# Patient Record
Sex: FEMALE | Race: WHITE | NOT HISPANIC OR LATINO | Employment: OTHER | URBAN - METROPOLITAN AREA
[De-identification: names, ages, dates, MRNs, and addresses within clinical notes are randomized per-mention and may not be internally consistent; named-entity substitution may affect disease eponyms.]

---

## 2017-01-10 ENCOUNTER — TELEPHONE (OUTPATIENT)
Dept: FAMILY MEDICINE CLINIC | Facility: CLINIC | Age: 67
End: 2017-01-10

## 2017-01-11 RX ORDER — ESCITALOPRAM OXALATE 10 MG/1
10 TABLET ORAL DAILY
Qty: 90 TABLET | Refills: 1 | Status: SHIPPED | OUTPATIENT
Start: 2017-01-11 | End: 2017-07-21 | Stop reason: SDUPTHER

## 2017-03-13 RX ORDER — MELOXICAM 15 MG/1
TABLET ORAL
Qty: 90 TABLET | Refills: 0 | Status: SHIPPED | OUTPATIENT
Start: 2017-03-13 | End: 2017-06-26 | Stop reason: SDUPTHER

## 2017-03-20 RX ORDER — BUPROPION HYDROCHLORIDE 150 MG/1
TABLET ORAL
Qty: 90 TABLET | Refills: 0 | Status: SHIPPED | OUTPATIENT
Start: 2017-03-20 | End: 2017-06-30 | Stop reason: SDUPTHER

## 2017-03-20 RX ORDER — FENOFIBRATE 145 MG/1
TABLET, COATED ORAL
Qty: 90 TABLET | Refills: 0 | Status: SHIPPED | OUTPATIENT
Start: 2017-03-20 | End: 2017-06-30 | Stop reason: SDUPTHER

## 2017-03-27 RX ORDER — AMILORIDE HYDROCHLORIDE AND HYDROCHLOROTHIAZIDE 5; 50 MG/1; MG/1
TABLET ORAL
Qty: 90 TABLET | Refills: 0 | Status: SHIPPED | OUTPATIENT
Start: 2017-03-27 | End: 2017-07-13 | Stop reason: SDUPTHER

## 2017-03-27 RX ORDER — LOSARTAN POTASSIUM 100 MG/1
TABLET ORAL
Qty: 90 TABLET | Refills: 0 | Status: SHIPPED | OUTPATIENT
Start: 2017-03-27 | End: 2017-07-13 | Stop reason: SDUPTHER

## 2017-05-15 RX ORDER — ATENOLOL 50 MG/1
TABLET ORAL
Qty: 90 TABLET | Refills: 0 | Status: SHIPPED | OUTPATIENT
Start: 2017-05-15 | End: 2017-07-21 | Stop reason: SDUPTHER

## 2017-06-26 RX ORDER — MELOXICAM 15 MG/1
TABLET ORAL
Qty: 90 TABLET | Refills: 0 | Status: SHIPPED | OUTPATIENT
Start: 2017-06-26 | End: 2017-10-17 | Stop reason: SDUPTHER

## 2017-06-30 RX ORDER — FENOFIBRATE 145 MG/1
TABLET, COATED ORAL
Qty: 90 TABLET | Refills: 0 | Status: SHIPPED | OUTPATIENT
Start: 2017-06-30 | End: 2017-07-21 | Stop reason: SDUPTHER

## 2017-06-30 RX ORDER — BUPROPION HYDROCHLORIDE 150 MG/1
TABLET ORAL
Qty: 90 TABLET | Refills: 0 | Status: SHIPPED | OUTPATIENT
Start: 2017-06-30 | End: 2017-07-21 | Stop reason: SDUPTHER

## 2017-07-05 ENCOUNTER — APPOINTMENT (OUTPATIENT)
Dept: WOMENS IMAGING | Facility: HOSPITAL | Age: 67
End: 2017-07-05

## 2017-07-05 DIAGNOSIS — R92.8 ABNORMAL MAMMOGRAM OF LEFT BREAST: Primary | ICD-10-CM

## 2017-07-05 PROCEDURE — MDREVIEWSP: Performed by: RADIOLOGY

## 2017-07-05 PROCEDURE — 77063 BREAST TOMOSYNTHESIS BI: CPT | Performed by: RADIOLOGY

## 2017-07-05 PROCEDURE — 76641 ULTRASOUND BREAST COMPLETE: CPT | Performed by: RADIOLOGY

## 2017-07-05 PROCEDURE — G0206 DX MAMMO INCL CAD UNI: HCPCS | Performed by: RADIOLOGY

## 2017-07-05 PROCEDURE — G0279 TOMOSYNTHESIS, MAMMO: HCPCS | Performed by: RADIOLOGY

## 2017-07-05 PROCEDURE — 19083 BX BREAST 1ST LESION US IMAG: CPT | Performed by: RADIOLOGY

## 2017-07-05 PROCEDURE — G0202 SCR MAMMO BI INCL CAD: HCPCS | Performed by: RADIOLOGY

## 2017-07-14 RX ORDER — LOSARTAN POTASSIUM 100 MG/1
TABLET ORAL
Qty: 90 TABLET | Refills: 0 | Status: SHIPPED | OUTPATIENT
Start: 2017-07-14 | End: 2017-07-21 | Stop reason: SDUPTHER

## 2017-07-14 RX ORDER — AMILORIDE HYDROCHLORIDE AND HYDROCHLOROTHIAZIDE 5; 50 MG/1; MG/1
TABLET ORAL
Qty: 90 TABLET | Refills: 0 | Status: SHIPPED | OUTPATIENT
Start: 2017-07-14 | End: 2017-07-21 | Stop reason: SDUPTHER

## 2017-07-21 ENCOUNTER — PREP FOR SURGERY (OUTPATIENT)
Dept: OTHER | Facility: HOSPITAL | Age: 67
End: 2017-07-21

## 2017-07-21 ENCOUNTER — OFFICE VISIT (OUTPATIENT)
Dept: FAMILY MEDICINE CLINIC | Facility: CLINIC | Age: 67
End: 2017-07-21

## 2017-07-21 ENCOUNTER — OFFICE VISIT (OUTPATIENT)
Dept: MAMMOGRAPHY | Facility: CLINIC | Age: 67
End: 2017-07-21

## 2017-07-21 VITALS
WEIGHT: 237 LBS | HEIGHT: 67 IN | SYSTOLIC BLOOD PRESSURE: 112 MMHG | OXYGEN SATURATION: 96 % | DIASTOLIC BLOOD PRESSURE: 68 MMHG | HEART RATE: 63 BPM | TEMPERATURE: 97.1 F | BODY MASS INDEX: 37.2 KG/M2

## 2017-07-21 VITALS
SYSTOLIC BLOOD PRESSURE: 120 MMHG | DIASTOLIC BLOOD PRESSURE: 80 MMHG | WEIGHT: 237 LBS | HEART RATE: 88 BPM | HEIGHT: 67 IN | BODY MASS INDEX: 37.2 KG/M2 | OXYGEN SATURATION: 95 %

## 2017-07-21 DIAGNOSIS — C50.912 RECURRENT BREAST CANCER, LEFT (HCC): Primary | ICD-10-CM

## 2017-07-21 DIAGNOSIS — I10 ESSENTIAL HYPERTENSION: ICD-10-CM

## 2017-07-21 DIAGNOSIS — K21.9 GASTROESOPHAGEAL REFLUX DISEASE WITHOUT ESOPHAGITIS: ICD-10-CM

## 2017-07-21 DIAGNOSIS — C50.412 BREAST CANCER OF UPPER-OUTER QUADRANT OF LEFT FEMALE BREAST (HCC): Primary | ICD-10-CM

## 2017-07-21 DIAGNOSIS — E78.2 MIXED HYPERLIPIDEMIA: Primary | ICD-10-CM

## 2017-07-21 PROCEDURE — 99205 OFFICE O/P NEW HI 60 MIN: CPT | Performed by: SURGERY

## 2017-07-21 PROCEDURE — 99213 OFFICE O/P EST LOW 20 MIN: CPT | Performed by: FAMILY MEDICINE

## 2017-07-21 RX ORDER — ESCITALOPRAM OXALATE 10 MG/1
10 TABLET ORAL DAILY
Qty: 90 TABLET | Refills: 1 | Status: SHIPPED | OUTPATIENT
Start: 2017-07-21 | End: 2018-03-22 | Stop reason: SDUPTHER

## 2017-07-21 RX ORDER — CHLORAL HYDRATE 500 MG
1200 CAPSULE ORAL
COMMUNITY

## 2017-07-21 RX ORDER — BUPROPION HYDROCHLORIDE 150 MG/1
150 TABLET ORAL EVERY MORNING
Qty: 90 TABLET | Refills: 1 | Status: SHIPPED | OUTPATIENT
Start: 2017-07-21 | End: 2018-02-26 | Stop reason: HOSPADM

## 2017-07-21 RX ORDER — AMOXICILLIN 500 MG/1
CAPSULE ORAL
COMMUNITY
Start: 2017-07-20 | End: 2017-07-21

## 2017-07-21 RX ORDER — AMILORIDE HYDROCHLORIDE AND HYDROCHLOROTHIAZIDE 5; 50 MG/1; MG/1
1 TABLET ORAL DAILY
Qty: 90 TABLET | Refills: 1 | Status: SHIPPED | OUTPATIENT
Start: 2017-07-21 | End: 2018-06-04 | Stop reason: SDUPTHER

## 2017-07-21 RX ORDER — LOSARTAN POTASSIUM 100 MG/1
100 TABLET ORAL DAILY
Qty: 90 TABLET | Refills: 1 | Status: SHIPPED | OUTPATIENT
Start: 2017-07-21 | End: 2018-08-08 | Stop reason: SDUPTHER

## 2017-07-21 RX ORDER — ACETAMINOPHEN 325 MG/1
650 TABLET ORAL ONCE
Status: CANCELLED | OUTPATIENT
Start: 2017-08-21 | End: 2017-07-21

## 2017-07-21 RX ORDER — ATENOLOL 50 MG/1
50 TABLET ORAL DAILY
Qty: 90 TABLET | Refills: 1 | Status: SHIPPED | OUTPATIENT
Start: 2017-07-21 | End: 2018-02-20 | Stop reason: SDUPTHER

## 2017-07-21 RX ORDER — LIDOCAINE AND PRILOCAINE 25; 25 MG/G; MG/G
CREAM TOPICAL ONCE
Status: CANCELLED | OUTPATIENT
Start: 2017-08-21 | End: 2017-07-21

## 2017-07-21 RX ORDER — CELECOXIB 200 MG/1
200 CAPSULE ORAL ONCE
Status: CANCELLED | OUTPATIENT
Start: 2017-08-21 | End: 2017-07-21

## 2017-07-21 RX ORDER — CEFAZOLIN SODIUM 2 G/100ML
2 INJECTION, SOLUTION INTRAVENOUS ONCE
Status: CANCELLED | OUTPATIENT
Start: 2017-08-21 | End: 2017-07-21

## 2017-07-21 RX ORDER — DIAZEPAM 5 MG/1
10 TABLET ORAL ONCE
Status: CANCELLED | OUTPATIENT
Start: 2017-08-21 | End: 2017-07-21

## 2017-07-21 RX ORDER — AMOXICILLIN 500 MG/1
1000 CAPSULE ORAL 2 TIMES DAILY
COMMUNITY
End: 2017-07-21

## 2017-07-21 RX ORDER — FENOFIBRATE 160 MG/1
160 TABLET ORAL DAILY
Qty: 90 TABLET | Refills: 1 | Status: SHIPPED | OUTPATIENT
Start: 2017-07-21 | End: 2018-04-19 | Stop reason: SDUPTHER

## 2017-07-21 NOTE — PROGRESS NOTES
Subjective   Alesia Resendez is a 67 y.o. female presenting with   Chief Complaint   Patient presents with   • Hypertension     check up    • Hyperlipidemia     check up    • Med Refill     all mds  send to [facility]   • Question     about fenofibrate         HPI Comments: This is a very pleasant 67-year-old who is here for follow-up for high blood pressure and high cholesterol.  She tells me that the 145 mg fenofibrate went from 8 end all her co-pay to over $100.  I told her when I checked on [employer] that the 160 mg strength should be much more reasonable.  I will send out that strength.    She also is about to get bilateral mastectomy for breast cancer from Dr. Eisenberg.  This should be done in about one month.  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  The biopsy was done by Dr. Akiko Watts and she has been referred to Dr. Calle for Mohs dissection.    She has had right knee replacement surgery and currently appears to be doing very well other than the above-stated problems.  Her mood seems very good and she seems to be accepting this well.    Hypertension     Hyperlipidemia          The following portions of the patient's history were reviewed and updated as appropriate: current medications, past family history, past medical history, past social history, past surgical history and problem list.    Review of Systems   All other systems reviewed and are negative.      Objective   Physical Exam   Constitutional: She is oriented to person, place, and time. She appears well-developed and well-nourished. No distress.   HENT:   Head: Normocephalic and atraumatic.       Eyes: EOM are normal. Pupils are equal, round, and reactive to light.   Neck: Normal range of motion. Neck supple. No thyromegaly present.   Cardiovascular: Normal rate, regular rhythm and intact distal pulses.    Murmur (1/6 systolic ejection murmur) heard.  Pulmonary/Chest: Effort  normal and breath sounds normal. No respiratory distress. She has no wheezes.   Musculoskeletal: Normal range of motion. She exhibits deformity (right knee surgical scar). She exhibits no edema or tenderness.   Lymphadenopathy:     She has no cervical adenopathy.   Neurological: She is alert and oriented to person, place, and time.   Skin: Skin is warm and dry. She is not diaphoretic.   Psychiatric: She has a normal mood and affect. Her behavior is normal.   Nursing note and vitals reviewed.      Assessment/Plan   Alesia was seen today for hypertension, hyperlipidemia, med refill and question.    Diagnoses and all orders for this visit:    Mixed hyperlipidemia  -     Comprehensive Metabolic Panel    Essential hypertension  -     Comprehensive Metabolic Panel  -     TSH    Gastroesophageal reflux disease without esophagitis  -     Comprehensive Metabolic Panel    Other orders  -     fenofibrate 160 MG tablet; Take 1 tablet by mouth Daily.                   I would like him to return for another visit in 6 month(s)

## 2017-07-21 NOTE — PATIENT INSTRUCTIONS
This is a very nice 67-year-old who is here for follow-up.  I will order routine blood work and notify her when I get the results.  I would like her to call if there is any problem.

## 2017-07-21 NOTE — PROGRESS NOTES
Chief Complaint: Alesia Resendez is a 67 y.o.. female here today for Breast Cancer (Left Breast)        History of Present Illness:  Patient presents with newly diagnosed breast cancer.  She is a nice 67-year-old white female who underwent breast conserving surgery for a left breast cancer in 2006.  She apparently had a sentinel lymph node biopsy with that procedure.  She took tamoxifen for 5 years and receive radiation treatment.  She has been obtaining yearly mammograms and her most recent mammograms discovered 2 questionable areas in the 12 o'clock position of the left breast.  Biopsy of one of these revealed an intermediate grade invasive ductal cancer that was ER positive at 98%, HI positive at 60%, and HER-2 negative.  The Ki-67 was 13%.  Her family history is negative for breast or ovarian cancer.      Review of Systems:  Review of Systems - Oncology     Past Medical and Surgical History:  Breast Biopsy History:  Patient has had the following breast biopsies:Left Breast 2006- DCIS, Left breast 2017- Ductal carcinoma  Breast Cancer HIstory:  Patient has the following past medical history of breast cancer treatment:Left Breast DCIS treated with Tamoxifen, radiation and Left Breast Lumpectomy  Breast Operations, and year:  Left Breast Lumpectomy    History   Smoking Status   • Never Smoker   Smokeless Tobacco   • Not on file     Obstetric History:  Patient is postmenopausal, entered menopause naturally at age: 50   Number of pregnancies:3  Number of live births: 3  Number of abortions or miscarriages: 0  Age of delivery of first child: 26  Patient breast fed, for the following lenth of time:9 months  Length of time taking birth control pills:4 years  Patient took hormone replacement during the following dates:Took premarin for 12 years. Stopped taking in 2006.    Past Surgical History:   Procedure Laterality Date   • APPENDECTOMY     • GALLBLADDER SURGERY     • HYSTERECTOMY     • TOTAL KNEE ARTHROPLASTY  Bilateral        Past Medical History:   Diagnosis Date   • Arthritis    • Hypertension    • Urinary incontinence        Prior Hospitalizations, other than for surgery or childbirth, and year:  Pneumonia 1991    Social History:  Patient is .  Patient has 2 daughters. and Patient has 1 sons.    Family History:  Family History   Problem Relation Age of Onset   • Heart attack Mother    • Esophageal cancer Mother    • Alcohol abuse Father    • Diabetes Father      type 2   • Heart attack Father    • Heart failure Father    • Hyperlipidemia Father    • Hypertension Father    • Prostate cancer Father    • Birth defects Brother    • Heart attack Brother        Vital Signs:  Vitals:    07/21/17 0948   BP: 112/68   Pulse: 63   Temp: 97.1 °F (36.2 °C)   SpO2: 96%       Medications:    Current Outpatient Prescriptions:     Current Outpatient Prescriptions:   •  aMILoride-hydrochlorothiazide (MODURETIC) 5-50 MG per tablet, TAKE ONE TABLET BY MOUTH DAILY, Disp: 90 tablet, Rfl: 0  •  amoxicillin (AMOXIL) 500 MG capsule, , Disp: , Rfl:   •  atenolol (TENORMIN) 50 MG tablet, TAKE ONE TABLET BY MOUTH DAILY, Disp: 90 tablet, Rfl: 0  •  buPROPion XL (WELLBUTRIN XL) 150 MG 24 hr tablet, TAKE ONE TABLET BY MOUTH DAILY, Disp: 90 tablet, Rfl: 0  •  escitalopram (LEXAPRO) 10 MG tablet, Take 1 tablet by mouth Daily., Disp: 90 tablet, Rfl: 1  •  fenofibrate (TRICOR) 145 MG tablet, TAKE ONE TABLET BY MOUTH DAILY, Disp: 90 tablet, Rfl: 0  •  losartan (COZAAR) 100 MG tablet, TAKE ONE TABLET BY MOUTH DAILY, Disp: 90 tablet, Rfl: 0  •  meloxicam (MOBIC) 15 MG tablet, TAKE ONE TABLET BY MOUTH DAILY AS NEEDED, Disp: 90 tablet, Rfl: 0  •  Mirabegron (MYRBETRIQ PO), Take 50 mg by mouth., Disp: , Rfl:   •  Multiple Vitamins-Minerals (CENTRUM SILVER PO), Take  by mouth., Disp: , Rfl:   •  Omega-3 Fatty Acids (FISH OIL) 1000 MG capsule capsule, Take 1,200 mg by mouth Daily With Breakfast., Disp: , Rfl:   •  omeprazole (priLOSEC) 20 MG capsule,  TAKE ONE CAPSULE BY MOUTH DAILY, Disp: 90 capsule, Rfl: 0    Physical Examination:  General Appearance:   Patient is in no distress.  She is well kept and has an obese build.   Psychiatric:  Patient with appropriate mood and affect. Alert and oriented to self, time, and place.    Breast, RIGHT:  small sized, symmetric with the contralateral side.  Breast skin is without erythema, edema, rashes.  There are no visible abnormalities upon inspection during the arm-raising maneuver or with hands on hips in the sitting position. There is no nipple retraction, discharge or nipple/areolar skin changes.There are no masses palpable in the sitting or supine positions.    Breast, LEFT:  small sized, symmetric with the contralateral side.  Breast skin is without erythema, edema, rashes.  There are no visible abnormalities upon inspection during the arm-raising maneuver or with hands on hips in the sitting position. There is no nipple retraction, discharge or nipple/areolar skin changes.There is a lumpectomy scar in the upper outer quadrant with a bit of architectural distortion.  There is a small biopsy scar nearby and some bruising but I do not feel an obvious mass.    Lymphatic:  There is no axillary, cervical, infraclavicular, or supraclavicular adenopathy bilaterally.  Eyes:  Pupils are round and reactive to light.  Cardiovascular:  Heart rate and rhythm are regular.  Respiratory:  Lungs are clear bilaterally with no crackles or wheezes in any lung field.  Gastrointestinal:  Abdomen is soft, nondistended, and nontender.  There is no evidence of hepatosplenomegaly.  There are no scars from previous surgery.    Musculoskeletal:  Good strength in all 4 extremities.   There is good range of motion in both shoulders.  There are scars on both knees from her knee replacement  Skin:  She does have a small area along the nasolabial fold on the left side of her face which is a squamous cell cancer.    Assessment:  1. Breast cancer of  upper-outer quadrant of left female breast          Plan:  She was accompanied today by her daughter who is a nurse.  The office visit lasted 1 hour with 50 minutes spent in face-to-face consultation.  We began the conversation discussing her pathology report.  We talked about the origin of most of breast cancers from either the ducts or the lobules.  The difference between invasive and in situ disease was explained and the visual was drawn for her.  When invasion has occurred, the lymph nodes need to be evaluated.  When there is in situ disease the lymph nodes should be considered if the area of concern is widespread or it is high-grade.  We also discussed the significance of hormone receptors.  They often can give us some idea how the breast cancer will behave and potentially lead us to offer neoadjuvant chemotherapy.    Next we discussed the surgical options which include breast conserving therapy versus a mastectomy.  With breast conserving therapy we are talking about a lumpectomy with margins, lymph node evaluation, and radiation treatment.  The radiation treatment is generally given to the entire breast for 6 weeks.  The side effects consist of local skin change and potential injury to nearby structures such as the lung or the heart.  A mastectomy would involve removing the breast tissues with preservation of the pectoral muscles and evaluation of the lymph nodes if indicated.  The potential for reconstruction by either an implant or autologous tissue was discussed.  This could be performed on a delayed basis or immediately depending on a multitude of factors.  The survival rates for these 2 procedures are equivalent but there are incidences where one may be favored over the other.  There are times when a lumpectomy is not possible due to the large tumor to breast ratio or the location of the tumor.  Previous radiation to the chest wall or collagen disorders such as scleroderma would make radiation treatment  and possible and therefore exclude breast conserving therapy as an option.    We discussed her situation and unfortunately she does not have many choices.  Because of her previous breast conserving surgery treatment she is not a candidate for repeat lumpectomy.  She will need a mastectomy and is not interested in reconstruction.  She does desire to have bilateral mastectomies and I think that is reasonable in her situation.  We talked about the possibility of genetic testing because this is a second breast primary in her.  We certainly can consider that but the operation she is desiring would be the recommended procedure if she was carrying a genetic mutation.      CPT coding:    Next Appointment:  No Follow-up on file.

## 2017-07-22 LAB
ALBUMIN SERPL-MCNC: 4.7 G/DL (ref 3.5–5.2)
ALBUMIN/GLOB SERPL: 1.7 G/DL
ALP SERPL-CCNC: 54 U/L (ref 39–117)
ALT SERPL-CCNC: 42 U/L (ref 1–33)
AST SERPL-CCNC: 42 U/L (ref 1–32)
BILIRUB SERPL-MCNC: 0.4 MG/DL (ref 0.1–1.2)
BUN SERPL-MCNC: 24 MG/DL (ref 8–23)
BUN/CREAT SERPL: 24 (ref 7–25)
CALCIUM SERPL-MCNC: 10.9 MG/DL (ref 8.6–10.5)
CHLORIDE SERPL-SCNC: 103 MMOL/L (ref 98–107)
CO2 SERPL-SCNC: 26.6 MMOL/L (ref 22–29)
CREAT SERPL-MCNC: 1 MG/DL (ref 0.57–1)
GLOBULIN SER CALC-MCNC: 2.8 GM/DL
GLUCOSE SERPL-MCNC: 99 MG/DL (ref 65–99)
POTASSIUM SERPL-SCNC: 4.4 MMOL/L (ref 3.5–5.2)
PROT SERPL-MCNC: 7.5 G/DL (ref 6–8.5)
SODIUM SERPL-SCNC: 145 MMOL/L (ref 136–145)
TSH SERPL DL<=0.005 MIU/L-ACNC: 1.03 MIU/ML (ref 0.27–4.2)

## 2017-07-25 PROBLEM — C50.919 RECURRENT BREAST CANCER: Status: ACTIVE | Noted: 2017-07-25

## 2017-07-26 ENCOUNTER — HOSPITAL ENCOUNTER (OUTPATIENT)
Dept: CT IMAGING | Facility: HOSPITAL | Age: 67
Discharge: HOME | End: 2017-07-26
Admitting: FAMILY MEDICINE

## 2017-07-26 ENCOUNTER — OFFICE VISIT (OUTPATIENT)
Dept: FAMILY MEDICINE CLINIC | Facility: CLINIC | Age: 67
End: 2017-07-26

## 2017-07-26 VITALS
HEIGHT: 67 IN | TEMPERATURE: 98.5 F | DIASTOLIC BLOOD PRESSURE: 76 MMHG | WEIGHT: 237 LBS | HEART RATE: 61 BPM | OXYGEN SATURATION: 97 % | BODY MASS INDEX: 37.2 KG/M2 | SYSTOLIC BLOOD PRESSURE: 122 MMHG

## 2017-07-26 DIAGNOSIS — R10.11 RIGHT UPPER QUADRANT ABDOMINAL PAIN: ICD-10-CM

## 2017-07-26 DIAGNOSIS — I10 ESSENTIAL HYPERTENSION: ICD-10-CM

## 2017-07-26 DIAGNOSIS — R10.11 RUQ ABDOMINAL PAIN: Primary | ICD-10-CM

## 2017-07-26 DIAGNOSIS — K21.9 GASTROESOPHAGEAL REFLUX DISEASE WITHOUT ESOPHAGITIS: ICD-10-CM

## 2017-07-26 DIAGNOSIS — E78.2 MIXED HYPERLIPIDEMIA: Primary | ICD-10-CM

## 2017-07-26 PROCEDURE — 0 IOPAMIDOL 61 % SOLUTION: Performed by: FAMILY MEDICINE

## 2017-07-26 PROCEDURE — 74177 CT ABD & PELVIS W/CONTRAST: CPT

## 2017-07-26 PROCEDURE — 82565 ASSAY OF CREATININE: CPT

## 2017-07-26 PROCEDURE — 0 DIATRIZOATE MEGLUMINE & SODIUM PER 1 ML: Performed by: FAMILY MEDICINE

## 2017-07-26 PROCEDURE — 99213 OFFICE O/P EST LOW 20 MIN: CPT | Performed by: FAMILY MEDICINE

## 2017-07-26 RX ADMIN — IOPAMIDOL 85 ML: 612 INJECTION, SOLUTION INTRAVENOUS at 14:37

## 2017-07-26 RX ADMIN — DIATRIZOATE MEGLUMINE AND DIATRIZOATE SODIUM 30 ML: 660; 100 LIQUID ORAL; RECTAL at 13:30

## 2017-07-26 NOTE — PATIENT INSTRUCTIONS
This is a very nice 67-year-old who has acute right upper quadrant abdominal pain.  I will request a CT of the abdomen and call her but if she gets significantly worse she should go on to the emergency department.

## 2017-07-27 LAB
ALBUMIN SERPL-MCNC: 4.7 G/DL (ref 3.5–5.2)
ALBUMIN/GLOB SERPL: 1.9 G/DL
ALP SERPL-CCNC: 60 U/L (ref 39–117)
ALT SERPL-CCNC: 30 U/L (ref 1–33)
AMYLASE SERPL-CCNC: 54 U/L (ref 28–100)
AST SERPL-CCNC: 22 U/L (ref 1–32)
BASOPHILS # BLD AUTO: 0.05 10*3/MM3 (ref 0–0.2)
BASOPHILS NFR BLD AUTO: 0.6 % (ref 0–1.5)
BILIRUB SERPL-MCNC: 0.3 MG/DL (ref 0.1–1.2)
BUN SERPL-MCNC: 21 MG/DL (ref 8–23)
BUN/CREAT SERPL: 20.6 (ref 7–25)
CALCIUM SERPL-MCNC: 10.1 MG/DL (ref 8.6–10.5)
CHLORIDE SERPL-SCNC: 103 MMOL/L (ref 98–107)
CO2 SERPL-SCNC: 25 MMOL/L (ref 22–29)
CREAT BLDA-MCNC: 1.1 MG/DL (ref 0.6–1.3)
CREAT SERPL-MCNC: 1.02 MG/DL (ref 0.57–1)
EOSINOPHIL # BLD AUTO: 0.26 10*3/MM3 (ref 0–0.7)
EOSINOPHIL NFR BLD AUTO: 3.3 % (ref 0.3–6.2)
ERYTHROCYTE [DISTWIDTH] IN BLOOD BY AUTOMATED COUNT: 13.2 % (ref 11.7–13)
GLOBULIN SER CALC-MCNC: 2.5 GM/DL
GLUCOSE SERPL-MCNC: 102 MG/DL (ref 65–99)
HCT VFR BLD AUTO: 44.6 % (ref 35.6–45.5)
HGB BLD-MCNC: 14.2 G/DL (ref 11.9–15.5)
IMM GRANULOCYTES # BLD: 0 10*3/MM3 (ref 0–0.03)
IMM GRANULOCYTES NFR BLD: 0 % (ref 0–0.5)
LYMPHOCYTES # BLD AUTO: 4.08 10*3/MM3 (ref 0.9–4.8)
LYMPHOCYTES NFR BLD AUTO: 52.4 % (ref 19.6–45.3)
MCH RBC QN AUTO: 30.1 PG (ref 26.9–32)
MCHC RBC AUTO-ENTMCNC: 31.8 G/DL (ref 32.4–36.3)
MCV RBC AUTO: 94.5 FL (ref 80.5–98.2)
MONOCYTES # BLD AUTO: 0.58 10*3/MM3 (ref 0.2–1.2)
MONOCYTES NFR BLD AUTO: 7.5 % (ref 5–12)
NEUTROPHILS # BLD AUTO: 2.81 10*3/MM3 (ref 1.9–8.1)
NEUTROPHILS NFR BLD AUTO: 36.2 % (ref 42.7–76)
PLATELET # BLD AUTO: 304 10*3/MM3 (ref 140–500)
POTASSIUM SERPL-SCNC: 4.5 MMOL/L (ref 3.5–5.2)
PROT SERPL-MCNC: 7.2 G/DL (ref 6–8.5)
RBC # BLD AUTO: 4.72 10*6/MM3 (ref 3.9–5.2)
SODIUM SERPL-SCNC: 143 MMOL/L (ref 136–145)
WBC # BLD AUTO: 7.78 10*3/MM3 (ref 4.5–10.7)

## 2017-08-09 ENCOUNTER — TELEPHONE (OUTPATIENT)
Dept: OTHER | Facility: HOSPITAL | Age: 67
End: 2017-08-09

## 2017-08-09 NOTE — TELEPHONE ENCOUNTER
I called and spoke with Alesia today. She would like to meet for a navigational meeting August 14th after her PAT appointment at 11:00. [facility].

## 2017-08-14 ENCOUNTER — APPOINTMENT (OUTPATIENT)
Dept: PREADMISSION TESTING | Facility: HOSPITAL | Age: 67
End: 2017-08-14

## 2017-08-14 ENCOUNTER — DOCUMENTATION (OUTPATIENT)
Dept: OTHER | Facility: HOSPITAL | Age: 67
End: 2017-08-14

## 2017-08-14 ENCOUNTER — HOSPITAL ENCOUNTER (OUTPATIENT)
Dept: GENERAL RADIOLOGY | Facility: HOSPITAL | Age: 67
Discharge: HOME | End: 2017-08-14
Admitting: SURGERY

## 2017-08-14 VITALS
HEART RATE: 60 BPM | TEMPERATURE: 98.4 F | OXYGEN SATURATION: 95 % | DIASTOLIC BLOOD PRESSURE: 78 MMHG | BODY MASS INDEX: 36.59 KG/M2 | WEIGHT: 241.44 LBS | RESPIRATION RATE: 16 BRPM | HEIGHT: 68 IN | SYSTOLIC BLOOD PRESSURE: 125 MMHG

## 2017-08-14 DIAGNOSIS — C50.912 RECURRENT BREAST CANCER, LEFT (HCC): ICD-10-CM

## 2017-08-14 LAB
ALBUMIN SERPL-MCNC: 4.2 G/DL (ref 3.5–5.2)
ALBUMIN/GLOB SERPL: 1.5 G/DL
ALP SERPL-CCNC: 55 U/L (ref 39–117)
ALT SERPL W P-5'-P-CCNC: 31 U/L (ref 1–33)
ANION GAP SERPL CALCULATED.3IONS-SCNC: 12.3 MMOL/L
AST SERPL-CCNC: 32 U/L (ref 1–32)
BILIRUB SERPL-MCNC: 0.3 MG/DL (ref 0.1–1.2)
BUN BLD-MCNC: 26 MG/DL (ref 8–23)
BUN/CREAT SERPL: 28.6 (ref 7–25)
CALCIUM SPEC-SCNC: 10.3 MG/DL (ref 8.6–10.5)
CHLORIDE SERPL-SCNC: 105 MMOL/L (ref 98–107)
CO2 SERPL-SCNC: 25.7 MMOL/L (ref 22–29)
CREAT BLD-MCNC: 0.91 MG/DL (ref 0.57–1)
DEPRECATED RDW RBC AUTO: 44.4 FL (ref 37–54)
ERYTHROCYTE [DISTWIDTH] IN BLOOD BY AUTOMATED COUNT: 13.2 % (ref 11.7–13)
GFR SERPL CREATININE-BSD FRML MDRD: 62 ML/MIN/1.73
GLOBULIN UR ELPH-MCNC: 2.8 GM/DL
GLUCOSE BLD-MCNC: 110 MG/DL (ref 65–99)
HCT VFR BLD AUTO: 41.8 % (ref 35.6–45.5)
HGB BLD-MCNC: 13.3 G/DL (ref 11.9–15.5)
MCH RBC QN AUTO: 29.4 PG (ref 26.9–32)
MCHC RBC AUTO-ENTMCNC: 31.8 G/DL (ref 32.4–36.3)
MCV RBC AUTO: 92.5 FL (ref 80.5–98.2)
PLATELET # BLD AUTO: 250 10*3/MM3 (ref 140–500)
PMV BLD AUTO: 9.5 FL (ref 6–12)
POTASSIUM BLD-SCNC: 3.7 MMOL/L (ref 3.5–5.2)
PROT SERPL-MCNC: 7 G/DL (ref 6–8.5)
RBC # BLD AUTO: 4.52 10*6/MM3 (ref 3.9–5.2)
SODIUM BLD-SCNC: 143 MMOL/L (ref 136–145)
WBC NRBC COR # BLD: 6.4 10*3/MM3 (ref 4.5–10.7)

## 2017-08-14 PROCEDURE — 36415 COLL VENOUS BLD VENIPUNCTURE: CPT

## 2017-08-14 PROCEDURE — 85027 COMPLETE CBC AUTOMATED: CPT | Performed by: SURGERY

## 2017-08-14 PROCEDURE — 80053 COMPREHEN METABOLIC PANEL: CPT | Performed by: SURGERY

## 2017-08-14 PROCEDURE — 93010 ELECTROCARDIOGRAM REPORT: CPT | Performed by: INTERNAL MEDICINE

## 2017-08-14 PROCEDURE — 93005 ELECTROCARDIOGRAM TRACING: CPT

## 2017-08-14 PROCEDURE — 71020 HC CHEST PA AND LATERAL: CPT

## 2017-08-14 NOTE — DISCHARGE INSTRUCTIONS
Take the following medications the morning of surgery with a small sip of water:    LOSARTAN   OMEPRAZOLE    General Instructions:  • Do not eat solid food after midnight the night before surgery.  • You may drink clear liquids day of surgery but must stop at least one hour before your hospital arrival time. (0800)  • It is beneficial for you to have a clear drink that contains carbohydrates the day of surgery.  We suggest a 20 ounce bottle of Gatorade or Powerade for non-diabetic patients     Clear liquids are liquids you can see through.  Nothing red in color.     Plain water                               Sports drinks  Sodas                                   Gelatin (Jell-O)  Fruit juices without pulp such as white grape juice and apple juice  Popsicles that contain no fruit or yogurt  Tea or coffee (no cream or milk added)  Gatorade / Powerade  G2 / Powerade Zero    • Do not smoke, use chewing tobacco or drink alcohol the day of surgery.   • Bring any papers given to you in the doctor’s office.  • Wear clean comfortable clothes and socks.  • Do not wear contact lenses or make-up.  Bring a case for your glasses.   • Leave all other valuables and jewelry at home.  • The Pre-Admission Testing nurse will instruct you to bring medications if unable to obtain an accurate list in Pre-Admission Testing.    • REPORT TO OUTPATIENT SURGERY ON 8- AT 0900        Preventing a Surgical Site Infection:  • For 2 to 3 days before surgery, avoid shaving with a razor because the razor can irritate skin and make it easier to develop an infection.  • The night prior to surgery sleep in a clean bed with clean clothing.  Do not allow pets to sleep with you.  • Shower on the morning of surgery using a fresh bar of anti-bacterial soap (such as Dial) and clean washcloth.  Dry with a clean towel and dress in clean clothing.  • Ask your surgeon if you will be receiving antibiotics prior to surgery.  • Make sure you, your family, and  all healthcare providers clean their hands with soap and water or an alcohol based hand  before caring for you or your wound.    Day of surgery:  Upon arrival, a Pre-op nurse and Anesthesiologist will review your health history, obtain vital signs, and answer questions you may have.  The only belongings needed at this time will be your home medications and if applicable your C-PAP/BI-PAP machine.  If you are staying overnight your family can leave the rest of your belongings in the car and bring them to your room later.  A Pre-op nurse will start an IV and you may receive medication in preparation for surgery, including something to help you relax.  Your family will be able to see you in the Pre-op area.  While you are in surgery your family should notify the waiting room  if they leave the waiting room area and provide a contact phone number.    Please be aware that surgery does come with discomfort.  We want to make every effort to control your discomfort so please discuss any uncontrolled symptoms with your nurse.   Your doctor will most likely have prescribed pain medications.      If you are staying overnight following surgery, you will be transported to your hospital room following the recovery period.  [facility] has all private rooms.    If you have any questions please call Pre-Admission Testing at [phone].    Deductibles and co-payments are collected on the day of service. Please be prepared to pay the required co-pay, deductible or deposit on the day of service as defined by your plan.

## 2017-08-14 NOTE — PROGRESS NOTES
I met with Alesia today after her PAT appointment to review what to expect from her upcoming surgery and in the post-operative period. We reviewed drain care principals and she was able to view a J/P drain, a Softee Post Surgical Camisole, and knitted prosthesis. She lives alone, but her sister will be staying with her for a week. Her sister happens to be a nurse.  We discussed that Dr. Eisneberg will refer her to a medical oncologist. She states she was seen outside the system before, but would like to see someone at [facility] now. She also stated that she took Tamoxifen back in 2006 and suffered terrible hot flashes. She will call for any questions that arise.

## 2017-08-21 ENCOUNTER — HOSPITAL ENCOUNTER (OUTPATIENT)
Facility: HOSPITAL | Age: 67
Discharge: HOME | End: 2017-08-22
Attending: SURGERY | Admitting: SURGERY

## 2017-08-21 ENCOUNTER — ANESTHESIA (OUTPATIENT)
Dept: PERIOP | Facility: HOSPITAL | Age: 67
End: 2017-08-21

## 2017-08-21 ENCOUNTER — HOSPITAL ENCOUNTER (OUTPATIENT)
Dept: NUCLEAR MEDICINE | Facility: HOSPITAL | Age: 67
Discharge: HOME | End: 2017-08-21
Attending: SURGERY

## 2017-08-21 ENCOUNTER — ANESTHESIA EVENT (OUTPATIENT)
Dept: PERIOP | Facility: HOSPITAL | Age: 67
End: 2017-08-21

## 2017-08-21 DIAGNOSIS — C50.912 RECURRENT BREAST CANCER, LEFT (HCC): ICD-10-CM

## 2017-08-21 PROCEDURE — 25010000002 SUCCINYLCHOLINE PER 20 MG: Performed by: NURSE ANESTHETIST, CERTIFIED REGISTERED

## 2017-08-21 PROCEDURE — A9541 TC99M SULFUR COLLOID: HCPCS | Performed by: SURGERY

## 2017-08-21 PROCEDURE — G0378 HOSPITAL OBSERVATION PER HR: HCPCS

## 2017-08-21 PROCEDURE — 38525 BIOPSY/REMOVAL LYMPH NODES: CPT | Performed by: SURGERY

## 2017-08-21 PROCEDURE — 25010000002 FENTANYL CITRATE (PF) 100 MCG/2ML SOLUTION: Performed by: NURSE ANESTHETIST, CERTIFIED REGISTERED

## 2017-08-21 PROCEDURE — 88332 PATH CONSLTJ SURG EA ADD BLK: CPT | Performed by: SURGERY

## 2017-08-21 PROCEDURE — 25010000002 PROPOFOL 10 MG/ML EMULSION: Performed by: NURSE ANESTHETIST, CERTIFIED REGISTERED

## 2017-08-21 PROCEDURE — 0 TECHNETIUM FILTERED SULFUR COLLOID: Performed by: SURGERY

## 2017-08-21 PROCEDURE — 25010000002 ONDANSETRON PER 1 MG: Performed by: NURSE ANESTHETIST, CERTIFIED REGISTERED

## 2017-08-21 PROCEDURE — 38792 RA TRACER ID OF SENTINL NODE: CPT | Performed by: SURGERY

## 2017-08-21 PROCEDURE — 88307 TISSUE EXAM BY PATHOLOGIST: CPT | Performed by: SURGERY

## 2017-08-21 PROCEDURE — 38792 RA TRACER ID OF SENTINL NODE: CPT

## 2017-08-21 PROCEDURE — 25010000002 MIDAZOLAM PER 1 MG: Performed by: ANESTHESIOLOGY

## 2017-08-21 PROCEDURE — 25010000002 FENTANYL CITRATE (PF) 100 MCG/2ML SOLUTION: Performed by: ANESTHESIOLOGY

## 2017-08-21 PROCEDURE — 25010000002 DEXAMETHASONE PER 1 MG: Performed by: NURSE ANESTHETIST, CERTIFIED REGISTERED

## 2017-08-21 PROCEDURE — 88331 PATH CONSLTJ SURG 1 BLK 1SPC: CPT | Performed by: SURGERY

## 2017-08-21 PROCEDURE — 25010000003 CEFAZOLIN IN D5W 1 GM/50ML SOLUTION: Performed by: SURGERY

## 2017-08-21 PROCEDURE — 19303 MAST SIMPLE COMPLETE: CPT | Performed by: SURGERY

## 2017-08-21 PROCEDURE — 25010000003 CEFAZOLIN IN DEXTROSE 2-4 GM/100ML-% SOLUTION: Performed by: SURGERY

## 2017-08-21 RX ORDER — CELECOXIB 200 MG/1
200 CAPSULE ORAL ONCE
Status: COMPLETED | OUTPATIENT
Start: 2017-08-21 | End: 2017-08-21

## 2017-08-21 RX ORDER — ACETAMINOPHEN 650 MG/1
650 SUPPOSITORY RECTAL ONCE AS NEEDED
Status: DISCONTINUED | OUTPATIENT
Start: 2017-08-21 | End: 2017-08-21 | Stop reason: HOSPADM

## 2017-08-21 RX ORDER — ACETAMINOPHEN 325 MG/1
650 TABLET ORAL EVERY 6 HOURS PRN
Status: DISCONTINUED | OUTPATIENT
Start: 2017-08-21 | End: 2017-08-22 | Stop reason: HOSPADM

## 2017-08-21 RX ORDER — DEXAMETHASONE SODIUM PHOSPHATE 10 MG/ML
INJECTION INTRAMUSCULAR; INTRAVENOUS AS NEEDED
Status: DISCONTINUED | OUTPATIENT
Start: 2017-08-21 | End: 2017-08-21 | Stop reason: SURG

## 2017-08-21 RX ORDER — NALOXONE HCL 0.4 MG/ML
0.4 VIAL (ML) INJECTION
Status: DISCONTINUED | OUTPATIENT
Start: 2017-08-21 | End: 2017-08-22 | Stop reason: HOSPADM

## 2017-08-21 RX ORDER — OXYCODONE HYDROCHLORIDE AND ACETAMINOPHEN 5; 325 MG/1; MG/1
1 TABLET ORAL ONCE AS NEEDED
Status: DISCONTINUED | OUTPATIENT
Start: 2017-08-21 | End: 2017-08-21 | Stop reason: HOSPADM

## 2017-08-21 RX ORDER — LIDOCAINE HYDROCHLORIDE 10 MG/ML
0.5 INJECTION, SOLUTION EPIDURAL; INFILTRATION; INTRACAUDAL; PERINEURAL ONCE AS NEEDED
Status: COMPLETED | OUTPATIENT
Start: 2017-08-21 | End: 2017-08-21

## 2017-08-21 RX ORDER — BUPROPION HYDROCHLORIDE 150 MG/1
150 TABLET ORAL EVERY MORNING
Status: DISCONTINUED | OUTPATIENT
Start: 2017-08-22 | End: 2017-08-22 | Stop reason: HOSPADM

## 2017-08-21 RX ORDER — ISOSULFAN BLUE 50 MG/5ML
INJECTION, SOLUTION SUBCUTANEOUS
Status: DISPENSED
Start: 2017-08-21 | End: 2017-08-21

## 2017-08-21 RX ORDER — ACETAMINOPHEN 325 MG/1
650 TABLET ORAL ONCE AS NEEDED
Status: DISCONTINUED | OUTPATIENT
Start: 2017-08-21 | End: 2017-08-21 | Stop reason: HOSPADM

## 2017-08-21 RX ORDER — PROPOFOL 10 MG/ML
VIAL (ML) INTRAVENOUS AS NEEDED
Status: DISCONTINUED | OUTPATIENT
Start: 2017-08-21 | End: 2017-08-21 | Stop reason: SURG

## 2017-08-21 RX ORDER — LIDOCAINE AND PRILOCAINE 25; 25 MG/G; MG/G
CREAM TOPICAL ONCE
Status: COMPLETED | OUTPATIENT
Start: 2017-08-21 | End: 2017-08-21

## 2017-08-21 RX ORDER — PROMETHAZINE HYDROCHLORIDE 25 MG/ML
6.25 INJECTION, SOLUTION INTRAMUSCULAR; INTRAVENOUS ONCE AS NEEDED
Status: DISCONTINUED | OUTPATIENT
Start: 2017-08-21 | End: 2017-08-21 | Stop reason: HOSPADM

## 2017-08-21 RX ORDER — AMILORIDE HYDROCHLORIDE 5 MG/1
5 TABLET ORAL DAILY
Status: DISCONTINUED | OUTPATIENT
Start: 2017-08-21 | End: 2017-08-22 | Stop reason: HOSPADM

## 2017-08-21 RX ORDER — DIPHENHYDRAMINE HYDROCHLORIDE 50 MG/ML
12.5 INJECTION INTRAMUSCULAR; INTRAVENOUS
Status: DISCONTINUED | OUTPATIENT
Start: 2017-08-21 | End: 2017-08-21 | Stop reason: HOSPADM

## 2017-08-21 RX ORDER — NALOXONE HCL 0.4 MG/ML
0.4 VIAL (ML) INJECTION AS NEEDED
Status: DISCONTINUED | OUTPATIENT
Start: 2017-08-21 | End: 2017-08-21 | Stop reason: HOSPADM

## 2017-08-21 RX ORDER — ACETAMINOPHEN 325 MG/1
650 TABLET ORAL ONCE
Status: DISCONTINUED | OUTPATIENT
Start: 2017-08-21 | End: 2017-08-21 | Stop reason: HOSPADM

## 2017-08-21 RX ORDER — NALOXONE HCL 0.4 MG/ML
0.1 VIAL (ML) INJECTION
Status: DISCONTINUED | OUTPATIENT
Start: 2017-08-21 | End: 2017-08-22 | Stop reason: HOSPADM

## 2017-08-21 RX ORDER — ESCITALOPRAM OXALATE 10 MG/1
10 TABLET ORAL DAILY
Status: DISCONTINUED | OUTPATIENT
Start: 2017-08-21 | End: 2017-08-22 | Stop reason: HOSPADM

## 2017-08-21 RX ORDER — HYDROMORPHONE HYDROCHLORIDE 1 MG/ML
0.5 INJECTION, SOLUTION INTRAMUSCULAR; INTRAVENOUS; SUBCUTANEOUS
Status: DISCONTINUED | OUTPATIENT
Start: 2017-08-21 | End: 2017-08-21 | Stop reason: HOSPADM

## 2017-08-21 RX ORDER — PROMETHAZINE HYDROCHLORIDE 25 MG/1
25 SUPPOSITORY RECTAL ONCE AS NEEDED
Status: DISCONTINUED | OUTPATIENT
Start: 2017-08-21 | End: 2017-08-21 | Stop reason: HOSPADM

## 2017-08-21 RX ORDER — ATENOLOL 50 MG/1
50 TABLET ORAL NIGHTLY
Status: DISCONTINUED | OUTPATIENT
Start: 2017-08-21 | End: 2017-08-22 | Stop reason: HOSPADM

## 2017-08-21 RX ORDER — FENTANYL CITRATE 50 UG/ML
INJECTION, SOLUTION INTRAMUSCULAR; INTRAVENOUS AS NEEDED
Status: DISCONTINUED | OUTPATIENT
Start: 2017-08-21 | End: 2017-08-21 | Stop reason: SURG

## 2017-08-21 RX ORDER — SODIUM CHLORIDE 0.9 % (FLUSH) 0.9 %
1-10 SYRINGE (ML) INJECTION AS NEEDED
Status: DISCONTINUED | OUTPATIENT
Start: 2017-08-21 | End: 2017-08-21 | Stop reason: HOSPADM

## 2017-08-21 RX ORDER — ONDANSETRON 4 MG/1
4 TABLET, FILM COATED ORAL EVERY 6 HOURS PRN
Status: DISCONTINUED | OUTPATIENT
Start: 2017-08-21 | End: 2017-08-22 | Stop reason: HOSPADM

## 2017-08-21 RX ORDER — CEFAZOLIN SODIUM 2 G/100ML
2 INJECTION, SOLUTION INTRAVENOUS ONCE
Status: COMPLETED | OUTPATIENT
Start: 2017-08-21 | End: 2017-08-21

## 2017-08-21 RX ORDER — HYDRALAZINE HYDROCHLORIDE 20 MG/ML
5 INJECTION INTRAMUSCULAR; INTRAVENOUS
Status: DISCONTINUED | OUTPATIENT
Start: 2017-08-21 | End: 2017-08-21 | Stop reason: HOSPADM

## 2017-08-21 RX ORDER — LOSARTAN POTASSIUM 50 MG/1
100 TABLET ORAL DAILY
Status: DISCONTINUED | OUTPATIENT
Start: 2017-08-21 | End: 2017-08-22 | Stop reason: HOSPADM

## 2017-08-21 RX ORDER — LIDOCAINE HYDROCHLORIDE 20 MG/ML
INJECTION, SOLUTION INFILTRATION; PERINEURAL AS NEEDED
Status: DISCONTINUED | OUTPATIENT
Start: 2017-08-21 | End: 2017-08-21 | Stop reason: SURG

## 2017-08-21 RX ORDER — MAGNESIUM HYDROXIDE 1200 MG/15ML
LIQUID ORAL AS NEEDED
Status: DISCONTINUED | OUTPATIENT
Start: 2017-08-21 | End: 2017-08-21 | Stop reason: HOSPADM

## 2017-08-21 RX ORDER — HYDROCHLOROTHIAZIDE 50 MG/1
50 TABLET ORAL DAILY
Status: DISCONTINUED | OUTPATIENT
Start: 2017-08-21 | End: 2017-08-22 | Stop reason: HOSPADM

## 2017-08-21 RX ORDER — MIDAZOLAM HYDROCHLORIDE 1 MG/ML
1 INJECTION INTRAMUSCULAR; INTRAVENOUS
Status: DISCONTINUED | OUTPATIENT
Start: 2017-08-21 | End: 2017-08-21 | Stop reason: HOSPADM

## 2017-08-21 RX ORDER — FAMOTIDINE 10 MG/ML
20 INJECTION, SOLUTION INTRAVENOUS ONCE
Status: COMPLETED | OUTPATIENT
Start: 2017-08-21 | End: 2017-08-21

## 2017-08-21 RX ORDER — ONDANSETRON 2 MG/ML
INJECTION INTRAMUSCULAR; INTRAVENOUS AS NEEDED
Status: DISCONTINUED | OUTPATIENT
Start: 2017-08-21 | End: 2017-08-21 | Stop reason: SURG

## 2017-08-21 RX ORDER — HYDROMORPHONE HYDROCHLORIDE 1 MG/ML
0.5 INJECTION, SOLUTION INTRAMUSCULAR; INTRAVENOUS; SUBCUTANEOUS
Status: DISCONTINUED | OUTPATIENT
Start: 2017-08-21 | End: 2017-08-22 | Stop reason: HOSPADM

## 2017-08-21 RX ORDER — AMILORIDE HYDROCHLORIDE AND HYDROCHLOROTHIAZIDE 5; 50 MG/1; MG/1
1 TABLET ORAL DAILY
Status: DISCONTINUED | OUTPATIENT
Start: 2017-08-21 | End: 2017-08-21 | Stop reason: CLARIF

## 2017-08-21 RX ORDER — EPHEDRINE SULFATE 50 MG/ML
INJECTION, SOLUTION INTRAVENOUS AS NEEDED
Status: DISCONTINUED | OUTPATIENT
Start: 2017-08-21 | End: 2017-08-21 | Stop reason: SURG

## 2017-08-21 RX ORDER — SCOLOPAMINE TRANSDERMAL SYSTEM 1 MG/1
1 PATCH, EXTENDED RELEASE TRANSDERMAL ONCE
Status: COMPLETED | OUTPATIENT
Start: 2017-08-21 | End: 2017-08-22

## 2017-08-21 RX ORDER — DIAZEPAM 5 MG/1
10 TABLET ORAL ONCE
Status: COMPLETED | OUTPATIENT
Start: 2017-08-21 | End: 2017-08-21

## 2017-08-21 RX ORDER — MIDAZOLAM HYDROCHLORIDE 1 MG/ML
2 INJECTION INTRAMUSCULAR; INTRAVENOUS
Status: DISCONTINUED | OUTPATIENT
Start: 2017-08-21 | End: 2017-08-21 | Stop reason: HOSPADM

## 2017-08-21 RX ORDER — OXYCODONE HYDROCHLORIDE AND ACETAMINOPHEN 5; 325 MG/1; MG/1
1 TABLET ORAL EVERY 4 HOURS PRN
Status: DISCONTINUED | OUTPATIENT
Start: 2017-08-21 | End: 2017-08-22 | Stop reason: HOSPADM

## 2017-08-21 RX ORDER — FENTANYL CITRATE 50 UG/ML
50 INJECTION, SOLUTION INTRAMUSCULAR; INTRAVENOUS
Status: DISCONTINUED | OUTPATIENT
Start: 2017-08-21 | End: 2017-08-21 | Stop reason: HOSPADM

## 2017-08-21 RX ORDER — ONDANSETRON 4 MG/1
4 TABLET, ORALLY DISINTEGRATING ORAL EVERY 6 HOURS PRN
Status: DISCONTINUED | OUTPATIENT
Start: 2017-08-21 | End: 2017-08-22 | Stop reason: HOSPADM

## 2017-08-21 RX ORDER — PROMETHAZINE HYDROCHLORIDE 25 MG/1
25 TABLET ORAL ONCE AS NEEDED
Status: DISCONTINUED | OUTPATIENT
Start: 2017-08-21 | End: 2017-08-21 | Stop reason: HOSPADM

## 2017-08-21 RX ORDER — ACETAMINOPHEN 325 MG/1
650 TABLET ORAL ONCE
Status: COMPLETED | OUTPATIENT
Start: 2017-08-21 | End: 2017-08-21

## 2017-08-21 RX ORDER — SODIUM CHLORIDE, SODIUM LACTATE, POTASSIUM CHLORIDE, CALCIUM CHLORIDE 600; 310; 30; 20 MG/100ML; MG/100ML; MG/100ML; MG/100ML
9 INJECTION, SOLUTION INTRAVENOUS CONTINUOUS
Status: DISCONTINUED | OUTPATIENT
Start: 2017-08-21 | End: 2017-08-22 | Stop reason: HOSPADM

## 2017-08-21 RX ORDER — NALBUPHINE HCL 10 MG/ML
10 AMPUL (ML) INJECTION EVERY 4 HOURS PRN
Status: DISCONTINUED | OUTPATIENT
Start: 2017-08-21 | End: 2017-08-21 | Stop reason: HOSPADM

## 2017-08-21 RX ORDER — NALBUPHINE HCL 10 MG/ML
2 AMPUL (ML) INJECTION EVERY 4 HOURS PRN
Status: DISCONTINUED | OUTPATIENT
Start: 2017-08-21 | End: 2017-08-21 | Stop reason: HOSPADM

## 2017-08-21 RX ORDER — HYDROCODONE BITARTRATE AND ACETAMINOPHEN 7.5; 325 MG/1; MG/1
1 TABLET ORAL ONCE AS NEEDED
Status: DISCONTINUED | OUTPATIENT
Start: 2017-08-21 | End: 2017-08-21 | Stop reason: HOSPADM

## 2017-08-21 RX ORDER — ROCURONIUM BROMIDE 10 MG/ML
INJECTION, SOLUTION INTRAVENOUS AS NEEDED
Status: DISCONTINUED | OUTPATIENT
Start: 2017-08-21 | End: 2017-08-21 | Stop reason: SURG

## 2017-08-21 RX ORDER — ATENOLOL 50 MG/1
50 TABLET ORAL DAILY
Status: DISCONTINUED | OUTPATIENT
Start: 2017-08-21 | End: 2017-08-21

## 2017-08-21 RX ORDER — ONDANSETRON 2 MG/ML
4 INJECTION INTRAMUSCULAR; INTRAVENOUS EVERY 6 HOURS PRN
Status: DISCONTINUED | OUTPATIENT
Start: 2017-08-21 | End: 2017-08-22 | Stop reason: HOSPADM

## 2017-08-21 RX ORDER — ISOSULFAN BLUE 50 MG/5ML
INJECTION, SOLUTION SUBCUTANEOUS AS NEEDED
Status: DISCONTINUED | OUTPATIENT
Start: 2017-08-21 | End: 2017-08-21 | Stop reason: HOSPADM

## 2017-08-21 RX ORDER — SUCCINYLCHOLINE CHLORIDE 20 MG/ML
INJECTION INTRAMUSCULAR; INTRAVENOUS AS NEEDED
Status: DISCONTINUED | OUTPATIENT
Start: 2017-08-21 | End: 2017-08-21 | Stop reason: SURG

## 2017-08-21 RX ORDER — DEXTROSE, SODIUM CHLORIDE, AND POTASSIUM CHLORIDE 5; .45; .15 G/100ML; G/100ML; G/100ML
50 INJECTION INTRAVENOUS CONTINUOUS
Status: DISCONTINUED | OUTPATIENT
Start: 2017-08-21 | End: 2017-08-22 | Stop reason: HOSPADM

## 2017-08-21 RX ADMIN — FENTANYL CITRATE 50 MCG: 50 INJECTION INTRAMUSCULAR; INTRAVENOUS at 14:51

## 2017-08-21 RX ADMIN — SCOPOLAMINE 1 PATCH: 1 PATCH, EXTENDED RELEASE TRANSDERMAL at 10:57

## 2017-08-21 RX ADMIN — SODIUM CHLORIDE, POTASSIUM CHLORIDE, SODIUM LACTATE AND CALCIUM CHLORIDE 9 ML/HR: 600; 310; 30; 20 INJECTION, SOLUTION INTRAVENOUS at 09:54

## 2017-08-21 RX ADMIN — OXYCODONE HYDROCHLORIDE AND ACETAMINOPHEN 1 TABLET: 5; 325 TABLET ORAL at 21:07

## 2017-08-21 RX ADMIN — FENTANYL CITRATE 100 MCG: 50 INJECTION INTRAMUSCULAR; INTRAVENOUS at 11:40

## 2017-08-21 RX ADMIN — LIDOCAINE AND PRILOCAINE: 25; 25 CREAM TOPICAL at 09:43

## 2017-08-21 RX ADMIN — PROPOFOL 150 MG: 10 INJECTION, EMULSION INTRAVENOUS at 11:40

## 2017-08-21 RX ADMIN — CELECOXIB 200 MG: 200 CAPSULE ORAL at 09:41

## 2017-08-21 RX ADMIN — ONDANSETRON 4 MG: 2 INJECTION INTRAMUSCULAR; INTRAVENOUS at 14:31

## 2017-08-21 RX ADMIN — CEFAZOLIN SODIUM 2 G: 2 INJECTION, SOLUTION INTRAVENOUS at 11:36

## 2017-08-21 RX ADMIN — ROCURONIUM BROMIDE 5 MG: 10 INJECTION INTRAVENOUS at 11:40

## 2017-08-21 RX ADMIN — LIDOCAINE HYDROCHLORIDE 100 MG: 20 INJECTION, SOLUTION INFILTRATION; PERINEURAL at 11:40

## 2017-08-21 RX ADMIN — SODIUM CHLORIDE, POTASSIUM CHLORIDE, SODIUM LACTATE AND CALCIUM CHLORIDE: 600; 310; 30; 20 INJECTION, SOLUTION INTRAVENOUS at 14:34

## 2017-08-21 RX ADMIN — EPHEDRINE SULFATE 10 MG: 50 INJECTION INTRAMUSCULAR; INTRAVENOUS; SUBCUTANEOUS at 11:52

## 2017-08-21 RX ADMIN — FENTANYL CITRATE 50 MCG: 50 INJECTION INTRAMUSCULAR; INTRAVENOUS at 15:29

## 2017-08-21 RX ADMIN — MIDAZOLAM 1 MG: 1 INJECTION INTRAMUSCULAR; INTRAVENOUS at 10:56

## 2017-08-21 RX ADMIN — OXYCODONE HYDROCHLORIDE AND ACETAMINOPHEN 1 TABLET: 5; 325 TABLET ORAL at 17:06

## 2017-08-21 RX ADMIN — EPHEDRINE SULFATE 10 MG: 50 INJECTION INTRAMUSCULAR; INTRAVENOUS; SUBCUTANEOUS at 12:54

## 2017-08-21 RX ADMIN — MIDAZOLAM 1 MG: 1 INJECTION INTRAMUSCULAR; INTRAVENOUS at 11:04

## 2017-08-21 RX ADMIN — EPHEDRINE SULFATE 10 MG: 50 INJECTION INTRAMUSCULAR; INTRAVENOUS; SUBCUTANEOUS at 12:49

## 2017-08-21 RX ADMIN — TECHNETIUM TC 99M SULFUR COLLOID 1 DOSE: KIT at 10:35

## 2017-08-21 RX ADMIN — DEXAMETHASONE SODIUM PHOSPHATE 4 MG: 10 INJECTION INTRAMUSCULAR; INTRAVENOUS at 12:00

## 2017-08-21 RX ADMIN — SUCCINYLCHOLINE CHLORIDE 100 MG: 20 INJECTION, SOLUTION INTRAMUSCULAR; INTRAVENOUS; PARENTERAL at 11:40

## 2017-08-21 RX ADMIN — POTASSIUM CHLORIDE, DEXTROSE MONOHYDRATE AND SODIUM CHLORIDE 50 ML/HR: 150; 5; 450 INJECTION, SOLUTION INTRAVENOUS at 18:21

## 2017-08-21 RX ADMIN — FENTANYL CITRATE 50 MCG: 50 INJECTION INTRAMUSCULAR; INTRAVENOUS at 13:05

## 2017-08-21 RX ADMIN — FAMOTIDINE 20 MG: 10 INJECTION, SOLUTION INTRAVENOUS at 10:56

## 2017-08-21 RX ADMIN — DIAZEPAM 10 MG: 5 TABLET ORAL at 09:54

## 2017-08-21 RX ADMIN — ACETAMINOPHEN 650 MG: 325 TABLET ORAL at 09:41

## 2017-08-21 RX ADMIN — LIDOCAINE HYDROCHLORIDE 0.2 ML: 10 INJECTION, SOLUTION EPIDURAL; INFILTRATION; INTRACAUDAL; PERINEURAL at 09:55

## 2017-08-21 RX ADMIN — SODIUM CHLORIDE, POTASSIUM CHLORIDE, SODIUM LACTATE AND CALCIUM CHLORIDE: 600; 310; 30; 20 INJECTION, SOLUTION INTRAVENOUS at 12:45

## 2017-08-21 RX ADMIN — EPHEDRINE SULFATE 5 MG: 50 INJECTION INTRAMUSCULAR; INTRAVENOUS; SUBCUTANEOUS at 12:00

## 2017-08-21 RX ADMIN — FENTANYL CITRATE 50 MCG: 50 INJECTION INTRAMUSCULAR; INTRAVENOUS at 14:30

## 2017-08-21 RX ADMIN — CEFAZOLIN SODIUM 1 G: 1 INJECTION, SOLUTION INTRAVENOUS at 19:51

## 2017-08-21 NOTE — PLAN OF CARE
Problem: Patient Care Overview (Adult)  Goal: Plan of Care Review  Outcome: Ongoing (interventions implemented as appropriate)    08/21/17 1514   Coping/Psychosocial Response Interventions   Plan Of Care Reviewed With patient   Patient Care Overview   Progress improving

## 2017-08-21 NOTE — PLAN OF CARE
Problem: Perioperative Period (Adult)  Goal: Signs and Symptoms of Listed Potential Problems Will be Absent or Manageable (Perioperative Period)  Outcome: Ongoing (interventions implemented as appropriate)    08/21/17 0962   Perioperative Period   Problems Assessed (Perioperative Period) all

## 2017-08-21 NOTE — ANESTHESIA POSTPROCEDURE EVALUATION
Patient: Alesia Resendez    Procedure Summary     Date Anesthesia Start Anesthesia Stop Room / Location    08/21/17 1136 [id number]  [facility]  /  [facility]       Procedure Diagnosis Surgeon Provider    BILATEREAL BREAST MASTECTOMY WITH SENTINEL NODE BIOPSY ON THE LEFT The sentinel lymph node biopsy will only be performed on the left side (Bilateral Breast) Recurrent breast cancer, left  (Recurrent breast cancer, left [C50.912]) MD Sophy Orr MD          Anesthesia Type: general  Last vitals  BP   129/73 (08/21/17 1516)    Temp   36.9 °C (98.5 °F) (08/21/17 1500)    Pulse   66 (08/21/17 1516)   Resp   16 (08/21/17 1516)    SpO2   94 % (08/21/17 1516)      Post Anesthesia Care and Evaluation    Patient location during evaluation: bedside  Patient participation: complete - patient participated  Level of consciousness: awake  Pain management: adequate  Airway patency: patent  Anesthetic complications: No anesthetic complications    Cardiovascular status: acceptable  Respiratory status: acceptable  Hydration status: acceptable    Comments: /73  Pulse 66  Temp 36.9 °C (98.5 °F) (Temporal Artery )   Resp 16  SpO2 94%

## 2017-08-21 NOTE — ANESTHESIA PROCEDURE NOTES
Airway  Urgency: elective    Airway not difficult    General Information and Staff    Patient location during procedure: OR  Anesthesiologist: MARNI WELLER  CRNA: ZEYAD DOLAN    Indications and Patient Condition  Indications for airway management: airway protection    Preoxygenated: yes  MILS not maintained throughout  Mask difficulty assessment: 1 - vent by mask    Final Airway Details  Final airway type: endotracheal airway      Successful airway: ETT  Cuffed: yes   Successful intubation technique: direct laryngoscopy  Facilitating devices/methods: intubating stylet  Endotracheal tube insertion site: oral  Blade: [place]  Blade size: #2  ETT size: 7.0 mm  Cormack-Lehane Classification: grade I - full view of glottis  Placement verified by: chest auscultation and capnometry   Cuff volume (mL): 8  Measured from: lips  Number of attempts at approach: 1    Additional Comments  Ett placed easily, appears atraumatic, dentition intact

## 2017-08-21 NOTE — ANESTHESIA PREPROCEDURE EVALUATION
Anesthesia Evaluation     history of anesthetic complications: PONV  NPO Solid Status: > 8 hours  NPO Liquid Status: > 8 hours     Airway   Mallampati: II  TM distance: >3 FB  Neck ROM: full  no difficulty expected  Dental - normal exam     Pulmonary - negative pulmonary ROS and normal exam   Cardiovascular   Exercise tolerance: good (4-7 METS)    ECG reviewed  Rhythm: regular    (+) hypertension, hyperlipidemia  (-) murmur      Neuro/Psych- negative ROS  GI/Hepatic/Renal/Endo    (+)  GERD,     Musculoskeletal     Abdominal  - normal exam  (+) obese,    Substance History      OB/GYN          Other   (+) arthritis                                     Anesthesia Plan    ASA 3     general   (  D/W R&B of GA including but not limited to: heart, lung, liver, kidney, neurologic problems, positioning injuries, dental damage, corneal abrasion and TMJ.  .)  intravenous induction   Anesthetic plan and risks discussed with patient.

## 2017-08-21 NOTE — PLAN OF CARE
Problem: Perioperative Period (Adult)  Goal: Signs and Symptoms of Listed Potential Problems Will be Absent or Manageable (Perioperative Period)  Outcome: Ongoing (interventions implemented as appropriate)    08/21/17 1515   Perioperative Period   Problems Assessed (Perioperative Period) all   Problems Present (Perioperative Period) none

## 2017-08-21 NOTE — PLAN OF CARE
Problem: Mastectomy (Adult)  Goal: Signs and Symptoms of Listed Potential Problems Will be Absent or Manageable (Mastectomy)  Outcome: Ongoing (interventions implemented as appropriate)    08/21/17 1515   Mastectomy   Problems Assessed (Mastectomy) all   Problems Present (Mastectomy) none

## 2017-08-21 NOTE — PLAN OF CARE
Problem: Patient Care Overview (Adult)  Goal: Plan of Care Review  Outcome: Ongoing (interventions implemented as appropriate)    08/21/17 0929   Coping/Psychosocial Response Interventions   Plan Of Care Reviewed With patient       Goal: Discharge Needs Assessment  Outcome: Ongoing (interventions implemented as appropriate)    08/21/17 0929   Discharge Needs Assessment   Concerns To Be Addressed denies needs/concerns at this time

## 2017-08-21 NOTE — OP NOTE
Name: Alesia Resendez  Age: 67 y.o.  Sex: female  :  1950  MRN: [id number]    Mastectomy with SN Procedure Note    Indications: This patient presents for surgical treatment of Breast Cancer involving the left breast.  She has previously had cancer in this breast treated with breast conserving therapy.  Her only option is a mastectomy and she has opted for bilateral mastectomies with a left sentinel lymph node biopsy.    Pre-operative Diagnosis: breast cancer, left    Post-operative Diagnosis: same    Procedure:  bilateral  mastectomy, left [place] Node biopsy   Surgeon: Diallo Eisenberg MD, FACS    Assistants: Patricia WHITTEN    Anesthesia: General anesthesia      Procedure Details   The patient was seen again in the Holding Room. The risks, benefits, indications, potential complications, treatment options, and expected outcomes were discussed with the patient. The possibilities of reaction to medication, pulmonary aspiration, bleeding, recurrent infection, the need for additional procedures, failure to diagnose a condition, and creating a complication requiring transfusion or further operation were discussed with the patient. The patient and/or family concurred with the proposed plan, giving informed consent. The site of surgery was properly noted/marked.    The patient was also taken to the nuclear med department where a radioisotope injection was performed in the periareolar area of the affected breast  in the usual fashion.       The patient was taken to the Operating Room, identified as Alesia Resendez  and the procedure verified as bilateral total mastectomy    A Time Out was held and the above information confirmed.          The patient was placed supine and general anesthetic was administered.     3 cc of blue dye were injected into the breast near the edge of the left areola.  The  arm, breast, and chest were prepped and draped in standard fashion.   An oblique elliptical incision was made  encompassing the nipple of the .left breast.  . Skin flaps were created meticulously to preserve the subdermal blood supply.  Flaps were developed to the clavicle, rectus sheath, sternum, and anterior edge of the latissimus dorsi m.    [place] node evaluation was performed. 3 sentinel node(s) was/were found.  The first sentinel lymph node had counts of 84 and was not blue, the second lymph node had counts of 75 was also not blue.  The final lymph node had counts of 257 and it had some blue within it. . No other nodes were found with counts over 25 and there were no other blue nodes.  Frozen section was performed and all 3 sentinel lymph     Dissection was carried down to the pectoralis fascia, which was included with the specimen, and an axillary dissection  was not performed.,       The specimen was submitted to pathology. The wound was irrigated. One CONG drain was placed.  The skin incision was closed in layers with interrupted 3-0 Vicryl sutures and a 4-0 Vicryl subcuticular suture.. The drain was secured with 2-0 silk sutures.     Attention was then directed to the right breast.  An elliptical incision was fashioned so that the nipple areolar complex would be removed.  Flaps were created as previously described and carried down to the chest wall on all sides.  The breast was reflected off of the chest wall including the pectoralis fascia.  When we came to the lateral aspect of the breast was incised along the lateral edge of the pectoralis muscle and eventually identified the intercostal brachial nerve.  We dissected the tissue inferior to this while preserving it.  The remaining attachments were divided and the breast removed.  An orienting suture was placed and it was sent to pathology for permanent sections.  A 19 [place] track Olvera drain was placed and secured in place with a 2-0 nylon suture.  Steri-Strips and dressings and an Ace wrap were applied.    Instrument, sponge, and needle counts were correct at  closure and at the conclusion of the case.     Findings: There were no unexpected findings.     Estimated Blood Loss: less than 50 mL           Drains: 19 [place] S[place]-Olvera drain ×2      Specimens:   ID Type Source Tests Collected by Time Destination   A : SENTINEL NODE #1 Tissue Axilla, Left TISSUE EXAM Diallo Eisenberg MD 8/21/2017 1246    B : SENTINEL NODE #2 Tissue Axilla, Left TISSUE EXAM Diallo Eisenberg MD 8/21/2017 1251    C : SENTINEL NODE #3 Tissue Axilla, Left TISSUE EXAM Diallo Eisenberg MD 8/21/2017 1254    D : LEFT BREAST SUTURE @ 12:00 Tissue Breast, Left TISSUE EXAM Diallo Eisenberg MD 8/21/2017 1302    E : RIGHT BREAST--STITCH AT 12:00 Tissue Breast, Right TISSUE EXAM Diallo Eisenberg MD 8/21/2017 1403        Complications: None; patient tolerated the procedure well.           Disposition: PACU - hemodynamically stable.           Condition: stable

## 2017-08-21 NOTE — H&P
History of Present Illness:  Patient presents with newly diagnosed breast cancer.  She is a nice 67-year-old white female who underwent breast conserving surgery for a left breast cancer in 2006.  She apparently had a sentinel lymph node biopsy with that procedure.  She took tamoxifen for 5 years and receive radiation treatment.  She has been obtaining yearly mammograms and her most recent mammograms discovered 2 questionable areas in the 12 o'clock position of the left breast.  Biopsy of one of these revealed an intermediate grade invasive ductal cancer that was ER positive at 98%, OR positive at 60%, and HER-2 negative.  The Ki-67 was 13%.  Her family history is negative for breast or ovarian cancer.        Review of Systems:  Review of Systems - Oncology      Past Medical and Surgical History:  Breast Biopsy History:  Patient has had the following breast biopsies:Left Breast 2006- DCIS, Left breast 2017- Ductal carcinoma  Breast Cancer HIstory:  Patient has the following past medical history of breast cancer treatment:Left Breast DCIS treated with Tamoxifen, radiation and Left Breast Lumpectomy  Breast Operations, and year:  Left Breast Lumpectomy         History   Smoking Status   • Never Smoker   Smokeless Tobacco   • Not on file      Obstetric History:  Patient is postmenopausal, entered menopause naturally at age: 50   Number of pregnancies:3  Number of live births: 3  Number of abortions or miscarriages: 0  Age of delivery of first child: 26  Patient breast fed, for the following lenth of time:9 months  Length of time taking birth control pills:4 years  Patient took hormone replacement during the following dates:Took premarin for 12 years. Stopped taking in 2006.      Surgical History          Past Surgical History:   Procedure Laterality Date   • APPENDECTOMY       • GALLBLADDER SURGERY       • HYSTERECTOMY       • TOTAL KNEE ARTHROPLASTY Bilateral               Medical History         Past Medical  History:   Diagnosis Date   • Arthritis     • Hypertension     • Urinary incontinence              Prior Hospitalizations, other than for surgery or childbirth, and year:  Pneumonia 1991     Social History:  Patient is .  Patient has 2 daughters. and Patient has 1 sons.     Family History:         Family History   Problem Relation Age of Onset   • Heart attack Mother     • Esophageal cancer Mother     • Alcohol abuse Father     • Diabetes Father         type 2   • Heart attack Father     • Heart failure Father     • Hyperlipidemia Father     • Hypertension Father     • Prostate cancer Father     • Birth defects Brother     • Heart attack Brother           Vital Signs: Blood pressure 117/61, pulse 58, temperature 98.1, O2 saturation 94%      Vitals:        BP:    Pulse:    Temp:    SpO2:          Medications:     Current Outpatient Prescriptions:      Current Outpatient Prescriptions:   •  aMILoride-hydrochlorothiazide (MODURETIC) 5-50 MG per tablet, TAKE ONE TABLET BY MOUTH DAILY, Disp: 90 tablet, Rfl: 0  •  amoxicillin (AMOXIL) 500 MG capsule, , Disp: , Rfl:   •  atenolol (TENORMIN) 50 MG tablet, TAKE ONE TABLET BY MOUTH DAILY, Disp: 90 tablet, Rfl: 0  •  buPROPion XL (WELLBUTRIN XL) 150 MG 24 hr tablet, TAKE ONE TABLET BY MOUTH DAILY, Disp: 90 tablet, Rfl: 0  •  escitalopram (LEXAPRO) 10 MG tablet, Take 1 tablet by mouth Daily., Disp: 90 tablet, Rfl: 1  •  fenofibrate (TRICOR) 145 MG tablet, TAKE ONE TABLET BY MOUTH DAILY, Disp: 90 tablet, Rfl: 0  •  losartan (COZAAR) 100 MG tablet, TAKE ONE TABLET BY MOUTH DAILY, Disp: 90 tablet, Rfl: 0  •  meloxicam (MOBIC) 15 MG tablet, TAKE ONE TABLET BY MOUTH DAILY AS NEEDED, Disp: 90 tablet, Rfl: 0  •  Mirabegron (MYRBETRIQ PO), Take 50 mg by mouth., Disp: , Rfl:   •  Multiple Vitamins-Minerals (CENTRUM SILVER PO), Take  by mouth., Disp: , Rfl:   •  Omega-3 Fatty Acids (FISH OIL) 1000 MG capsule capsule, Take 1,200 mg by mouth Daily With Breakfast., Disp: , Rfl:   •   omeprazole (priLOSEC) 20 MG capsule, TAKE ONE CAPSULE BY MOUTH DAILY, Disp: 90 capsule, Rfl: 0     Physical Examination:  General Appearance:   Patient is in no distress.  She is well kept and has an obese build.   Psychiatric:  Patient with appropriate mood and affect. Alert and oriented to self, time, and place.     Breast, RIGHT:  small sized, symmetric with the contralateral side.  Breast skin is without erythema, edema, rashes.  There are no visible abnormalities upon inspection during the arm-raising maneuver or with hands on hips in the sitting position. There is no nipple retraction, discharge or nipple/areolar skin changes.There are no masses palpable in the sitting or supine positions.     Breast, LEFT:  small sized, symmetric with the contralateral side.  Breast skin is without erythema, edema, rashes.  There are no visible abnormalities upon inspection during the arm-raising maneuver or with hands on hips in the sitting position. There is no nipple retraction, discharge or nipple/areolar skin changes.There is a lumpectomy scar in the upper outer quadrant with a bit of architectural distortion.  There is a small biopsy scar nearby and some bruising but I do not feel an obvious mass.     Lymphatic:  There is no axillary, cervical, infraclavicular, or supraclavicular adenopathy bilaterally.  Eyes:  Pupils are round and reactive to light.  Cardiovascular:  Heart rate and rhythm are regular.  Respiratory:  Lungs are clear bilaterally with no crackles or wheezes in any lung field.  Gastrointestinal:  Abdomen is soft, nondistended, and nontender.  There is no evidence of hepatosplenomegaly.  There are no scars from previous surgery.     Musculoskeletal:  Good strength in all 4 extremities.   There is good range of motion in both shoulders.  There are scars on both knees from her knee replacement  Skin:  She does have a small area along the nasolabial fold on the left side of her face which is a squamous cell  cancer.     Assessment:  1. Breast cancer of upper-outer quadrant of left female breast      Plan-the patient has decided to undergo bilateral mastectomies with a left sentinel lymph node biopsy.  She does understand that if the sentinel lymph node biopsy does not work, she will require an axillary dissection.  All of her questions have been answered and she wishes to proceed.

## 2017-08-21 NOTE — PLAN OF CARE
Problem: Patient Care Overview (Adult)  Goal: Plan of Care Review  Outcome: Ongoing (interventions implemented as appropriate)    08/21/17 4148   Coping/Psychosocial Response Interventions   Plan Of Care Reviewed With patient   Patient Care Overview   Progress no change   Outcome Evaluation   Outcome Summary/Follow up Plan just arrived from [facility] PACU, vss, dressings to chest intact, CONG x2 with bloody drainage, ivf infusing, medicated for pain as ordered, scds on       Goal: Adult Individualization and Mutuality  Outcome: Ongoing (interventions implemented as appropriate)  Goal: Discharge Needs Assessment  Outcome: Ongoing (interventions implemented as appropriate)    Problem: Perioperative Period (Adult)  Goal: Signs and Symptoms of Listed Potential Problems Will be Absent or Manageable (Perioperative Period)  Outcome: Ongoing (interventions implemented as appropriate)    Problem: Mastectomy (Adult)  Goal: Signs and Symptoms of Listed Potential Problems Will be Absent or Manageable (Mastectomy)  Outcome: Ongoing (interventions implemented as appropriate)

## 2017-08-22 VITALS
RESPIRATION RATE: 16 BRPM | TEMPERATURE: 97.6 F | HEART RATE: 60 BPM | OXYGEN SATURATION: 95 % | DIASTOLIC BLOOD PRESSURE: 71 MMHG | SYSTOLIC BLOOD PRESSURE: 114 MMHG

## 2017-08-22 PROCEDURE — 25010000003 CEFAZOLIN IN D5W 1 GM/50ML SOLUTION: Performed by: SURGERY

## 2017-08-22 PROCEDURE — G0378 HOSPITAL OBSERVATION PER HR: HCPCS

## 2017-08-22 PROCEDURE — 99024 POSTOP FOLLOW-UP VISIT: CPT | Performed by: SURGERY

## 2017-08-22 RX ORDER — HYDROCODONE BITARTRATE AND ACETAMINOPHEN 5; 325 MG/1; MG/1
1-2 TABLET ORAL EVERY 4 HOURS PRN
Qty: 25 TABLET | Refills: 0 | Status: SHIPPED | OUTPATIENT
Start: 2017-08-22 | End: 2017-09-08

## 2017-08-22 RX ADMIN — BUPROPION HYDROCHLORIDE 150 MG: 150 TABLET, EXTENDED RELEASE ORAL at 06:46

## 2017-08-22 RX ADMIN — CEFAZOLIN SODIUM 1 G: 1 INJECTION, SOLUTION INTRAVENOUS at 03:40

## 2017-08-22 RX ADMIN — OXYCODONE HYDROCHLORIDE AND ACETAMINOPHEN 1 TABLET: 5; 325 TABLET ORAL at 02:32

## 2017-08-22 RX ADMIN — OXYCODONE HYDROCHLORIDE AND ACETAMINOPHEN 1 TABLET: 5; 325 TABLET ORAL at 08:48

## 2017-08-22 RX ADMIN — OXYCODONE HYDROCHLORIDE AND ACETAMINOPHEN 1 TABLET: 5; 325 TABLET ORAL at 12:46

## 2017-08-22 NOTE — PLAN OF CARE
Problem: Patient Care Overview (Adult)  Goal: Plan of Care Review  Outcome: Ongoing (interventions implemented as appropriate)    08/22/17 0541   Coping/Psychosocial Response Interventions   Plan Of Care Reviewed With patient   Patient Care Overview   Progress improving   Outcome Evaluation   Outcome Summary/Follow up Plan VSS, TOLERATING REGULAR DIET, ENCOURAGE TO INCREASE FLUID INTAKE AND TO USE INCENTIVE SPIROMETER, WILL D/C ARGUELLES IN AM, ENCOURAGE TO AMBULATE MORE IN THE HALLWAY.       Goal: Adult Individualization and Mutuality  Outcome: Ongoing (interventions implemented as appropriate)  Goal: Discharge Needs Assessment  Outcome: Ongoing (interventions implemented as appropriate)    Problem: Perioperative Period (Adult)  Goal: Signs and Symptoms of Listed Potential Problems Will be Absent or Manageable (Perioperative Period)  Outcome: Ongoing (interventions implemented as appropriate)    Problem: Mastectomy (Adult)  Goal: Signs and Symptoms of Listed Potential Problems Will be Absent or Manageable (Mastectomy)  Outcome: Ongoing (interventions implemented as appropriate)

## 2017-08-22 NOTE — PROGRESS NOTES
Postoperative rounding note breast surgery    Postoperative day: 1     Overnight events:  She has done very well overnight.  Her pain was well-controlled with oral pain medication.  She has ambulated and her catheter has been removed.  There is also been no nausea or vomiting    Vitals:    08/22/17 0700   BP: 93/61   Pulse: 59   Resp: 16   Temp: 97.5 °F (36.4 °C)   SpO2: 93%       Examination:     The patient is alert and oriented.  Her Ace bandage is in place and her dressings/incisions are clean dry and intact.  There are no undrained fluid collections underneath her mastectomy flaps.  Her drains are serosanguineous.    Assessment:  Postoperative day 1 doing well.     We'll plan to saline lock her IV fluids.  We will have her complete her drain teaching.  We will make sure that she is adequately ambulating and tolerates her breakfast and/or lunch.  She may go home later today.        She already has a follow-up visit with me scheduled but I have asked her to call us in 6 days with her drainage amounts..  I asked her to call the office if she needs a reminder on the date and time.

## 2017-08-24 ENCOUNTER — TELEPHONE (OUTPATIENT)
Dept: MAMMOGRAPHY | Facility: CLINIC | Age: 67
End: 2017-08-24

## 2017-08-24 DIAGNOSIS — C50.412 BREAST CANCER OF UPPER-OUTER QUADRANT OF LEFT FEMALE BREAST (HCC): Primary | ICD-10-CM

## 2017-08-24 NOTE — TELEPHONE ENCOUNTER
I told her the path report shows negative sentinel lymph nodes and an 8 mm cancer with clear margins.  The other breast did not show any abnormalities.  I will place an order for an oncology visit as well.

## 2017-08-28 ENCOUNTER — TELEPHONE (OUTPATIENT)
Dept: MAMMOGRAPHY | Facility: CLINIC | Age: 67
End: 2017-08-28

## 2017-08-28 NOTE — TELEPHONE ENCOUNTER
Pt called with her drain output.      CONG#1 CONG#2  8/25 70ml 20  8/26 40 19  8/27 39 20       Pt will call back on Wed or Thursday with updated amounts. Was educated on needing 30 ml or less in each drain for 3 consecutive days. Pt with LUCIEN/HARLAN Banegas RN.

## 2017-08-30 ENCOUNTER — TELEPHONE (OUTPATIENT)
Dept: OTHER | Facility: HOSPITAL | Age: 67
End: 2017-08-30

## 2017-08-30 NOTE — TELEPHONE ENCOUNTER
I spoke with Alesia today. She states she is doing well after surgery. She is hoping to get her drains out the end of this week. She states she is managing well with drain care with assistance from some friends. She says that she does have some pain to the incision site, but not bad. She is aware that she has an appointment with medical oncology on September 8th and will give me a call after that appointment to let me know what the plan is going forward.

## 2017-08-31 ENCOUNTER — TELEPHONE (OUTPATIENT)
Dept: MAMMOGRAPHY | Facility: CLINIC | Age: 67
End: 2017-08-31

## 2017-09-01 ENCOUNTER — OFFICE VISIT (OUTPATIENT)
Dept: MAMMOGRAPHY | Facility: CLINIC | Age: 67
End: 2017-09-01

## 2017-09-01 VITALS
DIASTOLIC BLOOD PRESSURE: 75 MMHG | OXYGEN SATURATION: 96 % | HEART RATE: 77 BPM | TEMPERATURE: 97.5 F | SYSTOLIC BLOOD PRESSURE: 126 MMHG

## 2017-09-01 DIAGNOSIS — C50.912 RECURRENT BREAST CANCER, LEFT (HCC): Primary | ICD-10-CM

## 2017-09-01 PROCEDURE — 99024 POSTOP FOLLOW-UP VISIT: CPT | Performed by: SURGERY

## 2017-09-01 RX ORDER — VALACYCLOVIR HYDROCHLORIDE 500 MG/1
TABLET, FILM COATED ORAL
COMMUNITY
Start: 2017-07-31 | End: 2017-09-08

## 2017-09-01 NOTE — PROGRESS NOTES
Chief Complaint: Alesia Resendez is a  67 y.o. female, initially referred by No ref. provider found , who is here today for a postoperative visit.    History of Present Illness:  In the interim,Alesia Resendez has had the following procedure and resultant pathology report: She is undergone bilateral mastectomies with left sentinel lymph node biopsy.  All 3 sentinel nodes were negative along with 2 additional nodes in the breast specimen.  The tumor measured 8 mm and there were nice clear margins.  The drains are now putting out below 30 cc of fluid for 24 hours.    She has noted no redness, warmth,drainage, swelling at the incision site. Denies fever or chills.      Current Outpatient Prescriptions:   •  aMILoride-hydrochlorothiazide (MODURETIC) 5-50 MG per tablet, Take 1 tablet by mouth Daily., Disp: 90 tablet, Rfl: 1  •  atenolol (TENORMIN) 50 MG tablet, Take 1 tablet by mouth Daily., Disp: 90 tablet, Rfl: 1  •  buPROPion XL (WELLBUTRIN XL) 150 MG 24 hr tablet, Take 1 tablet by mouth Every Morning., Disp: 90 tablet, Rfl: 1  •  escitalopram (LEXAPRO) 10 MG tablet, Take 1 tablet by mouth Daily., Disp: 90 tablet, Rfl: 1  •  fenofibrate 160 MG tablet, Take 1 tablet by mouth Daily., Disp: 90 tablet, Rfl: 1  •  HYDROcodone-acetaminophen (NORCO) 5-325 MG per tablet, Take 1-2 tablets by mouth Every 4 (Four) Hours As Needed (Pain)., Disp: 25 tablet, Rfl: 0  •  losartan (COZAAR) 100 MG tablet, Take 1 tablet by mouth Daily., Disp: 90 tablet, Rfl: 1  •  meloxicam (MOBIC) 15 MG tablet, TAKE ONE TABLET BY MOUTH DAILY AS NEEDED, Disp: 90 tablet, Rfl: 0  •  Mirabegron ER (MYRBETRIQ) 25 MG tablet sustained-release 24 hour 24 hr tablet, Take 25 mg by mouth., Disp: , Rfl:   •  Multiple Vitamins-Minerals (CENTRUM SILVER PO), Take  by mouth., Disp: , Rfl:   •  Omega-3 Fatty Acids (FISH OIL) 1000 MG capsule capsule, Take 1,200 mg by mouth Daily With Breakfast., Disp: , Rfl:   •  omeprazole (priLOSEC) 20 MG capsule, TAKE ONE CAPSULE BY  MOUTH DAILY, Disp: 90 capsule, Rfl: 0  •  valACYclovir (VALTREX) 500 MG tablet, , Disp: , Rfl:   Physical examination  The patient is status post mastectomies on both sides.  The incisions look good with the Steri-Strips in place.  There are also bilateral drains in place.  I do not see any evidence of undrained fluid collections or flap necrosis.  Assessment:  Recurrent left breast cancer status post bilateral mastectomies without reconstruction.  She does have an appointment to see the medical oncologist next week.  I was able to remove the drains without difficulty today and I will see her back in the office in 1 week to assess for fluid collection    Plan:  Office visit 1 week

## 2017-09-08 ENCOUNTER — LAB (OUTPATIENT)
Dept: OTHER | Facility: HOSPITAL | Age: 67
End: 2017-09-08

## 2017-09-08 ENCOUNTER — OFFICE VISIT (OUTPATIENT)
Dept: MAMMOGRAPHY | Facility: CLINIC | Age: 67
End: 2017-09-08

## 2017-09-08 ENCOUNTER — CONSULT (OUTPATIENT)
Dept: ONCOLOGY | Facility: CLINIC | Age: 67
End: 2017-09-08

## 2017-09-08 VITALS
SYSTOLIC BLOOD PRESSURE: 118 MMHG | RESPIRATION RATE: 16 BRPM | DIASTOLIC BLOOD PRESSURE: 73 MMHG | WEIGHT: 242.4 LBS | HEIGHT: 67 IN | HEART RATE: 64 BPM | BODY MASS INDEX: 38.04 KG/M2 | TEMPERATURE: 98.6 F | OXYGEN SATURATION: 95 %

## 2017-09-08 VITALS
TEMPERATURE: 98.1 F | HEART RATE: 58 BPM | DIASTOLIC BLOOD PRESSURE: 78 MMHG | OXYGEN SATURATION: 97 % | SYSTOLIC BLOOD PRESSURE: 128 MMHG

## 2017-09-08 DIAGNOSIS — C50.912 RECURRENT BREAST CANCER, LEFT (HCC): Primary | ICD-10-CM

## 2017-09-08 DIAGNOSIS — C50.412 BREAST CANCER OF UPPER-OUTER QUADRANT OF LEFT FEMALE BREAST (HCC): Primary | ICD-10-CM

## 2017-09-08 DIAGNOSIS — Z90.722 ACQUIRED ABSENCE OF BOTH OVARIES: ICD-10-CM

## 2017-09-08 LAB
BASOPHILS # BLD AUTO: 0.07 10*3/MM3 (ref 0–0.2)
BASOPHILS NFR BLD AUTO: 1.1 % (ref 0–1.5)
DEPRECATED RDW RBC AUTO: 39.5 FL (ref 37–54)
EOSINOPHIL # BLD AUTO: 0.35 10*3/MM3 (ref 0–0.7)
EOSINOPHIL NFR BLD AUTO: 5.6 % (ref 0.3–6.2)
ERYTHROCYTE [DISTWIDTH] IN BLOOD BY AUTOMATED COUNT: 12.3 % (ref 11.7–13)
HCT VFR BLD AUTO: 40.3 % (ref 35.6–45.5)
HGB BLD-MCNC: 13.8 G/DL (ref 11.9–15.5)
IMM GRANULOCYTES # BLD: 0.01 10*3/MM3 (ref 0–0.03)
IMM GRANULOCYTES NFR BLD: 0.2 % (ref 0–0.5)
LYMPHOCYTES # BLD AUTO: 2.97 10*3/MM3 (ref 0.9–4.8)
LYMPHOCYTES NFR BLD AUTO: 47.4 % (ref 19.6–45.3)
MCH RBC QN AUTO: 30 PG (ref 26.9–32)
MCHC RBC AUTO-ENTMCNC: 34.2 G/DL (ref 32.4–36.3)
MCV RBC AUTO: 87.6 FL (ref 80.5–98.2)
MONOCYTES # BLD AUTO: 0.4 10*3/MM3 (ref 0.2–1.2)
MONOCYTES NFR BLD AUTO: 6.4 % (ref 5–12)
NEUTROPHILS # BLD AUTO: 2.46 10*3/MM3 (ref 1.9–8.1)
NEUTROPHILS NFR BLD AUTO: 39.3 % (ref 42.7–76)
NRBC BLD MANUAL-RTO: 0 /100 WBC (ref 0–0)
PLATELET # BLD AUTO: 310 10*3/MM3 (ref 140–500)
PMV BLD AUTO: 9.2 FL (ref 6–12)
RBC # BLD AUTO: 4.6 10*6/MM3 (ref 3.9–5.2)
WBC NRBC COR # BLD: 6.26 10*3/MM3 (ref 4.5–10.7)

## 2017-09-08 PROCEDURE — 85025 COMPLETE CBC W/AUTO DIFF WBC: CPT | Performed by: INTERNAL MEDICINE

## 2017-09-08 PROCEDURE — 99024 POSTOP FOLLOW-UP VISIT: CPT | Performed by: SURGERY

## 2017-09-08 PROCEDURE — 99205 OFFICE O/P NEW HI 60 MIN: CPT | Performed by: INTERNAL MEDICINE

## 2017-09-08 PROCEDURE — 36415 COLL VENOUS BLD VENIPUNCTURE: CPT

## 2017-09-08 NOTE — PROGRESS NOTES
Chief Complaint: Alesia Resendez is a  67 y.o. female, initially referred by No ref. provider found , who is here today for a postoperative visit.    History of Present Illness:  In the interim,Alesia Resendez has had the drain is removed from each side.  She has been exercising her arms and feels that she has full range of motion back area the patient has not detected any obvious fluid beneath the flaps.  She has seen the medical oncologists who have ordered an Oncotype DX test.    She has noted no redness, warmth,drainage, swelling at the incision site. Denies fever or chills.      Current Outpatient Prescriptions:   •  aMILoride-hydrochlorothiazide (MODURETIC) 5-50 MG per tablet, Take 1 tablet by mouth Daily., Disp: 90 tablet, Rfl: 1  •  atenolol (TENORMIN) 50 MG tablet, Take 1 tablet by mouth Daily., Disp: 90 tablet, Rfl: 1  •  buPROPion XL (WELLBUTRIN XL) 150 MG 24 hr tablet, Take 1 tablet by mouth Every Morning., Disp: 90 tablet, Rfl: 1  •  escitalopram (LEXAPRO) 10 MG tablet, Take 1 tablet by mouth Daily., Disp: 90 tablet, Rfl: 1  •  fenofibrate 160 MG tablet, Take 1 tablet by mouth Daily., Disp: 90 tablet, Rfl: 1  •  losartan (COZAAR) 100 MG tablet, Take 1 tablet by mouth Daily., Disp: 90 tablet, Rfl: 1  •  meloxicam (MOBIC) 15 MG tablet, TAKE ONE TABLET BY MOUTH DAILY AS NEEDED, Disp: 90 tablet, Rfl: 0  •  Mirabegron ER (MYRBETRIQ) 25 MG tablet sustained-release 24 hour 24 hr tablet, Take 25 mg by mouth., Disp: , Rfl:   •  Multiple Vitamins-Minerals (CENTRUM SILVER PO), Take  by mouth., Disp: , Rfl:   •  Omega-3 Fatty Acids (FISH OIL) 1000 MG capsule capsule, Take 1,200 mg by mouth Daily With Breakfast., Disp: , Rfl:   •  omeprazole (priLOSEC) 20 MG capsule, TAKE ONE CAPSULE BY MOUTH DAILY, Disp: 90 capsule, Rfl: 0  Physical examination-the chest wall on both sides is healing well from her surgery.  I did remove the Steri-Strips today and see no areas of skin necrosis.  There did appear to be some fluid in  the right lateral chest wall and I was able to aspirate 30 cc of serosanguineous fluid.  Assessment:  Recurrent left breast cancer of the upper outer quadrant status post bilateral mastectomy with left sentinel lymph node biopsy.  She is doing well and has regained full arm motion.  I would like to see her back in the office in 1 week to assess for a seroma formation.    Plan:  Return visit in 1 week.  I will give her a prescription for breast prosthesis after that visit.

## 2017-09-08 NOTE — PROGRESS NOTES
Subjective .     REASON FOR CONSULTATION:     Provide an opinion on any further workup or treatment recurrent left breast cancer status post bilateral mastectomy on 8/21/2017.  The tumor is stage IA (pT1 cN0 M0), ER/NC positive, HER-2 negative.  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  Patient is here for further evaluation.                             REQUESTING PHYSICIAN: Diallo Eisenberg MD     RECORDS OBTAINED:  Records of the patients history including those obtained from the referring provider were reviewed and summarized in detail.    HISTORY OF PRESENT ILLNESS:  The patient is a 67 y.o. year old female who is here for initial evaluation with the above-mentioned history.    Ms. Resendez reports that she has healed wound, no infection.  She actually will see Dr. Eisenberg this afternoon for removing the stitches.  She has no plan for reconstruction surgery.  She reports she had her 1st episode of breast cancer back in 2006, at that time she was seen at the [facility].  She had a lumpectomy with a 6 mm DCIS and had radiation therapy followed by 5 years of tamoxifen.     The patient had an abnormal mammogram study in early 07/2017.  She subsequently had ultrasound-guided left breast biopsy on 07/05/2017.  This study reported invasive ductal carcinoma with the largest dimension 11 mm; it was a grade 2 tumor.  Further biomarker study at the [facility] reported      It was reported strongly positive for ER 98% and strongly for NC 61%, HER-2 was negative, IHC 0 and Ki-67 at 13.7%.      The patient was referred to see Dr. Eisenberg who performed bilateral mastectomy on 08/21/2017.  Pathology evaluation from the mastectomy sample reported invasive ductal carcinoma with the largest dimension 8 mm, grade 2 Grass[place] score 6/10 with a single focus of invasive carcinoma.  There is  also DCIS component.  All margins were clear.  All 3 sentinel lymph nodes were negative for malignancy.  There were also 2 lymph nodes in the mastectomy sample, which were also negative.          Past Medical History:   Diagnosis Date   • Anxiety    • Arthritis    • Breast cancer     LEFT   • Breast cancer 2017    LEFT   • Gastric reflux    • High cholesterol    • History of radiation therapy 2006    LT BREAST   • History of skin cancer     SQUAMOUS CELL REMOVED FROM LT UPPER LIP AND RT THUMB   • Hypertension    • PONV (postoperative nausea and vomiting)    • Urinary incontinence      Past Surgical History:   Procedure Laterality Date   • APPENDECTOMY     • BREAST LUMPECTOMY W/ NEEDLE LOCALIZATION Left    • COLONOSCOPY     • GALLBLADDER SURGERY     • HYSTERECTOMY Secondary to cervical carcinoma in situ      • MASTECTOMY W/ SENTINEL NODE BIOPSY Bilateral 2017    Procedure: BILATEREAL BREAST MASTECTOMY WITH SENTINEL NODE BIOPSY ON THE LEFT The sentinel lymph node biopsy will only be performed on the left side;  Surgeon: Diallo Eisenberg MD;  [place] [facility] [place] [facility] [place]  Service:    • TOTAL KNEE ARTHROPLASTY Bilateral 2000     OB/GYN history: .  Menarche age 12.  Was on Premarin prior to her diagnosis of first breast cancer in .     HEMATOLOGIC/ONCOLOGIC HISTORY:  (History from previous dates can be found in the separate document.) See history of present illness    MEDICATIONS    Current Outpatient Prescriptions:   •  aMILoride-hydrochlorothiazide (MODURETIC) 5-50 MG per tablet, Take 1 tablet by mouth Daily., Disp: 90 tablet, Rfl: 1  •  atenolol (TENORMIN) 50 MG tablet, Take 1 tablet by mouth Daily., Disp: 90 tablet, Rfl: 1  •  buPROPion XL (WELLBUTRIN XL) 150 MG 24 hr tablet, Take 1 tablet by mouth Every Morning., Disp: 90 tablet, Rfl: 1  •  escitalopram (LEXAPRO) 10 MG tablet, Take 1 tablet by mouth Daily., Disp: 90 tablet, Rfl: 1  •  fenofibrate 160 MG tablet, Take 1  tablet by mouth Daily., Disp: 90 tablet, Rfl: 1  •  losartan (COZAAR) 100 MG tablet, Take 1 tablet by mouth Daily., Disp: 90 tablet, Rfl: 1  •  meloxicam (MOBIC) 15 MG tablet, TAKE ONE TABLET BY MOUTH DAILY AS NEEDED, Disp: 90 tablet, Rfl: 0  •  Mirabegron ER (MYRBETRIQ) 25 MG tablet sustained-release 24 hour 24 hr tablet, Take 25 mg by mouth., Disp: , Rfl:   •  Multiple Vitamins-Minerals (CENTRUM SILVER PO), Take  by mouth., Disp: , Rfl:   •  Omega-3 Fatty Acids (FISH OIL) 1000 MG capsule capsule, Take 1,200 mg by mouth Daily With Breakfast., Disp: , Rfl:   •  omeprazole (priLOSEC) 20 MG capsule, TAKE ONE CAPSULE BY MOUTH DAILY, Disp: 90 capsule, Rfl: 0    ALLERGIES:   No Known Allergies    SOCIAL HISTORY:       Social History     Social History   • Marital status:      Spouse name: N/A   • Number of children: 3   • Years of education: College education      Occupational History   • From [employer]  Retired     Social History Main Topics   • Smoking status: Never Smoker   • Smokeless tobacco: Never Used   • Alcohol use 2 drinks per month    • Drug use: No   • Sexual activity: Defer      Comment:          FAMILY HISTORY:  Family History   Problem Relation Age of Onset   • Heart attack and hypertension  Mother    • Esophageal cancer Mother, diagnosed at age 74, and the past week at age 76 due to cancer.      •      • Alcohol abuse Father    • Diabetes Father      type 2   • Heart attack Father    • Heart failure Father    • Hyperlipidemia Father    • Hypertension Father    • Prostate cancerDiagnosed at age 85.   Father,  at age 87 due to multiple medical problems    • Clotting disorder Father    • Birth defects Brother    • Heart attack Brother    • Clotting disorder Daughter    • breast cancer  Paternal great aunt all         REVIEW OF SYSTEMS:  GENERAL: No change in appetite or weight;   No fevers, chills, sweats.    SKIN: Healing wound on chest.   No rashes.  HEME/LYMPH: No  "easy bruising, bleeding.   No swollen nodes.   EYES: No vision changes or diplopia.   ENT: No tinnitus, hearing loss, gum bleeding, epistaxis, hoarseness or dysphagia.   RESPIRATORY: No cough, shortness of breath, hemoptysis or wheezing.   CVS: No chest pain, palpitations, orthopnea, dyspnea on exertion or PND.   GI: No melena or hematochezia.   No abdominal pain.  No nausea, vomiting, constipation, diarrhea  : No lower tract obstructive symptoms, dysuria or hematuria.   MUSCULOSKELETAL: No bone pain.  No joint stiffness.   NEUROLOGICAL: No global weakness, loss of consciousness or seizures.   PSYCHIATRIC: No increased nervousness, mood changes or depression.     Objective    Vitals:    09/08/17 0755   BP: 118/73   Pulse: 64   Resp: 16   Temp: 98.6 °F (37 °C)   TempSrc: Oral   SpO2: 95%   Weight: 242 lb 6.4 oz (110 kg)   Height: 66.93\" (170 cm)  Comment: new ht newpt   PainSc:   5   PainLoc: Comment: surgery site     Current Status 9/8/2017   ECOG score 0      PHYSICAL EXAM:    GENERAL:  Well-developed, Moderate obese female in no acute distress.   SKIN:  Warm, dry without rashes, purpura or petechiae.  Bilateral chest wound is healing, no evidence of infection.  EYES:   EOMs intact.  Conjunctivae normal.  EARS:  Hearing intact.  NOSE:  No nasal discharge.  MOUTH:  Tongue is well-papillated; no stomatitis or ulcers.  Lips normal.  THROAT:  Oropharynx without lesions or exudates.  NECK:  Supple with good range of motion; no thyromegaly or masses, no JVD.  LYMPHATICS:  No cervical, supraclavicular, axillary or inguinal adenopathy.  CHEST:  Lungs clear to auscultation. Good airflow.  CARDIAC:  Regular rate and rhythm without murmurs, rubs or gallops. Normal S1,S2.  ABDOMEN:  Soft, nontender with no hepatosplenomegaly or masses.  EXTREMITIES:  No clubbing, cyanosis or edema.  NEUROLOGICAL:  Cranial Nerves II-XII grossly intact.  No focal neurological deficits.  PSYCHIATRIC:  Normal affect and mood.    RECENT " LABS:  Lab Results   Component Value Date    WBC 6.26 09/08/2017    HGB 13.8 09/08/2017    HCT 40.3 09/08/2017    MCV 87.6 09/08/2017     09/08/2017     Lab Results   Component Value Date    NEUTROABS 2.46 09/08/2017     Lab Results   Component Value Date    GLUCOSE 110 (H) 08/14/2017    BUN 26 (H) 08/14/2017    CREATININE 0.91 08/14/2017    EGFRIFNONA 62 08/14/2017    EGFRIFAFRI 66 07/26/2017    BCR 28.6 (H) 08/14/2017    K 3.7 08/14/2017    CO2 25.7 08/14/2017    CALCIUM 10.3 08/14/2017    PROTENTOTREF 7.2 07/26/2017    ALBUMIN 4.20 08/14/2017    LABIL2 1.5 08/14/2017    AST 32 08/14/2017    ALT 31 08/14/2017     Sodium   Date Value Ref Range Status   08/14/2017 143 136 - 145 mmol/L Final     Potassium   Date Value Ref Range Status   08/14/2017 3.7 3.5 - 5.2 mmol/L Final     Total Bilirubin   Date Value Ref Range Status   08/14/2017 0.3 0.1 - 1.2 mg/dL Final     Alkaline Phosphatase   Date Value Ref Range Status   08/14/2017 55 39 - 117 U/L Final   ]      PATH RESULT: 8/21/2017  Final Diagnosis      1: SENTINEL LYMPH NODE #1, LEFT AXILLA, EXCISIONAL SPECIMEN (ONE NODE):                         NO TUMOR IDENTIFIED.     2: SENTINEL LYMPH NODE #2, LEFT AXILLA, EXCISIONAL SPECIMEN (ONE NODE):                         NO TUMOR IDENTIFIED.     3: SENTINEL LYMPH NODE #3, LEFT AXILLA, EXCISIONAL SPECIMEN (ONE NODE):                         NO TUMOR IDENTIFIED.     CARLOS/jse      4: LEFT MASTECTOMY:                         INVASIVE DUCT CARCINOMA, GRADE 2 (8 MM)                         MARGINS FREE OF INVASIVE CARCINOMA AND DUCT CARCINOMA IN-SITU.                         TWO BENIGN AXILLARY LYMPH NODES.               SCAR  5: RIGHT MASTECTOMY:                         FIBROCYSTIC CHANGE INCLUDING DUCT HYPERPLASIA WITHOUT ATYPIA, COLUMNAR CELL CHANGE,                                                FOCAL           APOCRINE METAPLASIA/                         FIBROSIS OF NIPPLE WITH ASSOCIATED SQUAMOUS METAPLASIA OF DUCT  EPITHELIUM.       SYNOPTIC REPORT:  The following synoptic report utilizes the January 2016 [facility] protocol for invasive carcinoma of breast.    Procedure:  Total mastectomy.  Lymph node sampling:  Bergoo lymph nodes and partial dissection.  Specimen laterality:  Left.  Tumor size:  Size of the largest invasive carcinoma.         Greatest dimension of largest focus of invasion: 8 mm.         COMMENT           :  This dimension is based on the presence of invasive carcinoma on two consecutively obtained tissue          blocks with estimated thickness of 4 mm for each block.  Histologic type:  Invasive mammary carcinoma, ductal, not otherwise specified.  Histologic grade: ([place] Histologic Score):          Glandular/tubular differentiation: Score 3.          Nuclear pleomorphism:  Score 2.          Mitotic rate: Score 1.          Overall grade:  Grade 2.  Tumor focality:  Single focus of invasive carcinoma.  Duct carcinoma in-situ: Duct carcinoma in situ is present. Intermediate grade with focal comedo necrosis.   Margins           Invasive carcinoma:  Margins uninvolved by invasive carcinoma.           Distance from closest margin: 2.5 mm from superior superficial margin.             Duct carcinoma in-situ:  Margins uninvolved by duct carcinoma in-situ.           Distance from closest margin:  Duct carcinoma in-situ is 2.0 mm from superior superficial margin.  Lymph nodes:            Total number of lymph nodes examined (sentinel and nonsentinel): 5.           Number of sentinel lymph nodes examined: 3.  Pathologic staging:             Primary tumor:  pT1b           COMMENT:  This staging is based on findings in this specimen only.           Regional lymph nodes:  PN0.           COMMENT:  This finding is based on routine hematoxylin and eosin stain slides only.  Cytokertatin staining is                    available on request.  Additional studies:  Hormone receptor and HER2-Linda studies have been performed on  prior biopsy material.           Assessment/Plan      Recurrent breast cancer, left, Posterior bilateral mastectomy. Stage 1A, Strongly ER/CO positive and negative for HER-2.      The patient had a therapeutic left breast mastectomy and a prophylactic right breast mastectomy on 08/21/2017 by Dr. Eisenberg.  Pathology evaluation reported invasive ductal carcinoma, largest dimension 8 mm without lymph node involvement. However, her previous biopsy on 07/05/2017 reported largest dimension at 11 mm, so this patient would have T1c disease, but she is still stage IA.  Strongly ER/CO positive and negative for HER-2.  I discussed with the patient today, most likely, she will not need chemotherapy, however, I do recommend to have genetic testing with Oncotype DX, just to be sure, since this is either recurrent left breast cancer or the 2nd primary left breast cancer.      I also discussed with the patient endocrine therapy using aromatase inhibitors.  I discussed with her the potential side effects associated with aromatase inhibitors including decreased bone density, diffuse arthralgia, and hot flashes and sweating.  She has had no bone density for 4 years now so I will arrange her to have a DEXA scan in the next few days.      I will bring the patient back in 3 weeks from now for reevaluation, since the Oncotype DX study will take 2 weeks.      I answered her questions to her satisfaction.         FRIEDA JACKSON M.D., Ph.D.    9/8/2017          CC: MD Gabriella Orr disclaimer:  Much of this encounter note is an electronic transcription/translation of spoken language to printed text. The electronic translation of spoken language may permit erroneous, or at times, nonsensical words or phrases to be inadvertently transcribed; Although I have reviewed the note for such errors, some may still exist.

## 2017-09-14 ENCOUNTER — HOSPITAL ENCOUNTER (OUTPATIENT)
Dept: BONE DENSITY | Facility: HOSPITAL | Age: 67
Discharge: HOME | End: 2017-09-14
Attending: INTERNAL MEDICINE | Admitting: INTERNAL MEDICINE

## 2017-09-14 DIAGNOSIS — Z90.722 ACQUIRED ABSENCE OF BOTH OVARIES: ICD-10-CM

## 2017-09-14 DIAGNOSIS — C50.912 RECURRENT BREAST CANCER, LEFT (HCC): ICD-10-CM

## 2017-09-14 PROCEDURE — 77080 DXA BONE DENSITY AXIAL: CPT

## 2017-09-15 ENCOUNTER — OFFICE VISIT (OUTPATIENT)
Dept: MAMMOGRAPHY | Facility: CLINIC | Age: 67
End: 2017-09-15

## 2017-09-15 VITALS
TEMPERATURE: 97.6 F | HEART RATE: 63 BPM | SYSTOLIC BLOOD PRESSURE: 112 MMHG | OXYGEN SATURATION: 95 % | DIASTOLIC BLOOD PRESSURE: 60 MMHG

## 2017-09-15 DIAGNOSIS — C50.912 RECURRENT BREAST CANCER, LEFT (HCC): Primary | ICD-10-CM

## 2017-09-15 PROCEDURE — 99024 POSTOP FOLLOW-UP VISIT: CPT | Performed by: SURGERY

## 2017-09-15 NOTE — PROGRESS NOTES
Chief Complaint: Alesia Resendez is a  67 y.o. female, initially referred by No ref. provider found , who is here today for a postoperative visit.    History of Present Illness:  In the interim,Alesia Resendez has had a small seroma drained from the right hand side.  She thinks her may be a little puffiness there.  She has also been doing the wall walking exercises and making good progress.    She has noted no redness, warmth,drainage at the incision site. Denies fever or chills.      Current Outpatient Prescriptions:   •  aMILoride-hydrochlorothiazide (MODURETIC) 5-50 MG per tablet, Take 1 tablet by mouth Daily., Disp: 90 tablet, Rfl: 1  •  atenolol (TENORMIN) 50 MG tablet, Take 1 tablet by mouth Daily., Disp: 90 tablet, Rfl: 1  •  buPROPion XL (WELLBUTRIN XL) 150 MG 24 hr tablet, Take 1 tablet by mouth Every Morning., Disp: 90 tablet, Rfl: 1  •  escitalopram (LEXAPRO) 10 MG tablet, Take 1 tablet by mouth Daily., Disp: 90 tablet, Rfl: 1  •  fenofibrate 160 MG tablet, Take 1 tablet by mouth Daily., Disp: 90 tablet, Rfl: 1  •  losartan (COZAAR) 100 MG tablet, Take 1 tablet by mouth Daily., Disp: 90 tablet, Rfl: 1  •  meloxicam (MOBIC) 15 MG tablet, TAKE ONE TABLET BY MOUTH DAILY AS NEEDED, Disp: 90 tablet, Rfl: 0  •  Mirabegron ER (MYRBETRIQ) 25 MG tablet sustained-release 24 hour 24 hr tablet, Take 25 mg by mouth., Disp: , Rfl:   •  Multiple Vitamins-Minerals (CENTRUM SILVER PO), Take  by mouth., Disp: , Rfl:   •  Omega-3 Fatty Acids (FISH OIL) 1000 MG capsule capsule, Take 1,200 mg by mouth Daily With Breakfast., Disp: , Rfl:   •  omeprazole (priLOSEC) 20 MG capsule, TAKE ONE CAPSULE BY MOUTH DAILY, Disp: 90 capsule, Rfl: 0  Physical examination  The right chest wall has a nicely healing incision.  There does appear to be some fluid present but certainly no signs of infection.  I aspirated this today and removed 52 cc of serosanguineous fluid  Left chest wall-there is a nicely healing incision in no signs of  fluid.  Assessment:  Recurrent left breast cancer status post bilateral mastectomy with left sentinel lymph node biopsy.    Plan:  She has full range of motion of her upper extremities.  I would like to see her back in 1 week to assess for any additional seroma fluid          EMR Dragon/transcription disclaimer:    Much of this encounter note is an electronic transcription/translocation of spoken language to printed text.  The electronic translation of spoken language may permit erroneous, or at times, nonsensical words or phrases to be inadvertently transcribed.  Although I have reviewed the note from such areas, some may still exist.

## 2017-09-19 LAB
CYTO UR: NORMAL
LAB AP CASE REPORT: NORMAL
Lab: NORMAL
Lab: NORMAL
PATH REPORT.ADDENDUM SPEC: NORMAL
PATH REPORT.FINAL DX SPEC: NORMAL
PATH REPORT.GROSS SPEC: NORMAL

## 2017-09-22 ENCOUNTER — OFFICE VISIT (OUTPATIENT)
Dept: MAMMOGRAPHY | Facility: CLINIC | Age: 67
End: 2017-09-22

## 2017-09-22 VITALS
TEMPERATURE: 98 F | HEART RATE: 65 BPM | OXYGEN SATURATION: 95 % | SYSTOLIC BLOOD PRESSURE: 118 MMHG | DIASTOLIC BLOOD PRESSURE: 65 MMHG

## 2017-09-22 DIAGNOSIS — C50.912 RECURRENT BREAST CANCER, LEFT (HCC): Primary | ICD-10-CM

## 2017-09-22 PROCEDURE — 99024 POSTOP FOLLOW-UP VISIT: CPT | Performed by: SURGERY

## 2017-09-22 NOTE — PROGRESS NOTES
Chief Complaint: Alesia Resendez is a  67 y.o. female, initially referred by No ref. provider found , who is here today for a postoperative visit.    History of Present Illness:  In the interim,Alesia Resendez has been doing the wall walking exercises and making very good progress.  She does feel that there may be some fluid along the right chest wall as it is feeling a little tight to her.    She has noted no redness, warmth or drainage at the incision site. Denies fever or chills.      Current Outpatient Prescriptions:   •  aMILoride-hydrochlorothiazide (MODURETIC) 5-50 MG per tablet, Take 1 tablet by mouth Daily., Disp: 90 tablet, Rfl: 1  •  atenolol (TENORMIN) 50 MG tablet, Take 1 tablet by mouth Daily., Disp: 90 tablet, Rfl: 1  •  buPROPion XL (WELLBUTRIN XL) 150 MG 24 hr tablet, Take 1 tablet by mouth Every Morning., Disp: 90 tablet, Rfl: 1  •  escitalopram (LEXAPRO) 10 MG tablet, Take 1 tablet by mouth Daily., Disp: 90 tablet, Rfl: 1  •  fenofibrate 160 MG tablet, Take 1 tablet by mouth Daily., Disp: 90 tablet, Rfl: 1  •  losartan (COZAAR) 100 MG tablet, Take 1 tablet by mouth Daily., Disp: 90 tablet, Rfl: 1  •  meloxicam (MOBIC) 15 MG tablet, TAKE ONE TABLET BY MOUTH DAILY AS NEEDED, Disp: 90 tablet, Rfl: 0  •  Mirabegron ER (MYRBETRIQ) 25 MG tablet sustained-release 24 hour 24 hr tablet, Take 25 mg by mouth., Disp: , Rfl:   •  Multiple Vitamins-Minerals (CENTRUM SILVER PO), Take  by mouth., Disp: , Rfl:   •  Omega-3 Fatty Acids (FISH OIL) 1000 MG capsule capsule, Take 1,200 mg by mouth Daily With Breakfast., Disp: , Rfl:   •  omeprazole (priLOSEC) 20 MG capsule, TAKE ONE CAPSULE BY MOUTH DAILY, Disp: 90 capsule, Rfl: 0  Physical examination  Chest wall-there did appear to be some fluid on the right side and today I aspirated 55 cc of serous fluid.  The left side looks fine  She does appear to have full range of motion of both upper extremities  Assessment:  Recurrent left breast cancer status post bilateral  mastectomies-she has developed a seroma on the right side.  We aspirated essentially the same amount we did last week.  I do feel like we have totally drained her today and she has been asked to reapply an Ace wrap around her chest to encourage the skin flaps to stick down.    Plan:  I will see her back in the office in 1 week.          EMR Dragon/transcription disclaimer:    Much of this encounter note is an electronic transcription/translocation of spoken language to printed text.  The electronic translation of spoken language may permit erroneous, or at times, nonsensical words or phrases to be inadvertently transcribed.  Although I have reviewed the note from such areas, some may still exist.

## 2017-09-29 ENCOUNTER — APPOINTMENT (OUTPATIENT)
Dept: OTHER | Facility: HOSPITAL | Age: 67
End: 2017-09-29

## 2017-09-29 ENCOUNTER — OFFICE VISIT (OUTPATIENT)
Dept: ONCOLOGY | Facility: CLINIC | Age: 67
End: 2017-09-29

## 2017-09-29 VITALS
RESPIRATION RATE: 16 BRPM | BODY MASS INDEX: 38.45 KG/M2 | SYSTOLIC BLOOD PRESSURE: 125 MMHG | HEART RATE: 62 BPM | WEIGHT: 245 LBS | HEIGHT: 67 IN | OXYGEN SATURATION: 95 % | TEMPERATURE: 98.6 F | DIASTOLIC BLOOD PRESSURE: 82 MMHG

## 2017-09-29 DIAGNOSIS — C50.912 RECURRENT BREAST CANCER, LEFT (HCC): Primary | ICD-10-CM

## 2017-09-29 PROCEDURE — 99214 OFFICE O/P EST MOD 30 MIN: CPT | Performed by: INTERNAL MEDICINE

## 2017-09-29 PROCEDURE — G0463 HOSPITAL OUTPT CLINIC VISIT: HCPCS | Performed by: INTERNAL MEDICINE

## 2017-09-29 NOTE — PROGRESS NOTES
Subjective .     REASON FOR FOLLOWUP:     1.  Recurrent left breast cancer status post bilateral mastectomy on 8/21/2017.  The tumor is stage IA (pT1 cN0 M0), ER/MO positive, HER-2 negative.  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   2.  Oncotype DX score 16, corresponding to a 10 year disease recurrence 10%.          3.  Bone density scan on 9/14/2017 reported normal results.       HISTORY OF PRESENT ILLNESS:  The patient is a 67 y.o. female who is here for re-evaluation, to discuss further treatment plan after we obtained Oncotype DX study and DEXA bone density.     Patient reports she has excellent performance status ECOG 0. She is physically active. Her wound has completely healed from the mastectomies. She has no specific complaints.    We obtained Oncotype DX study which comes back with a low risk score of 16, corresponding to 10 year disease recurrence risk 10%. She also had normal bone density scan.     Discussed with patient for endocrine therapy using aromatase inhibitor and its side effects. The patient reported that she had tremendous hot flashes, sweating when she was taking tamoxifen previously after she was diagnosed with DCIS. She also cited that she already had arthritis with joint pains involving her hands, she does not want to have risk to have worsening arthralgia from the aromatase inhibitors because she is physically active she does not want to have any limitation because of side effects from the aromatase inhibitor. I also presented data from the [facility] web site, and this looked at 5 year and 10 year survival benefit. Endocrine therapy using tamoxifen carries very small benefit, in 5 years it saves 1 out of 10 patients who would die from the disease and in 20 years it saves 2 out of 20 patients from dying of breast cancer. After consideration, the patient reports she does not want to have endocrine therapy  at all. She would rather take the risk.       Past Medical History:   Diagnosis Date   • Anxiety    • Arthritis    • Breast cancer 2006    LEFT   • Breast cancer 2017    LEFT   • Gastric reflux    • High cholesterol    • History of radiation therapy 2006    LT BREAST   • History of skin cancer     SQUAMOUS CELL REMOVED FROM LT UPPER LIP AND RT THUMB   • Hypertension    • PONV (postoperative nausea and vomiting)    • Urinary incontinence      Past Surgical History:   Procedure Laterality Date   • APPENDECTOMY     • BREAST LUMPECTOMY W/ NEEDLE LOCALIZATION Left    • COLONOSCOPY     • GALLBLADDER SURGERY     • HYSTERECTOMY Secondary to cervical carcinoma in situ      • MASTECTOMY W/ SENTINEL NODE BIOPSY Bilateral 2017    Procedure: BILATEREAL BREAST MASTECTOMY WITH SENTINEL NODE BIOPSY ON THE LEFT The sentinel lymph node biopsy will only be performed on the left side;  Surgeon: Diallo Eisenberg MD;  [place] [facility] [place] [facility] [place]:    • TOTAL KNEE ARTHROPLASTY Bilateral 2000     OB/GYN history: .  Menarche age 12.  Was on Premarin prior to her diagnosis of first breast cancer in .     HEMATOLOGIC/ONCOLOGIC HISTORY: The patient is a 67 y.o. year old female who is here for initial evaluation on 2017with the above-mentioned history.    Ms. Resendez reports that she has healed wound, no infection.  She actually will see Dr. Eisenberg this afternoon for removing the stitches.  She has no plan for reconstruction surgery.  She reports she had her 1st episode of breast cancer back in , at that time she was seen at the [facility].  She had a lumpectomy with a 6 mm DCIS and had radiation therapy followed by 5 years of tamoxifen.     The patient had an abnormal mammogram study in early 2017.  She subsequently had ultrasound-guided left breast biopsy on 2017.  This study reported invasive ductal carcinoma with the  largest dimension 11 mm; it was a grade 2 tumor.  Further biomarker study at the [facility] reported      It was reported strongly positive for ER 98% and strongly for OH 61%, HER-2 was negative, IHC 0 and Ki-67 at 13.7%.      The patient was referred to see Dr. Eisenberg who performed bilateral mastectomy on 08/21/2017.  Pathology evaluation from the mastectomy sample reported invasive ductal carcinoma with the largest dimension 8 mm, grade 2 Amorita score 6/10 with a single focus of invasive carcinoma.  There is also DCIS component.  All margins were clear.  All 3 sentinel lymph nodes were negative for malignancy.  There were also 2 lymph nodes in the mastectomy sample, which were also negative.      MEDICATIONS    Current Outpatient Prescriptions:   •  aMILoride-hydrochlorothiazide (MODURETIC) 5-50 MG per tablet, Take 1 tablet by mouth Daily., Disp: 90 tablet, Rfl: 1  •  atenolol (TENORMIN) 50 MG tablet, Take 1 tablet by mouth Daily., Disp: 90 tablet, Rfl: 1  •  buPROPion XL (WELLBUTRIN XL) 150 MG 24 hr tablet, Take 1 tablet by mouth Every Morning., Disp: 90 tablet, Rfl: 1  •  escitalopram (LEXAPRO) 10 MG tablet, Take 1 tablet by mouth Daily., Disp: 90 tablet, Rfl: 1  •  fenofibrate 160 MG tablet, Take 1 tablet by mouth Daily., Disp: 90 tablet, Rfl: 1  •  losartan (COZAAR) 100 MG tablet, Take 1 tablet by mouth Daily., Disp: 90 tablet, Rfl: 1  •  meloxicam (MOBIC) 15 MG tablet, TAKE ONE TABLET BY MOUTH DAILY AS NEEDED, Disp: 90 tablet, Rfl: 0  •  Mirabegron ER (MYRBETRIQ) 25 MG tablet sustained-release 24 hour 24 hr tablet, Take 25 mg by mouth., Disp: , Rfl:   •  Multiple Vitamins-Minerals (CENTRUM SILVER PO), Take  by mouth., Disp: , Rfl:   •  Omega-3 Fatty Acids (FISH OIL) 1000 MG capsule capsule, Take 1,200 mg by mouth Daily With Breakfast., Disp: , Rfl:   •  omeprazole (priLOSEC) 20 MG capsule, TAKE ONE CAPSULE BY MOUTH DAILY, Disp: 90 capsule, Rfl: 0    ALLERGIES:   No Known Allergies    SOCIAL  HISTORY:       Social History     Social History   • Marital status:      Spouse name: N/A   • Number of children: 3   • Years of education: College education      Occupational History   • From [employer]  Retired     Social History Main Topics   • Smoking status: Never Smoker   • Smokeless tobacco: Never Used   • Alcohol use 2 drinks per month    • Drug use: No   • Sexual activity: Defer      Comment:          FAMILY HISTORY:  Family History   Problem Relation Age of Onset   • Heart attack and hypertension  Mother    • Esophageal cancer Mother, diagnosed at age 74, and the past week at age 76 due to cancer.      •      • Alcohol abuse Father    • Diabetes Father      type 2   • Heart attack Father    • Heart failure Father    • Hyperlipidemia Father    • Hypertension Father    • Prostate cancerDiagnosed at age 85.   Father,  at age 87 due to multiple medical problems    • Clotting disorder Father    • Birth defects Brother    • Heart attack Brother    • Clotting disorder Daughter    • breast cancer  Paternal great aunt all         REVIEW OF SYSTEMS:  GENERAL: No change in appetite or weight;   No fevers, chills, sweats.    SKIN: Healing wound on chest. No rashes.  HEME/LYMPH: No easy bruising, bleeding.   No swollen nodes.   EYES: No vision changes or diplopia.   ENT: No tinnitus, hearing loss, gum bleeding, epistaxis, hoarseness or dysphagia.   RESPIRATORY: No cough, shortness of breath, hemoptysis or wheezing.   CVS: No chest pain, palpitations, orthopnea, dyspnea on exertion or PND.   GI: No melena or hematochezia.   No abdominal pain.  No nausea, vomiting, constipation, diarrhea  : No lower tract obstructive symptoms, dysuria or hematuria.   MUSCULOSKELETAL: Chronic arthritis and arthralgia involving her hands. NEUROLOGICAL: No global weakness, loss of consciousness or seizures.   PSYCHIATRIC: No increased nervousness, mood changes or depression.     Objective    Vitals:  "   09/29/17 0930   BP: 125/82   Pulse: 62   Resp: 16   Temp: 98.6 °F (37 °C)   TempSrc: Oral   SpO2: 95%   Weight: 245 lb (111 kg)   Height: 66.93\" (170 cm)   PainSc: 3  Comment: surgery discomfort   PainLoc: Arm  Comment: right arm     Current Status 9/29/2017   ECOG score 0      PHYSICAL EXAM:    GENERAL:  Well-developed, Moderate obese female in no acute distress.   SKIN:  Warm, dry without rashes, purpura or petechiae.   EYES:  Conjunctivae normal.  EARS:  Hearing intact.  NOSE:  No nasal discharge.  MOUTH:  Tongue is well-papillated; no stomatitis or ulcers.  Lips normal.  THROAT:  Oropharynx without lesions or exudates.  NECK:  Supple with good range of motion; no thyromegaly or masses.  LYMPHATICS:  No cervical, supraclavicular, axillary adenopathy.  CHEST:  Lungs clear to auscultation. Good airflow.  CARDIAC:  Regular rate and rhythm without murmurs, rubs or gallops. Normal S1,S2.  ABDOMEN:  Soft, nontender with no hepatosplenomegaly or masses.  EXTREMITIES:  No clubbing, cyanosis or edema.  NEUROLOGICAL:  Cranial Nerves II-XII grossly intact.  No focal neurological deficits.  PSYCHIATRIC:  Normal affect and mood.    RECENT LABS:  Lab Results   Component Value Date    WBC 6.26 09/08/2017    HGB 13.8 09/08/2017    HCT 40.3 09/08/2017    MCV 87.6 09/08/2017     09/08/2017     Lab Results   Component Value Date    NEUTROABS 2.46 09/08/2017     Lab Results   Component Value Date    GLUCOSE 110 (H) 08/14/2017    BUN 26 (H) 08/14/2017    CREATININE 0.91 08/14/2017    EGFRIFNONA 62 08/14/2017    EGFRIFAFRI 66 07/26/2017    BCR 28.6 (H) 08/14/2017    K 3.7 08/14/2017    CO2 25.7 08/14/2017    CALCIUM 10.3 08/14/2017    PROTENTOTREF 7.2 07/26/2017    ALBUMIN 4.20 08/14/2017    LABIL2 1.5 08/14/2017    AST 32 08/14/2017    ALT 31 08/14/2017     Sodium   Date Value Ref Range Status   08/14/2017 143 136 - 145 mmol/L Final     Potassium   Date Value Ref Range Status   08/14/2017 3.7 3.5 - 5.2 mmol/L Final "     Total Bilirubin   Date Value Ref Range Status   08/14/2017 0.3 0.1 - 1.2 mg/dL Final     Alkaline Phosphatase   Date Value Ref Range Status   08/14/2017 55 39 - 117 U/L Final   ]        Assessment/Plan      Recurrent left breast cancer, post bilateral mastectomy. Stage 1A, Strongly ER/WY positive and negative for HER-2.  Has low Oncotype DX score 16, corresponding to a 10 year disease recurrence risk 10%.  Certainly she is not a candidate for adjuvant chemotherapy.     Discussed with patient for the meaning of this study.  The disease recurrence not only limited due to local on the chest wall, but also most likely wouldn't be in the bones, liver lungs or even brain.  Patient voiced understanding.      I discussed with the patient today, see the current history, and she made her mind, not to pursue endocrine therapy.  She is willing to take the 10% risk for disease recurrence.    I will bring her back in 6 months for routine follow-up, we'll check CBC and CMP also.    More than 30 min, over half of that time were for counseling.  I answered her questions to her satisfaction.         FRIEDA JACKSON M.D., Ph.D.    9/29/2017        CC: MD Gabriella rOr disclaimer:  Much of this encounter note is an electronic transcription/translation of spoken language to printed text. The electronic translation of spoken language may permit erroneous, or at times, nonsensical words or phrases to be inadvertently transcribed; Although I have reviewed the note for such errors, some may still exist.

## 2017-10-02 ENCOUNTER — OFFICE VISIT (OUTPATIENT)
Dept: MAMMOGRAPHY | Facility: CLINIC | Age: 67
End: 2017-10-02

## 2017-10-02 VITALS
DIASTOLIC BLOOD PRESSURE: 80 MMHG | HEART RATE: 63 BPM | OXYGEN SATURATION: 96 % | TEMPERATURE: 97.9 F | SYSTOLIC BLOOD PRESSURE: 125 MMHG

## 2017-10-02 DIAGNOSIS — C50.912 RECURRENT MALIGNANT NEOPLASM OF LEFT BREAST (HCC): Primary | ICD-10-CM

## 2017-10-02 DIAGNOSIS — Z90.13 S/P BILATERAL MASTECTOMY: ICD-10-CM

## 2017-10-02 PROCEDURE — 99024 POSTOP FOLLOW-UP VISIT: CPT | Performed by: SURGERY

## 2017-10-02 RX ORDER — IMIQUIMOD 12.5 MG/.25G
CREAM TOPICAL
COMMUNITY
Start: 2017-09-26 | End: 2019-03-15

## 2017-10-02 NOTE — PROGRESS NOTES
Chief Complaint: Alesia Resendez is a  67 y.o. female, initially referred by No ref. provider found , who is here today for a postoperative visit.    History of Present Illness:  In the interim,Alesia Resendez has been wearing her Ace wrap to help decrease seroma formation.  She has also seen the medical oncologists.  Her Oncotype DX score was 16.  She does not need adjuvant chemotherapy but was offered hormone blocking therapy.  The patient after considering all of this declined to take that.    She has noted no redness, warmth,drainage, swelling at the incision site. Denies fever or chills.      Current Outpatient Prescriptions:   •  aMILoride-hydrochlorothiazide (MODURETIC) 5-50 MG per tablet, Take 1 tablet by mouth Daily., Disp: 90 tablet, Rfl: 1  •  atenolol (TENORMIN) 50 MG tablet, Take 1 tablet by mouth Daily., Disp: 90 tablet, Rfl: 1  •  buPROPion XL (WELLBUTRIN XL) 150 MG 24 hr tablet, Take 1 tablet by mouth Every Morning., Disp: 90 tablet, Rfl: 1  •  escitalopram (LEXAPRO) 10 MG tablet, Take 1 tablet by mouth Daily., Disp: 90 tablet, Rfl: 1  •  fenofibrate 160 MG tablet, Take 1 tablet by mouth Daily., Disp: 90 tablet, Rfl: 1  •  imiquimod (ALDARA) 5 % cream, , Disp: , Rfl:   •  losartan (COZAAR) 100 MG tablet, Take 1 tablet by mouth Daily., Disp: 90 tablet, Rfl: 1  •  meloxicam (MOBIC) 15 MG tablet, TAKE ONE TABLET BY MOUTH DAILY AS NEEDED, Disp: 90 tablet, Rfl: 0  •  Mirabegron ER (MYRBETRIQ) 25 MG tablet sustained-release 24 hour 24 hr tablet, Take 25 mg by mouth., Disp: , Rfl:   •  Multiple Vitamins-Minerals (CENTRUM SILVER PO), Take  by mouth., Disp: , Rfl:   •  Omega-3 Fatty Acids (FISH OIL) 1000 MG capsule capsule, Take 1,200 mg by mouth Daily With Breakfast., Disp: , Rfl:   •  omeprazole (priLOSEC) 20 MG capsule, TAKE ONE CAPSULE BY MOUTH DAILY, Disp: 90 capsule, Rfl: 0  Physical examination  The right chest wall has a nicely healed incision.  There was a tiny bit of fluid that I could detect and I  was able to aspirate 7 cc of serosanguineous fluid today.  The left chest wall has healed nicely with no signs of fluid.  The patient has full range of motion of both upper extremities    Assessment:  Recurrent left breast cancer status post bilateral mastectomy-we have been dealing with a postoperative seroma on the right side and today's aspiration should be all that she needs.    Plan:  She will continue to follow with the medical oncologists and therefore I will see her on an as-needed basis.  She has been instructed to call me if the medical oncologists stop seeing her so that I can be certain that she is having a physical examination every 6 months.  She was given a prescription for breast prosthesis.        EMR Dragon/transcription disclaimer:    Much of this encounter note is an electronic transcription/translocation of spoken language to printed text.  The electronic translation of spoken language may permit erroneous, or at times, nonsensical words or phrases to be inadvertently transcribed.  Although I have reviewed the note from such areas, some may still exist.

## 2017-10-17 RX ORDER — MELOXICAM 15 MG/1
TABLET ORAL
Qty: 90 TABLET | Refills: 0 | Status: SHIPPED | OUTPATIENT
Start: 2017-10-17 | End: 2018-02-08 | Stop reason: SDUPTHER

## 2017-12-14 ENCOUNTER — OFFICE VISIT (OUTPATIENT)
Dept: OTHER | Facility: HOSPITAL | Age: 67
End: 2017-12-14

## 2017-12-14 VITALS
DIASTOLIC BLOOD PRESSURE: 74 MMHG | BODY MASS INDEX: 39 KG/M2 | TEMPERATURE: 98.4 F | RESPIRATION RATE: 18 BRPM | SYSTOLIC BLOOD PRESSURE: 118 MMHG | WEIGHT: 248.5 LBS | OXYGEN SATURATION: 95 % | HEART RATE: 66 BPM

## 2017-12-14 DIAGNOSIS — C50.912 RECURRENT MALIGNANT NEOPLASM OF LEFT BREAST (HCC): Primary | ICD-10-CM

## 2017-12-14 PROCEDURE — G0463 HOSPITAL OUTPT CLINIC VISIT: HCPCS | Performed by: NURSE PRACTITIONER

## 2017-12-14 PROCEDURE — 99215 OFFICE O/P EST HI 40 MIN: CPT | Performed by: NURSE PRACTITIONER

## 2017-12-14 NOTE — PROGRESS NOTES
[facility] CANCER SURVIVORSHIP VISIT NOTE    Alesia Resendez is a pleasant 67 y.o.   female seen today at the request of Peyton Keyes MD in our Cancer Survivorship Clinic, being seen for recurrent infiltrative ductal carcinoma (Stage IA) of the Left Breast. Here today to review survivorship care plan.    TREATMENT HISTORY:   67 year old  female with a history of left DCIS in 2006 treated with lumpectomy, radiation and five years of Tamoxifen who in July of 2017 had abnormal mammogram. This was followed by an ultrasound guided left breast biopsy which showed a grade 2 infiltrating ductal carcinoma 11 mm in greatest dimension ER/N[place] Positive, HER2 negative. On August 21, 2017 the patient underwent a therapeutic left mastectomy and prophylactic right mastectomy and no reconstruction with Diallo Eisenberg MD. [place] lymph node biopsy showed 3 negative lymph nodes. Two additional lymph nodes from the left mastectomy sample were also negative. Pathology showed invasive ductal carcinoma with a DCIS component, grade 2, 8 mm in greatest dimension. Surgical margins were negative. She had Oncotype Dx testing result of 16 and so no adjuvant chemotherapy was recommended. She did discuss risks/benefits of adjuvant endocrine therapy with Dr Keyes and ultimately decided to forgo this.     Allergies as of 12/14/2017   • (No Known Allergies)       MEDICATIONS:  I have reviewed the medication list with the patient and I updated it in the electronic medical record. Medication dosages and frequencies were confirmed to be accurate with the patient.      Review of Systems   Constitutional: Positive for fatigue. Negative for activity change, appetite change, chills, fever and unexpected weight change.        Denies hot flashes     HENT: Negative for sore throat and trouble swallowing.    Respiratory: Negative for cough, chest tightness and shortness of breath.    Cardiovascular: Negative for chest pain  and leg swelling.   Gastrointestinal: Negative for abdominal pain and blood in stool.   Genitourinary: Negative for difficulty urinating and hematuria.   Musculoskeletal: Negative for arthralgias and myalgias.   Neurological: Negative for dizziness, weakness, light-headedness and headaches.   Psychiatric/Behavioral: Positive for decreased concentration. Negative for dysphoric mood and sleep disturbance. The patient is nervous/anxious.    All other systems reviewed and are negative.      DISTRESS QUESTIONNAIRE: 3/10 EFFECT TO LEVEL OF FUNCTIONING: none  Patient identified areas of distress: see flowsheet    FATIGUE: 3/10    /74  Pulse 66  Temp 98.4 °F (36.9 °C) (Oral)   Resp 18  Wt 113 kg (248 lb 8 oz)  SpO2 95% Comment: room air  BMI 39 kg/m2    Pain Score    12/14/17 1008   PainSc: 0-No pain         Physical Exam   Constitutional: She is oriented to person, place, and time. Vital signs are normal. She appears well-developed. She is cooperative.   HENT:   Head: Normocephalic and atraumatic.   Mouth/Throat: Oropharynx is clear and moist and mucous membranes are normal.   Cardiovascular: Normal rate, regular rhythm and intact distal pulses.    No lower extremity edema   Pulmonary/Chest: Effort normal. No respiratory distress.   Abdominal: Soft. Normal appearance.   Neurological: She is alert and oriented to person, place, and time.   Skin: Skin is warm, dry and intact. No cyanosis. Nails show no clubbing.   Psychiatric: She has a normal mood and affect. Her speech is normal and behavior is normal. Judgment and thought content normal.         Problem List Items Addressed This Visit        Malignant Neoplasms    Recurrent breast cancer - Primary    Relevant Orders    Ambulatory Referral to Multi-Disciplinary Clinic            SURVIVORSHIP DISCUSSION HELD TODAY:   Primary patient goal(s): wellness     Management of disease and treatment related effects: Alesia reports doing well after her surgery. She did have  some questions regarding getting her prosthesis prescription filled and Augustina Fermin RN was able to come to speak to the patient regarding this. She does report mild fatigue and some memory changes which she attributes to aging however we did discuss that these may be related to surgery as well and that she may well see improvements in both as she continues to recover. We discussed risk of lymphedema in women with sentinel node biopsy is low and has been reported to be between 5% and 17%, and also preventative measures and s/s of lymphedema developing or infection to be reported promptly. Information regarding breast care clinic and bioimpedance testing provided. She has elected to forgo endocrine therapy after her mastectomy and feels her decision was well informed and in collaboration with her treatment team.       Psychosocial and spiritual: Alesia reports anxiety and nervousness mostly related to general life stressors and does not associate this with her cancer diagnosis. She has been working part time as a  and picking up some extra days which she thinks she would like to take a step back from. She states that she had good support from her three adult children and sister immediately after her mastectomies but feels that after her recovery they are less inclined to discuss her diagnosis with her and this has been a let down for her. We did talk about the next Sharing Our Stories support series in February and registration information provided. We also discussed resources at [employer] for support, and also the searchable [place] [facility] Pathfinder database. She also states she would like to begin volunteering and we discussed the [employer] peer support network as well as possible avenues at the hospital and [employer]             Maintaining personal wellness: We discussed current BMI of 39 and recommended target of 20-25 for wellness, cardiovascular health and risk reduction for  cancer recurrence and prevention. We discussed availability of oncology registered dietician, Jessica Jones and referral offered. Patient agrees at this time. Jessica's contact info and handout describing recommended dietary modifications emphasizing whole foods, plant based diet provided and referral placed. She does participate in water aerobics 4 days a week which she feels was instrumental to her recovery. She is asking about Reiki therapy offered and I provided her with information for this as well as massage therapy.                    Orders Placed This Encounter   Procedures   • Ambulatory Referral to Multi-Disciplinary Clinic     Referral Priority:   Routine     Referral Type:   Consultation     Referral Reason:   Specialty Services Required     Number of Visits Requested:   1       After review of the Survivorship Treatment Summary & Care Plan, the patient verbalized understanding of reccommendations for follow-up. As outlined in the care plan, they were advised to continue with follow-up care in accordance with the NCCN surveillance guidelines while transitioning back to their primary care physician for continued general preventive and healthcare needs. We discussed the importance of healthy eating, exercise and weight management. We reviewed current guidelines for routine screening of other cancers.     A copy of the Survivorship Treatment Summary & Care Plan for Ms. Resendez (see below) was provided to her and forwarded to the providers identified on the care team.      30 minutes out of 40 minutes face-to-face spent counseling patient extensively on treatment summary, surveillance for recurrence, late and long-term effects of disease and treatment, intimacy and self-image, preventative screening for other cancers, achieving/maintatining healthy BMI and link to risk reduction, adherence to current therapies, management of anxiety/uncertainty and self care strategies.          Breast Cancer Survivorship Plan       General Information   Patient name Alesia Resendez   Date of birth 1950    Phone   Home Phone [phone]   Mobile [phone]      Email [email or web address]      Cancer Treatment Team     Patient Care Team:  Diallo Eisenberg MD as Referring Physician (Breast Surgery)  Diallo Eisenberg MD as Referring Physician (Breast Surgery)  Peyton Keyes MD PhD as Consulting Physician (Hematology and Oncology)    Provider Phone numbers  Care Team Provider: Virgil Moeller II, MD,  ([phone])  Care Team Provider: Akiko Drake MD,  ([phone])  Care Team Provider: Diallo Eisenberg MD,  ([phone])  Care Team Provider: Diallo Eisenberg MD,  ([phone])  Care Team Provider: Peyton Keyes MD PhD,  ([phone])     Post Treatment Care Team   Primary Care Physician Geremias Coronado MD  [address] [address]   [phone]         Background Information   Medical history Past Medical History:   Diagnosis   • Anxiety   • Arthritis   • Breast cancer    LEFT   • Breast cancer    LEFT   • Gastric reflux   • High cholesterol   • History of radiation therapy    LT BREAST   • History of skin cancer    SQUAMOUS CELL REMOVED FROM LT UPPER LIP AND RT THUMB   • Hypertension   • PONV (postoperative nausea and vomiting)   • Urinary incontinence      Surgical history Past Surgical History:   Procedure   • ANAL FISSURECTOMY   • APPENDECTOMY   • BREAST LUMPECTOMY W/ NEEDLE LOCALIZATION   • COLONOSCOPY   • GALLBLADDER SURGERY   • HYSTERECTOMY   • KNEE SURGERY    Knee cap replaced   • MASTECTOMY W/ SENTINEL NODE BIOPSY    Procedure: BILATEREAL BREAST MASTECTOMY WITH SENTINEL NODE BIOPSY ON THE LEFT The sentinel lymph node biopsy will only be performed on the left side;  Surgeon: Diallo Eisenberg MD;  Location: [facility];  Service:    • OOPHORECTOMY    BSO   • POSTERIOR REPAIR    with enterocele repair, midurethral sling, perineoplasty   • TONSILLECTOMY   • TOTAL KNEE  ARTHROPLASTY      Tobacco use History   Smoking Status   • Never Smoker   Smokeless Tobacco   • Never Used      Family oncology history Cancer-related family history includes Esophageal cancer (age of onset: 74) in her mother; Prostate cancer (age of onset: 85) in her father; Stomach cancer in her mother.      Oncology Information      Recurrent breast cancer    7/5/2017 Initial Diagnosis     Recurrent breast cancer    Left breast DCIS treated in 2006 with lumpectomy, radiation and Tamoxifen x 5 years         7/5/2017 Biopsy     Ultrasound guided left breast biopsy. Pathology showed grade 2 infiltrating ductal carcinoma.     Pathologic Stage: IA, T1c N0 M0  Estrogen receptor: Positive  Progesterone receptor: Positive  HER2 status: Negative  Oncotype DX Score: 16    Genetic testing: Not performed           8/21/2017 Surgery     Surgery       Procedure:  Therapeutic LEFT mastectomy; prophylactic RIGHT mastectomy; no reconstruction    [place] lymph node biopsy showed 3 negative lymph nodes  2 lymph nodes examined from mastectomy sample were negative  Pathology showed invasive ductal carcinoma with a DCIS component, grade 2, 8 mm in largest dimension  Surgical margins negative                   Complications during Therapy:   No concerns stated   Modification to Treatment Plan:  No Modifications      Lifetime Dose Tracking:   No doses have been documented on this patient for the following tracked chemicals: Doxorubicin, Epirubicin, Idarubicin, Daunorubicin, Mitoxantrone, Bleomycin, Mitomycin, Doxorubicin Liposomal         Disease Status at Completion of Primary Therapy: Complete Clinical Response / No Evidence of Disease      Persistent Treatment-Associated Adverse Effects at Completion of Therapy   It is important to recognize that not every person experiences the following adverse events after treatment.  You may not have any of these issues, a few or many adverse effects.  Experiences are highly variable.  Please  discuss any adverse effects of cancer treatment with your cancer care team.      After Surgical Therapy   Mastectomy     Mastectomy involves the removal of the entire breast. After surgery, there may be pain and soreness in the chest, underarm or shoulder which should get better over time. Nerves may be damaged along surgical scars on the chest and areas of lymph node removal which may result in numbness and other changes in sensation. Care and attention to wound healing after surgery is important. Healing can be affected by things like smoking, diabetes and other factors. Ask your doctor or nurse if you have issues with pain, numbness or tingling, or any changes in function or mobility.      How you think and feel about your body is important and coping with changes after mastectomy takes time.  It’s important to look at your scar and your chest. It’s important to touch your scar, too.  Your physician may give you instructions about massaging the scar to help with healing and to soften scar tissue. If you have a partner, let your partner look at your chest and feel the scar when you’re ready.  If you do not have breast reconstruction, you can wear a breast prosthesis to give the appearance of a natural breast in clothing. Prostheses can differ in material, weight and form and can be worn in a bra or attached to the chest. Your doctor can tell you when the time is right to be fitted for a prosthesis and you may need a special bra to wear with it. Some insurance companies cover the cost. Ask your healthcare providers for a list of resources and about help with insurance coverage. Some women choose to wear a breast prosthesis at times and to not wear one at other times or even not at all. Working through feelings about the cancer and changes as a result of surgery may take time and support. Talk with your doctor or nurse about any issues with body image and coping and ask for a list of resources for bras and  prostheses.     Survivors of breast cancer should speak with their health care provider regarding the possibility of a genetic or family syndrome. If there does appear to be a family history or possible genetic syndrome, genetic counseling and testing may be recommended.  [place] Node Biopsy   Removal of the sentinel lymph node is the removal of the first lymph node to which cancer cells are most likely to spread. After surgery, there may be pain and soreness in the area where the node was removed.  Nerves may be damaged which may result in numbness or other changes in sensation. While sentinel node biopsy (as opposed to a lymph node dissection where more lymph nodes are removed) decreases the risk of developing lymphedema, the risk is not completely gone.     Lymphedema   Removal of lymph nodes can slow the normal flow of lymph in the area which can lead to swelling in that limb, also called lymphedema.  Survivors who also received radiation therapy to the area where a lymph node was removed may be at increased risk of developing lymphedema. In some cases, the lymphedema can occur years after cancer therapy was completed. Lymphedema can cause pain or discomfort, disfigurement, change in function, and increased risk of infection in the affected area and closest limb. Signs of lymphedema can include a feeling of fullness or heaviness, changes in the skin (red, thick, stiff), aching, tightness, and difficulty moving or flexing nearby joints.  Other signs could be that your jewelry or clothes, like socks, pants or sleeves, may begin to feel tight on the affected limb. As signs of lymphedema could develop months or years after treatment, continue to monitor for signs and notify your doctor.      Several steps can be taken to help prevent and control lymphedema. Survivors should protect the potentially affected limb by avoiding cuts, scrapes, burns, insect bites, shots/vaccines, blood draws, and IV sticks in order to  decrease the risk of developing an infection in the limb. In addition, the survivor should protect the limb from the sun to avoid sunburn.  Lastly, survivors should avoid tight clothing and jewelry, and blood pressures in the affected limb that might further slow the normal flow of lymph.      Survivors of cancer, including those at risk for lymphedema, can and should exercise. Survivors should start slow and gradually increase intensity while monitoring your limb for changes in swelling or redness. If either occurs, stop exercising and notify your doctor for further direction.            After Chemotherapy   No chemotherapy received.      After Radiation Therapy   No radiation treatments received.      Hormone Therapy   Not receiving hormone therapy      Care of your Venous Access Device   No Venous Access Device currently in place      General After Cancer Treatment        It is not uncommon for cancer to impact other areas of your life such as relationships, work and mental health.  If you develop financial concerns, resources are sometimes available to assist in these areas.  Depression and anxiety can present either during or after cancer diagnosis and treatment.  It is important to discuss with your physician any of these concerns so these resources can be made available to you.      General Cancer Support & Resources    [facility]    Cancer Resource Center & Multidisciplinary Clinic:  [address] [place] [address]  [phone]    Survivorship Clinical Nurse Specialist  Shelli Prince APR - [phone]    Oncology Social Worker:  Sis Bo CSW - [phone]     Psychiatric Nurse Practitioner:  Rosalinda De La Cruz APR - [phone]    Financial Counselor and Contact Information:  [facility] - [phone]    Oncology Dietician Contact Information:  Jessica Jones RD - [phone]    Managing Anxiety or Depression:  Referral to support group,  e.g. American Cancer Society (www.cancer.org, 620.335.4219), Cancer Support Community (www.Living Independently Group.org, 317.457.7641)    Referral to a community provider for cognitive behavioral therapy or counseling.    Psychiatric care or counseling and/or initiation of pharmacotherapy are available at the [facility] . Please call [phone] or talk to a member of your treatment team about a referral.    For anonymous information, resource requests or support you can also call the 24-hour Crisis and Information Center at [phone]      Fear of Cancer Coming Back:  Patients need to know that these feelings are normal, and they won’t cause the cancer to come back.    • Referral for cognitive behavioral therapy or counseling    • Referral to support group, e.g. American Cancer Society (www.cancer.org, 187.241.7530), Cancer Support Community (www.Living Independently Group.org, 502.626.2214)      Fatigue:  • Regular physical activity (goal of 150 minutes of activity per week)  • Evaluation for hypothyroidism, anemia, depression      Financial Concerns:  The financial challenges that people with cancer and their families face are very real.     For hospital bills, you may want to talk with a hospital financial counselor. You may be able to work out a monthly payment plan or even get a reduced rate. You may also want to stay in touch with your insurance company to make sure costs are covered.    For information about resources that are available you can go to the NCI database, “Organizations That Offer Support Services,” at http://www.cancer.gov, search terms “financial assistance.”     Or call the NCI toll-free 7-328-2-CANCER (1-677.628.4745) to ask for help.      Managing Hot Flashes:  Ask your doctor about appropriate medical treatments. Medicines that may help manage hot flashes include Venlafaxine (Effexor) 37.5mg up to 75 mg daily OR Gabapentin (Neurontin) 300mg at bedtime up  "to 3 times/day.   OR referral to gynecologist      Managing Insomnia  · Practice good sleep hygiene (reduce/eliminate TV and electronic device screen time one hour before bed)  · Evaluation and management of other symptoms (hot flashes, anxiety, and pain)  · Regular physical activity (goal of 150 minutes of activity per week)  · Mindfulness based stress reduction    Concerns about Sexuality, Intimacy and Body Image    • Evaluation and treatment for anxiety, depression, as appropriate    • Referral to counseling and/or support group to address body image concerns,      such as breast asymmetry, prosthesis, e.g. the American Cancer Society’s     Look Good…Feel Better program (www.cancer.org, 757.158.8677)    • Recommendation for vaginal lubricant (Astroglide) or moisturizer (Replens, 1x     every 3 days)    • Screening for pain with intercourse    www.SexyAfterCancer.[employer]    Changes in Memory or \"Brain Fog\"  Patients should know that 25% of cancer patients have cognitive dysfunction after treatment and it usually gets better over time    Some things you can do to manage \"brain fog\" or memory problems include:  · Mental exercises (e.g., word games, crossword puzzles)   · Setting up and following routines  · Repeating information  · Keeping a memory journal  · Avoiding multitasking  · Exercising  · Maintaining a healthy diet.   · There is some evidence that Group Cognitive Training is also effective  · Rule out depression, sleep disturbance    About Lymphedema:  According to the National Cancer [place], anywhere from 5-17% of women who have sentinel lymph node biopsy develops lymphedema. Among women who have axillary lymph node dissection, the percentage is higher - from 20-53% - and risk increases with the number of nodes taken out.     • Referral to lymphedema physical therapy specialist for lymphatic massage  • Weight training  • Maintenance of healthy weight    Tips to reduce the risk of injury or infection to the " arm:  · Treat infections of the at-risk arm and hand right away.  · Wear gloves when doing house or garden work.  · Keep skin clean and well-moisturized.  · Use the arm not at risk when having blood drawn, getting injections or having blood pressure taken.  · Avoid sunburn and excess heat from saunas, hot baths, tanning and other sources.  · Don't cut nail cuticles.  · Use insect repellant when outdoors.  · Avoid injuries, including scratches and bruises, to the at-risk arm.  · Rest the at-risk arm in an elevated position (above the heart or shoulder).     If you have an infection, injury or any of the symptoms listed above, see your health care provider.      Maintain a Healthy Weight:  Aim for a target BMI of 20-25 m2  (Body mass index is 38.45 kg/(m^2).)    An oncology specialized registered dietician is available at the [facility] for consultations to you. Please call [phone]             Surveillance    How Frequent?    Medical Oncology visits 1 - 4 times per year as clinically appropriate for 5 years, then annually      Lab tests As clinically indicated    Imaging exams      Mammography Every 12 months    Lymphedema Monitor for lymphedema    Genetic Counseling Periodic screening for changes in family history and referral to genetic counseling as indicated    Gynecologic assessment For women on tamoxifen, gynecologic assessment every 12 months if uterus is present.    Bone Density study For women on aromatase inhibitor or who experience ovarian failure secondary to treatment should have monitoring of bone health and bone mineral density determination at baseline and periodically thereafter    Immunizations       Influenza       Herpes Zoster       Pneumococcal Yearly  Once  As appropriate    Tobacco Cessation The patient is not currently a tobacco user.  Counseling given: Not Answered         Monitor for ongoing toxicities:   None Specified      Call your doctor if you have any of these  signs or symptoms   New or worsening symptoms.  Pain that is new, unrelieved or bothersome.  Difficulty with your emotions, anxiety, and/or depression.  Physical problems that affect your abilities in your daily life or those that are bothersome (for example, continued fatigue, trouble sleeping, sexual problems, or edema).      Referrals provided   There are no referral needs at this time.           Self Care Plan                      Self Care Plan: What You Can Do to Stay Healthy after Treatment for Cancer       Cancer treatments may increase your chance of developing other health problems years after you have completed treatment. The purpose of this self care plan is to inform you about what steps you can take to maintain good health after cancer treatment. Keep in mind that every person treated for cancer is different and that these recommendations are not intended to be a substitute for the advice of a doctor or other health care professional. Please use these recommendations to talk with your health care provider about an appropriate follow up care plan for you.       Surveillance for Your Cancer    Recommendation Frequency Comments   Cancer surveillance visit with medical provider that is focused on detecting signs of recurrence of your cancer.   For additional information, visit   www.livestrong.org or   www.cancer.net/patient/[email or web address]  Frequency depends on type and stage of cancer you had. (If you had a higher risk cancer, you may be seen more often).    Your doctor has provided you with a personalized cancer treatment summary and survivorship care plan. If you need another copy, ask your doctor.             General Cancer Screening for Women     Cancer screening tests are designed to find cancer or pre-cancerous areas before there are any symptoms and, generally, when treatments are most successful. Various organizations have developed guidelines for cancer screening for women. While these guidelines  vary slightly between different organizations, they cover the same basic screening tests for breast, cervical and colorectal cancers.   In addition, during routine health examinations (at any age) your health care provider may also evaluate for cancers of the skin, mouth and thyroid. Not all screening tests are right for everyone. Your personal and family cancer history, and/or the presence of a known genetic predisposition, can affect which tests are right for you, and at what age you begin them. Therefore, you should discuss these with your health care provider. Your care plan will also include a section on follow up care for your type of cancer, and these recommendations override the general screening recommendations for that particular type of cancer in the general population.     The American Cancer Society (ACS) recommends these screening guidelines for women:    Recommendation   Frequency Comments   Breast Cancer Screening   For more information, see the ACS document Breast Cancer: Early Detection.   www.cancer.org/ssLINK/breast-cancer-early-detection-darline  · Yearly mammograms starting at age 40, and continuing for as long as a woman is in good health.   · Clinical breast exam (CBE), performed by a health care professional, every three years for women in their 20s and 30s, and every year for women 40 and over.   · A monthly breast self-exam (BSE) is a good way to monitor breast health. Women should know how their breasts normally look and feel, and report any change promptly to their health care provider.  The ACS recommends that some women - because of their family history, a genetic tendency, or certain other factors - be screened with Magnetic Resonance Imaging (MRI) in addition to mammograms. The number of women who fall into this category is small (less than 2 percent of all [place] women). Talk with your doctor about your personal history and whether you should have additional tests at an earlier age.   "  Colon and Rectal Cancer Screening   For more information see the ACS document    Colorectal Cancer: Early Detection.   www.cancer.org/ssLINK/colorectal-cancer-early-detection-darline  Options for colon cancer screening can be divided into those that screen for both cancer and polyps, and those that just screen for cancer. Screening should begin at age 50 (unless you are considered \"high risk\" (see comments), using one of the following testing schedules:   Tests that find polyps and cancer   (Preferred over those that find cancer alone. If any of these tests are positive, a colonoscopy should be done.)   · Flexible sigmoidoscopy every five years, or   · Colonoscopy every 10 years, or   · Double-contrast barium enema every five years, or   · CT colonography (virtual colonoscopy) every five years     Tests that primarily test for cancer   · Yearly fecal occult blood test (FOBT)*, or   · Yearly fecal immunochemical test (FIT) *, or   · Stool DNA test (sDNA), interval uncertain*     * The multiple stool take-home test should be used. One test done by the doctor in the office is not adequate. A colonoscopy should be done if the test is positive.   Talk with your doctor about your medical history, and what colorectal cancer screening test and schedule is best for you.   Individuals at higher risk of colon cancer should have screening earlier and potentially more frequently. Those at higher risk of colon and rectal cancer:     · Individuals with a family history of colon or rectal cancer in a relative who was diagnosed before the age of 60   · Individuals with a history of polyps   · Individuals with inflammatory bowel disease (Crohn's disease or ulcerative colitis)   · Individuals with a genetic predisposition to colon or rectal cancer, such as hereditary non-polyposis colon cancer (HNPCC) syndrome or familial adenomatous polyposis (FAP) syndrome       Cervical Cancer Screening     For more information see the ACS document " Cervical Cancer: Early Detection.   www.cancer.org/.../cervical-cancer-prevention-and-early-detection-darline  Cervical cancer screening should not begin before age 21. Screening Pap tests should be performed every three years between age 21 and 29   · Women age 30 or older should be screened every 3 years with a Pap test or every five years with a combined Pap/Human Papillomavirus (HPV) test.   · Women 65 years of age or older who have had negative consecutive screening in the preceding 10 years should discontinue screening.   · Women who have had a total hysterectomy (removal of the uterus and cervix) should also stop having Pap tests, unless the surgery was done as a treatment for cervical cancer or pre-cancer. Women who have had a hysterectomy without removal of the cervix should continue to have Pap tests.   · Women who have had a history of a serious cervical precancer should continue screening for at least 20 years, even if that extends screening past age 65.   · Women who have been vaccinated against HPV should still follow these screening guidelines.  Treatment for most gynecological cancers involves hysterectomy and alters recommendations for Pap tests. Refer to your personalized cancer treatment summary and survivorship care plan to see what the Pap test recommendations are for you. If you need another copy of your care plan, please ask your doctor.        Sun Exposure and Skin Cancer Risk     Skin cancer is the most commonly diagnosed type of cancer, and rates are on the rise. However, this is one cancer that in most cases can be prevented or detected early. While you may hear that you need the sun to make vitamin D, in reality you only need a few minutes a day to do this. Exposure to ultraviolet (UV) rays, either by natural sunlight or tanning beds, can lead to skin cancer. In addition, UV rays lead to other forms of skin damage, including wrinkles, loss of skin elasticity, dark patches (sometimes called  "age spots or liver spots), and pre-cancerous skin changes (such as dry, scaly, rough patches). Although dark-skinned people are less likely to develop skin cancer, they can and do develop skin cancers, most often in areas that are not exposed to sun (on the soles of the feet, under nails, and genitals).   You can do a lot to protect yourself from damaging UV rays and to detect skin cancer early. Start by practicing sun safety, including using a broad spectrum sunscreen (which protects against UVA and UVB rays)  with an SPF of at least 30 every day, avoiding peak sun times (10 a.m. to 4 p.m., when the rays are strongest) and wearing protective clothing such as hats, sunglasses and long-sleeved shirts.   Examine your skin regularly so you become familiar with any moles or birthmarks. If a mole has changed in any way, you should have a health care provider examine the area. This includes a change in size, shape or color; the development of scaliness, bleeding, oozing, itchiness or pain; or the development of a sore that will not heal. If you have a lot of moles, it may be helpful to make note of moles using photographs or a \"mole map\".     For a guide to performing a skin exam, visit www.[facility].com/skincancernet/skin_examinations.html.      Healthy Lifestyle    For some cancer survivors, the experience is the motivation to making healthy lifestyle changes. It may seem insignificant, but these changes have been shown to reduce the risk of the cancer coming back or a new cancer developing. Below are some tips on adopting a healthier lifestyle. Maintaining a healthy weight is important in cancer prevention, as is physical activity and eating a healthy diet. Strive to incorporate all three pieces of the puzzle: healthy weight, balanced diet and regular exercise.    Recommendation Goal Comments   Maintain a healthy weight.     For more information, visit "   http://www.nhlbi.nih.gov/health/public/heart/obesity/lose_wt/index.htm   www.win.niddk.nih.gov   Call the American Heart Association   1-509.908.1193   · Talk to your health care team about what a healthy weight is for you, and take steps to reach and maintain that weight.  · For many people reaching their ideal weight can be a challenge; however, losing even 5 to 10 pounds can lower blood pressure, blood sugar and cholesterol levels.   · Weigh yourself weekly to monitor for weight gain/loss  Being overweight can increase your chance of your cancer coming back. Maintaining a healthy weight, physical activity and eating a healthy diet are all important in cancer prevention.   Being overweight can increase your chance of having high blood pressure, high blood sugar and/or high cholesterol, which can lead to heart disease, diabetes and stroke. If you would like more information about your blood sugar, cholesterol or blood pressure, talk with your primary care provider.      Eat a healthy diet, mostly from plant sources.     For more information, visit   http://www.Signadyneplate.gov/food-groups/  · Eat healthy, including plenty of fruits and vegetables daily.   · Drink more water, less soda and juice.   · Limit how much alcohol you drink (if you drink at all).  Strive to have two-thirds of your plate be vegetables, fruits, whole grains and beans, while one-third or less should be an animal product. Choose fish and chicken and limit red meat and processed meats. Limit intake of alcohol to two drinks per day.   Exercise.     To learn more about recommendations for diet, activity and weight, visit   AICR’s Guidelines for Survivors   http://preventcancer.aicr.org/site/PageServer?pagename=patients_survivors_guidelines   ACS Eat Healthy and Get Active   www.cancer.org/Healthy/EatHealthyGetActive/index · Experts recommend at least 30 minutes of moderate-to-vigorous activity per day, five days a week.  Research shows that  exercise can help you control your weight, improve your energy level, and help you sleep at night. The key is to find a physical activity you enjoy such as walking, dancing or gardening and do it regularly. If you have been inactive for a while, start out slowly. You can start out by exercising 10 minutes a day several days a week. If you feel dizzy, short of breath, or have chest pain during exercise, stop exercising and talk with your primary care doctor.   Do not use tobacco in any form.     For more information, visit   http://www.cdc.gov/tobacco/campaign/tips/  · If you use tobacco, quit as soon as possible.  Smoking is the most preventable cause of death in the [place] If you would like more information, ask your doctor or you can call a national hotline at 8(880)-QUIT-NOW.    Keep your bones healthy.   For more information, visit   www.niams.nih.gov/Health.../Bone/Bone_Health/bone_health_for_life   For the FRAX (Fracture Risk Assessment) tool, visit:   www.shef.ac.uk/FRAX/ , to estimate 10-year risks for fractures  · Ask your primary care provider about screening for osteoporosis beginning at age 65 or at a younger age if your bone fracture risk is increased.   · The 10-year risk for osteoporotic fractures can be calculated for individuals by using the FRAX tool and could help to guide screening decisions for women younger than 65 years.   · Maximize your bone health by eating healthy, getting enough calcium and vitamin D, and exercising regularly.  Certain cancer treatments, such as chemotherapy or hormonal therapy, can cause bone loss. In addition, after menopause, women can lose up to 20 percent of their bone density. The good news is that women can maximize their bone density by eating healthy, getting enough calcium and vitamin D, and exercising regularly.     Age (years) Calcium per day Vitamin D per day   19 to 49 1000 milligrams 600 units   50 or over 1200 milligrams 800 units     ·    Have regular  check-ups by a healthcare professional.     For more information about healthy screening tests for men visit the [place].S. Department of Health and Human Services.   http://www.womenshealth.gov/screening-tests-and-vaccines/screening-tests-for-men/  For more information about adult vaccinations visit the CDC:   http://www.cdc.gov/vaccines/recs/schedules/adult-schedule.htm  · Keep up-to-date on general health screening tests, including cholesterol, blood pressure and glucose (blood sugar) levels.   · Get an annual influenza vaccine (flu shot).   · Get vaccinated with the pneumococcal vaccine, which prevents a type of pneumonia, and re-vaccinated as determined by your health care team.   · Don’t forget dental and eye health!  · The American Optometric Association recommends adults have their eyes examined every two years until age 60, then annually. People who wear glasses or corrective lenses or are at high risk for eye problems (i.e., diabetics, family history of eye disease) should be seen more frequently.   · The American Dental Association recommends adults see their dentist at least once a year.              Shelli Prince DNP, APRN, AGCNS-BC, AOCNS  Survivorship Clinical Nurse Specialist  [facility]

## 2018-02-08 RX ORDER — MELOXICAM 15 MG/1
TABLET ORAL
Qty: 90 TABLET | Refills: 0 | Status: SHIPPED | OUTPATIENT
Start: 2018-02-08 | End: 2018-05-23 | Stop reason: SDUPTHER

## 2018-02-21 RX ORDER — ATENOLOL 50 MG/1
TABLET ORAL
Qty: 90 TABLET | Refills: 0 | Status: SHIPPED | OUTPATIENT
Start: 2018-02-21 | End: 2018-05-23 | Stop reason: SDUPTHER

## 2018-02-26 ENCOUNTER — OFFICE VISIT (OUTPATIENT)
Dept: FAMILY MEDICINE CLINIC | Facility: CLINIC | Age: 68
End: 2018-02-26

## 2018-02-26 VITALS
HEIGHT: 67 IN | TEMPERATURE: 98.8 F | DIASTOLIC BLOOD PRESSURE: 74 MMHG | OXYGEN SATURATION: 94 % | HEART RATE: 65 BPM | WEIGHT: 245.6 LBS | BODY MASS INDEX: 38.55 KG/M2 | SYSTOLIC BLOOD PRESSURE: 120 MMHG

## 2018-02-26 DIAGNOSIS — R05.9 COUGH: Primary | ICD-10-CM

## 2018-02-26 DIAGNOSIS — B34.9 VIRAL SYNDROME: Primary | ICD-10-CM

## 2018-02-26 LAB
EXPIRATION DATE: NORMAL
FLUAV AG NPH QL: NEGATIVE
FLUBV AG NPH QL: NEGATIVE
INTERNAL CONTROL: NORMAL
Lab: NORMAL

## 2018-02-26 PROCEDURE — 87804 INFLUENZA ASSAY W/OPTIC: CPT | Performed by: FAMILY MEDICINE

## 2018-02-26 PROCEDURE — 99213 OFFICE O/P EST LOW 20 MIN: CPT | Performed by: FAMILY MEDICINE

## 2018-02-26 RX ORDER — OSELTAMIVIR PHOSPHATE 75 MG/1
75 CAPSULE ORAL 2 TIMES DAILY
Qty: 10 CAPSULE | Refills: 0 | Status: SHIPPED | OUTPATIENT
Start: 2018-02-26 | End: 2018-03-16

## 2018-02-26 RX ORDER — ALBUTEROL SULFATE 90 UG/1
2 AEROSOL, METERED RESPIRATORY (INHALATION) EVERY 4 HOURS PRN
Qty: 8 G | Refills: 1 | Status: SHIPPED | OUTPATIENT
Start: 2018-02-26 | End: 2019-03-15

## 2018-02-26 RX ORDER — DEXTROMETHORPHAN HYDROBROMIDE AND PROMETHAZINE HYDROCHLORIDE 15; 6.25 MG/5ML; MG/5ML
5 SYRUP ORAL 4 TIMES DAILY PRN
Qty: 118 ML | Refills: 0 | Status: SHIPPED | OUTPATIENT
Start: 2018-02-26 | End: 2018-03-16

## 2018-02-26 RX ORDER — NITROFURANTOIN 25; 75 MG/1; MG/1
CAPSULE ORAL
COMMUNITY
Start: 2018-02-06 | End: 2019-03-15

## 2018-02-26 NOTE — PROGRESS NOTES
Subjective   Alesia Resendez is a 68 y.o. female presenting with   Chief Complaint   Patient presents with   • Cough     has been going on since saturday    • Diarrhea   • Headache   • Wheezing        HPI Comments: 68-year-old white female nonsmoker started feeling ill late Saturday and now has diffuse modest headache and a deep cough that is productive of clear fluid and loose bowel movements.  She also says she is wheezing and that the wheezing is loud enough she sometimes have trouble falling asleep at night.    She does not have any stiff neck or confusion or chest pain or shortness of breath.  She does not have any focal neurologic deficit.  She does not have any blood in her stool.    Cough   Associated symptoms include a fever, headaches and wheezing.   Diarrhea    Associated symptoms include coughing, a fever and headaches.   Headache    Associated symptoms include coughing and a fever.   Wheezing    Associated symptoms include coughing, diarrhea, a fever and headaches.        The following portions of the patient's history were reviewed and updated as appropriate: current medications, past family history, past medical history, past social history, past surgical history and problem list.    Review of Systems   Constitutional: Positive for fatigue and fever.   HENT: Positive for congestion.    Respiratory: Positive for cough and wheezing.    Gastrointestinal: Positive for diarrhea.   Neurological: Positive for headaches.   All other systems reviewed and are negative.      Objective   Physical Exam   Constitutional: She is oriented to person, place, and time. She appears well-developed and well-nourished. No distress.   HENT:   Head: Normocephalic and atraumatic.   Right Ear: External ear normal.   Left Ear: External ear normal.   Nose: Mucosal edema and rhinorrhea (clear rhinorrhea) present. Right sinus exhibits no maxillary sinus tenderness and no frontal sinus tenderness. Left sinus exhibits no maxillary  sinus tenderness and no frontal sinus tenderness.   Mouth/Throat: Oropharynx is clear and moist.   Eyes: EOM are normal. Pupils are equal, round, and reactive to light.   Neck: Normal range of motion. Neck supple. No JVD present. No thyromegaly present.   Cardiovascular: Normal rate and regular rhythm.    Pulmonary/Chest: Effort normal. No respiratory distress. She has wheezes in the right upper field and the left upper field. She has rhonchi in the right upper field and the left upper field. She has no rales. She exhibits no tenderness.   Abdominal: Soft. Bowel sounds are normal. She exhibits no distension and no mass. There is no tenderness.   Musculoskeletal: Normal range of motion.   Lymphadenopathy:     She has no cervical adenopathy.   Neurological: She is alert and oriented to person, place, and time.   Skin: Skin is warm and dry. She is not diaphoretic.   Psychiatric: She has a normal mood and affect. Her behavior is normal.   Nursing note and vitals reviewed.      Assessment/Plan   Alesia was seen today for cough, diarrhea, headache and wheezing.    Diagnoses and all orders for this visit:    Viral syndrome    Other orders  -     oseltamivir (TAMIFLU) 75 MG capsule; Take 1 capsule by mouth 2 (Two) Times a Day.  -     promethazine-dextromethorphan (PROMETHAZINE-DM) 6.25-15 MG/5ML syrup; Take 5 mL by mouth 4 (Four) Times a Day As Needed for Cough.  -     albuterol (PROVENTIL HFA;VENTOLIN HFA) 108 (90 Base) MCG/ACT inhaler; Inhale 2 puffs Every 4 (Four) Hours As Needed for Wheezing.                   I would like him to return for another visit in 6 month(s)

## 2018-02-26 NOTE — PATIENT INSTRUCTIONS
This is a very nice 68-year-old with a flu syndrome.  I will send out Phenergan DM for the cough and Tamiflu and albuterol inhaler further wheezing.  I would like her to call if she does not start feeling better.

## 2018-03-16 ENCOUNTER — OFFICE VISIT (OUTPATIENT)
Dept: ONCOLOGY | Facility: CLINIC | Age: 68
End: 2018-03-16

## 2018-03-16 ENCOUNTER — LAB (OUTPATIENT)
Dept: OTHER | Facility: HOSPITAL | Age: 68
End: 2018-03-16

## 2018-03-16 VITALS
WEIGHT: 244 LBS | HEIGHT: 67 IN | TEMPERATURE: 98.5 F | BODY MASS INDEX: 38.3 KG/M2 | HEART RATE: 60 BPM | SYSTOLIC BLOOD PRESSURE: 111 MMHG | DIASTOLIC BLOOD PRESSURE: 73 MMHG | RESPIRATION RATE: 18 BRPM | OXYGEN SATURATION: 94 %

## 2018-03-16 DIAGNOSIS — C50.912 RECURRENT MALIGNANT NEOPLASM OF LEFT BREAST (HCC): Primary | ICD-10-CM

## 2018-03-16 DIAGNOSIS — Z12.11 ENCOUNTER FOR SCREENING FOR MALIGNANT NEOPLASM OF COLON: Primary | ICD-10-CM

## 2018-03-16 DIAGNOSIS — C50.912 RECURRENT MALIGNANT NEOPLASM OF LEFT BREAST (HCC): ICD-10-CM

## 2018-03-16 DIAGNOSIS — R10.11 RIGHT UPPER QUADRANT ABDOMINAL PAIN: ICD-10-CM

## 2018-03-16 LAB
ALBUMIN SERPL-MCNC: 4.1 G/DL (ref 3.5–5.2)
ALBUMIN/GLOB SERPL: 1.4 G/DL
ALP SERPL-CCNC: 61 U/L (ref 39–117)
ALT SERPL W P-5'-P-CCNC: 36 U/L (ref 1–33)
ANION GAP SERPL CALCULATED.3IONS-SCNC: 11.6 MMOL/L
AST SERPL-CCNC: 46 U/L (ref 1–32)
BASOPHILS # BLD AUTO: 0.06 10*3/MM3 (ref 0–0.2)
BASOPHILS NFR BLD AUTO: 1 % (ref 0–1.5)
BILIRUB SERPL-MCNC: 0.4 MG/DL (ref 0.1–1.2)
BUN BLD-MCNC: 26 MG/DL (ref 8–23)
BUN/CREAT SERPL: 25.5 (ref 7–25)
CALCIUM SPEC-SCNC: 10.1 MG/DL (ref 8.6–10.5)
CHLORIDE SERPL-SCNC: 105 MMOL/L (ref 98–107)
CO2 SERPL-SCNC: 26.4 MMOL/L (ref 22–29)
CREAT BLD-MCNC: 1.02 MG/DL (ref 0.57–1)
DEPRECATED RDW RBC AUTO: 42.5 FL (ref 37–54)
EOSINOPHIL # BLD AUTO: 0.2 10*3/MM3 (ref 0–0.7)
EOSINOPHIL NFR BLD AUTO: 3.2 % (ref 0.3–6.2)
ERYTHROCYTE [DISTWIDTH] IN BLOOD BY AUTOMATED COUNT: 12.8 % (ref 11.7–13)
GFR SERPL CREATININE-BSD FRML MDRD: 54 ML/MIN/1.73
GLOBULIN UR ELPH-MCNC: 3 GM/DL
GLUCOSE BLD-MCNC: 114 MG/DL (ref 65–99)
HCT VFR BLD AUTO: 42.8 % (ref 35.6–45.5)
HGB BLD-MCNC: 13.9 G/DL (ref 11.9–15.5)
IMM GRANULOCYTES # BLD: 0.01 10*3/MM3 (ref 0–0.03)
IMM GRANULOCYTES NFR BLD: 0.2 % (ref 0–0.5)
LYMPHOCYTES # BLD AUTO: 3.44 10*3/MM3 (ref 0.9–4.8)
LYMPHOCYTES NFR BLD AUTO: 55.1 % (ref 19.6–45.3)
MCH RBC QN AUTO: 29.3 PG (ref 26.9–32)
MCHC RBC AUTO-ENTMCNC: 32.5 G/DL (ref 32.4–36.3)
MCV RBC AUTO: 90.1 FL (ref 80.5–98.2)
MONOCYTES # BLD AUTO: 0.55 10*3/MM3 (ref 0.2–1.2)
MONOCYTES NFR BLD AUTO: 8.8 % (ref 5–12)
NEUTROPHILS # BLD AUTO: 1.98 10*3/MM3 (ref 1.9–8.1)
NEUTROPHILS NFR BLD AUTO: 31.7 % (ref 42.7–76)
NRBC BLD MANUAL-RTO: 0 /100 WBC (ref 0–0)
PLAT MORPH BLD: NORMAL
PLATELET # BLD AUTO: 262 10*3/MM3 (ref 140–500)
PMV BLD AUTO: 9.2 FL (ref 6–12)
POTASSIUM BLD-SCNC: 4.3 MMOL/L (ref 3.5–5.2)
PROT SERPL-MCNC: 7.1 G/DL (ref 6–8.5)
RBC # BLD AUTO: 4.75 10*6/MM3 (ref 3.9–5.2)
RBC MORPH BLD: NORMAL
SODIUM BLD-SCNC: 143 MMOL/L (ref 136–145)
WBC MORPH BLD: NORMAL
WBC NRBC COR # BLD: 6.24 10*3/MM3 (ref 4.5–10.7)

## 2018-03-16 PROCEDURE — 85025 COMPLETE CBC W/AUTO DIFF WBC: CPT | Performed by: INTERNAL MEDICINE

## 2018-03-16 PROCEDURE — 85007 BL SMEAR W/DIFF WBC COUNT: CPT | Performed by: INTERNAL MEDICINE

## 2018-03-16 PROCEDURE — 80053 COMPREHEN METABOLIC PANEL: CPT | Performed by: INTERNAL MEDICINE

## 2018-03-16 PROCEDURE — 99213 OFFICE O/P EST LOW 20 MIN: CPT | Performed by: INTERNAL MEDICINE

## 2018-03-16 PROCEDURE — 36415 COLL VENOUS BLD VENIPUNCTURE: CPT

## 2018-03-16 NOTE — PROGRESS NOTES
Subjective .     REASON FOR FOLLOWUP:     1.  Recurrent left breast cancer status post bilateral mastectomy on 2017.  The tumor is stage IA (pT1 cN0 M0), ER/TX positive, HER-2 negative.  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   2.  Oncotype DX score 16, corresponding to a 10 year disease recurrence 10%.       3.  Bone density scan on 2017 reported normal results.   4.  Patient declined adjuvant endocrine therapy in 2017.        HISTORY OF PRESENT ILLNESS:  The patient is a 68 y.o. female who is here for 6-month follow-up evaluation.  Patient reports she is doing well, she does frequent the self-examination and denies any lymphadenopathy or suspicious lesion on her chest wall at the site of mastectomy.    Patient reports excellent performance status ECOG 0. She is physically active.  She has no specific complaints.      Past Medical History:   Diagnosis Date   • Anxiety    • Arthritis    • Breast cancer     LEFT   • Breast cancer 2017    LEFT   • Gastric reflux    • High cholesterol    • History of radiation therapy     LT BREAST   • History of skin cancer     SQUAMOUS CELL REMOVED FROM LT UPPER LIP AND RT THUMB   • Hypertension    • PONV (postoperative nausea and vomiting)    • Urinary incontinence      Past Surgical History:   Procedure Laterality Date   • APPENDECTOMY     • BREAST LUMPECTOMY W/ NEEDLE LOCALIZATION Left    • COLONOSCOPY     • GALLBLADDER SURGERY     • HYSTERECTOMY Secondary to cervical carcinoma in situ      • MASTECTOMY W/ SENTINEL NODE BIOPSY Bilateral 2017    Procedure: BILATEREAL BREAST MASTECTOMY WITH SENTINEL NODE BIOPSY ON THE LEFT The sentinel lymph node biopsy will only be performed on the left side;  Surgeon: Diallo Eisenberg MD;  Location: [facility] OR [facility] – [place], [place];  Service:    • TOTAL KNEE ARTHROPLASTY Bilateral 2000     OB/GYN history: .  Menarche  age 12.  Was on Premarin prior to her diagnosis of first breast cancer in 2006.     HEMATOLOGIC/ONCOLOGIC HISTORY: The patient is a 67 y.o. year old female who is here for initial evaluation on 09/08/2017with the above-mentioned history.    Ms. Resendez reports that she has healed wound, no infection.  She actually will see Dr. Eisenberg this afternoon for removing the stitches.  She has no plan for reconstruction surgery.  She reports she had her 1st episode of breast cancer back in 2006, at that time she was seen at the [facility].  She had a lumpectomy with a 6 mm DCIS and had radiation therapy followed by 5 years of tamoxifen.     The patient had an abnormal mammogram study in early 07/2017.  She subsequently had ultrasound-guided left breast biopsy on 07/05/2017.  This study reported invasive ductal carcinoma with the largest dimension 11 mm; it was a grade 2 tumor.  Further biomarker study at the [facility] reported      It was reported strongly positive for ER 98% and strongly for NH 61%, HER-2 was negative, IHC 0 and Ki-67 at 13.7%.      The patient was referred to see Dr. Eisenberg who performed bilateral mastectomy on 08/21/2017.  Pathology evaluation from the mastectomy sample reported invasive ductal carcinoma with the largest dimension 8 mm, grade 2 [place] score 6/10 with a single focus of invasive carcinoma.  There is also DCIS component.  All margins were clear.  All 3 sentinel lymph nodes were negative for malignancy.  There were also 2 lymph nodes in the mastectomy sample, which were also negative.      We obtained Oncotype DX study which comes back with a low risk score of 16, corresponding to 10 year disease recurrence risk 10%. She also had normal bone density scan.     Discussed with patient for endocrine therapy using aromatase inhibitor and its side effects. The patient reported that she had tremendous hot flashes, sweating when she was  taking tamoxifen previously after she was diagnosed with DCIS. She also cited that she already had arthritis with joint pains involving her hands, she does not want to have risk to have worsening arthralgia from the aromatase inhibitors because she is physically active she does not want to have any limitation because of side effects from the aromatase inhibitor. I also presented data from the [facility] web site, and this looked at 5 year and 10 year survival benefit. Endocrine therapy using tamoxifen carries very small benefit, in 5 years it saves 1 out of 10 patients who would die from the disease and in 20 years it saves 2 out of 20 patients from dying of breast cancer. After consideration, the patient reports she does not want to have endocrine therapy at all. She would rather take the risk.    MEDICATIONS    Current Outpatient Prescriptions:   •  albuterol (PROVENTIL HFA;VENTOLIN HFA) 108 (90 Base) MCG/ACT inhaler, Inhale 2 puffs Every 4 (Four) Hours As Needed for Wheezing., Disp: 8 g, Rfl: 1  •  aMILoride-hydrochlorothiazide (MODURETIC) 5-50 MG per tablet, Take 1 tablet by mouth Daily., Disp: 90 tablet, Rfl: 1  •  atenolol (TENORMIN) 50 MG tablet, TAKE ONE TABLET BY MOUTH DAILY, Disp: 90 tablet, Rfl: 0  •  escitalopram (LEXAPRO) 10 MG tablet, Take 1 tablet by mouth Daily., Disp: 90 tablet, Rfl: 1  •  fenofibrate 160 MG tablet, Take 1 tablet by mouth Daily., Disp: 90 tablet, Rfl: 1  •  imiquimod (ALDARA) 5 % cream, , Disp: , Rfl:   •  losartan (COZAAR) 100 MG tablet, Take 1 tablet by mouth Daily., Disp: 90 tablet, Rfl: 1  •  meloxicam (MOBIC) 15 MG tablet, TAKE ONE TABLET BY MOUTH DAILY AS NEEDED, Disp: 90 tablet, Rfl: 0  •  Mirabegron ER (MYRBETRIQ) 25 MG tablet sustained-release 24 hour 24 hr tablet, Take 25 mg by mouth., Disp: , Rfl:   •  Multiple Vitamins-Minerals (CENTRUM SILVER PO), Take  by mouth., Disp: , Rfl:   •  nitrofurantoin, macrocrystal-monohydrate, (MACROBID) 100 MG capsule, , Disp: , Rfl:    •  Omega-3 Fatty Acids (FISH OIL) 1000 MG capsule capsule, Take 1,200 mg by mouth Daily With Breakfast., Disp: , Rfl:   •  omeprazole (priLOSEC) 20 MG capsule, TAKE ONE CAPSULE BY MOUTH DAILY, Disp: 90 capsule, Rfl: 0    ALLERGIES:   No Known Allergies    SOCIAL HISTORY:       Social History     Social History   • Marital status:      Spouse name: N/A   • Number of children: 3   • Years of education: College education      Occupational History   • From [employer]  Retired     Social History Main Topics   • Smoking status: Never Smoker   • Smokeless tobacco: Never Used   • Alcohol use 2 drinks per month    • Drug use: No   • Sexual activity: Defer      Comment:          FAMILY HISTORY:  Family History   Problem Relation Age of Onset   • Heart attack and hypertension  Mother    • Esophageal cancer Mother, diagnosed at age 74, and the past week at age 76 due to cancer.      •      • Alcohol abuse Father    • Diabetes Father      type 2   • Heart attack Father    • Heart failure Father    • Hyperlipidemia Father    • Hypertension Father    • Prostate cancerDiagnosed at age 85.   Father,  at age 87 due to multiple medical problems    • Clotting disorder Father    • Birth defects Brother    • Heart attack Brother    • Clotting disorder Daughter    • breast cancer  Paternal great aunt all         REVIEW OF SYSTEMS:  GENERAL: Excellent performance status is ECOG 0.  Denies pains.  No change in appetite or weight; No fevers, chills, sweats.    SKIN: No open wound. No rashes.  No bruises no petechia.  HEME/LYMPH: No easy bruising, bleeding. No swollen nodes.   EYES: No vision changes or diplopia.   ENT: No tinnitus, hearing loss, gum bleeding, epistaxis, hoarseness or dysphagia.   RESPIRATORY: No cough, shortness of breath, hemoptysis or wheezing.   CVS: No chest pain, palpitations, orthopnea, dyspnea on exertion.   GI: No melena or hematochezia. No abdominal pain.  No nausea, vomiting,  "constipation, diarrhea  : No lower tract obstructive symptoms, dysuria or hematuria.   MUSCULOSKELETAL: Chronic arthritis and arthralgia involving her hands. NEUROLOGICAL: No global weakness, loss of consciousness or seizures.   PSYCHIATRIC: No increased nervousness, mood changes or depression.     Objective    Vitals:    03/16/18 1050   BP: 111/73   Pulse: 60   Resp: 18   Temp: 98.5 °F (36.9 °C)   TempSrc: Oral   SpO2: 94%   Weight: 111 kg (244 lb)   Height: 170 cm (66.93\")   PainSc: 0-No pain     Current Status 3/16/2018   ECOG score 0      PHYSICAL EXAM:    GENERAL:  Well-developed, moderate obese  female in no acute distress.  Patient is healthy looking.  SKIN:  Warm, dry without rashes, purpura or petechiae.   EYES:  Conjunctivae normal.  EARS:  Hearing intact.  NOSE:  No nasal discharge.    NECK:   no thyromegaly or masses.  LYMPHATICS:  No cervical, supraclavicular, axillary adenopathy.  CHEST:  Lungs clear to auscultation. Good airflow.  Status post bilateral mastectomy.  No suspicious lesion palpable.  CARDIAC:  Regular rate and rhythm without murmurs, rubs or gallops. Normal S1,S2.  ABDOMEN:  Soft, nontender no guarding or rebound, with no hepatosplenomegaly or masses.  Bowel sounds normal.  EXTREMITIES:  No clubbing, cyanosis or edema.  NEUROLOGICAL:  Cranial Nerves II-XII grossly intact.  No focal neurological deficits.  PSYCHIATRIC:  Normal affect and mood.    RECENT LABS:  Lab Results   Component Value Date    WBC 6.24 03/16/2018    HGB 13.9 03/16/2018    HCT 42.8 03/16/2018    MCV 90.1 03/16/2018     03/16/2018     Lab Results   Component Value Date    NEUTROABS 2.46 09/08/2017     CMP is pending today.      Assessment/Plan      Patient is 68 years old female with recurrent left breast cancer, post bilateral mastectomy in August 2017. Stage 1A, strongly ER/WV positive and negative for HER-2.  Has low Oncotype DX score 16, corresponding to a 10 year disease recurrence risk 10%.  " Certainly she is not a candidate for adjuvant chemotherapy.     Patient declined adjuvant endocrine therapy in September 2017.  She prefers follow-up with us in the clinic for monitoring purposes.      Physical examination today is benign with no evidence for disease recurrence.  Review of system is also unremarkable.      PLAN:   I will bring her back in 6 months for routine follow-up, we'll check CBC.       More than 15 min, over half of that time were for counseling.       FRIEDA JACKSON M.D., Ph.D.    3/16/2018        CC: Diallo Eisenberg MD

## 2018-03-23 RX ORDER — ESCITALOPRAM OXALATE 10 MG/1
TABLET ORAL
Qty: 90 TABLET | Refills: 0 | Status: SHIPPED | OUTPATIENT
Start: 2018-03-23 | End: 2018-07-02 | Stop reason: SDUPTHER

## 2018-04-19 RX ORDER — FENOFIBRATE 160 MG/1
TABLET ORAL
Qty: 90 TABLET | Refills: 3 | Status: SHIPPED | OUTPATIENT
Start: 2018-04-19 | End: 2018-12-14 | Stop reason: SDUPTHER

## 2018-05-18 ENCOUNTER — OFFICE VISIT (OUTPATIENT)
Dept: FAMILY MEDICINE CLINIC | Facility: CLINIC | Age: 68
End: 2018-05-18

## 2018-05-18 VITALS
BODY MASS INDEX: 38.4 KG/M2 | OXYGEN SATURATION: 97 % | WEIGHT: 244.7 LBS | HEIGHT: 67 IN | TEMPERATURE: 98.2 F | HEART RATE: 64 BPM

## 2018-05-18 DIAGNOSIS — E74.8 OTHER SPECIFIED DISORDERS OF CARBOHYDRATE METABOLISM (CODE): ICD-10-CM

## 2018-05-18 DIAGNOSIS — R06.83 SNORING: ICD-10-CM

## 2018-05-18 DIAGNOSIS — E78.2 MIXED HYPERLIPIDEMIA: Primary | ICD-10-CM

## 2018-05-18 DIAGNOSIS — R53.82 CHRONIC FATIGUE: ICD-10-CM

## 2018-05-18 DIAGNOSIS — R25.1 TREMOR: ICD-10-CM

## 2018-05-18 DIAGNOSIS — I10 ESSENTIAL HYPERTENSION: ICD-10-CM

## 2018-05-18 DIAGNOSIS — K21.9 GASTROESOPHAGEAL REFLUX DISEASE WITHOUT ESOPHAGITIS: ICD-10-CM

## 2018-05-18 PROCEDURE — 99214 OFFICE O/P EST MOD 30 MIN: CPT | Performed by: FAMILY MEDICINE

## 2018-05-18 NOTE — PATIENT INSTRUCTIONS
This is an extremely nice 68-year-old who is here for routine follow-up for her reflux and cholesterol and blood pressure.  However she has fatigue and has been told she snores and also has noticed a shaking sensation and internally and mild tremor on use of her hands.  I will request blood work and a sleep study and call her.

## 2018-05-18 NOTE — PROGRESS NOTES
Subjective   Alesia Resendez is a 68 y.o. female presenting with   Chief Complaint   Patient presents with   • Follow-up     medication follow up         This is a 68-year-old white female nonsmoker who is here for routine follow-up for her blood pressure and cholesterol and her reflux.  She tells me she has been quite tired lately and she has been accused of snoring.  She has never had a sleep study.    She also mentions lately she has a sensation of internal tremulousness and occasionally her hands shake.  She says this is not related to food or to fasting.  She is not taking any decongest and's or energy drinks or excess caffeine.  She does not have any palpitations or chest pain or dizziness or loss of consciousness.  She does not have a resting tremor.  She does not have any difficulty with eating or drinking due to tremor.  He does not have a family history of movement disorder that she is aware of.         The following portions of the patient's history were reviewed and updated as appropriate: current medications, past family history, past medical history, past social history, past surgical history and problem list.    Review of Systems   Constitutional: Positive for fatigue.   Neurological: Positive for tremors.   All other systems reviewed and are negative.      Objective   Physical Exam   Constitutional: She is oriented to person, place, and time. She appears well-developed and well-nourished. No distress.   HENT:   Head: Normocephalic and atraumatic.   Mouth/Throat: Oropharynx is clear and moist. No oropharyngeal exudate.   Eyes: EOM are normal. Pupils are equal, round, and reactive to light. No scleral icterus.   Neck: Normal range of motion. Neck supple. No JVD present. No thyromegaly present.   Cardiovascular: Normal rate, regular rhythm, normal heart sounds and intact distal pulses.  Exam reveals no friction rub.    No murmur heard.  Pulmonary/Chest: Effort normal and breath sounds normal. No  respiratory distress. She has no wheezes.   Musculoskeletal: Normal range of motion. She exhibits no edema, tenderness or deformity.   Lymphadenopathy:     She has no cervical adenopathy.   Neurological: She is alert and oriented to person, place, and time. She displays tremor (very minor fine hand tremor on extension). No cranial nerve deficit or sensory deficit. Coordination normal.   Skin: Skin is warm and dry. She is not diaphoretic.   Psychiatric: She has a normal mood and affect. Her behavior is normal.   Nursing note and vitals reviewed.      Assessment/Plan   Alesia was seen today for follow-up.    Diagnoses and all orders for this visit:    Mixed hyperlipidemia  -     Comprehensive Metabolic Panel  -     Lipid Panel With LDL / HDL Ratio    Essential hypertension  -     Comprehensive Metabolic Panel  -     TSH  -     Catecholamines, Fractionated, Plasma    Gastroesophageal reflux disease without esophagitis  -     Comprehensive Metabolic Panel  -     CBC & Differential    Chronic fatigue  -     Comprehensive Metabolic Panel  -     TSH  -     CBC & Differential  -     Ambulatory Referral to Sleep Medicine    Snoring  -     Hemoglobin A1c  -     Ambulatory Referral to Sleep Medicine    Tremor  -     Hemoglobin A1c  -     Catecholamines, Fractionated, Plasma    Other specified disorders of carbohydrate metabolism (CODE)   -     Hemoglobin A1c                   I would like him to return for another visit in 6 month(s)

## 2018-05-22 ENCOUNTER — TELEPHONE (OUTPATIENT)
Dept: FAMILY MEDICINE CLINIC | Facility: CLINIC | Age: 68
End: 2018-05-22

## 2018-05-23 LAB
ALBUMIN SERPL-MCNC: 4.2 G/DL (ref 3.6–4.8)
ALBUMIN/GLOB SERPL: 1.6 {RATIO} (ref 1.2–2.2)
ALP SERPL-CCNC: 60 IU/L (ref 39–117)
ALT SERPL-CCNC: 32 IU/L (ref 0–32)
AST SERPL-CCNC: 37 IU/L (ref 0–40)
BASOPHILS # BLD AUTO: 0.1 X10E3/UL (ref 0–0.2)
BASOPHILS NFR BLD AUTO: 1 %
BILIRUB SERPL-MCNC: 0.3 MG/DL (ref 0–1.2)
BUN SERPL-MCNC: 26 MG/DL (ref 8–27)
BUN/CREAT SERPL: 27 (ref 12–28)
CALCIUM SERPL-MCNC: 10.4 MG/DL (ref 8.7–10.3)
CHLORIDE SERPL-SCNC: 104 MMOL/L (ref 96–106)
CHOLEST SERPL-MCNC: 123 MG/DL (ref 100–199)
CO2 SERPL-SCNC: 24 MMOL/L (ref 18–29)
CREAT SERPL-MCNC: 0.97 MG/DL (ref 0.57–1)
DOPAMINE SERPL-MCNC: <30 PG/ML (ref 0–48)
EOSINOPHIL # BLD AUTO: 0.2 X10E3/UL (ref 0–0.4)
EOSINOPHIL NFR BLD AUTO: 4 %
EPINEPH PLAS-MCNC: 19 PG/ML (ref 0–62)
ERYTHROCYTE [DISTWIDTH] IN BLOOD BY AUTOMATED COUNT: 14.2 % (ref 12.3–15.4)
GFR SERPLBLD CREATININE-BSD FMLA CKD-EPI: 60 ML/MIN/1.73
GFR SERPLBLD CREATININE-BSD FMLA CKD-EPI: 69 ML/MIN/1.73
GLOBULIN SER CALC-MCNC: 2.6 G/DL (ref 1.5–4.5)
GLUCOSE SERPL-MCNC: 111 MG/DL (ref 65–99)
HBA1C MFR BLD: 5.7 % (ref 4.8–5.6)
HCT VFR BLD AUTO: 43.2 % (ref 34–46.6)
HDLC SERPL-MCNC: 22 MG/DL
HGB BLD-MCNC: 14 G/DL (ref 11.1–15.9)
IMM GRANULOCYTES # BLD: 0 X10E3/UL (ref 0–0.1)
IMM GRANULOCYTES NFR BLD: 0 %
LDLC SERPL CALC-MCNC: 53 MG/DL (ref 0–99)
LDLC/HDLC SERPL: 2.4 RATIO (ref 0–3.2)
LYMPHOCYTES # BLD AUTO: 2.4 X10E3/UL (ref 0.7–3.1)
LYMPHOCYTES NFR BLD AUTO: 53 %
MCH RBC QN AUTO: 28.9 PG (ref 26.6–33)
MCHC RBC AUTO-ENTMCNC: 32.4 G/DL (ref 31.5–35.7)
MCV RBC AUTO: 89 FL (ref 79–97)
MONOCYTES # BLD AUTO: 0.3 X10E3/UL (ref 0.1–0.9)
MONOCYTES NFR BLD AUTO: 6 %
NEUTROPHILS # BLD AUTO: 1.7 X10E3/UL (ref 1.4–7)
NEUTROPHILS NFR BLD AUTO: 36 %
NOREPINEPH PLAS-MCNC: 353 PG/ML (ref 0–874)
PLATELET # BLD AUTO: 263 X10E3/UL (ref 150–379)
POTASSIUM SERPL-SCNC: 4.2 MMOL/L (ref 3.5–5.2)
PROT SERPL-MCNC: 6.8 G/DL (ref 6–8.5)
RBC # BLD AUTO: 4.84 X10E6/UL (ref 3.77–5.28)
SODIUM SERPL-SCNC: 144 MMOL/L (ref 134–144)
TRIGL SERPL-MCNC: 241 MG/DL (ref 0–149)
TSH SERPL DL<=0.005 MIU/L-ACNC: 1.7 UIU/ML (ref 0.45–4.5)
VLDLC SERPL CALC-MCNC: 48 MG/DL (ref 5–40)
WBC # BLD AUTO: 4.5 X10E3/UL (ref 3.4–10.8)

## 2018-05-23 RX ORDER — ATENOLOL 50 MG/1
TABLET ORAL
Qty: 90 TABLET | Refills: 1 | Status: SHIPPED | OUTPATIENT
Start: 2018-05-23 | End: 2018-12-04 | Stop reason: SDUPTHER

## 2018-05-23 RX ORDER — MELOXICAM 15 MG/1
TABLET ORAL
Qty: 90 TABLET | Refills: 1 | Status: SHIPPED | OUTPATIENT
Start: 2018-05-23 | End: 2018-12-14 | Stop reason: ALTCHOICE

## 2018-06-04 RX ORDER — AMILORIDE HYDROCHLORIDE AND HYDROCHLOROTHIAZIDE 5; 50 MG/1; MG/1
TABLET ORAL
Qty: 90 TABLET | Refills: 3 | Status: SHIPPED | OUTPATIENT
Start: 2018-06-04 | End: 2018-12-14 | Stop reason: SDUPTHER

## 2018-06-15 RX ORDER — OMEPRAZOLE 20 MG/1
20 CAPSULE, DELAYED RELEASE ORAL DAILY
Qty: 90 CAPSULE | Refills: 0 | Status: SHIPPED | OUTPATIENT
Start: 2018-06-15 | End: 2018-10-01 | Stop reason: SDUPTHER

## 2018-07-02 RX ORDER — ESCITALOPRAM OXALATE 10 MG/1
TABLET ORAL
Qty: 90 TABLET | Refills: 1 | Status: SHIPPED | OUTPATIENT
Start: 2018-07-02 | End: 2018-12-14 | Stop reason: SDUPTHER

## 2018-08-08 RX ORDER — LOSARTAN POTASSIUM 100 MG/1
TABLET ORAL
Qty: 90 TABLET | Refills: 0 | Status: SHIPPED | OUTPATIENT
Start: 2018-08-08 | End: 2018-12-03 | Stop reason: SDUPTHER

## 2018-08-31 ENCOUNTER — APPOINTMENT (OUTPATIENT)
Dept: ONCOLOGY | Facility: CLINIC | Age: 68
End: 2018-08-31

## 2018-08-31 ENCOUNTER — APPOINTMENT (OUTPATIENT)
Dept: OTHER | Facility: HOSPITAL | Age: 68
End: 2018-08-31

## 2018-09-17 RX ORDER — BUPROPION HYDROCHLORIDE 150 MG/1
TABLET ORAL
Qty: 90 TABLET | Refills: 1 | Status: SHIPPED | OUTPATIENT
Start: 2018-09-17 | End: 2018-12-14 | Stop reason: SDUPTHER

## 2018-09-24 ENCOUNTER — APPOINTMENT (OUTPATIENT)
Dept: OTHER | Facility: HOSPITAL | Age: 68
End: 2018-09-24

## 2018-09-24 ENCOUNTER — APPOINTMENT (OUTPATIENT)
Dept: ONCOLOGY | Facility: CLINIC | Age: 68
End: 2018-09-24

## 2018-09-25 ENCOUNTER — LAB (OUTPATIENT)
Dept: OTHER | Facility: HOSPITAL | Age: 68
End: 2018-09-25

## 2018-09-25 ENCOUNTER — APPOINTMENT (OUTPATIENT)
Dept: ONCOLOGY | Facility: CLINIC | Age: 68
End: 2018-09-25

## 2018-09-25 DIAGNOSIS — Z12.11 ENCOUNTER FOR SCREENING FOR MALIGNANT NEOPLASM OF COLON: Primary | ICD-10-CM

## 2018-09-25 DIAGNOSIS — R53.82 CHRONIC FATIGUE: ICD-10-CM

## 2018-09-25 LAB
BASOPHILS # BLD AUTO: 0.05 10*3/MM3 (ref 0–0.2)
BASOPHILS NFR BLD AUTO: 0.8 % (ref 0–1.5)
DEPRECATED RDW RBC AUTO: 40.9 FL (ref 37–54)
EOSINOPHIL # BLD AUTO: 0.18 10*3/MM3 (ref 0–0.7)
EOSINOPHIL NFR BLD AUTO: 2.9 % (ref 0.3–6.2)
ERYTHROCYTE [DISTWIDTH] IN BLOOD BY AUTOMATED COUNT: 12.6 % (ref 11.7–13)
HCT VFR BLD AUTO: 42.3 % (ref 35.6–45.5)
HGB BLD-MCNC: 14.4 G/DL (ref 11.9–15.5)
IMM GRANULOCYTES # BLD: 0.01 10*3/MM3 (ref 0–0.03)
IMM GRANULOCYTES NFR BLD: 0.2 % (ref 0–0.5)
LYMPHOCYTES # BLD AUTO: 3.55 10*3/MM3 (ref 0.9–4.8)
LYMPHOCYTES NFR BLD AUTO: 57.3 % (ref 19.6–45.3)
MCH RBC QN AUTO: 30 PG (ref 26.9–32)
MCHC RBC AUTO-ENTMCNC: 34 G/DL (ref 32.4–36.3)
MCV RBC AUTO: 88.1 FL (ref 80.5–98.2)
MONOCYTES # BLD AUTO: 0.51 10*3/MM3 (ref 0.2–1.2)
MONOCYTES NFR BLD AUTO: 8.2 % (ref 5–12)
NEUTROPHILS # BLD AUTO: 1.9 10*3/MM3 (ref 1.9–8.1)
NEUTROPHILS NFR BLD AUTO: 30.6 % (ref 42.7–76)
NRBC BLD MANUAL-RTO: 0 /100 WBC (ref 0–0)
PLAT MORPH BLD: NORMAL
PLATELET # BLD AUTO: 246 10*3/MM3 (ref 140–500)
PMV BLD AUTO: 9.6 FL (ref 6–12)
RBC # BLD AUTO: 4.8 10*6/MM3 (ref 3.9–5.2)
RBC MORPH BLD: NORMAL
WBC MORPH BLD: NORMAL
WBC NRBC COR # BLD: 6.2 10*3/MM3 (ref 4.5–10.7)

## 2018-09-25 PROCEDURE — 85007 BL SMEAR W/DIFF WBC COUNT: CPT | Performed by: INTERNAL MEDICINE

## 2018-09-25 PROCEDURE — 85025 COMPLETE CBC W/AUTO DIFF WBC: CPT | Performed by: INTERNAL MEDICINE

## 2018-09-25 PROCEDURE — 36415 COLL VENOUS BLD VENIPUNCTURE: CPT

## 2018-09-26 ENCOUNTER — OFFICE VISIT (OUTPATIENT)
Dept: ONCOLOGY | Facility: CLINIC | Age: 68
End: 2018-09-26

## 2018-09-26 ENCOUNTER — APPOINTMENT (OUTPATIENT)
Dept: LAB | Facility: HOSPITAL | Age: 68
End: 2018-09-26

## 2018-09-26 VITALS
DIASTOLIC BLOOD PRESSURE: 82 MMHG | HEART RATE: 64 BPM | RESPIRATION RATE: 16 BRPM | TEMPERATURE: 98.5 F | OXYGEN SATURATION: 95 % | SYSTOLIC BLOOD PRESSURE: 120 MMHG | BODY MASS INDEX: 40.24 KG/M2 | HEIGHT: 66 IN | WEIGHT: 250.4 LBS

## 2018-09-26 DIAGNOSIS — C50.912 RECURRENT MALIGNANT NEOPLASM OF LEFT BREAST (HCC): Primary | ICD-10-CM

## 2018-09-26 PROCEDURE — 99213 OFFICE O/P EST LOW 20 MIN: CPT | Performed by: INTERNAL MEDICINE

## 2018-09-26 NOTE — PROGRESS NOTES
Subjective .     REASON FOR FOLLOWUP:     1.  Recurrent left breast cancer status post bilateral mastectomy on 8/21/2017.  The tumor is stage IA (pT1 cN0 M0), ER/ID positive, HER-2 negative.  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   2.  Oncotype DX score 16, corresponding to a 10 year disease recurrence 10%.       3.  Bone density scan on 9/14/2017 reported normal results.   4.  Patient declined adjuvant endocrine therapy in September 2017.        HISTORY OF PRESENT ILLNESS:  The patient is a 68 y.o. female who is here for 6-month follow-up evaluation.  Patient reports she had a bilateral cataract surgery in this past June 2018.  She also had 2 squamous cell carcinoma resected one from herbridge and the second was from left lower leg which still has not healed yet with some bacteria infection.  Otherwise, Patient reports she is doing well, she does frequent the self-examination and denies any lymphadenopathy or suspicious lesion on her chest wall at the site of mastectomy.  She does occasionally has some pains on the left chest where she had mastectomy.    Patient reports excellent performance status ECOG 0. She is physically active.     Past Medical History:   Diagnosis Date   • Anxiety    • Arthritis    • Breast cancer (CMS/HCC) 2017    LEFT   • History of radiation therapy 2006    LT BREAST   • History of skin cancer     SQUAMOUS CELL REMOVED FROM LT UPPER LIP AND RT THUMB     Past Surgical History:   Procedure Laterality Date   • APPENDECTOMY  2014   • BREAST LUMPECTOMY W/ NEEDLE LOCALIZATION Left 2006   • COLONOSCOPY  2014   • GALLBLADDER SURGERY  2007   • HYSTERECTOMY Secondary to cervical carcinoma in situ   1987   • MASTECTOMY W/ SENTINEL NODE BIOPSY Bilateral 8/21/2017    Procedure: BILATEREAL BREAST MASTECTOMY WITH SENTINEL NODE BIOPSY ON THE LEFT The sentinel lymph node biopsy will only be performed on the left side;   Surgeon: Diallo Eisenberg MD;  Location: [facility] OR [facility];  Service:    • TOTAL KNEE ARTHROPLASTY Bilateral 2000      *  Bilateral cataract surgery in 2018.     *  Resection of squamous cell carcinoma from her nose bridge and left lower leg in 2018.      OB/GYN history: .  Menarche age 12.  Was on Premarin prior to her diagnosis of first breast cancer in .     HEMATOLOGIC/ONCOLOGIC HISTORY: The patient is a 67 y.o. year old female who is here for initial evaluation on 2017with the above-mentioned history.    Ms. Resendez reports that she has healed wound, no infection.  She actually will see Dr. Eisenberg this afternoon for removing the stitches.  She has no plan for reconstruction surgery.  She reports she had her 1st episode of breast cancer back in , at that time she was seen at the [facility].  She had a lumpectomy with a 6 mm DCIS and had radiation therapy followed by 5 years of tamoxifen.     The patient had an abnormal mammogram study in early 2017.  She subsequently had ultrasound-guided left breast biopsy on 2017.  This study reported invasive ductal carcinoma with the largest dimension 11 mm; it was a grade 2 tumor.  Further biomarker study at the [facility] reported      It was reported strongly positive for ER 98% and strongly for ME 61%, HER-2 was negative, IHC 0 and Ki-67 at 13.7%.      The patient was referred to see Dr. Eisenberg who performed bilateral mastectomy on 2017.  Pathology evaluation from the mastectomy sample reported invasive ductal carcinoma with the largest dimension 8 mm, grade 2 Chatham score 6/10 with a single focus of invasive carcinoma.  There is also DCIS component.  All margins were clear.  All 3 sentinel lymph nodes were negative for malignancy.  There were also 2 lymph nodes in the mastectomy sample, which were also negative.      We obtained Oncotype DX study which  comes back with a low risk score of 16, corresponding to 10 year disease recurrence risk 10%. She also had normal bone density scan.     Discussed with patient for endocrine therapy using aromatase inhibitor and its side effects. The patient reported that she had tremendous hot flashes, sweating when she was taking tamoxifen previously after she was diagnosed with DCIS. She also cited that she already had arthritis with joint pains involving her hands, she does not want to have risk to have worsening arthralgia from the aromatase inhibitors because she is physically active she does not want to have any limitation because of side effects from the aromatase inhibitor. I also presented data from the [facility] web site, and this looked at 5 year and 10 year survival benefit. Endocrine therapy using tamoxifen carries very small benefit, in 5 years it saves 1 out of 10 patients who would die from the disease and in 20 years it saves 2 out of 20 patients from dying of breast cancer. After consideration, the patient reports she does not want to have endocrine therapy at all. She would rather take the risk.    MEDICATIONS    Current Outpatient Prescriptions:   •  albuterol (PROVENTIL HFA;VENTOLIN HFA) 108 (90 Base) MCG/ACT inhaler, Inhale 2 puffs Every 4 (Four) Hours As Needed for Wheezing., Disp: 8 g, Rfl: 1  •  aMILoride-hydrochlorothiazide (MODURETIC) 5-50 MG per tablet, TAKE ONE TABLET BY MOUTH DAILY, Disp: 90 tablet, Rfl: 3  •  atenolol (TENORMIN) 50 MG tablet, TAKE ONE TABLET BY MOUTH DAILY, Disp: 90 tablet, Rfl: 1  •  buPROPion XL (WELLBUTRIN XL) 150 MG 24 hr tablet, TAKE ONE TABLET BY MOUTH DAILY, Disp: 90 tablet, Rfl: 1  •  escitalopram (LEXAPRO) 10 MG tablet, TAKE ONE TABLET BY MOUTH DAILY, Disp: 90 tablet, Rfl: 1  •  fenofibrate 160 MG tablet, TAKE ONE TABLET BY MOUTH DAILY, Disp: 90 tablet, Rfl: 3  •  losartan (COZAAR) 100 MG tablet, TAKE ONE TABLET BY MOUTH DAILY, Disp: 90 tablet, Rfl: 0  •  meloxicam  (MOBIC) 15 MG tablet, TAKE ONE TABLET BY MOUTH DAILY AS NEEDED, Disp: 90 tablet, Rfl: 1  •  Mirabegron ER (MYRBETRIQ) 25 MG tablet sustained-release 24 hour 24 hr tablet, Take 25 mg by mouth., Disp: , Rfl:   •  Multiple Vitamins-Minerals (CENTRUM SILVER PO), Take  by mouth., Disp: , Rfl:   •  nitrofurantoin, macrocrystal-monohydrate, (MACROBID) 100 MG capsule, , Disp: , Rfl:   •  Omega-3 Fatty Acids (FISH OIL) 1000 MG capsule capsule, Take 1,200 mg by mouth Daily With Breakfast., Disp: , Rfl:   •  omeprazole (priLOSEC) 20 MG capsule, Take 1 capsule by mouth Daily., Disp: 90 capsule, Rfl: 0  •  imiquimod (ALDARA) 5 % cream, , Disp: , Rfl:     ALLERGIES:   No Known Allergies    SOCIAL HISTORY:       Social History     Social History   • Marital status:      Spouse name: N/A   • Number of children: 3   • Years of education: College education      Occupational History   • From [employer]  Retired     Social History Main Topics   • Smoking status: Never Smoker   • Smokeless tobacco: Never Used   • Alcohol use 2 drinks per month    • Drug use: No   • Sexual activity: Defer      Comment:          FAMILY HISTORY:  Family History   Problem Relation Age of Onset   • Heart attack and hypertension  Mother    • Esophageal cancer Mother, diagnosed at age 74, and the past week at age 76 due to cancer.      •      • Alcohol abuse Father    • Diabetes Father      type 2   • Heart attack Father    • Heart failure Father    • Hyperlipidemia Father    • Hypertension Father    • Prostate cancerDiagnosed at age 85.   Father,  at age 87 due to multiple medical problems    • Clotting disorder Father    • Birth defects Brother    • Heart attack Brother    • Clotting disorder Daughter    • breast cancer  Paternal great aunt all         REVIEW OF SYSTEMS:  GENERAL: Excellent performance status is ECOG 0.  Denies pains.  No change in appetite or weight; No fevers, chills, sweats.    SKIN: Left lower leg  "has a small open wound, post resection of squamous cell carcinoma in August 2018.  No rashes.  No bruises no petechia.  HEME/LYMPH: No easy bruising, bleeding. No swollen nodes.   EYES: No vision changes or diplopia.   ENT: No tinnitus, hearing loss, gum bleeding, epistaxis, hoarseness or dysphagia.   RESPIRATORY: No cough, shortness of breath, hemoptysis or wheezing.   CVS: No chest pain, palpitations, orthopnea, dyspnea on exertion.   GI: No melena or hematochezia. No abdominal pain.  No nausea, vomiting, constipation, diarrhea  : No lower tract obstructive symptoms, dysuria or hematuria.   MUSCULOSKELETAL: Chronic arthritis and arthralgia involving her hands. NEUROLOGICAL: No global weakness, loss of consciousness or seizures.   PSYCHIATRIC: No increased nervousness, mood changes or depression.     Objective    Vitals:    09/26/18 0911   BP: 120/82   Pulse: 64   Resp: 16   Temp: 98.5 °F (36.9 °C)   SpO2: 95%  Comment: at rest   Weight: 114 kg (250 lb 6.4 oz)   Height: 168 cm (66.14\")  Comment: new ht. without shoes   PainSc: 0-No pain     Current Status 9/26/2018   ECOG score 0      PHYSICAL EXAM:    GENERAL:  Well-developed, moderate obese  female in no acute distress.    SKIN:  Warm, dry without rashes, purpura or petechiae.  Lower left leg has a small area covered by bandage without open wound.  Her nose bridge has a scar from resection of squamous cell carcinoma.  EYES:  Conjunctivae normal.  EARS:  Hearing intact.  NOSE:  No nasal discharge.    NECK:   no thyromegaly or masses.  LYMPHATICS:  No cervical, supraclavicular, axillary adenopathy.  CHEST:  Lungs clear to auscultation. Good airflow.  Status post bilateral mastectomy.  No suspicious lesion palpable.  CARDIAC:  Regular rate and rhythm without murmurs, rubs or gallops. Normal S1,S2.  ABDOMEN:  Soft, nontender no guarding or rebound, with no hepatosplenomegaly or masses.  Bowel sounds normal.  EXTREMITIES:  No clubbing, cyanosis or " edema.  NEUROLOGICAL:  Cranial Nerves II-XII grossly intact.  No focal neurological deficits.  PSYCHIATRIC:  Normal affect and mood.    RECENT LABS:  Lab Results   Component Value Date    WBC 6.20 09/25/2018    HGB 14.4 09/25/2018    HCT 42.3 09/25/2018    MCV 88.1 09/25/2018     09/25/2018     Lab Results   Component Value Date    NEUTROABS 1.90 09/25/2018     Lab Results   Component Value Date    GLUCOSE 114 (H) 03/16/2018    BUN 26 05/18/2018    CREATININE 0.97 05/18/2018    EGFRIFNONA 60 05/18/2018    EGFRIFAFRI 69 05/18/2018    BCR 27 05/18/2018    K 4.2 05/18/2018    CO2 24 05/18/2018    CALCIUM 10.4 (H) 05/18/2018    PROTENTOTREF 6.8 05/18/2018    ALBUMIN 4.2 05/18/2018    LABIL2 1.6 05/18/2018    AST 37 05/18/2018    ALT 32 05/18/2018         Assessment/Plan      Patient is 68 years old female returning today for 6 month follow-up.  She has history of recurrent left breast cancer, post bilateral mastectomy in August 2017. Stage 1A, strongly ER/IA positive and negative for HER-2.  Has low Oncotype DX score 16, corresponding to a 10 year disease recurrence risk 10%.  Certainly she is not a candidate for adjuvant chemotherapy.     Patient declined adjuvant endocrine therapy in September 2017.  She prefers follow-up with us in the clinic for monitoring purposes.      Physical examination today is benign, there is no palpable nodules on her chest and axillary.  There is no evidence for disease recurrence.  Review of system is also unremarkable.      PLAN:   I will bring her back in 6 months for routine follow-up, we'll check CBC.       More than 15 min, over half of that time were for counseling.       FRIEDA JACKSON M.D., Ph.D.    9/26/2018      CC: Diallo Eisenberg MD

## 2018-10-01 RX ORDER — OMEPRAZOLE 20 MG/1
CAPSULE, DELAYED RELEASE ORAL
Qty: 90 CAPSULE | Refills: 0 | Status: SHIPPED | OUTPATIENT
Start: 2018-10-01 | End: 2019-01-15 | Stop reason: SDUPTHER

## 2018-12-03 RX ORDER — LOSARTAN POTASSIUM 100 MG/1
100 TABLET ORAL DAILY
Qty: 90 TABLET | Refills: 0 | Status: SHIPPED | OUTPATIENT
Start: 2018-12-03 | End: 2018-12-14 | Stop reason: SDUPTHER

## 2018-12-04 RX ORDER — ATENOLOL 50 MG/1
TABLET ORAL
Qty: 90 TABLET | Refills: 0 | Status: SHIPPED | OUTPATIENT
Start: 2018-12-04 | End: 2018-12-14 | Stop reason: SDUPTHER

## 2018-12-14 ENCOUNTER — OFFICE VISIT (OUTPATIENT)
Dept: FAMILY MEDICINE CLINIC | Facility: CLINIC | Age: 68
End: 2018-12-14

## 2018-12-14 VITALS
DIASTOLIC BLOOD PRESSURE: 88 MMHG | WEIGHT: 252.3 LBS | TEMPERATURE: 98.7 F | BODY MASS INDEX: 39.6 KG/M2 | SYSTOLIC BLOOD PRESSURE: 128 MMHG | HEIGHT: 67 IN | HEART RATE: 60 BPM | OXYGEN SATURATION: 93 %

## 2018-12-14 DIAGNOSIS — K21.9 GASTROESOPHAGEAL REFLUX DISEASE WITHOUT ESOPHAGITIS: ICD-10-CM

## 2018-12-14 DIAGNOSIS — I10 ESSENTIAL HYPERTENSION: ICD-10-CM

## 2018-12-14 DIAGNOSIS — E78.2 MIXED HYPERLIPIDEMIA: Primary | ICD-10-CM

## 2018-12-14 PROCEDURE — 99213 OFFICE O/P EST LOW 20 MIN: CPT | Performed by: FAMILY MEDICINE

## 2018-12-14 RX ORDER — BUPROPION HYDROCHLORIDE 150 MG/1
150 TABLET ORAL DAILY
Qty: 90 TABLET | Refills: 3 | Status: SHIPPED | OUTPATIENT
Start: 2018-12-14 | End: 2020-01-14 | Stop reason: SDUPTHER

## 2018-12-14 RX ORDER — ATENOLOL 50 MG/1
50 TABLET ORAL DAILY
Qty: 90 TABLET | Refills: 3 | Status: SHIPPED | OUTPATIENT
Start: 2018-12-14 | End: 2020-01-14 | Stop reason: SDUPTHER

## 2018-12-14 RX ORDER — FENOFIBRATE 160 MG/1
160 TABLET ORAL DAILY
Qty: 90 TABLET | Refills: 3 | Status: SHIPPED | OUTPATIENT
Start: 2018-12-14 | End: 2019-04-29 | Stop reason: SDUPTHER

## 2018-12-14 RX ORDER — LOSARTAN POTASSIUM 100 MG/1
100 TABLET ORAL DAILY
Qty: 90 TABLET | Refills: 3 | Status: SHIPPED | OUTPATIENT
Start: 2018-12-14 | End: 2019-02-01 | Stop reason: RX

## 2018-12-14 RX ORDER — DICLOFENAC SODIUM 75 MG/1
75 TABLET, DELAYED RELEASE ORAL
COMMUNITY
Start: 2018-11-28 | End: 2019-12-06

## 2018-12-14 RX ORDER — AMILORIDE HYDROCHLORIDE AND HYDROCHLOROTHIAZIDE 5; 50 MG/1; MG/1
1 TABLET ORAL DAILY
Qty: 90 TABLET | Refills: 3 | Status: SHIPPED | OUTPATIENT
Start: 2018-12-14 | End: 2020-01-02

## 2018-12-14 RX ORDER — HYDROXYCHLOROQUINE SULFATE 200 MG/1
200 TABLET, FILM COATED ORAL
COMMUNITY
Start: 2018-11-28 | End: 2019-12-06

## 2018-12-14 RX ORDER — ESCITALOPRAM OXALATE 10 MG/1
10 TABLET ORAL DAILY
Qty: 90 TABLET | Refills: 3 | Status: SHIPPED | OUTPATIENT
Start: 2018-12-14 | End: 2020-01-02

## 2018-12-14 NOTE — PATIENT INSTRUCTIONS
This is a very nice 68-year-old who is here for routine follow-up.  She appears to be doing well other than her osteoarthritis.  I will renew her prescriptions.

## 2018-12-14 NOTE — PROGRESS NOTES
Subjective   Alesia Resendez is a 68 y.o. female presenting with   Chief Complaint   Patient presents with   • Hyperlipidemia     follow up        This is a 68-year-old white female nonsmoker who comes in today for follow-up for her blood pressure and cholesterol and reflux esophagitis.  She recently saw a rheumatologist for her diffuse severe osteoarthritis and was begun on Plaquenil.  She says after 2 weeks she already can see a difference and her joints are moving much better.  She had extensive blood work with her rheumatologist, so I will not order repeat blood work today.  He has had bilateral knee replacement surgery and these joints do not bother her.    She says that she does not have any side effects from her current medication and she does need refills.  She does request 90 day refills.         The following portions of the patient's history were reviewed and updated as appropriate: current medications, past family history, past medical history, past social history, past surgical history and problem list.    Review of Systems   Musculoskeletal: Positive for arthralgias.   All other systems reviewed and are negative.      Objective   Physical Exam   Constitutional: She is oriented to person, place, and time. She appears well-developed and well-nourished.   HENT:   Head: Normocephalic and atraumatic.   Eyes: EOM are normal. Pupils are equal, round, and reactive to light.   Neck: Normal range of motion. Neck supple. No thyromegaly present.   Cardiovascular: Normal rate and regular rhythm.   Murmur (2/6 systolic ejection murmur) heard.  Pulmonary/Chest: Effort normal and breath sounds normal.   Musculoskeletal: Normal range of motion. She exhibits no edema. Deformity: modest osteoarthritic deformity of multiple joints.   Neurological: She is alert and oriented to person, place, and time.   Skin: Skin is warm and dry.   Psychiatric: She has a normal mood and affect. Her behavior is normal.   Nursing note and  vitals reviewed.      Assessment/Plan   Alesia was seen today for hyperlipidemia.    Diagnoses and all orders for this visit:    Mixed hyperlipidemia    Essential hypertension    Gastroesophageal reflux disease without esophagitis    Other orders  -     aMILoride-hydrochlorothiazide (MODURETIC) 5-50 MG per tablet; Take 1 tablet by mouth Daily.  -     atenolol (TENORMIN) 50 MG tablet; Take 1 tablet by mouth Daily.  -     buPROPion XL (WELLBUTRIN XL) 150 MG 24 hr tablet; Take 1 tablet by mouth Daily.  -     escitalopram (LEXAPRO) 10 MG tablet; Take 1 tablet by mouth Daily.  -     fenofibrate 160 MG tablet; Take 1 tablet by mouth Daily.  -     losartan (COZAAR) 100 MG tablet; Take 1 tablet by mouth Daily.                   I would like him to return for another visit in 6 month(s)

## 2019-01-10 ENCOUNTER — TELEPHONE (OUTPATIENT)
Dept: FAMILY MEDICINE CLINIC | Facility: CLINIC | Age: 69
End: 2019-01-10

## 2019-01-10 NOTE — TELEPHONE ENCOUNTER
Pt called she stopped taking the Myrabetriq a couple months ago and realizes she needs it will you send in to her [facility] for a 90 supply?

## 2019-01-16 ENCOUNTER — TELEPHONE (OUTPATIENT)
Dept: FAMILY MEDICINE CLINIC | Facility: CLINIC | Age: 69
End: 2019-01-16

## 2019-01-16 RX ORDER — OMEPRAZOLE 20 MG/1
20 CAPSULE, DELAYED RELEASE ORAL DAILY
Qty: 90 CAPSULE | Refills: 1 | Status: SHIPPED | OUTPATIENT
Start: 2019-01-16 | End: 2020-01-14 | Stop reason: SDUPTHER

## 2019-01-16 RX ORDER — OMEPRAZOLE 20 MG/1
CAPSULE, DELAYED RELEASE ORAL
Qty: 90 CAPSULE | Refills: 1 | Status: SHIPPED | OUTPATIENT
Start: 2019-01-16 | End: 2019-01-16 | Stop reason: SDUPTHER

## 2019-01-31 ENCOUNTER — TELEPHONE (OUTPATIENT)
Dept: FAMILY MEDICINE CLINIC | Facility: CLINIC | Age: 69
End: 2019-01-31

## 2019-02-01 RX ORDER — CANDESARTAN 16 MG/1
16 TABLET ORAL DAILY
Qty: 30 TABLET | Refills: 5 | Status: SHIPPED | OUTPATIENT
Start: 2019-02-01 | End: 2019-03-15

## 2019-03-12 ENCOUNTER — OFFICE VISIT (OUTPATIENT)
Dept: ONCOLOGY | Facility: CLINIC | Age: 69
End: 2019-03-12

## 2019-03-12 ENCOUNTER — LAB (OUTPATIENT)
Dept: LAB | Facility: HOSPITAL | Age: 69
End: 2019-03-12

## 2019-03-12 VITALS
HEIGHT: 66 IN | RESPIRATION RATE: 16 BRPM | BODY MASS INDEX: 38.54 KG/M2 | HEART RATE: 63 BPM | OXYGEN SATURATION: 94 % | WEIGHT: 239.8 LBS | TEMPERATURE: 98.5 F | SYSTOLIC BLOOD PRESSURE: 111 MMHG | DIASTOLIC BLOOD PRESSURE: 71 MMHG

## 2019-03-12 DIAGNOSIS — C50.912 RECURRENT MALIGNANT NEOPLASM OF LEFT BREAST (HCC): Primary | ICD-10-CM

## 2019-03-12 DIAGNOSIS — K62.5 RECTAL HEMORRHAGE: Primary | ICD-10-CM

## 2019-03-12 LAB
BASOPHILS # BLD AUTO: 0.04 10*3/MM3 (ref 0–0.2)
BASOPHILS NFR BLD AUTO: 0.7 % (ref 0–1.5)
DEPRECATED RDW RBC AUTO: 40.8 FL (ref 37–54)
EOSINOPHIL # BLD AUTO: 0.29 10*3/MM3 (ref 0–0.4)
EOSINOPHIL NFR BLD AUTO: 4.9 % (ref 0.3–6.2)
ERYTHROCYTE [DISTWIDTH] IN BLOOD BY AUTOMATED COUNT: 12.7 % (ref 12.3–15.4)
HCT VFR BLD AUTO: 43.8 % (ref 34–46.6)
HGB BLD-MCNC: 14.9 G/DL (ref 12–15.9)
IMM GRANULOCYTES # BLD AUTO: 0.01 10*3/MM3 (ref 0–0.05)
IMM GRANULOCYTES NFR BLD AUTO: 0.2 % (ref 0–0.5)
LYMPHOCYTES # BLD AUTO: 3.42 10*3/MM3 (ref 0.7–3.1)
LYMPHOCYTES NFR BLD AUTO: 57.6 % (ref 19.6–45.3)
MCH RBC QN AUTO: 30 PG (ref 26.6–33)
MCHC RBC AUTO-ENTMCNC: 34 G/DL (ref 31.5–35.7)
MCV RBC AUTO: 88.1 FL (ref 79–97)
MONOCYTES # BLD AUTO: 0.43 10*3/MM3 (ref 0.1–0.9)
MONOCYTES NFR BLD AUTO: 7.2 % (ref 5–12)
NEUTROPHILS # BLD AUTO: 1.75 10*3/MM3 (ref 1.4–7)
NEUTROPHILS NFR BLD AUTO: 29.4 % (ref 42.7–76)
NRBC BLD AUTO-RTO: 0 /100 WBC (ref 0–0)
PLATELET # BLD AUTO: 279 10*3/MM3 (ref 140–450)
PMV BLD AUTO: 9.1 FL (ref 6–12)
RBC # BLD AUTO: 4.97 10*6/MM3 (ref 3.77–5.28)
WBC NRBC COR # BLD: 5.94 10*3/MM3 (ref 3.4–10.8)

## 2019-03-12 PROCEDURE — 85025 COMPLETE CBC W/AUTO DIFF WBC: CPT | Performed by: INTERNAL MEDICINE

## 2019-03-12 PROCEDURE — 36415 COLL VENOUS BLD VENIPUNCTURE: CPT | Performed by: INTERNAL MEDICINE

## 2019-03-12 PROCEDURE — 99213 OFFICE O/P EST LOW 20 MIN: CPT | Performed by: INTERNAL MEDICINE

## 2019-03-12 NOTE — PROGRESS NOTES
Subjective .     REASON FOR FOLLOWUP:     1.  Recurrent left breast cancer status post bilateral mastectomy on 8/21/2017.  The tumor is stage IA (pT1 cN0 M0), ER/ME positive, HER-2 negative.  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   2.  Oncotype DX score 16, corresponding to a 10 year disease recurrence 10%.       3.  Bone density scan on 9/14/2017 reported normal results.   4.  Patient declined adjuvant endocrine therapy in September 2017.        HISTORY OF PRESENT ILLNESS:  The patient is a 69 y.o. female who is here for 6-month follow-up evaluation.      Patient reports that she has no new medical condition.  She has good performance status is ECOG 0.  She is physically active.  Denies nausea vomiting no vision changes no headaches.  She denies suspicious lesion on her chest, axillary or neck area.  Patient reports routine self-examination.    Laboratory study today reported marginal neutrophil 1750 which is at her baseline level in the past 12-month, which is certainly lower than her counts in 2015 and 2017 above 2400.  She has normal lymphocytes 3420 and monocytes 430 and a total WBC 5940.  She has baseline normal hemoglobin 14.9 and platelets 279,000.      Past Medical History:   Diagnosis Date   • Anxiety    • Arthritis    • Breast cancer (CMS/HCC) 2017    LEFT   • History of radiation therapy 2006    LT BREAST   • History of skin cancer     SQUAMOUS CELL REMOVED FROM LT UPPER LIP AND RT THUMB     Past Surgical History:   Procedure Laterality Date   • APPENDECTOMY  2014   • BREAST LUMPECTOMY W/ NEEDLE LOCALIZATION Left 2006   • COLONOSCOPY  2014   • GALLBLADDER SURGERY  2007   • HYSTERECTOMY Secondary to cervical carcinoma in situ   1987   • MASTECTOMY W/ SENTINEL NODE BIOPSY Bilateral 8/21/2017    Procedure: BILATEREAL BREAST MASTECTOMY WITH SENTINEL NODE BIOPSY ON THE LEFT The sentinel lymph node biopsy will only be performed  on the left side;  Surgeon: Diallo Eisenberg MD;  Location: [facility] [place];  Service:    • TOTAL KNEE ARTHROPLASTY Bilateral 2000      *  Bilateral cataract surgery in 2018.     *  Resection of squamous cell carcinoma from her nose bridge and left lower leg in 2018.      OB/GYN history: .  Menarche age 12.  Was on Premarin prior to her diagnosis of first breast cancer in .     HEMATOLOGIC/ONCOLOGIC HISTORY: The patient is a 67 y.o. year old female who is here for initial evaluation on 2017with the above-mentioned history.    Ms. Resendez reports that she has healed wound, no infection.  She actually will see Dr. Eisenberg this afternoon for removing the stitches.  She has no plan for reconstruction surgery.  She reports she had her 1st episode of breast cancer back in , at that time she was seen at the [facility].  She had a lumpectomy with a 6 mm DCIS and had radiation therapy followed by 5 years of tamoxifen.     The patient had an abnormal mammogram study in early 2017.  She subsequently had ultrasound-guided left breast biopsy on 2017.  This study reported invasive ductal carcinoma with the largest dimension 11 mm; it was a grade 2 tumor.  Further biomarker study at the [facility] reported      It was reported strongly positive for ER 98% and strongly for NJ 61%, HER-2 was negative, IHC 0 and Ki-67 at 13.7%.      The patient was referred to see Dr. Eisenberg who performed bilateral mastectomy on 2017.  Pathology evaluation from the mastectomy sample reported invasive ductal carcinoma with the largest dimension 8 mm, grade 2 Craig score 6/10 with a single focus of invasive carcinoma.  There is also DCIS component.  All margins were clear.  All 3 sentinel lymph nodes were negative for malignancy.  There were also 2 lymph nodes in the mastectomy sample, which were also negative.      We obtained  Oncotype DX study which comes back with a low risk score of 16, corresponding to 10 year disease recurrence risk 10%. She also had normal bone density scan.     Discussed with patient for endocrine therapy using aromatase inhibitor and its side effects. The patient reported that she had tremendous hot flashes, sweating when she was taking tamoxifen previously after she was diagnosed with DCIS. She also cited that she already had arthritis with joint pains involving her hands, she does not want to have risk to have worsening arthralgia from the aromatase inhibitors because she is physically active she does not want to have any limitation because of side effects from the aromatase inhibitor. I also presented data from the [facility] web site, and this looked at 5 year and 10 year survival benefit. Endocrine therapy using tamoxifen carries very small benefit, in 5 years it saves 1 out of 10 patients who would die from the disease and in 20 years it saves 2 out of 20 patients from dying of breast cancer. After consideration, the patient reports she does not want to have endocrine therapy at all. She would rather take the risk.    MEDICATIONS    Current Outpatient Medications:   •  albuterol (PROVENTIL HFA;VENTOLIN HFA) 108 (90 Base) MCG/ACT inhaler, Inhale 2 puffs Every 4 (Four) Hours As Needed for Wheezing., Disp: 8 g, Rfl: 1  •  aMILoride-hydrochlorothiazide (MODURETIC) 5-50 MG per tablet, Take 1 tablet by mouth Daily., Disp: 90 tablet, Rfl: 3  •  atenolol (TENORMIN) 50 MG tablet, Take 1 tablet by mouth Daily., Disp: 90 tablet, Rfl: 3  •  buPROPion XL (WELLBUTRIN XL) 150 MG 24 hr tablet, Take 1 tablet by mouth Daily., Disp: 90 tablet, Rfl: 3  •  candesartan (ATACAND) 16 MG tablet, Take 1 tablet by mouth Daily., Disp: 30 tablet, Rfl: 5  •  diclofenac (VOLTAREN) 75 MG EC tablet, Take 75 mg by mouth., Disp: , Rfl:   •  escitalopram (LEXAPRO) 10 MG tablet, Take 1 tablet by mouth Daily., Disp: 90 tablet, Rfl: 3  •   fenofibrate 160 MG tablet, Take 1 tablet by mouth Daily., Disp: 90 tablet, Rfl: 3  •  hydroxychloroquine (PLAQUENIL) 200 MG tablet, Take 200 mg by mouth., Disp: , Rfl:   •  imiquimod (ALDARA) 5 % cream, , Disp: , Rfl:   •  Mirabegron ER (MYRBETRIQ) 25 MG tablet sustained-release 24 hour 24 hr tablet, Take 1 tablet by mouth Daily., Disp: 90 tablet, Rfl: 1  •  Multiple Vitamins-Minerals (CENTRUM SILVER PO), Take  by mouth., Disp: , Rfl:   •  nitrofurantoin, macrocrystal-monohydrate, (MACROBID) 100 MG capsule, , Disp: , Rfl:   •  Omega-3 Fatty Acids (FISH OIL) 1000 MG capsule capsule, Take 1,200 mg by mouth Daily With Breakfast., Disp: , Rfl:   •  omeprazole (priLOSEC) 20 MG capsule, Take 1 capsule by mouth Daily., Disp: 90 capsule, Rfl: 1    ALLERGIES:   No Known Allergies    SOCIAL HISTORY:       Social History     Social History   • Marital status:      Spouse name: N/A   • Number of children: 3   • Years of education: College education      Occupational History   • From [employer]  Retired     Social History Main Topics   • Smoking status: Never Smoker   • Smokeless tobacco: Never Used   • Alcohol use 2 drinks per month    • Drug use: No   • Sexual activity: Defer      Comment:          FAMILY HISTORY:  Family History   Problem Relation Age of Onset   • Heart attack and hypertension  Mother    • Esophageal cancer Mother, diagnosed at age 74, and the past week at age 76 due to cancer.      •      • Alcohol abuse Father    • Diabetes Father      type 2   • Heart attack Father    • Heart failure Father    • Hyperlipidemia Father    • Hypertension Father    • Prostate cancerDiagnosed at age 85.   Father,  at age 87 due to multiple medical problems    • Clotting disorder Father    • Birth defects Brother    • Heart attack Brother    • Clotting disorder Daughter    • breast cancer  Paternal great aunt all         REVIEW OF SYSTEMS:  GENERAL: Excellent performance status ECOG 0.   "Denies pains.  No change in appetite or weight; No fevers, chills, sweats.    SKIN: No rash, open wound.  No bruises no petechia.  HEME/LYMPH: No easy bruising, bleeding. No swollen nodes.   EYES: No vision changes or diplopia.   ENT: No tinnitus, hearing loss, gum bleeding, epistaxis, hoarseness or dysphagia.   RESPIRATORY: No cough, shortness of breath, hemoptysis or wheezing.   CVS: No chest pain, palpitations, orthopnea, dyspnea on exertion.   GI: No nausea, vomiting, constipation, diarrhea. No melena or hematochezia. No abdominal pain.   : No lower tract obstructive symptoms, dysuria or hematuria.   MUSCULOSKELETAL: Chronic arthritis and arthralgia involving her hands.   NEUROLOGICAL: No global weakness, loss of consciousness or seizures.   PSYCHIATRIC: No increased nervousness, mood changes or depression.     Objective    Vitals:    03/12/19 1208   BP: 111/71   Pulse: 63   Resp: 16   Temp: 98.5 °F (36.9 °C)   SpO2: 94%  Comment: at rest   Weight: 109 kg (239 lb 12.8 oz)   Height: 167 cm (65.75\")  Comment: new ht.   PainSc:   3   PainLoc: Comment: hands     Current Status 3/12/2019   ECOG score 0      PHYSICAL EXAM:    GENERAL:  Well-developed, moderate obese  female without acute distress.    SKIN:  Warm, dry without rashes, purpura or petechiae.    EYES:  Conjunctivae normal. PERRL.  EARS:  Hearing intact.  NOSE:  No nasal discharge.    NECK:   No thyromegaly or masses.  LYMPHATICS:  No cervical, supraclavicular, or axillary adenopathy.  CHEST:  Lungs clear to auscultation.  Normal respiratory effort.  Good airflow.  Status post bilateral mastectomy.  No suspicious lesion palpable.  CARDIAC:  Regular rate and rhythm without murmurs.  Normal S1,S2.  ABDOMEN:  Soft, no tender, no guarding or rebound. Bowel sounds normal.  EXTREMITIES:  No clubbing, cyanosis or edema.  NEUROLOGICAL:  Cranial Nerves II-XII grossly intact.  No focal neurological deficits.  PSYCHIATRIC:  Normal affect and mood.    RECENT " LABS:  Lab Results   Component Value Date    WBC 5.94 03/12/2019    HGB 14.9 03/12/2019    HCT 43.8 03/12/2019    MCV 88.1 03/12/2019     03/12/2019     Lab Results   Component Value Date    NEUTROABS 1.75 03/12/2019         Assessment/Plan      Patient is 69 years old female returning today for 6-month follow-up.  She has history of recurrent left breast cancer, post bilateral mastectomy in August 2017. Stage 1A, strongly ER/IL positive and negative for HER-2.  Has low Oncotype DX score 16, corresponding to a 10 year disease recurrence risk 10%.  Certainly she is not a candidate for adjuvant chemotherapy.     Patient declined adjuvant endocrine therapy in September 2017.  She prefers follow-up with us in the clinic for monitoring purposes.       Patient does routine self examination and reports no abnormalities.    Today review of systems is negative.  Physical examination showed no suspicion lesions on her chest, axillary or neck area.  There is no evidence for disease recurrence.        PLAN:   I will bring patient back in 6 months for routine follow-up with checking CBC.       More than 15 min, over half of that time were for counseling.       FRIEDA JACKSON M.D., Ph.D.    3/12/2019        CC: Diallo Eisenberg MD

## 2019-03-15 ENCOUNTER — OFFICE VISIT (OUTPATIENT)
Dept: FAMILY MEDICINE CLINIC | Facility: CLINIC | Age: 69
End: 2019-03-15

## 2019-03-15 VITALS
HEIGHT: 65 IN | TEMPERATURE: 97.9 F | BODY MASS INDEX: 39.99 KG/M2 | SYSTOLIC BLOOD PRESSURE: 130 MMHG | WEIGHT: 240 LBS | DIASTOLIC BLOOD PRESSURE: 70 MMHG | OXYGEN SATURATION: 93 % | HEART RATE: 66 BPM

## 2019-03-15 DIAGNOSIS — R06.00 DYSPNEA, UNSPECIFIED TYPE: ICD-10-CM

## 2019-03-15 DIAGNOSIS — R05.9 COUGH: ICD-10-CM

## 2019-03-15 DIAGNOSIS — J11.1 INFLUENZA: Primary | ICD-10-CM

## 2019-03-15 PROCEDURE — 99214 OFFICE O/P EST MOD 30 MIN: CPT | Performed by: NURSE PRACTITIONER

## 2019-03-15 RX ORDER — ALBUTEROL SULFATE 90 UG/1
2 AEROSOL, METERED RESPIRATORY (INHALATION) EVERY 4 HOURS PRN
Qty: 1 INHALER | Refills: 0 | Status: SHIPPED | OUTPATIENT
Start: 2019-03-15 | End: 2019-12-06 | Stop reason: SDUPTHER

## 2019-03-15 RX ORDER — AZITHROMYCIN 250 MG/1
TABLET, FILM COATED ORAL
Qty: 6 TABLET | Refills: 0 | Status: SHIPPED | OUTPATIENT
Start: 2019-03-15 | End: 2019-03-29

## 2019-03-15 NOTE — PROGRESS NOTES
Subjective   Alesia Resendez is a 69 y.o. female.     Pleasant patient recently diagnosed with influenza several days ago urgent care she was told it was too late to start Tamiflu  Cough congestion body aches  Without fever presently but still feels quite a bit of fatigue and now some shortness of breath cough mostly nonproductive no chest pain some mild dyspnea with activity  No severe symptoms no lethargy no severe headache or neck pain    Nothing makes better or worse    Past medical history\  Without lung disease liver kidney disease or other immunodeficiencies  Social history  No tobacco         The following portions of the patient's history were reviewed and updated as appropriate: allergies, current medications, past family history, past medical history, past social history, past surgical history and problem list.    Review of Systems   Constitutional: Negative for chills, diaphoresis, fatigue and fever.   HENT: Positive for postnasal drip.    Respiratory: Positive for cough and shortness of breath.    Cardiovascular: Negative for chest pain, palpitations and leg swelling.   All other systems reviewed and are negative.      Objective   Physical Exam   Constitutional: She is oriented to person, place, and time. She appears well-developed and well-nourished.   Danyell appears well nontoxic   HENT:   Head: Normocephalic.   Mouth/Throat: Oropharynx is clear and moist.   belén   Eyes: Conjunctivae are normal. Pupils are equal, round, and reactive to light.   Neck: Neck supple.   Cardiovascular: Normal rate, regular rhythm and normal heart sounds. Exam reveals no friction rub.   No murmur heard.  Pulmonary/Chest: Effort normal and breath sounds normal. No respiratory distress. She has no wheezes.   Abdominal: Soft.   Musculoskeletal: She exhibits no edema or tenderness.   Neurological: She is alert and oriented to person, place, and time.   Skin: Skin is warm and dry.   Psychiatric: She has a normal mood and  affect. Her behavior is normal. Judgment and thought content normal.   Vitals reviewed.        Assessment/Plan   Alesia was seen today for patient would like chest x-ray.    Diagnoses and all orders for this visit:    Influenza    Cough    Dyspnea, unspecified type  Comments:  mild    Other orders  -     azithromycin (ZITHROMAX Z-ZULEYMA) 250 MG tablet; Take 2 tablets the first day, then 1 tablet daily for 4 days.  -     albuterol sulfate  (90 Base) MCG/ACT inhaler; Inhale 2 puffs Every 4 (Four) Hours As Needed for Wheezing.                      Push fluids plenty rest  Mucinex DM start Zithromax now    Use albuterol inhaler 3 or 4 times a day for the next several days or as needed 2 puffs Tylenol or ibuprofen as needed      If chest pain high fever increased shortness of breath weakness increasing fatigue nausea vomiting malaise  Emergency room  Follow-up next week if not improving significantly    Any worsening emergency room  Vital signs stable today chest is clear

## 2019-03-15 NOTE — PATIENT INSTRUCTIONS
Push fluids plenty rest  Mucinex DM start Zithromax now    Use albuterol inhaler 3 or 4 times a day for the next several days or as needed 2 puffs Tylenol or ibuprofen as needed      If chest pain high fever increased shortness of breath weakness increasing fatigue nausea vomiting malaise  Emergency room  Follow-up next week if not improving significantly

## 2019-03-29 ENCOUNTER — OFFICE VISIT (OUTPATIENT)
Dept: FAMILY MEDICINE CLINIC | Facility: CLINIC | Age: 69
End: 2019-03-29

## 2019-03-29 VITALS
OXYGEN SATURATION: 98 % | WEIGHT: 254 LBS | DIASTOLIC BLOOD PRESSURE: 100 MMHG | HEIGHT: 65 IN | RESPIRATION RATE: 16 BRPM | SYSTOLIC BLOOD PRESSURE: 170 MMHG | TEMPERATURE: 97.9 F | BODY MASS INDEX: 42.32 KG/M2 | HEART RATE: 57 BPM

## 2019-03-29 DIAGNOSIS — H69.83 DYSFUNCTION OF BOTH EUSTACHIAN TUBES: ICD-10-CM

## 2019-03-29 DIAGNOSIS — J11.00 PNEUMONIA AND INFLUENZA: Primary | ICD-10-CM

## 2019-03-29 PROCEDURE — 99213 OFFICE O/P EST LOW 20 MIN: CPT | Performed by: NURSE PRACTITIONER

## 2019-03-29 RX ORDER — FUROSEMIDE 20 MG/1
20 TABLET ORAL DAILY
Qty: 30 TABLET | Refills: 0 | Status: SHIPPED | OUTPATIENT
Start: 2019-03-29 | End: 2019-06-13

## 2019-03-29 RX ORDER — POTASSIUM CHLORIDE 750 MG/1
10 TABLET, EXTENDED RELEASE ORAL DAILY
Qty: 30 TABLET | Refills: 0 | Status: SHIPPED | OUTPATIENT
Start: 2019-03-29 | End: 2019-12-06

## 2019-03-29 RX ORDER — LEVOFLOXACIN 750 MG/1
750 TABLET ORAL DAILY
COMMUNITY
Start: 2019-03-24 | End: 2019-03-29

## 2019-04-29 ENCOUNTER — HOSPITAL ENCOUNTER (OUTPATIENT)
Dept: GENERAL RADIOLOGY | Facility: HOSPITAL | Age: 69
Discharge: HOME | End: 2019-04-29
Admitting: NURSE PRACTITIONER

## 2019-04-29 ENCOUNTER — OFFICE VISIT (OUTPATIENT)
Dept: FAMILY MEDICINE CLINIC | Facility: CLINIC | Age: 69
End: 2019-04-29

## 2019-04-29 VITALS
BODY MASS INDEX: 40.82 KG/M2 | SYSTOLIC BLOOD PRESSURE: 132 MMHG | HEIGHT: 65 IN | TEMPERATURE: 98.1 F | WEIGHT: 245 LBS | DIASTOLIC BLOOD PRESSURE: 90 MMHG | HEART RATE: 63 BPM | OXYGEN SATURATION: 93 %

## 2019-04-29 DIAGNOSIS — F32.A DEPRESSION, UNSPECIFIED DEPRESSION TYPE: ICD-10-CM

## 2019-04-29 DIAGNOSIS — J18.9 COMMUNITY ACQUIRED PNEUMONIA, UNSPECIFIED LATERALITY: ICD-10-CM

## 2019-04-29 DIAGNOSIS — E78.2 MIXED HYPERLIPIDEMIA: ICD-10-CM

## 2019-04-29 DIAGNOSIS — I10 ESSENTIAL HYPERTENSION: Primary | ICD-10-CM

## 2019-04-29 PROCEDURE — 99214 OFFICE O/P EST MOD 30 MIN: CPT | Performed by: NURSE PRACTITIONER

## 2019-04-29 PROCEDURE — 71046 X-RAY EXAM CHEST 2 VIEWS: CPT

## 2019-04-29 RX ORDER — FENOFIBRATE 160 MG/1
160 TABLET ORAL DAILY
Qty: 90 TABLET | Refills: 1 | Status: SHIPPED | OUTPATIENT
Start: 2019-04-29 | End: 2020-05-20

## 2019-04-29 RX ORDER — FLUTICASONE PROPIONATE 50 MCG
2 SPRAY, SUSPENSION (ML) NASAL DAILY
Qty: 1 BOTTLE | Refills: 2 | Status: SHIPPED | OUTPATIENT
Start: 2019-04-29 | End: 2019-06-13

## 2019-04-29 NOTE — PROGRESS NOTES
Subjective   Alesia Resendez is a 69 y.o. female.     Pleasant patient here today follow-up community acquired pneumonia, she is much better no chest pain shortness of breath fever chills  Needs follow-up chest x-ray today she just got back from [place]    She is having some right ear discomfort for several weeks, she has wax impaction last visit she is been putting drops in her ear no fever no popping etc. sinus congestion some not too bad    No fever no chills no associated headaches or dizziness  No chronic ear pain          The following portions of the patient's history were reviewed and updated as appropriate: allergies, current medications, past family history, past medical history, past social history, past surgical history and problem list.    Review of Systems   HENT: Positive for ear pain.    Respiratory: Negative for shortness of breath.    Cardiovascular: Negative for chest pain.   All other systems reviewed and are negative.      Objective   Physical Exam   Constitutional: She is oriented to person, place, and time. She appears well-developed and well-nourished.   HENT:   Head: Normocephalic.   Mouth/Throat: Oropharynx is clear and moist. No oropharyngeal exudate.   Right ear serum impaction approximately 75% to 80%  Superior portion tympanic membrane visible  Decreased reflex without any obvious fluid  No purulence or odor no tragus tenderness cervical exam normal no facial swelling   Eyes: Conjunctivae are normal. Pupils are equal, round, and reactive to light.   Neck: Neck supple. No JVD present.   Cardiovascular: Normal rate, regular rhythm and normal heart sounds. Exam reveals no friction rub.   No murmur heard.  Pulmonary/Chest: Effort normal and breath sounds normal. No respiratory distress. She has no wheezes.   Abdominal: She exhibits no distension.   Musculoskeletal: She exhibits no edema or tenderness.   Lymphadenopathy:     She has no cervical adenopathy.   Neurological: She is alert and  oriented to person, place, and time.   Skin: Skin is warm and dry.   Psychiatric: She has a normal mood and affect. Her behavior is normal. Judgment and thought content normal.   Vitals reviewed.        Assessment/Plan   Alesia was seen today for dr. fátima monique.    Diagnoses and all orders for this visit:    Essential hypertension    Community acquired pneumonia, unspecified laterality  -     XR Chest PA & Lateral    Mixed hyperlipidemia    Depression, unspecified depression type    Other orders  -     fenofibrate 160 MG tablet; Take 1 tablet by mouth Daily.  -     fluticasone (FLONASE) 50 MCG/ACT nasal spray; 2 sprays into the nostril(s) as directed by provider Daily. For eustachian tube dysfunction        Right ear irrigated, patient tolerated well TMs are clear  TM is dull whitish haze no mass  No fluid level no bulging erythema  Slightly decreased reflex  Turbinates congested  Chest is clear patient's no chest pain shortness of breath or symptoms of pneumonia have resolved outpatient chest x-ray to prove clearing  No need for mammogram bilateral meniscectomy  2017  Colonoscopy up-to-date DEXA scan up-to-date  Follow-up 6 months  Ear symptoms not resolving in 6 weeks call for referral to ENT for any chronic ear discomfort pain          There are no Patient Instructions on file for this visit.

## 2019-05-07 ENCOUNTER — TELEPHONE (OUTPATIENT)
Dept: FAMILY MEDICINE CLINIC | Facility: CLINIC | Age: 69
End: 2019-05-07

## 2019-05-07 NOTE — TELEPHONE ENCOUNTER
Per Patient Myrbetriq cost 1000.00 dollars. Can you sent something in a little cheaper for bladder control.    Advise.

## 2019-05-09 RX ORDER — OXYBUTYNIN CHLORIDE 5 MG/1
5 TABLET ORAL 2 TIMES DAILY
Qty: 180 TABLET | Refills: 1 | Status: SHIPPED | OUTPATIENT
Start: 2019-05-09 | End: 2020-01-14 | Stop reason: SDUPTHER

## 2019-05-09 NOTE — TELEPHONE ENCOUNTER
Please discontinue Myrbetriq  Please give patient oxybutynin 5 mg  Dispense 180  1 p.o. twice daily +1 refill      Have patient give this a try  For 3 or 4 weeks and see how she does  Hopefully she will have similar results  Discontinue if any problems such as sedation increased constipation dry mouth etc.  But most people tolerate this well

## 2019-06-13 ENCOUNTER — OFFICE VISIT (OUTPATIENT)
Dept: FAMILY MEDICINE CLINIC | Facility: CLINIC | Age: 69
End: 2019-06-13

## 2019-06-13 VITALS
OXYGEN SATURATION: 92 % | SYSTOLIC BLOOD PRESSURE: 128 MMHG | HEART RATE: 61 BPM | TEMPERATURE: 98.5 F | DIASTOLIC BLOOD PRESSURE: 70 MMHG | WEIGHT: 238 LBS | BODY MASS INDEX: 39.65 KG/M2 | HEIGHT: 65 IN

## 2019-06-13 DIAGNOSIS — N39.0 URINARY TRACT INFECTION WITHOUT HEMATURIA, SITE UNSPECIFIED: Primary | ICD-10-CM

## 2019-06-13 DIAGNOSIS — R50.9 FEVER, UNSPECIFIED FEVER CAUSE: ICD-10-CM

## 2019-06-13 DIAGNOSIS — M06.9 RHEUMATOID ARTHRITIS INVOLVING MULTIPLE SITES, UNSPECIFIED RHEUMATOID FACTOR PRESENCE: ICD-10-CM

## 2019-06-13 DIAGNOSIS — R51.9 ACUTE NONINTRACTABLE HEADACHE, UNSPECIFIED HEADACHE TYPE: ICD-10-CM

## 2019-06-13 LAB
BILIRUB BLD-MCNC: NEGATIVE MG/DL
CLARITY, POC: ABNORMAL
COLOR UR: ABNORMAL
GLUCOSE UR STRIP-MCNC: NEGATIVE MG/DL
KETONES UR QL: NEGATIVE
LEUKOCYTE EST, POC: ABNORMAL
NITRITE UR-MCNC: NEGATIVE MG/ML
PH UR: 5.5 [PH] (ref 5–8)
PROT UR STRIP-MCNC: ABNORMAL MG/DL
RBC # UR STRIP: NEGATIVE /UL
SP GR UR: 1.03 (ref 1–1.03)
UROBILINOGEN UR QL: NORMAL

## 2019-06-13 PROCEDURE — 81003 URINALYSIS AUTO W/O SCOPE: CPT | Performed by: NURSE PRACTITIONER

## 2019-06-13 PROCEDURE — 99214 OFFICE O/P EST MOD 30 MIN: CPT | Performed by: NURSE PRACTITIONER

## 2019-06-13 RX ORDER — CIPROFLOXACIN 500 MG/1
500 TABLET, FILM COATED ORAL 2 TIMES DAILY
Qty: 14 TABLET | Refills: 0 | Status: SHIPPED | OUTPATIENT
Start: 2019-06-13 | End: 2019-08-27

## 2019-06-13 NOTE — PROGRESS NOTES
Subjective   Alesia Resendez is a 69 y.o. female.     Patient thinks she has a UTI  102 fever Tuesday then developed urinary frequency  Increased last night with incontinence  No dysuria temperature 100.2 this morning  No pelvic pain back pain  Positive nausea no lethargy positive mild fatigue  Mild headache much better than recent  Severe headache Monday and blood pressure is quite elevated  She was tachycardic  Constant pain right parietal  She went to the emergency room negative head CT  Given medication for headache and she was much better went home headache free  No urinary symptoms at time  No headache for 24 hours next day mild headache but nothing    no neck pain and does not feel like headache is her problem presently    Nothing makes better or worse    Recent CBC mild leukocytosis WBC 12,000 range with shift  Neutrophils elevated  Kidney function normal electrolytes stable  Head CT negative reviewed [facility] epic records           The following portions of the patient's history were reviewed and updated as appropriate: allergies, current medications, past family history, past medical history, past social history, past surgical history and problem list.    Review of Systems   Constitutional: Positive for fatigue and fever.   Respiratory: Negative for shortness of breath.    Cardiovascular: Negative for chest pain and leg swelling.   Gastrointestinal: Negative for abdominal pain.   Genitourinary: Positive for urinary incontinence and frequency. Negative for flank pain.   Neurological: Positive for headache. Negative for dizziness, tremors, seizures, syncope, facial asymmetry, speech difficulty, weakness, light-headedness, numbness and memory problem.   All other systems reviewed and are negative.      Suspect complicated UTI with fever chills recent headache systemic symptoms of nausea without signs or symptoms of pyelonephritis or sepsis  Discussed risk and benefit ciprofloxacin  Warning signs of  acute ankle or joint pain stop and seek immediate treatment    Objective   Physical Exam   Constitutional: She is oriented to person, place, and time. She appears well-developed and well-nourished. No distress.   Pleasant appears well   HENT:   Head: Normocephalic.   Mouth/Throat: Oropharynx is clear and moist.   Eyes: Conjunctivae are normal. Pupils are equal, round, and reactive to light.   Neck: Neck supple. No JVD present.   Cardiovascular: Normal rate, regular rhythm and normal heart sounds. Exam reveals no friction rub.   No murmur heard.  Pulmonary/Chest: Effort normal and breath sounds normal. No respiratory distress. She has no wheezes.   Abdominal: Soft. Bowel sounds are normal.   No CVA tenderness   Musculoskeletal: She exhibits no edema.   Lymphadenopathy:     She has no cervical adenopathy.   Neurological: She is alert and oriented to person, place, and time.   Skin: Skin is warm and dry. She is not diaphoretic.   Psychiatric: She has a normal mood and affect. Her behavior is normal. Judgment and thought content normal.   Vitals reviewed.  Patient with fever or systemic complaints      Assessment/Plan   Alesia was seen today for hospital follow-up on migraine.    Diagnoses and all orders for this visit:    Urinary tract infection without hematuria, site unspecified  -     POC Urinalysis Dipstick, Automated  -     Urine Culture - Urine, Urine, Clean Catch  -     Basic Metabolic Panel  -     CBC & Differential    Fever, unspecified fever cause  -     POC Urinalysis Dipstick, Automated  -     Urine Culture - Urine, Urine, Clean Catch  -     Basic Metabolic Panel  -     CBC & Differential    Acute nonintractable headache, unspecified headache type  -     POC Urinalysis Dipstick, Automated  -     Urine Culture - Urine, Urine, Clean Catch  -     Basic Metabolic Panel  -     CBC & Differential    Rheumatoid arthritis involving multiple sites, unspecified rheumatoid factor presence (CMS/[facility])  Comments:  History  rheumatoid arthritis osteoarthritis RA managed with Plaquenil sees rheumatology    Other orders  -     ciprofloxacin (CIPRO) 500 MG tablet; Take 1 tablet by mouth 2 (Two) Times a Day.      Risk and benefit Cipro  Patient with fever or systemic complaints  Discussed risk-benefit new guidelines  Risk of tendon rupture signs and symptoms discontinue if increased joint pain ankle injury  Seek immediate treatment  If nausea vomiting increased fever high fever abdominal pain lethargy emergency room  Symptoms should improve to 3 days otherwise should recheck          Patient Instructions   Discharge instructions  Start ciprofloxacin now  Push fluids  Rest  If high fever  Increased nausea vomiting malaise increased fatigue increased weakness  Abdominal pelvic pain increased flank pain worsening symptoms emergency room  Recheck here or urgent care  If fever is not resolved in the next 2 to 3 days  If any increased headache neck pain vision loss slurred speech emergency room  Urinary Tract Infection, Adult  A urinary tract infection (UTI) is an infection of any part of the urinary tract. The urinary tract includes the:  · Kidneys.  · Ureters.  · Bladder.  · Urethra.    These organs make, store, and get rid of pee (urine) in the body.  Follow these instructions at home:  · Take over-the-counter and prescription medicines only as told by your doctor.  · If you were prescribed an antibiotic medicine, take it as told by your doctor. Do not stop taking the antibiotic even if you start to feel better.  · Avoid the following drinks:  ? Alcohol.  ? Caffeine.  ? Tea.  ? Carbonated drinks.  · Drink enough fluid to keep your pee clear or pale yellow.  · Keep all follow-up visits as told by your doctor. This is important.  · Make sure to:  ? Empty your bladder often and completely. Do not to hold pee for long periods of time.  ? Empty your bladder before and after sex.  ? Wipe from front to back after a bowel movement if you are female.  Use each tissue one time when you wipe.  Contact a doctor if:  · You have back pain.  · You have a fever.  · You feel sick to your stomach (nauseous).  · You throw up (vomit).  · Your symptoms do not get better after 3 days.  · Your symptoms go away and then come back.  Get help right away if:  · You have very bad back pain.  · You have very bad lower belly (abdominal) pain.  · You are throwing up and cannot keep down any medicines or water.  This information is not intended to replace advice given to you by your health care provider. Make sure you discuss any questions you have with your health care provider.  Document Released: 06/05/2009 Document Revised: 06/12/2018 Document Reviewed: 11/07/2016  [employer] Interactive Patient Education © 2019 [employer]    General Headache Without Cause  A headache is pain or discomfort felt around the head or neck area. The specific cause of a headache may not be found. There are many causes and types of headaches. A few common ones are:  · Tension headaches.  · Migraine headaches.  · Cluster headaches.  · Chronic daily headaches.    Follow these instructions at home:  Watch your condition for any changes. Take these steps to help with your condition:  Managing pain  · Take over-the-counter and prescription medicines only as told by your health care provider.  · Lie down in a dark, quiet room when you have a headache.  · If directed, apply ice to the head and neck area:  ? Put ice in a plastic bag.  ? Place a towel between your skin and the bag.  ? Leave the ice on for 20 minutes, 2-3 times per day.  · Use a heating pad or hot shower to apply heat to the head and neck area as told by your health care provider.  · Keep lights dim if bright lights bother you or make your headaches worse.  Eating and drinking  · Eat meals on a regular schedule.  · Limit alcohol use.  · Decrease the amount of caffeine you drink, or stop drinking caffeine.  General instructions  · Keep all  follow-up visits as told by your health care provider. This is important.  · Keep a headache journal to help find out what may trigger your headaches. For example, write down:  ? What you eat and drink.  ? How much sleep you get.  ? Any change to your diet or medicines.  · Try massage or other relaxation techniques.  · Limit stress.  · Sit up straight, and do not tense your muscles.  · Do not use tobacco products, including cigarettes, chewing tobacco, or e-cigarettes. If you need help quitting, ask your health care provider.  · Exercise regularly as told by your health care provider.  · Sleep on a regular schedule. Get 7-9 hours of sleep, or the amount recommended by your health care provider.  Contact a health care provider if:  · Your symptoms are not helped by medicine.  · You have a headache that is different from the usual headache.  · You have nausea or you vomit.  · You have a fever.  Get help right away if:  · Your headache becomes severe.  · You have repeated vomiting.  · You have a stiff neck.  · You have a loss of vision.  · You have problems with speech.  · You have pain in the eye or ear.  · You have muscular weakness or loss of muscle control.  · You lose your balance or have trouble walking.  · You feel faint or pass out.  · You have confusion.  This information is not intended to replace advice given to you by your health care provider. Make sure you discuss any questions you have with your health care provider.  Document Released: 12/18/2006 Document Revised: 08/01/2018 Document Reviewed: 04/11/2016  [employer] Interactive Patient Education © 2019 [employer]

## 2019-06-13 NOTE — PATIENT INSTRUCTIONS
Discharge instructions  Start ciprofloxacin now  Push fluids  Rest  If high fever  Increased nausea vomiting malaise increased fatigue increased weakness  Abdominal pelvic pain increased flank pain worsening symptoms emergency room  Recheck here or urgent care  If fever is not resolved in the next 2 to 3 days  If any increased headache neck pain vision loss slurred speech emergency room  Urinary Tract Infection, Adult  A urinary tract infection (UTI) is an infection of any part of the urinary tract. The urinary tract includes the:  · Kidneys.  · Ureters.  · Bladder.  · Urethra.    These organs make, store, and get rid of pee (urine) in the body.  Follow these instructions at home:  · Take over-the-counter and prescription medicines only as told by your doctor.  · If you were prescribed an antibiotic medicine, take it as told by your doctor. Do not stop taking the antibiotic even if you start to feel better.  · Avoid the following drinks:  ? Alcohol.  ? Caffeine.  ? Tea.  ? Carbonated drinks.  · Drink enough fluid to keep your pee clear or pale yellow.  · Keep all follow-up visits as told by your doctor. This is important.  · Make sure to:  ? Empty your bladder often and completely. Do not to hold pee for long periods of time.  ? Empty your bladder before and after sex.  ? Wipe from front to back after a bowel movement if you are female. Use each tissue one time when you wipe.  Contact a doctor if:  · You have back pain.  · You have a fever.  · You feel sick to your stomach (nauseous).  · You throw up (vomit).  · Your symptoms do not get better after 3 days.  · Your symptoms go away and then come back.  Get help right away if:  · You have very bad back pain.  · You have very bad lower belly (abdominal) pain.  · You are throwing up and cannot keep down any medicines or water.  This information is not intended to replace advice given to you by your health care provider. Make sure you discuss any questions you have with  your health care provider.  Document Released: 06/05/2009 Document Revised: 06/12/2018 Document Reviewed: 11/07/2016  [employer] Interactive Patient Education © 2019 [employer]    General Headache Without Cause  A headache is pain or discomfort felt around the head or neck area. The specific cause of a headache may not be found. There are many causes and types of headaches. A few common ones are:  · Tension headaches.  · Migraine headaches.  · Cluster headaches.  · Chronic daily headaches.    Follow these instructions at home:  Watch your condition for any changes. Take these steps to help with your condition:  Managing pain  · Take over-the-counter and prescription medicines only as told by your health care provider.  · Lie down in a dark, quiet room when you have a headache.  · If directed, apply ice to the head and neck area:  ? Put ice in a plastic bag.  ? Place a towel between your skin and the bag.  ? Leave the ice on for 20 minutes, 2-3 times per day.  · Use a heating pad or hot shower to apply heat to the head and neck area as told by your health care provider.  · Keep lights dim if bright lights bother you or make your headaches worse.  Eating and drinking  · Eat meals on a regular schedule.  · Limit alcohol use.  · Decrease the amount of caffeine you drink, or stop drinking caffeine.  General instructions  · Keep all follow-up visits as told by your health care provider. This is important.  · Keep a headache journal to help find out what may trigger your headaches. For example, write down:  ? What you eat and drink.  ? How much sleep you get.  ? Any change to your diet or medicines.  · Try massage or other relaxation techniques.  · Limit stress.  · Sit up straight, and do not tense your muscles.  · Do not use tobacco products, including cigarettes, chewing tobacco, or e-cigarettes. If you need help quitting, ask your health care provider.  · Exercise regularly as told by your health care  provider.  · Sleep on a regular schedule. Get 7-9 hours of sleep, or the amount recommended by your health care provider.  Contact a health care provider if:  · Your symptoms are not helped by medicine.  · You have a headache that is different from the usual headache.  · You have nausea or you vomit.  · You have a fever.  Get help right away if:  · Your headache becomes severe.  · You have repeated vomiting.  · You have a stiff neck.  · You have a loss of vision.  · You have problems with speech.  · You have pain in the eye or ear.  · You have muscular weakness or loss of muscle control.  · You lose your balance or have trouble walking.  · You feel faint or pass out.  · You have confusion.  This information is not intended to replace advice given to you by your health care provider. Make sure you discuss any questions you have with your health care provider.  Document Released: 12/18/2006 Document Revised: 08/01/2018 Document Reviewed: 04/11/2016  [employer] Interactive Patient Education © 2019 [employer]

## 2019-06-16 LAB
BACTERIA UR CULT: ABNORMAL
BACTERIA UR CULT: ABNORMAL
OTHER ANTIBIOTIC SUSC ISLT: ABNORMAL

## 2019-08-27 ENCOUNTER — APPOINTMENT (OUTPATIENT)
Dept: LAB | Facility: HOSPITAL | Age: 69
End: 2019-08-27

## 2019-08-27 ENCOUNTER — OFFICE VISIT (OUTPATIENT)
Dept: ONCOLOGY | Facility: CLINIC | Age: 69
End: 2019-08-27

## 2019-08-27 VITALS
HEART RATE: 59 BPM | OXYGEN SATURATION: 95 % | RESPIRATION RATE: 16 BRPM | TEMPERATURE: 98.6 F | WEIGHT: 243.6 LBS | BODY MASS INDEX: 40.59 KG/M2 | SYSTOLIC BLOOD PRESSURE: 132 MMHG | DIASTOLIC BLOOD PRESSURE: 75 MMHG | HEIGHT: 65 IN

## 2019-08-27 DIAGNOSIS — C50.912 RECURRENT MALIGNANT NEOPLASM OF LEFT BREAST (HCC): Primary | ICD-10-CM

## 2019-08-27 DIAGNOSIS — C50.912 RECURRENT BREAST CANCER, LEFT (HCC): Primary | ICD-10-CM

## 2019-08-27 DIAGNOSIS — C50.912 RECURRENT MALIGNANT NEOPLASM OF LEFT BREAST (HCC): ICD-10-CM

## 2019-08-27 LAB
BASOPHILS # BLD AUTO: 0.06 10*3/MM3 (ref 0–0.2)
BASOPHILS NFR BLD AUTO: 0.9 % (ref 0–1.5)
DEPRECATED RDW RBC AUTO: 42.4 FL (ref 37–54)
EOSINOPHIL # BLD AUTO: 0.31 10*3/MM3 (ref 0–0.4)
EOSINOPHIL NFR BLD AUTO: 4.6 % (ref 0.3–6.2)
ERYTHROCYTE [DISTWIDTH] IN BLOOD BY AUTOMATED COUNT: 12.9 % (ref 12.3–15.4)
HCT VFR BLD AUTO: 40 % (ref 34–46.6)
HGB BLD-MCNC: 13.4 G/DL (ref 12–15.9)
IMM GRANULOCYTES # BLD AUTO: 0.03 10*3/MM3 (ref 0–0.05)
IMM GRANULOCYTES NFR BLD AUTO: 0.4 % (ref 0–0.5)
LYMPHOCYTES # BLD AUTO: 3.1 10*3/MM3 (ref 0.7–3.1)
LYMPHOCYTES NFR BLD AUTO: 46.3 % (ref 19.6–45.3)
MCH RBC QN AUTO: 30.5 PG (ref 26.6–33)
MCHC RBC AUTO-ENTMCNC: 33.5 G/DL (ref 31.5–35.7)
MCV RBC AUTO: 91.1 FL (ref 79–97)
MONOCYTES # BLD AUTO: 0.46 10*3/MM3 (ref 0.1–0.9)
MONOCYTES NFR BLD AUTO: 6.9 % (ref 5–12)
NEUTROPHILS # BLD AUTO: 2.73 10*3/MM3 (ref 1.7–7)
NEUTROPHILS NFR BLD AUTO: 40.9 % (ref 42.7–76)
NRBC BLD AUTO-RTO: 0 /100 WBC (ref 0–0.2)
PLATELET # BLD AUTO: 211 10*3/MM3 (ref 140–450)
PMV BLD AUTO: 9.7 FL (ref 6–12)
RBC # BLD AUTO: 4.39 10*6/MM3 (ref 3.77–5.28)
WBC NRBC COR # BLD: 6.69 10*3/MM3 (ref 3.4–10.8)

## 2019-08-27 PROCEDURE — G0463 HOSPITAL OUTPT CLINIC VISIT: HCPCS | Performed by: INTERNAL MEDICINE

## 2019-08-27 PROCEDURE — 99213 OFFICE O/P EST LOW 20 MIN: CPT | Performed by: INTERNAL MEDICINE

## 2019-08-27 PROCEDURE — 36416 COLLJ CAPILLARY BLOOD SPEC: CPT | Performed by: INTERNAL MEDICINE

## 2019-08-27 PROCEDURE — 85025 COMPLETE CBC W/AUTO DIFF WBC: CPT | Performed by: INTERNAL MEDICINE

## 2019-08-27 NOTE — PROGRESS NOTES
Subjective .     REASON FOR FOLLOWUP:     1.  Recurrent left breast cancer status post bilateral mastectomy on 8/21/2017.  The tumor is stage IA (pT1 cN0 M0), ER/CT positive, HER-2 negative.  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   2.  Oncotype DX score 16, corresponding to a 10 year disease recurrence 10%.       3.  Bone density scan on 9/14/2017 reported normal results.   4.  Patient declined adjuvant endocrine therapy in September 2017.        HISTORY OF PRESENT ILLNESS:  The patient is a 69 y.o. female who is here for 6-month follow-up evaluation.      The patient has good performance status is ECOG 0.  She is physically active.  She participated in aquatic exercise program.  Denies nausea vomiting no vision changes no headaches.  She denies suspicious lesion on her chest, axillary or neck area.  Patient reports routine self-examination.     She has been experiencing back pain but this is not new for the patient. She has noticed some edema in her lower bilateral extremities this past week with no pain and continues to manage her blood pressure with losartan as she has for years.     Laboratory study performed today showed completely normal CBC with Hb 13.4 platelets 211,000 and WBC 6700, ANC 2730 and lymphocytes 3100.      Past Medical History:   Diagnosis Date   • Anxiety    • Arthritis    • Breast cancer (CMS/HCC) 2017    LEFT   • History of radiation therapy 2006    LT BREAST   • History of skin cancer     SQUAMOUS CELL REMOVED FROM LT UPPER LIP AND RT THUMB     Past Surgical History:   Procedure Laterality Date   • APPENDECTOMY  2014   • BREAST LUMPECTOMY W/ NEEDLE LOCALIZATION Left 2006   • COLONOSCOPY  2014   • GALLBLADDER SURGERY  2007   • HYSTERECTOMY Secondary to cervical carcinoma in situ   1987   • MASTECTOMY W/ SENTINEL NODE BIOPSY Bilateral 8/21/2017    Procedure: BILATEREAL BREAST MASTECTOMY WITH SENTINEL NODE BIOPSY  ON THE LEFT The sentinel lymph node biopsy will only be performed on the left side;  Surgeon: Diallo Eisenberg MD;  Location: [facility] OR [facility] [place] [facility];  Service:    • TOTAL KNEE ARTHROPLASTY Bilateral 2000      *  Bilateral cataract surgery in 2018.     *  Resection of squamous cell carcinoma from her nose bridge and left lower leg in 2018.      OB/GYN history: .  Menarche age 12.  Was on Premarin prior to her diagnosis of first breast cancer in .     HEMATOLOGIC/ONCOLOGIC HISTORY: The patient is a 67 y.o. year old female who is here for initial evaluation on 2017with the above-mentioned history.    Ms. Resendez reports that she has healed wound, no infection.  She actually will see Dr. Eisenberg this afternoon for removing the stitches.  She has no plan for reconstruction surgery.  She reports she had her 1st episode of breast cancer back in , at that time she was seen at the [facility] [place] [facility].  She had a lumpectomy with a 6 mm DCIS and had radiation therapy followed by 5 years of tamoxifen.     The patient had an abnormal mammogram study in early 2017.  She subsequently had ultrasound-guided left breast biopsy on 2017.  This study reported invasive ductal carcinoma with the largest dimension 11 mm; it was a grade 2 tumor.  Further biomarker study at the [facility] reported      It was reported strongly positive for ER 98% and strongly for OK 61%, HER-2 was negative, IHC 0 and Ki-67 at 13.7%.      The patient was referred to see Dr. Eisenberg who performed bilateral mastectomy on 2017.  Pathology evaluation from the mastectomy sample reported invasive ductal carcinoma with the largest dimension 8 mm, grade 2 Francesco score 6/10 with a single focus of invasive carcinoma.  There is also DCIS component.  All margins were clear.  All 3 sentinel lymph nodes were negative for malignancy.  There were also 2 lymph nodes in the  mastectomy sample, which were also negative.      We obtained Oncotype DX study which comes back with a low risk score of 16, corresponding to 10 year disease recurrence risk 10%. She also had normal bone density scan.     Discussed with patient for endocrine therapy using aromatase inhibitor and its side effects. The patient reported that she had tremendous hot flashes, sweating when she was taking tamoxifen previously after she was diagnosed with DCIS. She also cited that she already had arthritis with joint pains involving her hands, she does not want to have risk to have worsening arthralgia from the aromatase inhibitors because she is physically active she does not want to have any limitation because of side effects from the aromatase inhibitor. I also presented data from the [facility] web site, and this looked at 5 year and 10 year survival benefit. Endocrine therapy using tamoxifen carries very small benefit, in 5 years it saves 1 out of 10 patients who would die from the disease and in 20 years it saves 2 out of 20 patients from dying of breast cancer. After consideration, the patient reports she does not want to have endocrine therapy at all. She would rather take the risk.    Laboratory study 03/12/2019 reported marginal neutrophil 1750 which is at her baseline level in the past 12-month, which is certainly lower than her counts in 2015 and 2017 above 2400.  She has normal lymphocytes 3420 and monocytes 430 and a total WBC 5940.  She has baseline normal hemoglobin 14.9 and platelets 279,000.    MEDICATIONS    Current Outpatient Medications:   •  albuterol sulfate  (90 Base) MCG/ACT inhaler, Inhale 2 puffs Every 4 (Four) Hours As Needed for Wheezing., Disp: 1 inhaler, Rfl: 0  •  aMILoride-hydrochlorothiazide (MODURETIC) 5-50 MG per tablet, Take 1 tablet by mouth Daily., Disp: 90 tablet, Rfl: 3  •  atenolol (TENORMIN) 50 MG tablet, Take 1 tablet by mouth Daily., Disp: 90 tablet, Rfl: 3  •   buPROPion XL (WELLBUTRIN XL) 150 MG 24 hr tablet, Take 1 tablet by mouth Daily., Disp: 90 tablet, Rfl: 3  •  diclofenac (VOLTAREN) 75 MG EC tablet, Take 75 mg by mouth., Disp: , Rfl:   •  escitalopram (LEXAPRO) 10 MG tablet, Take 1 tablet by mouth Daily., Disp: 90 tablet, Rfl: 3  •  fenofibrate 160 MG tablet, Take 1 tablet by mouth Daily., Disp: 90 tablet, Rfl: 1  •  hydroxychloroquine (PLAQUENIL) 200 MG tablet, Take 200 mg by mouth., Disp: , Rfl:   •  Multiple Vitamins-Minerals (CENTRUM SILVER PO), Take  by mouth., Disp: , Rfl:   •  Omega-3 Fatty Acids (FISH OIL) 1000 MG capsule capsule, Take 1,200 mg by mouth Daily With Breakfast., Disp: , Rfl:   •  omeprazole (priLOSEC) 20 MG capsule, Take 1 capsule by mouth Daily., Disp: 90 capsule, Rfl: 1  •  oxybutynin (DITROPAN) 5 MG tablet, Take 1 tablet by mouth 2 (Two) Times a Day., Disp: 180 tablet, Rfl: 1  •  potassium chloride (K-DUR,KLOR-CON) 10 MEQ CR tablet, Take 1 tablet by mouth Daily. If taking lasix, Disp: 30 tablet, Rfl: 0    ALLERGIES:   No Known Allergies    SOCIAL HISTORY:       Social History     Social History   • Marital status:      Spouse name: N/A   • Number of children: 3   • Years of education: College education      Occupational History   • From [employer]  Retired     Social History Main Topics   • Smoking status: Never Smoker   • Smokeless tobacco: Never Used   • Alcohol use 2 drinks per month    • Drug use: No   • Sexual activity: Defer      Comment:          FAMILY HISTORY:  Family History   Problem Relation Age of Onset   • Heart attack and hypertension  Mother    • Esophageal cancer Mother, diagnosed at age 74, and the past week at age 76 due to cancer.      •      • Alcohol abuse Father    • Diabetes Father      type 2   • Heart attack Father    • Heart failure Father    • Hyperlipidemia Father    • Hypertension Father    • Prostate cancerDiagnosed at age 85.   Father,  at age 87 due to multiple medical  "problems    • Clotting disorder Father    • Birth defects Brother    • Heart attack Brother    • Clotting disorder Daughter    • breast cancer  Paternal great aunt all       Review of Systems   Constitutional: Negative for appetite change, chills, diaphoresis, fatigue, fever and unexpected weight change.   HENT: Negative for hearing loss, sore throat and trouble swallowing.    Respiratory: Negative for cough, chest tightness, shortness of breath and wheezing.    Cardiovascular: Negative for chest pain, palpitations and leg swelling.   Gastrointestinal: Negative for abdominal distention, abdominal pain, constipation, diarrhea and nausea.   Genitourinary: Negative for dysuria, frequency, hematuria and urgency.   Musculoskeletal: Positive for back pain (chronic). Negative for arthralgias and joint swelling.   Skin: Negative for rash and wound.   Neurological: Negative for dizziness, seizures, syncope, speech difficulty, weakness, numbness and headaches.   Hematological: Negative for adenopathy. Does not bruise/bleed easily.   Psychiatric/Behavioral: Negative for behavioral problems, confusion and suicidal ideas.         Objective    Vitals:    08/27/19 1154   BP: 132/75   Pulse: 59   Resp: 16   Temp: 98.6 °F (37 °C)   SpO2: 95%   Weight: 110 kg (243 lb 9.6 oz)   Height: 165.1 cm (65\")   PainSc: 0-No pain     Current Status 8/27/2019   ECOG score 0      PHYSICAL EXAM:      GENERAL:  Well-developed, well-nourished  female in no acute distress.   SKIN:  Warm, dry without rashes, purpura or petechiae.  EYES:  Pupils equal, round and reactive to light.  EOMs intact.  Conjunctivae normal.  EARS:  Hearing intact.  NOSE:  No nasal discharge.  MOUTH:  Tongue is well-papillated; no stomatitis or ulcers.  Lips normal.  THROAT:  Oropharynx without lesions or exudates.  NECK:  Supple with good range of motion; no thyromegaly or masses.  LYMPHATICS:  No cervical, supraclavicular, axillary adenopathy.  CHEST:  Lungs clear to " auscultation. Good airflow.  CARDIAC:  Regular rate and rhythm without murmurs, rubs or gallops. Normal S1,S2.  ABDOMEN:  Soft, nontender with no hepatosplenomegaly or masses. Bowel sounds normal.  EXTREMITIES: trace bilateral lower extremities edema  NEUROLOGICAL:  Cranial Nerves II-XII grossly intact.  No focal neurological deficits.  PSYCHIATRIC:  Normal affect and mood.      RECENT LABS:  Lab Results   Component Value Date    WBC 6.69 08/27/2019    HGB 13.4 08/27/2019    HCT 40.0 08/27/2019    MCV 91.1 08/27/2019     08/27/2019     Lab Results   Component Value Date    NEUTROABS 2.73 08/27/2019         Assessment/Plan      1. History of recurrent left breast cancer, post bilateral mastectomy in August 2017. Stage 1A, strongly ER/DC positive and negative for HER-2.  Has low Oncotype DX score 16, corresponding to a 10 year disease recurrence risk 10%.  Certainly she is not a candidate for adjuvant chemotherapy.     · Patient declined adjuvant endocrine therapy in September 2017.  She prefers follow-up with us in the clinic for monitoring purposes.     · Patient does routine self examination and reports no abnormalities.  · Today review of systems is negative. Physical examination showed no suspicion lesions on her chest, axillary or neck area.  There is no evidence for disease recurrence.      2. Mild lower bilateral extremity swelling: the patient states she has been noticing some edema in her legs. She is currently managing her blood pressure with Losartan.   · Examination only showed trace edema in both lower legs.  Will continue to monitor.      PLAN:   1. I will bring patient back in 6 months for routine follow-up with checking CBC.       I, Yvonne Rahman, acted as scribe for Peyton Keyes MD PhD  in documenting the service or procedure. To the best of my knowledge, I recorded what was dictated by Peyton Keyes MD PhD      I reviewed the note scribed by Ms. Yvonne Rahman and made appropriate  corrections.       Peyton Keyes MD PhD  08/27/2019    CC: Diallo Eisenberg MD

## 2019-12-06 ENCOUNTER — TELEPHONE (OUTPATIENT)
Dept: FAMILY MEDICINE CLINIC | Facility: CLINIC | Age: 69
End: 2019-12-06

## 2019-12-06 ENCOUNTER — OFFICE VISIT (OUTPATIENT)
Dept: FAMILY MEDICINE CLINIC | Facility: CLINIC | Age: 69
End: 2019-12-06

## 2019-12-06 VITALS
HEART RATE: 58 BPM | TEMPERATURE: 98.3 F | HEIGHT: 65 IN | WEIGHT: 239 LBS | DIASTOLIC BLOOD PRESSURE: 76 MMHG | OXYGEN SATURATION: 95 % | BODY MASS INDEX: 39.82 KG/M2 | SYSTOLIC BLOOD PRESSURE: 134 MMHG | RESPIRATION RATE: 20 BRPM

## 2019-12-06 DIAGNOSIS — J40 BRONCHITIS: Primary | ICD-10-CM

## 2019-12-06 PROCEDURE — 94640 AIRWAY INHALATION TREATMENT: CPT | Performed by: NURSE PRACTITIONER

## 2019-12-06 PROCEDURE — 99214 OFFICE O/P EST MOD 30 MIN: CPT | Performed by: NURSE PRACTITIONER

## 2019-12-06 RX ORDER — PREDNISONE 20 MG/1
40 TABLET ORAL DAILY
Qty: 10 TABLET | Refills: 0 | Status: SHIPPED | OUTPATIENT
Start: 2019-12-06 | End: 2019-12-11

## 2019-12-06 RX ORDER — ALBUTEROL SULFATE 90 UG/1
2 AEROSOL, METERED RESPIRATORY (INHALATION) EVERY 4 HOURS PRN
Qty: 1 INHALER | Refills: 0 | Status: SHIPPED | OUTPATIENT
Start: 2019-12-06 | End: 2021-05-24

## 2019-12-06 RX ORDER — IPRATROPIUM BROMIDE AND ALBUTEROL SULFATE 2.5; .5 MG/3ML; MG/3ML
3 SOLUTION RESPIRATORY (INHALATION) ONCE
Status: COMPLETED | OUTPATIENT
Start: 2019-12-06 | End: 2019-12-06

## 2019-12-06 RX ORDER — AZITHROMYCIN 250 MG/1
TABLET, FILM COATED ORAL
Qty: 6 TABLET | Refills: 0 | Status: SHIPPED | OUTPATIENT
Start: 2019-12-06 | End: 2020-05-20

## 2019-12-06 RX ORDER — DEXTROMETHORPHAN HYDROBROMIDE AND PROMETHAZINE HYDROCHLORIDE 15; 6.25 MG/5ML; MG/5ML
5 SYRUP ORAL 4 TIMES DAILY PRN
Qty: 118 ML | Refills: 0 | Status: SHIPPED | OUTPATIENT
Start: 2019-12-06 | End: 2020-06-11

## 2019-12-06 RX ADMIN — IPRATROPIUM BROMIDE AND ALBUTEROL SULFATE 3 ML: 2.5; .5 SOLUTION RESPIRATORY (INHALATION) at 11:41

## 2019-12-06 NOTE — PROGRESS NOTES
"Subjective   Alesia Resendez is a 69 y.o. female.     Chief Complaint   Patient presents with   • Cough   • URI      HPI  New patient to me.  Here with worsening cough, chest tightness x 2 weeks.  Cannot seem to shake this.  Has many sick contacts with same.  Tried various OTC meds which have not been all that helpful.  Denies flu exposure.      Social History     Tobacco Use   • Smoking status: Never Smoker   • Smokeless tobacco: Never Used   Substance Use Topics   • Alcohol use: No   • Drug use: No       The following portions of the patient's history were reviewed and updated as appropriate: allergies, current medications, past family history, past medical history, past social history, past surgical history and problem list.    Review of Systems   Constitutional: Positive for activity change, appetite change and fatigue. Negative for chills and fever.   HENT: Positive for congestion, postnasal drip, rhinorrhea and sore throat. Negative for ear discharge, ear pain and trouble swallowing.    Eyes: Negative for discharge and itching.   Respiratory: Positive for cough, chest tightness, shortness of breath and wheezing.    Cardiovascular: Negative for chest pain and palpitations.   Gastrointestinal: Negative for abdominal pain, diarrhea, nausea and vomiting.   Musculoskeletal: Negative for arthralgias and myalgias.   Neurological: Negative for dizziness, weakness and headaches.       Objective   Blood pressure 134/76, pulse 58, temperature 98.3 °F (36.8 °C), temperature source Oral, resp. rate 20, height 165.1 cm (65\"), weight 108 kg (239 lb), SpO2 95 %, not currently breastfeeding.  Body mass index is 39.77 kg/m².    Physical Exam   Constitutional: She is oriented to person, place, and time. She appears well-developed and well-nourished. No distress.   Non toxic but is sick appearing   HENT:   Head: Normocephalic and atraumatic.   Right Ear: External ear and ear canal normal. Tympanic membrane is not erythematous. A " middle ear effusion is present.   Left Ear: External ear and ear canal normal. Tympanic membrane is not erythematous. A middle ear effusion is present.   Nose: Rhinorrhea present. Right sinus exhibits no maxillary sinus tenderness and no frontal sinus tenderness. Left sinus exhibits no maxillary sinus tenderness and no frontal sinus tenderness.   Mouth/Throat: Posterior oropharyngeal erythema present. No tonsillar exudate.   Eyes: Conjunctivae are normal. Right eye exhibits no discharge. Left eye exhibits no discharge.   Cardiovascular: Normal rate and regular rhythm.   Pulmonary/Chest: Effort normal.   Prior to MN scattered wheezes throughout-inspiratory/exp  Post MN decrease wheeze noted, still sounds tight  Pt verbalized improvement in breathing post MN     Abdominal: Soft. Bowel sounds are normal. There is no tenderness.   Musculoskeletal: She exhibits no deformity.   Gait smooth and steady   Neurological: She is alert and oriented to person, place, and time.   Skin: Skin is warm and dry. She is not diaphoretic.   Psychiatric: She has a normal mood and affect.   Nursing note and vitals reviewed.      Assessment   Problem List Items Addressed This Visit     None      Visit Diagnoses     Bronchitis    -  Primary    Relevant Medications    ipratropium-albuterol (DUO-NEB) nebulizer solution 3 mL (Completed)    promethazine-dextromethorphan (PROMETHAZINE-DM) 6.25-15 MG/5ML syrup    albuterol sulfate  (90 Base) MCG/ACT inhaler    predniSONE (DELTASONE) 20 MG tablet           Procedures            Impression and Plan:  Bronchitis.  Since she is more sick appearing and worsening will treat as bacterial.  Patient has been instructed to complete all antibiotics, even if feeling better. Call with any problems taking them.   Signs and symptoms of complications from bronchitis reviewed.  Normal course and expected resolution reviewed.  We discussed s/s requiring emergent tx.   We discussed side effects of all meds  today.      Health Maintenance Due   Topic Date Due   • MEDICARE ANNUAL WELLNESS  03/30/2016   • COLONOSCOPY  03/30/2016   • LIPID PANEL  05/18/2019   • MAMMOGRAM  07/05/2019              EMR Dragon/Transcription disclaimer:   Much of this encounter note is an electronic transcription/translation of spoken language to printed text. The electronic translation of spoken language may permit erroneous, or at times, nonsensical words or phrases to be inadvertently transcribed; Although I have reviewed the note for such errors, some may still exist.

## 2019-12-06 NOTE — TELEPHONE ENCOUNTER
Pt pharmacy called and needs medication clarification on azithromycin (ZITHROMAX Z-UZLEYMA) 250 MG tablet [[id number]] (Order [id number]). Please advise. JOSESITO PETER [address] [place] [address] [id number] [facility] [phone] [facility] [phone] FX

## 2019-12-13 RX ORDER — OMEPRAZOLE 20 MG/1
CAPSULE, DELAYED RELEASE ORAL
Qty: 90 CAPSULE | Refills: 0 | Status: SHIPPED | OUTPATIENT
Start: 2019-12-13 | End: 2020-11-04 | Stop reason: SDUPTHER

## 2020-01-02 RX ORDER — AMILORIDE HYDROCHLORIDE AND HYDROCHLOROTHIAZIDE 5; 50 MG/1; MG/1
TABLET ORAL
Qty: 60 TABLET | Refills: 2 | Status: SHIPPED | OUTPATIENT
Start: 2020-01-02 | End: 2020-01-14 | Stop reason: SDUPTHER

## 2020-01-02 RX ORDER — ESCITALOPRAM OXALATE 10 MG/1
TABLET ORAL
Qty: 90 TABLET | Refills: 2 | Status: SHIPPED | OUTPATIENT
Start: 2020-01-02 | End: 2020-01-14 | Stop reason: SDUPTHER

## 2020-01-08 RX ORDER — LOSARTAN POTASSIUM 100 MG/1
TABLET ORAL
Qty: 90 TABLET | Refills: 2 | OUTPATIENT
Start: 2020-01-08

## 2020-01-14 ENCOUNTER — OFFICE VISIT (OUTPATIENT)
Dept: FAMILY MEDICINE CLINIC | Facility: CLINIC | Age: 70
End: 2020-01-14

## 2020-01-14 VITALS
HEART RATE: 70 BPM | OXYGEN SATURATION: 91 % | DIASTOLIC BLOOD PRESSURE: 72 MMHG | WEIGHT: 246 LBS | BODY MASS INDEX: 40.98 KG/M2 | SYSTOLIC BLOOD PRESSURE: 130 MMHG | HEIGHT: 65 IN

## 2020-01-14 DIAGNOSIS — R79.89 ELEVATED LIVER FUNCTION TESTS: ICD-10-CM

## 2020-01-14 DIAGNOSIS — R73.03 PREDIABETES: ICD-10-CM

## 2020-01-14 DIAGNOSIS — M54.40 CHRONIC RIGHT-SIDED LOW BACK PAIN WITH SCIATICA, SCIATICA LATERALITY UNSPECIFIED: Primary | ICD-10-CM

## 2020-01-14 DIAGNOSIS — E78.2 MIXED HYPERLIPIDEMIA: ICD-10-CM

## 2020-01-14 DIAGNOSIS — G89.29 CHRONIC RIGHT-SIDED LOW BACK PAIN WITH SCIATICA, SCIATICA LATERALITY UNSPECIFIED: Primary | ICD-10-CM

## 2020-01-14 DIAGNOSIS — I10 ESSENTIAL HYPERTENSION: ICD-10-CM

## 2020-01-14 PROCEDURE — 99213 OFFICE O/P EST LOW 20 MIN: CPT | Performed by: NURSE PRACTITIONER

## 2020-01-14 RX ORDER — ESCITALOPRAM OXALATE 10 MG/1
10 TABLET ORAL DAILY
Qty: 90 TABLET | Refills: 1 | Status: SHIPPED | OUTPATIENT
Start: 2020-01-14 | End: 2020-11-04 | Stop reason: SDUPTHER

## 2020-01-14 RX ORDER — CELECOXIB 200 MG/1
200 CAPSULE ORAL
COMMUNITY
Start: 2019-11-25 | End: 2020-01-14 | Stop reason: SDUPTHER

## 2020-01-14 RX ORDER — CELECOXIB 200 MG/1
200 CAPSULE ORAL DAILY
Qty: 90 CAPSULE | Refills: 2 | Status: SHIPPED | OUTPATIENT
Start: 2020-01-14 | End: 2020-11-04 | Stop reason: SDUPTHER

## 2020-01-14 RX ORDER — ATENOLOL 50 MG/1
50 TABLET ORAL DAILY
Qty: 90 TABLET | Refills: 3 | Status: SHIPPED | OUTPATIENT
Start: 2020-01-14 | End: 2020-11-04 | Stop reason: SDUPTHER

## 2020-01-14 RX ORDER — OXYBUTYNIN CHLORIDE 5 MG/1
5 TABLET ORAL 2 TIMES DAILY
Qty: 180 TABLET | Refills: 1 | Status: SHIPPED | OUTPATIENT
Start: 2020-01-14 | End: 2020-11-04 | Stop reason: SDUPTHER

## 2020-01-14 RX ORDER — OMEPRAZOLE 20 MG/1
20 CAPSULE, DELAYED RELEASE ORAL DAILY
Qty: 90 CAPSULE | Refills: 1 | Status: SHIPPED | OUTPATIENT
Start: 2020-01-14 | End: 2020-06-11

## 2020-01-14 RX ORDER — BUPROPION HYDROCHLORIDE 150 MG/1
150 TABLET ORAL DAILY
Qty: 90 TABLET | Refills: 3 | Status: SHIPPED | OUTPATIENT
Start: 2020-01-14 | End: 2020-11-04 | Stop reason: SDUPTHER

## 2020-01-14 RX ORDER — FENOFIBRATE 160 MG/1
160 TABLET ORAL DAILY
Qty: 90 TABLET | Refills: 1 | Status: CANCELLED | OUTPATIENT
Start: 2020-01-14

## 2020-01-14 RX ORDER — AMILORIDE HYDROCHLORIDE AND HYDROCHLOROTHIAZIDE 5; 50 MG/1; MG/1
1 TABLET ORAL DAILY
Qty: 60 TABLET | Refills: 2 | Status: SHIPPED | OUTPATIENT
Start: 2020-01-14 | End: 2020-11-04 | Stop reason: SDUPTHER

## 2020-01-14 NOTE — PATIENT INSTRUCTIONS
Discontinue fenofibrate due to elevated liver function test  For better evaluation of the liver as well as to see if we can switch you to a statin which has better results decreasing stroke and heart attack      Follow-up  Fasting labs in 6 weeks outpatient    If your liver function test is normal no further evaluation for this  And we can switch you to a statin  If it is not you should see gastroenterology  As well as we can start you on a statin    Follow-up after your MRI as well as after your last labs  By email after your labs would be sufficient likely      Sciatica    Sciatica is pain, numbness, weakness, or tingling along the path of the sciatic nerve. The sciatic nerve starts in the lower back and runs down the back of each leg. The nerve controls the muscles in the lower leg and in the back of the knee. It also provides feeling (sensation) to the back of the thigh, the lower leg, and the sole of the foot. Sciatica is a symptom of another medical condition that pinches or puts pressure on the sciatic nerve.  Generally, sciatica only affects one side of the body. Sciatica usually goes away on its own or with treatment. In some cases, sciatica may keep coming back (recur).  What are the causes?  This condition is caused by pressure on the sciatic nerve, or pinching of the sciatic nerve. This may be the result of:  · A disk in between the bones of the spine (vertebrae) bulging out too far (herniated disk).  · Age-related changes in the spinal disks (degenerative disk disease).  · A pain disorder that affects a muscle in the buttock (piriformis syndrome).  · Extra bone growth (bone spur) near the sciatic nerve.  · An injury or break (fracture) of the pelvis.  · Pregnancy.  · Tumor (rare).  What increases the risk?  The following factors may make you more likely to develop this condition:  · Playing sports that place pressure or stress on the spine, such as football or weight lifting.  · Having poor strength and  flexibility.  · A history of back injury.  · A history of back surgery.  · Sitting for long periods of time.  · Doing activities that involve repetitive bending or lifting.  · Obesity.  What are the signs or symptoms?  Symptoms can vary from mild to very severe, and they may include:  · Any of these problems in the lower back, leg, hip, or buttock:  ? Mild tingling or dull aches.  ? Burning sensations.  ? Sharp pains.  · Numbness in the back of the calf or the sole of the foot.  · Leg weakness.  · Severe back pain that makes movement difficult.  These symptoms may get worse when you cough, sneeze, or laugh, or when you sit or stand for long periods of time. Being overweight may also make symptoms worse. In some cases, symptoms may recur over time.  How is this diagnosed?  This condition may be diagnosed based on:  · Your symptoms.  · A physical exam. Your health care provider may ask you to do certain movements to check whether those movements trigger your symptoms.  · You may have tests, including:  ? Blood tests.  ? X-rays.  ? MRI.  ? CT scan.  How is this treated?  In many cases, this condition improves on its own, without any treatment. However, treatment may include:  · Reducing or modifying physical activity during periods of pain.  · Exercising and stretching to strengthen your abdomen and improve the flexibility of your spine.  · Icing and applying heat to the affected area.  · Medicines that help:  ? To relieve pain and swelling.  ? To relax your muscles.  · Injections of medicines that help to relieve pain, irritation, and inflammation around the sciatic nerve (steroids).  · Surgery.  Follow these instructions at home:  Medicines  · Take over-the-counter and prescription medicines only as told by your health care provider.  · Do not drive or operate heavy machinery while taking prescription pain medicine.  Managing pain  · If directed, apply ice to the affected area.  ? Put ice in a plastic bag.  ? Place a  towel between your skin and the bag.  ? Leave the ice on for 20 minutes, 2-3 times a day.  · After icing, apply heat to the affected area before you exercise or as often as told by your health care provider. Use the heat source that your health care provider recommends, such as a moist heat pack or a heating pad.  ? Place a towel between your skin and the heat source.  ? Leave the heat on for 20-30 minutes.  ? Remove the heat if your skin turns bright red. This is especially important if you are unable to feel pain, heat, or cold. You may have a greater risk of getting burned.  Activity  · Return to your normal activities as told by your health care provider. Ask your health care provider what activities are safe for you.  ? Avoid activities that make your symptoms worse.  · Take brief periods of rest throughout the day. Resting in a lying or standing position is usually better than sitting to rest.  ? When you rest for longer periods, mix in some mild activity or stretching between periods of rest. This will help to prevent stiffness and pain.  ? Avoid sitting for long periods of time without moving. Get up and move around at least one time each hour.  · Exercise and stretch regularly, as told by your health care provider.  · Do not lift anything that is heavier than 10 lb (4.5 kg) while you have symptoms of sciatica. When you do not have symptoms, you should still avoid heavy lifting, especially repetitive heavy lifting.  · When you lift objects, always use proper lifting technique, which includes:  ? Bending your knees.  ? Keeping the load close to your body.  ? Avoiding twisting.  General instructions  · Use good posture.  ? Avoid leaning forward while sitting.  ? Avoid hunching over while standing.  · Maintain a healthy weight. Excess weight puts extra stress on your back and makes it difficult to maintain good posture.  · Wear supportive, comfortable shoes. Avoid wearing high heels.  · Avoid sleeping on a  mattress that is too soft or too hard. A mattress that is firm enough to support your back when you sleep may help to reduce your pain.  · Keep all follow-up visits as told by your health care provider. This is important.  Contact a health care provider if:  · You have pain that wakes you up when you are sleeping.  · You have pain that gets worse when you lie down.  · Your pain is worse than you have experienced in the past.  · Your pain lasts longer than 4 weeks.  · You experience unexplained weight loss.  Get help right away if:  · You lose control of your bowel or bladder (incontinence).  · You have:  ? Weakness in your lower back, pelvis, buttocks, or legs that gets worse.  ? Redness or swelling of your back.  ? A burning sensation when you urinate.  This information is not intended to replace advice given to you by your health care provider. Make sure you discuss any questions you have with your health care provider.  Document Released: 12/12/2002 Document Revised: 05/23/2017 Document Reviewed: 08/26/2016  [employer] Interactive Patient Education © 2019 [employer]

## 2020-01-18 NOTE — PROGRESS NOTES
"Subjective   Alesia Resendez is a 69 y.o. female.     Follow-up chronic right low back pain  Radiates down right lower extremity at times  No bowel or bladder changes no weakness no fever no chills lower abdominal pain associated  No night sweats or unexplained weight loss  Has been doing exercises not helping    History of breast cancer chemo- 2006, then by lateral meniscectomy 2017            Hypertension   Pertinent negatives include no chest pain, palpitations or shortness of breath.        /72   Pulse 70   Ht 165.1 cm (65\")   Wt 112 kg (246 lb)   SpO2 91%   BMI 40.94 kg/m²       The following portions of the patient's history were reviewed and updated as appropriate: allergies, current medications, past family history, past medical history, past social history, past surgical history and problem list.    Review of Systems   Constitutional: Negative for chills, fatigue, fever and unexpected weight loss.   HENT: Negative.  Negative for trouble swallowing.    Eyes: Negative.    Respiratory: Negative for cough and shortness of breath.    Cardiovascular: Negative for chest pain, palpitations and leg swelling.   Gastrointestinal: Negative for abdominal pain and blood in stool.   Genitourinary: Negative.  Negative for pelvic pain.   Musculoskeletal: Positive for back pain.   Skin: Negative.    Neurological: Negative.  Negative for dizziness, speech difficulty, weakness and confusion.   Psychiatric/Behavioral: Negative.        Objective   Physical Exam   Constitutional: She is oriented to person, place, and time. She appears well-developed and well-nourished.   HENT:   Head: Normocephalic.   Mouth/Throat: Oropharynx is clear and moist. No oropharyngeal exudate.   Eyes: Pupils are equal, round, and reactive to light. Conjunctivae are normal.   Neck: Neck supple. No JVD present.   Cardiovascular: Normal rate, regular rhythm and normal heart sounds. Exam reveals no friction rub.   No murmur " heard.  Pulmonary/Chest: Effort normal and breath sounds normal. No respiratory distress. She has no wheezes.   Abdominal: Soft. Bowel sounds are normal. She exhibits no distension and no mass. There is no tenderness. There is no guarding. No hernia.   Musculoskeletal: She exhibits no edema or tenderness.   Negative straight leg raise plantarflexor dorsiflexion normal reflexes normal Norvasc be intact normal gait   Lymphadenopathy:     She has no cervical adenopathy.   Neurological: She is alert and oriented to person, place, and time.   Skin: Skin is warm and dry.   Psychiatric: She has a normal mood and affect. Her behavior is normal. Judgment and thought content normal.   Vitals reviewed.        Assessment/Plan   Alesia was seen today for hypertension and med refill.    Diagnoses and all orders for this visit:    Chronic right-sided low back pain with sciatica, sciatica laterality unspecified  -     MRI Lumbar Spine Without Contrast  -     CBC & Differential; Future  -     Comprehensive Metabolic Panel; Future  -     Lipid Panel With LDL / HDL Ratio; Future  -     TSH Rfx On Abnormal To Free T4; Future  -     Urinalysis With Microscopic If Indicated (No Culture) - Urine, Clean Catch; Future  -     Hemoglobin A1c; Future    Essential hypertension  -     CBC & Differential; Future  -     Comprehensive Metabolic Panel; Future  -     Lipid Panel With LDL / HDL Ratio; Future  -     TSH Rfx On Abnormal To Free T4; Future  -     Urinalysis With Microscopic If Indicated (No Culture) - Urine, Clean Catch; Future  -     Hemoglobin A1c; Future    Mixed hyperlipidemia  -     CBC & Differential; Future  -     Comprehensive Metabolic Panel; Future  -     Lipid Panel With LDL / HDL Ratio; Future  -     TSH Rfx On Abnormal To Free T4; Future  -     Urinalysis With Microscopic If Indicated (No Culture) - Urine, Clean Catch; Future  -     Hemoglobin A1c; Future    Elevated liver function tests  -     CBC & Differential; Future  -      Comprehensive Metabolic Panel; Future  -     Lipid Panel With LDL / HDL Ratio; Future  -     TSH Rfx On Abnormal To Free T4; Future  -     Urinalysis With Microscopic If Indicated (No Culture) - Urine, Clean Catch; Future  -     Hemoglobin A1c; Future    Prediabetes  -     CBC & Differential; Future  -     Comprehensive Metabolic Panel; Future  -     Lipid Panel With LDL / HDL Ratio; Future  -     TSH Rfx On Abnormal To Free T4; Future  -     Urinalysis With Microscopic If Indicated (No Culture) - Urine, Clean Catch; Future  -     Hemoglobin A1c; Future    Other orders  -     aMILoride-hydrochlorothiazide (MODURETIC) 5-50 MG per tablet; Take 1 tablet by mouth Daily.  -     atenolol (TENORMIN) 50 MG tablet; Take 1 tablet by mouth Daily.  -     buPROPion XL (WELLBUTRIN XL) 150 MG 24 hr tablet; Take 1 tablet by mouth Daily.  -     escitalopram (LEXAPRO) 10 MG tablet; Take 1 tablet by mouth Daily.  -     omeprazole (priLOSEC) 20 MG capsule; Take 1 capsule by mouth Daily.  -     oxybutynin (DITROPAN) 5 MG tablet; Take 1 tablet by mouth 2 (Two) Times a Day.  -     celecoxib (CeleBREX) 200 MG capsule; Take 1 capsule by mouth Daily.                  Patient Instructions   Discontinue fenofibrate due to elevated liver function test  For better evaluation of the liver as well as to see if we can switch you to a statin which has better results decreasing stroke and heart attack      Follow-up  Fasting labs in 6 weeks outpatient    If your liver function test is normal no further evaluation for this  And we can switch you to a statin  If it is not you should see gastroenterology  As well as we can start you on a statin    Follow-up after your MRI as well as after your last labs  By email after your labs would be sufficient likely      Sciatica    Sciatica is pain, numbness, weakness, or tingling along the path of the sciatic nerve. The sciatic nerve starts in the lower back and runs down the back of each leg. The nerve  controls the muscles in the lower leg and in the back of the knee. It also provides feeling (sensation) to the back of the thigh, the lower leg, and the sole of the foot. Sciatica is a symptom of another medical condition that pinches or puts pressure on the sciatic nerve.  Generally, sciatica only affects one side of the body. Sciatica usually goes away on its own or with treatment. In some cases, sciatica may keep coming back (recur).  What are the causes?  This condition is caused by pressure on the sciatic nerve, or pinching of the sciatic nerve. This may be the result of:  · A disk in between the bones of the spine (vertebrae) bulging out too far (herniated disk).  · Age-related changes in the spinal disks (degenerative disk disease).  · A pain disorder that affects a muscle in the buttock (piriformis syndrome).  · Extra bone growth (bone spur) near the sciatic nerve.  · An injury or break (fracture) of the pelvis.  · Pregnancy.  · Tumor (rare).  What increases the risk?  The following factors may make you more likely to develop this condition:  · Playing sports that place pressure or stress on the spine, such as football or weight lifting.  · Having poor strength and flexibility.  · A history of back injury.  · A history of back surgery.  · Sitting for long periods of time.  · Doing activities that involve repetitive bending or lifting.  · Obesity.  What are the signs or symptoms?  Symptoms can vary from mild to very severe, and they may include:  · Any of these problems in the lower back, leg, hip, or buttock:  ? Mild tingling or dull aches.  ? Burning sensations.  ? Sharp pains.  · Numbness in the back of the calf or the sole of the foot.  · Leg weakness.  · Severe back pain that makes movement difficult.  These symptoms may get worse when you cough, sneeze, or laugh, or when you sit or stand for long periods of time. Being overweight may also make symptoms worse. In some cases, symptoms may recur over  time.  How is this diagnosed?  This condition may be diagnosed based on:  · Your symptoms.  · A physical exam. Your health care provider may ask you to do certain movements to check whether those movements trigger your symptoms.  · You may have tests, including:  ? Blood tests.  ? X-rays.  ? MRI.  ? CT scan.  How is this treated?  In many cases, this condition improves on its own, without any treatment. However, treatment may include:  · Reducing or modifying physical activity during periods of pain.  · Exercising and stretching to strengthen your abdomen and improve the flexibility of your spine.  · Icing and applying heat to the affected area.  · Medicines that help:  ? To relieve pain and swelling.  ? To relax your muscles.  · Injections of medicines that help to relieve pain, irritation, and inflammation around the sciatic nerve (steroids).  · Surgery.  Follow these instructions at home:  Medicines  · Take over-the-counter and prescription medicines only as told by your health care provider.  · Do not drive or operate heavy machinery while taking prescription pain medicine.  Managing pain  · If directed, apply ice to the affected area.  ? Put ice in a plastic bag.  ? Place a towel between your skin and the bag.  ? Leave the ice on for 20 minutes, 2-3 times a day.  · After icing, apply heat to the affected area before you exercise or as often as told by your health care provider. Use the heat source that your health care provider recommends, such as a moist heat pack or a heating pad.  ? Place a towel between your skin and the heat source.  ? Leave the heat on for 20-30 minutes.  ? Remove the heat if your skin turns bright red. This is especially important if you are unable to feel pain, heat, or cold. You may have a greater risk of getting burned.  Activity  · Return to your normal activities as told by your health care provider. Ask your health care provider what activities are safe for you.  ? Avoid activities  that make your symptoms worse.  · Take brief periods of rest throughout the day. Resting in a lying or standing position is usually better than sitting to rest.  ? When you rest for longer periods, mix in some mild activity or stretching between periods of rest. This will help to prevent stiffness and pain.  ? Avoid sitting for long periods of time without moving. Get up and move around at least one time each hour.  · Exercise and stretch regularly, as told by your health care provider.  · Do not lift anything that is heavier than 10 lb (4.5 kg) while you have symptoms of sciatica. When you do not have symptoms, you should still avoid heavy lifting, especially repetitive heavy lifting.  · When you lift objects, always use proper lifting technique, which includes:  ? Bending your knees.  ? Keeping the load close to your body.  ? Avoiding twisting.  General instructions  · Use good posture.  ? Avoid leaning forward while sitting.  ? Avoid hunching over while standing.  · Maintain a healthy weight. Excess weight puts extra stress on your back and makes it difficult to maintain good posture.  · Wear supportive, comfortable shoes. Avoid wearing high heels.  · Avoid sleeping on a mattress that is too soft or too hard. A mattress that is firm enough to support your back when you sleep may help to reduce your pain.  · Keep all follow-up visits as told by your health care provider. This is important.  Contact a health care provider if:  · You have pain that wakes you up when you are sleeping.  · You have pain that gets worse when you lie down.  · Your pain is worse than you have experienced in the past.  · Your pain lasts longer than 4 weeks.  · You experience unexplained weight loss.  Get help right away if:  · You lose control of your bowel or bladder (incontinence).  · You have:  ? Weakness in your lower back, pelvis, buttocks, or legs that gets worse.  ? Redness or swelling of your back.  ? A burning sensation when you  urinate.  This information is not intended to replace advice given to you by your health care provider. Make sure you discuss any questions you have with your health care provider.  Document Released: 12/12/2002 Document Revised: 05/23/2017 Document Reviewed: 08/26/2016  [employer] Patient Education © 2019 [employer]

## 2020-01-30 ENCOUNTER — HOSPITAL ENCOUNTER (OUTPATIENT)
Dept: MRI IMAGING | Facility: HOSPITAL | Age: 70
Discharge: HOME | End: 2020-01-30
Admitting: NURSE PRACTITIONER

## 2020-01-30 PROCEDURE — 72148 MRI LUMBAR SPINE W/O DYE: CPT

## 2020-02-03 ENCOUNTER — TELEPHONE (OUTPATIENT)
Dept: FAMILY MEDICINE CLINIC | Facility: CLINIC | Age: 70
End: 2020-02-03

## 2020-02-03 DIAGNOSIS — M48.061 SPINAL STENOSIS OF LUMBAR REGION, UNSPECIFIED WHETHER NEUROGENIC CLAUDICATION PRESENT: ICD-10-CM

## 2020-02-03 DIAGNOSIS — M43.16 SPONDYLOLISTHESIS OF LUMBAR REGION: Primary | ICD-10-CM

## 2020-02-03 NOTE — TELEPHONE ENCOUNTER
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 Jarquin   Who is an orthopedic back specialist    Please refer her to Dr. Vega orthopedic spine specialist  For spinal stenosis  Spondylolisthesis  Lumbar  's  Please enter referral I'll sign off  Or cosine order    If she still would like to speak with me I'll be happy to call her when I come back in from illness  Encourage her not to worry thank you

## 2020-02-03 NOTE — TELEPHONE ENCOUNTER
Please advise according to the Impression: The most severe canal stenosis is seen at L4-5 where there is a severe  degree of central canal stenosis secondary to disc bulging, ligamentum  flavum thickening, and facet arthropathy. Anterior spondylolisthesis of  L4 on L5 by 7-8 mm is seen. Moderate bilateral foraminal narrowing is  noted at L4-5 secondary to disc bulging and facet hypertrophic change.     At L4-5 and L5-S1, there is a moderate degree of bilateral foraminal  narrowing secondary to disc bulging and facet arthropathy.     Lesser degrees of canal narrowing and foraminal narrowing are noted at  the L2-3 and L3-4 levels as discussed in detail above.

## 2020-02-03 NOTE — TELEPHONE ENCOUNTER
Pt is ok with seeing Dr. Jarquin. She does not need a call back. Pt has been called and advised of results and understands. Sent request over to be signed.

## 2020-02-03 NOTE — TELEPHONE ENCOUNTER
Patient will like a call back to go over results from her MRI Lumbar Spine Without Contrast. Please advise

## 2020-02-10 RX ORDER — LOSARTAN POTASSIUM 100 MG/1
TABLET ORAL
Qty: 30 TABLET | Refills: 6 | Status: SHIPPED | OUTPATIENT
Start: 2020-02-10 | End: 2020-08-13

## 2020-02-11 ENCOUNTER — TELEPHONE (OUTPATIENT)
Dept: FAMILY MEDICINE CLINIC | Facility: CLINIC | Age: 70
End: 2020-02-11

## 2020-02-11 NOTE — TELEPHONE ENCOUNTER
PT HAD A MRI AND THE SURGEON SHE IS GOING TO NEEDS A LOWER LUMBAR SERIES XRAY. SHE WANTS TO KNOW IF YOU COULD PUT THE ORDER IN SO SHE CAN GO DOWNSTAIRS TO GET IT

## 2020-02-12 NOTE — TELEPHONE ENCOUNTER
Please clarify    I would figure Dr. Jarquin would  x-ray or in the office if need be    That said if he prefers to have x-rays done prior to the visit    Lumbar x-ray  2-3 views    Diagnosis back pain    Thank you

## 2020-02-13 ENCOUNTER — TELEPHONE (OUTPATIENT)
Dept: ONCOLOGY | Facility: CLINIC | Age: 70
End: 2020-02-13

## 2020-02-13 NOTE — TELEPHONE ENCOUNTER
Patient would like to cancel appointment for 02/20/20 and reschedule for march           Call patient at [phone]

## 2020-02-17 DIAGNOSIS — M54.9 BACK PAIN, UNSPECIFIED BACK LOCATION, UNSPECIFIED BACK PAIN LATERALITY, UNSPECIFIED CHRONICITY: Primary | ICD-10-CM

## 2020-02-25 ENCOUNTER — HOSPITAL ENCOUNTER (OUTPATIENT)
Dept: GENERAL RADIOLOGY | Facility: HOSPITAL | Age: 70
Discharge: HOME | End: 2020-02-25
Admitting: NURSE PRACTITIONER

## 2020-02-25 ENCOUNTER — RESULTS ENCOUNTER (OUTPATIENT)
Dept: FAMILY MEDICINE CLINIC | Facility: CLINIC | Age: 70
End: 2020-02-25

## 2020-02-25 DIAGNOSIS — E78.2 MIXED HYPERLIPIDEMIA: ICD-10-CM

## 2020-02-25 DIAGNOSIS — R79.89 ELEVATED LIVER FUNCTION TESTS: ICD-10-CM

## 2020-02-25 DIAGNOSIS — R73.03 PREDIABETES: ICD-10-CM

## 2020-02-25 DIAGNOSIS — I10 ESSENTIAL HYPERTENSION: ICD-10-CM

## 2020-02-25 DIAGNOSIS — M54.40 CHRONIC RIGHT-SIDED LOW BACK PAIN WITH SCIATICA, SCIATICA LATERALITY UNSPECIFIED: ICD-10-CM

## 2020-02-25 DIAGNOSIS — M54.9 BACK PAIN, UNSPECIFIED BACK LOCATION, UNSPECIFIED BACK PAIN LATERALITY, UNSPECIFIED CHRONICITY: ICD-10-CM

## 2020-02-25 DIAGNOSIS — G89.29 CHRONIC RIGHT-SIDED LOW BACK PAIN WITH SCIATICA, SCIATICA LATERALITY UNSPECIFIED: ICD-10-CM

## 2020-02-25 PROCEDURE — 72110 X-RAY EXAM L-2 SPINE 4/>VWS: CPT

## 2020-02-26 LAB
ALBUMIN SERPL-MCNC: 4.1 G/DL (ref 3.5–5.2)
ALBUMIN/GLOB SERPL: 1.7 G/DL
ALP SERPL-CCNC: 74 U/L (ref 39–117)
ALT SERPL-CCNC: 29 U/L (ref 1–33)
APPEARANCE UR: ABNORMAL
AST SERPL-CCNC: 31 U/L (ref 1–32)
BACTERIA #/AREA URNS HPF: ABNORMAL /HPF
BASOPHILS # BLD AUTO: 0.05 10*3/MM3 (ref 0–0.2)
BASOPHILS NFR BLD AUTO: 1 % (ref 0–1.5)
BILIRUB SERPL-MCNC: 0.4 MG/DL (ref 0.2–1.2)
BILIRUB UR QL STRIP: NEGATIVE
BUN SERPL-MCNC: 22 MG/DL (ref 8–23)
BUN/CREAT SERPL: 28.2 (ref 7–25)
CALCIUM SERPL-MCNC: 9.5 MG/DL (ref 8.6–10.5)
CASTS URNS MICRO: ABNORMAL
CHLORIDE SERPL-SCNC: 101 MMOL/L (ref 98–107)
CHOLEST SERPL-MCNC: 123 MG/DL (ref 0–200)
CO2 SERPL-SCNC: 26.2 MMOL/L (ref 22–29)
COLOR UR: YELLOW
CREAT SERPL-MCNC: 0.78 MG/DL (ref 0.57–1)
CRYSTALS URNS MICRO: ABNORMAL
EOSINOPHIL # BLD AUTO: 0.22 10*3/MM3 (ref 0–0.4)
EOSINOPHIL NFR BLD AUTO: 4.2 % (ref 0.3–6.2)
EPI CELLS #/AREA URNS HPF: ABNORMAL /HPF
ERYTHROCYTE [DISTWIDTH] IN BLOOD BY AUTOMATED COUNT: 13.1 % (ref 12.3–15.4)
GLOBULIN SER CALC-MCNC: 2.4 GM/DL
GLUCOSE SERPL-MCNC: 111 MG/DL (ref 65–99)
GLUCOSE UR QL: NEGATIVE
HBA1C MFR BLD: 5.8 % (ref 4.8–5.6)
HCT VFR BLD AUTO: 42.9 % (ref 34–46.6)
HDLC SERPL-MCNC: 31 MG/DL (ref 40–60)
HGB BLD-MCNC: 14.7 G/DL (ref 12–15.9)
HGB UR QL STRIP: NEGATIVE
IMM GRANULOCYTES # BLD AUTO: 0.01 10*3/MM3 (ref 0–0.05)
IMM GRANULOCYTES NFR BLD AUTO: 0.2 % (ref 0–0.5)
KETONES UR QL STRIP: NEGATIVE
LDLC SERPL CALC-MCNC: 45 MG/DL (ref 0–100)
LDLC/HDLC SERPL: 1.46 {RATIO}
LEUKOCYTE ESTERASE UR QL STRIP: ABNORMAL
LYMPHOCYTES # BLD AUTO: 2.31 10*3/MM3 (ref 0.7–3.1)
LYMPHOCYTES NFR BLD AUTO: 44.6 % (ref 19.6–45.3)
MCH RBC QN AUTO: 30.3 PG (ref 26.6–33)
MCHC RBC AUTO-ENTMCNC: 34.3 G/DL (ref 31.5–35.7)
MCV RBC AUTO: 88.5 FL (ref 79–97)
MONOCYTES # BLD AUTO: 0.48 10*3/MM3 (ref 0.1–0.9)
MONOCYTES NFR BLD AUTO: 9.3 % (ref 5–12)
NEUTROPHILS # BLD AUTO: 2.11 10*3/MM3 (ref 1.7–7)
NEUTROPHILS NFR BLD AUTO: 40.7 % (ref 42.7–76)
NITRITE UR QL STRIP: POSITIVE
NRBC BLD AUTO-RTO: 0 /100 WBC (ref 0–0.2)
PH UR STRIP: <=5 [PH] (ref 5–8)
PLATELET # BLD AUTO: 260 10*3/MM3 (ref 140–450)
POTASSIUM SERPL-SCNC: 4.2 MMOL/L (ref 3.5–5.2)
PROT SERPL-MCNC: 6.5 G/DL (ref 6–8.5)
PROT UR QL STRIP: NEGATIVE
RBC # BLD AUTO: 4.85 10*6/MM3 (ref 3.77–5.28)
RBC #/AREA URNS HPF: ABNORMAL /HPF
SODIUM SERPL-SCNC: 140 MMOL/L (ref 136–145)
SP GR UR: 1.02 (ref 1–1.03)
TRIGL SERPL-MCNC: 234 MG/DL (ref 0–150)
TSH SERPL DL<=0.005 MIU/L-ACNC: 1.94 UIU/ML (ref 0.27–4.2)
UROBILINOGEN UR STRIP-MCNC: ABNORMAL MG/DL
VLDLC SERPL CALC-MCNC: 46.8 MG/DL
WBC # BLD AUTO: 5.18 10*3/MM3 (ref 3.4–10.8)
WBC #/AREA URNS HPF: ABNORMAL /HPF

## 2020-02-26 RX ORDER — NITROFURANTOIN 25; 75 MG/1; MG/1
100 CAPSULE ORAL 2 TIMES DAILY
Qty: 10 CAPSULE | Refills: 0 | Status: SHIPPED | OUTPATIENT
Start: 2020-02-26 | End: 2020-05-20

## 2020-03-02 ENCOUNTER — PATIENT MESSAGE (OUTPATIENT)
Dept: FAMILY MEDICINE CLINIC | Facility: CLINIC | Age: 70
End: 2020-03-02

## 2020-05-04 ENCOUNTER — TELEMEDICINE (OUTPATIENT)
Dept: FAMILY MEDICINE CLINIC | Facility: CLINIC | Age: 70
End: 2020-05-04

## 2020-05-04 DIAGNOSIS — G89.29 CHRONIC RIGHT-SIDED LOW BACK PAIN WITH SCIATICA, SCIATICA LATERALITY UNSPECIFIED: ICD-10-CM

## 2020-05-04 DIAGNOSIS — M48.061 SPINAL STENOSIS OF LUMBAR REGION AT MULTIPLE LEVELS: Primary | ICD-10-CM

## 2020-05-04 DIAGNOSIS — M54.40 CHRONIC RIGHT-SIDED LOW BACK PAIN WITH SCIATICA, SCIATICA LATERALITY UNSPECIFIED: ICD-10-CM

## 2020-05-04 PROCEDURE — 99213 OFFICE O/P EST LOW 20 MIN: CPT | Performed by: NURSE PRACTITIONER

## 2020-05-04 RX ORDER — METHYLPREDNISOLONE 4 MG/1
TABLET ORAL
Qty: 21 TABLET | Refills: 0 | Status: SHIPPED | OUTPATIENT
Start: 2020-05-04 | End: 2020-06-11

## 2020-05-04 RX ORDER — TRAMADOL HYDROCHLORIDE 50 MG/1
TABLET ORAL
Qty: 15 TABLET | Refills: 0 | Status: SHIPPED | OUTPATIENT
Start: 2020-05-04 | End: 2020-06-16 | Stop reason: SDUPTHER

## 2020-05-04 NOTE — PROGRESS NOTES
Subjective   Alesia Resendez is a 70 y.o. female.     Pleasant patient follow-up low back pain sciatica spinal stenosis degenerative changes  Patient's had a increase in back pain over last several weeks  Her pain is continued since the January but it had waxed and waned periods of pain the last several weeks pain is more uncontrolled Celebrex not helping substantially she spoke to her brother-in-law who is an orthopedic surgeon he suggested  Medrol Dosepak something else for pain  Followed up with a epidural consideration  No fevers no chills no unexplained weight loss or night sweats pains consistent with her MRI lumbar    No history of drug or alcohol abuse no strong family history  We discussed treatment options and will including conservative treatment continuing exercises  Tylenol for breakthrough pain  Risk-benefit Medrol risk-benefit tramadol which she is requesting she has had this a long time ago did okay  No drug-seeking behavior   Discussed risk of addiction dependence which is low but present controlled substance need for drug screen urine, controlled substance agreement to be obtained at next follow-up visit as where pandemic situation now telehealth  Need for [employer] need for monitoring storage disposal  Never share  Acute pain management only  Limited quantities    Patient's pain is more severe walking ranges from mild to moderate moderate severe no bowel or bladder changes no fever no chills no weakness no groin paresthesias       There were no vitals taken for this visit.      The following portions of the patient's history were reviewed and updated as appropriate: allergies, current medications, past family history, past medical history, past social history, past surgical history and problem list.    Review of Systems   Constitutional: Negative for chills, fatigue, fever and unexpected weight loss.   HENT: Negative.  Negative for trouble swallowing.    Eyes: Negative.    Respiratory: Negative for  cough and shortness of breath.    Cardiovascular: Negative for chest pain, palpitations and leg swelling.   Gastrointestinal: Negative for abdominal pain and blood in stool.   Genitourinary: Negative.  Negative for pelvic pain.   Musculoskeletal: Positive for back pain.   Skin: Negative.    Neurological: Negative.  Negative for dizziness, speech difficulty, weakness and confusion.   Psychiatric/Behavioral: Negative.    All other systems reviewed and are negative.      Objective   Physical Exam   Constitutional: She is oriented to person, place, and time. She appears well-developed and well-nourished. No distress.   Present appropriate   HENT:   Head: Normocephalic.   Eyes: Pupils are equal, round, and reactive to light. Conjunctivae are normal.   Neck: Neck supple.   Cardiovascular: Exam reveals no friction rub.   No murmur heard.  Pulmonary/Chest: Effort normal. No respiratory distress. She has no wheezes.   Musculoskeletal: She exhibits no edema.   Patient sitting in the couch no distress     Neurological: She is alert and oriented to person, place, and time.   Skin: Skin is warm and dry.   Psychiatric: She has a normal mood and affect. Her behavior is normal. Judgment and thought content normal.   Vitals reviewed.        Assessment/Plan   Diagnoses and all orders for this visit:    Spinal stenosis of lumbar region at multiple levels    Chronic right-sided low back pain with sciatica, sciatica laterality unspecified  -     traMADol (ULTRAM) 50 MG tablet; 1 tablet by mouth every 4-6 hours as needed for pain    Other orders  -     methylPREDNISolone (MEDROL, ZULEYMA,) 4 MG tablet; Take as directed on package instructions.        Risk-benefit Medrol  Do not take with Celebrex  Take Tylenol 650  2 tablets twice daily with Medrol  Breakthrough pain May take tramadol as described  Fahad requested  Controlled substance agreement next follow-up visit in the office  As well as urine drug screen  Pandemic situation  Unable to  see patient in person presently  Telehealth visit completed video  Approximate 20 minutes  As part of this patient's treatment plan I am prescribing controlled substances. The patient has been made aware of appropriate use of such medications, including potential risk of somnolence, limited ability to drive and/or work safely, and potential for dependence or overdose. It has also been made clear that these medications are for use by this patient only, without concomitant use of alcohol or other substances unless prescribed.    Patient has completed prescribing agreement detailing terms of continued prescribing of controlled substances, including monitoring JAN reports, urine drug screening, and pill counts if necessary. The patient is aware that inappropriate use will result in cessation of prescribing such medications.    Weakness incontinence fever bowel or bladder incontinence or retention groin paresthesias emergency room    She will reschedule appointment with orthopedic surgeon Dr. Vega to evaluate as well as consider epidural injection  Otherwise I can refer her to pain management    Tramadol 50 mg dispensed 15  Acute use only    Caution sedation never mix medications to prevent death or serious illness injury    Continue exercises as tolerated    She will follow-up with me regarding her pain condition  Recheck I believe in July for her hypertension essential            There are no Patient Instructions on file for this visit.

## 2020-05-11 ENCOUNTER — HOSPITAL ENCOUNTER (OUTPATIENT)
Dept: GENERAL RADIOLOGY | Facility: HOSPITAL | Age: 70
Discharge: HOME | End: 2020-05-11
Admitting: NURSE PRACTITIONER

## 2020-05-11 DIAGNOSIS — G89.29 CHRONIC RIGHT-SIDED LOW BACK PAIN WITH RIGHT-SIDED SCIATICA: Primary | ICD-10-CM

## 2020-05-11 DIAGNOSIS — G89.29 CHRONIC RIGHT-SIDED LOW BACK PAIN WITH RIGHT-SIDED SCIATICA: ICD-10-CM

## 2020-05-11 DIAGNOSIS — M25.551 RIGHT HIP PAIN: ICD-10-CM

## 2020-05-11 DIAGNOSIS — M54.41 CHRONIC RIGHT-SIDED LOW BACK PAIN WITH RIGHT-SIDED SCIATICA: ICD-10-CM

## 2020-05-11 DIAGNOSIS — M54.41 CHRONIC RIGHT-SIDED LOW BACK PAIN WITH RIGHT-SIDED SCIATICA: Primary | ICD-10-CM

## 2020-05-11 PROCEDURE — 73502 X-RAY EXAM HIP UNI 2-3 VIEWS: CPT

## 2020-05-15 DIAGNOSIS — M54.40 CHRONIC RIGHT-SIDED LOW BACK PAIN WITH SCIATICA, SCIATICA LATERALITY UNSPECIFIED: Primary | ICD-10-CM

## 2020-05-15 DIAGNOSIS — M48.061 SPINAL STENOSIS OF LUMBAR REGION WITHOUT NEUROGENIC CLAUDICATION: ICD-10-CM

## 2020-05-15 DIAGNOSIS — G89.29 CHRONIC RIGHT-SIDED LOW BACK PAIN WITH SCIATICA, SCIATICA LATERALITY UNSPECIFIED: Primary | ICD-10-CM

## 2020-05-20 ENCOUNTER — OFFICE VISIT (OUTPATIENT)
Dept: FAMILY MEDICINE CLINIC | Facility: CLINIC | Age: 70
End: 2020-05-20

## 2020-05-20 VITALS
WEIGHT: 246 LBS | RESPIRATION RATE: 16 BRPM | DIASTOLIC BLOOD PRESSURE: 78 MMHG | BODY MASS INDEX: 56.93 KG/M2 | TEMPERATURE: 98.4 F | HEIGHT: 55 IN | SYSTOLIC BLOOD PRESSURE: 129 MMHG | OXYGEN SATURATION: 94 % | HEART RATE: 74 BPM

## 2020-05-20 DIAGNOSIS — G89.29 CHRONIC RIGHT-SIDED LOW BACK PAIN WITH RIGHT-SIDED SCIATICA: ICD-10-CM

## 2020-05-20 DIAGNOSIS — M48.061 SPINAL STENOSIS OF LUMBAR REGION AT MULTIPLE LEVELS: Primary | ICD-10-CM

## 2020-05-20 DIAGNOSIS — H00.019 HORDEOLUM EXTERNUM, UNSPECIFIED LATERALITY: ICD-10-CM

## 2020-05-20 DIAGNOSIS — M43.17 SPONDYLOLISTHESIS OF LUMBOSACRAL REGION: ICD-10-CM

## 2020-05-20 DIAGNOSIS — M54.41 CHRONIC RIGHT-SIDED LOW BACK PAIN WITH RIGHT-SIDED SCIATICA: ICD-10-CM

## 2020-05-20 PROCEDURE — 99214 OFFICE O/P EST MOD 30 MIN: CPT | Performed by: NURSE PRACTITIONER

## 2020-05-20 RX ORDER — ERYTHROMYCIN 5 MG/G
OINTMENT OPHTHALMIC 3 TIMES DAILY
Qty: 1 EACH | Refills: 0 | Status: SHIPPED | OUTPATIENT
Start: 2020-05-20 | End: 2021-05-24

## 2020-05-20 NOTE — PROGRESS NOTES
"Subjective   Alesia Resendez is a 70 y.o. female.     Pleasant patient here for follow-up right low back pain as well as more pain to her right hip  More recently.  Patient's had moderate moderate severe pain symptoms exacerbated more recently in January  Was not improving and we ended up getting MRI which showed significant spinal stenosis degenerative changes facet degeneration as well as at least 7 mm spondylolisthesis  Patient symptoms are concurrent and in agreement with her MRI.  As her symptoms moved more right lower hip with some tenderness right low back without any radiation to her groin or any focal tenderness over her lateral aspect of her hip, as well as some good advice from her orthopedist brother, recent pelvic and right hip x-rays obtained which were essentially normal.  More recently she has had some mild redness lateral aspect of her hip without increased fever or increased heat or other signs or symptoms.  No injury does not recall any fall and does not feel like she bothers her too much during her sleep    Recently her pain was so severe that after failing a Medrol Dosepak I believe we gave her a limited prescription of tramadol still has a few left she did quite well with this does not need a refill  If she does she will need to return to office for reevaluation    She is been referred to both orthopedic spine specialist as well with Dr. Hoff  For pain management as I suspect she likely will benefit from injections  Her treatment certainly more than likely will be conservative    Bilateral eyelid irritation several weeks thinks she has a stye nothing makes better or worse no vision change no fever chills drainage no history of malignancy or basal cell    Hip Pain           /78   Pulse 74   Temp 98.4 °F (36.9 °C)   Resp 16   Ht 65 cm (25.59\")   Wt 112 kg (246 lb)   SpO2 94%   .10 kg/m²       The following portions of the patient's history were reviewed and updated as " appropriate: allergies, current medications, past family history, past medical history, past social history, past surgical history and problem list.    Review of Systems   Musculoskeletal: Positive for back pain.       Objective   Physical Exam   Constitutional: She is oriented to person, place, and time. She appears well-developed and well-nourished.   HENT:   Head: Normocephalic.   Mouth/Throat: Oropharynx is clear and moist. No oropharyngeal exudate.   Eyes: Pupils are equal, round, and reactive to light. Conjunctivae are normal.   1 to 2 mm  Reddish lesion with centralized yellowish stye  External lid without secondary cellulitis or lid edema  Bilateral lower mid lip  Sclera otherwise clear no conjunctivitis no purulence no periorbital swelling   Neck: Neck supple. No JVD present.   Cardiovascular: Normal rate, regular rhythm and normal heart sounds. Exam reveals no friction rub.   No murmur heard.  Pulmonary/Chest: Effort normal and breath sounds normal. No respiratory distress. She has no wheezes.   Abdominal: Soft. Bowel sounds are normal. She exhibits no distension and no mass. There is no tenderness. There is no guarding. No hernia.   Musculoskeletal: She exhibits no edema or tenderness.   Lymphadenopathy:     She has no cervical adenopathy.   Neurological: She is alert and oriented to person, place, and time.   No lumbar tenderness  Mild discrete maroon rash over lateral aspect of hip upper hip possibly some bruising  No deep tenderness or induration no cellulitis or increased heat  No suspicious lesions or evidence of herpetic infection  Negative straight leg raise plantar flexion dorsiflexion normal internal/external rotation abduction abduction hip normal gait normal no grimacing position change on table  She does have pain in her back with position change   Skin: Skin is warm and dry.   Psychiatric: She has a normal mood and affect. Her behavior is normal. Judgment and thought content normal.   Vitals  reviewed.        Assessment/Plan   Alesia was seen today for hip pain.    Diagnoses and all orders for this visit:    Spinal stenosis of lumbar region at multiple levels    Hordeolum externum, unspecified laterality    Chronic right-sided low back pain with right-sided sciatica    Spondylolisthesis of lumbosacral region    Other orders  -     erythromycin (ROMYCIN) 5 MG/GM ophthalmic ointment; Administer  to both eyes 3 (Three) Times a Day.    Warm compresses 3-4 times a day erythromycin ointment bilateral 1 cm 3-4 times a day  See ophthalmology if not improving in a couple weeks increasing pain redness swelling abscess or enlarging urgent care ER  Back exercises continue  Patient symptoms secondary to stenosis she has no evidence of any  Hip severe abnormality  Her treatment will likely be conservative  Referred to neurosurgery and pain management  Already initiated  Weakness bowel bladder incontinence retention groin paresthesias fever chills back pain belly pain  Emergency room              There are no Patient Instructions on file for this visit.

## 2020-06-10 NOTE — PROGRESS NOTES
"CHIEF COMPLAINT: Back pain radiating into her right leg    Subjective   Alesia Resendez is a 70 y.o. female.   She presents to the office for evaluation of low back pain. She was referred here by James Epley, APRN.         Onset:  1 year ago, but worsened over the last few months  Inciting Event:  Stepped wrong and colton back, no injury a few months ago to worsen the pain, but associated with stopping aqua therapy.  Location:  Low back  Pain: Pain described as aching and stabbing. Located low back/buttocks.  Radiates to the right thigh, knee and foot along the lateral aspect.  Severity:  Pain rated as a 6 /10.  Symptoms have been constant.  Exacerbation:  Bending, certain positions, standing, walking.   Alleviation:  Changing positions improves it slightly - leaning towards the left side.  Associated Symptoms:   She denies any new onset of bowel or bladder weakness (chronic after child birth), significant leg weakness or saddle anesthesia.   Ambulates: With a limp  Limitations: This pain limits the patient from walking and exercising like she used to  Goals: Functional goals include ability to do the above activities    She has been through physical therapy ~January of this year.  She is not interested in medications to mask the pain and hasn't had much relief with NSAIDs or Tramadol.     Secondary issue is pain in the right upper thigh/hip region 1 week after performing chair yoga. She has a photo of a bruise that developed and now complains of a \"hard\" tender spot in that same region.     Back Pain   This is a chronic problem. The current episode started more than 1 year ago. The problem occurs constantly. The problem has been gradually worsening since onset. The pain is present in the gluteal and sacro-iliac. The quality of the pain is described as aching and stabbing. The pain radiates to the right foot, right knee and right thigh. The pain is at a severity of 9/10. The pain is the same all the time. The " symptoms are aggravated by bending, position and standing. Stiffness is present all day. Associated symptoms include bladder incontinence, leg pain and weakness (right leg). Pertinent negatives include no abdominal pain, chest pain, dysuria, numbness, paresis, paresthesias, perianal numbness, tingling or weight loss. Risk factors include history of cancer, menopause and obesity.        PEG Assessment   What number best describes your pain on average in the past week?7  What number best describes how, during the past week, pain has interfered with your enjoyment of life?6  What number best describes how, during the past week, pain has interfered with your general activity?  7        Current Outpatient Medications:   •  aMILoride-hydrochlorothiazide (MODURETIC) 5-50 MG per tablet, Take 1 tablet by mouth Daily., Disp: 60 tablet, Rfl: 2  •  atenolol (TENORMIN) 50 MG tablet, Take 1 tablet by mouth Daily., Disp: 90 tablet, Rfl: 3  •  buPROPion XL (WELLBUTRIN XL) 150 MG 24 hr tablet, Take 1 tablet by mouth Daily., Disp: 90 tablet, Rfl: 3  •  celecoxib (CeleBREX) 200 MG capsule, Take 1 capsule by mouth Daily., Disp: 90 capsule, Rfl: 2  •  escitalopram (LEXAPRO) 10 MG tablet, Take 1 tablet by mouth Daily., Disp: 90 tablet, Rfl: 1  •  losartan (COZAAR) 100 MG tablet, TAKE ONE TABLET BY MOUTH DAILY, Disp: 30 tablet, Rfl: 6  •  Multiple Vitamins-Minerals (CENTRUM SILVER PO), Take  by mouth., Disp: , Rfl:   •  Omega-3 Fatty Acids (FISH OIL) 1000 MG capsule capsule, Take 1,200 mg by mouth Daily With Breakfast., Disp: , Rfl:   •  omeprazole (priLOSEC) 20 MG capsule, TAKE ONE CAPSULE BY MOUTH DAILY, Disp: 90 capsule, Rfl: 0  •  oxybutynin (DITROPAN) 5 MG tablet, Take 1 tablet by mouth 2 (Two) Times a Day., Disp: 180 tablet, Rfl: 1  •  traMADol (ULTRAM) 50 MG tablet, 1 tablet by mouth every 4-6 hours as needed for pain, Disp: 15 tablet, Rfl: 0  •  albuterol sulfate  (90 Base) MCG/ACT inhaler, Inhale 2 puffs Every 4 (Four)  "Hours As Needed for Wheezing., Disp: 1 inhaler, Rfl: 0  •  erythromycin (ROMYCIN) 5 MG/GM ophthalmic ointment, Administer  to both eyes 3 (Three) Times a Day., Disp: 1 each, Rfl: 0    The following portions of the patient's history were reviewed and updated as appropriate: allergies, current medications, past family history, past medical history, past social history, past surgical history and problem list.      REVIEW OF PERTINENT MEDICAL DATA    5/11/2020 Right hip x-ray: No acute process.     2/25/2020 Lumbar x-ray: Lumbar disc disease throughout, worst at L5-S1.     Independent review/interpretation of 1/14/2020 Lumbar MRI: Severe multifactoral canal stenosis at L4-5, anterior listhesis of L4 on L5, bilateral narrowing of neuroforamen at L4-5 and L5-S1.  Diffuse disease throughout other levels.     2/25/2020 Labs: Creatinine 0.78    Review of Systems   Constitutional: Negative for fatigue and weight loss.   HENT: Negative for congestion.    Eyes: Negative for visual disturbance.   Respiratory: Negative for cough, shortness of breath and wheezing.    Cardiovascular: Negative for chest pain, palpitations and leg swelling.   Gastrointestinal: Positive for diarrhea. Negative for abdominal pain and nausea.   Genitourinary: Positive for bladder incontinence. Negative for difficulty urinating and dysuria.   Musculoskeletal: Positive for back pain.   Neurological: Positive for weakness (right leg). Negative for tingling, numbness and paresthesias.   Psychiatric/Behavioral: Positive for sleep disturbance. Negative for suicidal ideas. The patient is not nervous/anxious.          Vitals:    06/11/20 0954   BP: 143/84   Pulse: 66   Resp: 18   Temp: 97.1 °F (36.2 °C)   SpO2: 95%   Weight: 114 kg (251 lb)   Height: 172.7 cm (68\")   PainSc:   6   PainLoc: Back         Objective   Physical Exam   Constitutional: She is oriented to person, place, and time. She appears well-developed and well-nourished.   Sitting leaning over " towards her left hip upon my entering the room   Eyes: Conjunctivae are normal. No scleral icterus.   Cardiovascular: Normal rate.   Pulmonary/Chest: Effort normal. No respiratory distress.   Musculoskeletal:   Musculoskeletal Exam:   Ambulation: Antalgic  Lumbar Exam:  Palpated over lumbosacral paravertebral regions and transverse processes with negative tenderness appreciated, Bilateral.   Sacroiliac joints are not tender, Bilateral.  Trochanteric bursa are not tender, Bilateral.  Straight leg raise is positive radiculopathy, Right.  Contralateral straight leg test positive. Slump test is negative  radiculopathy, Left.  Facet loading is negative for pain, Bilateral.  Paraspinal/adjacent lumbar musculature are not tender to palpation, Bilateral.  Geovanny Alejandrina's test is positive sacroiliac pain, Right.     Neurological: She is alert and oriented to person, place, and time.   Skin: Skin is warm and dry.   Tenderness to palpation along the right upper lateral thigh with a firm, irregular mass under the skin.  No discoloration.    Psychiatric: She has a normal mood and affect. Her behavior is normal. Thought content normal.   Vitals reviewed.      Assessment/Plan   Problems Addressed this Visit     None      Visit Diagnoses     Lumbar radiculitis    -  Primary    Relevant Orders    Case Request    POC Urine Drug Screen, Triage (Completed)    Urine Drug Screen Confirmation - Urine, Clean Catch    Anterolisthesis        Relevant Orders    XR Spine Lumbar Complete With Flex & Ext    POC Urine Drug Screen, Triage (Completed)    Urine Drug Screen Confirmation - Urine, Clean Catch    Osteoarthritis of lumbar spine, unspecified spinal osteoarthritis complication status        Relevant Orders    POC Urine Drug Screen, Triage (Completed)    Urine Drug Screen Confirmation - Urine, Clean Catch    Displacement of lumbar intervertebral disc without myelopathy        Relevant Orders    Case Request    POC Urine Drug Screen, Triage  (Completed)    Urine Drug Screen Confirmation - Urine, Clean Catch    Hematoma of thigh, right, sequela            - Reviewed imaging results showing anterior listhesis of L4 on L5 and spondylosis.   - Bending xray ordered to assess stability.   - Used 3d spine model to discuss pain pathology.   - Right thigh pain likely residual hematoma.  Advised heat for 20 minute increments and gentle massage.   - Baseline urine drug screen obtained.   - Discussed physical therapy, however she would like to hold at this time since she just completed physical therapy.   - Medications discussed, but she requests to hold on medications for the time being.   - Plan for right L5 TFESI.  Risks and benefits discussed.     --- Follow-up R L5-S1 TFESI and 3-4 week OV to assess injection results.            JAN REPORT    JAN report has been reviewed and scanned into the patient's chart.    As the clinician, I personally reviewed the JAN from 6/10/2020 while the patient was in the office today.

## 2020-06-11 ENCOUNTER — OFFICE VISIT (OUTPATIENT)
Dept: PAIN MEDICINE | Facility: CLINIC | Age: 70
End: 2020-06-11

## 2020-06-11 ENCOUNTER — HOSPITAL ENCOUNTER (OUTPATIENT)
Dept: GENERAL RADIOLOGY | Facility: HOSPITAL | Age: 70
Discharge: HOME | End: 2020-06-11
Admitting: ANESTHESIOLOGY

## 2020-06-11 VITALS
HEART RATE: 66 BPM | WEIGHT: 251 LBS | HEIGHT: 68 IN | RESPIRATION RATE: 18 BRPM | TEMPERATURE: 97.1 F | DIASTOLIC BLOOD PRESSURE: 84 MMHG | SYSTOLIC BLOOD PRESSURE: 143 MMHG | OXYGEN SATURATION: 95 % | BODY MASS INDEX: 38.04 KG/M2

## 2020-06-11 DIAGNOSIS — M47.816 OSTEOARTHRITIS OF LUMBAR SPINE, UNSPECIFIED SPINAL OSTEOARTHRITIS COMPLICATION STATUS: ICD-10-CM

## 2020-06-11 DIAGNOSIS — M51.26 DISPLACEMENT OF LUMBAR INTERVERTEBRAL DISC WITHOUT MYELOPATHY: ICD-10-CM

## 2020-06-11 DIAGNOSIS — M43.10 ANTEROLISTHESIS: ICD-10-CM

## 2020-06-11 DIAGNOSIS — M54.16 LUMBAR RADICULITIS: Primary | ICD-10-CM

## 2020-06-11 DIAGNOSIS — S70.11XS: ICD-10-CM

## 2020-06-11 LAB
POC AMPHETAMINES: NEGATIVE
POC BARBITURATES: NEGATIVE
POC BENZODIAZEPHINES: NEGATIVE
POC COCAINE: NEGATIVE
POC METHADONE: NEGATIVE
POC METHAMPHETAMINE SCREEN URINE: NEGATIVE
POC OPIATES: NEGATIVE
POC OXYCODONE: NEGATIVE
POC PHENCYCLIDINE: NEGATIVE
POC PROPOXYPHENE: NEGATIVE
POC THC: NEGATIVE
POC TRICYCLIC ANTIDEPRESSANTS: NEGATIVE

## 2020-06-11 PROCEDURE — 99204 OFFICE O/P NEW MOD 45 MIN: CPT | Performed by: ANESTHESIOLOGY

## 2020-06-11 PROCEDURE — 72114 X-RAY EXAM L-S SPINE BENDING: CPT

## 2020-06-11 PROCEDURE — 80305 DRUG TEST PRSMV DIR OPT OBS: CPT | Performed by: ANESTHESIOLOGY

## 2020-06-11 NOTE — PATIENT INSTRUCTIONS
Epidural Steroid Injection  An epidural steroid injection is a shot of steroid medicine and numbing medicine that is given into the space between the spinal cord and the bones in your back (epidural space). The shot helps relieve pain caused by an irritated or swollen nerve root.  The amount of pain relief you get from the injection depends on what is causing the nerve to be swollen and irritated, and how long your pain lasts. You are more likely to benefit from this injection if your pain is strong and comes on suddenly rather than if you have had pain for a long time.  Tell a health care provider about:    · Any allergies you have.  · All medicines you are taking, including vitamins, herbs, eye drops, creams, and over-the-counter medicines.  · Any problems you or family members have had with anesthetic medicines.  · Any blood disorders you have.  · Any surgeries you have had.  · Any medical conditions you have.  · Whether you are pregnant or may be pregnant.  What are the risks?  Generally, this is a safe procedure. However, problems may occur, including:  · Headache.  · Bleeding.  · Infection.  · Allergic reaction to medicines.  · Damage to your nerves.  What happens before the procedure?  Staying hydrated  Follow instructions from your health care provider about hydration, which may include:  · Up to 2 hours before the procedure - you may continue to drink clear liquids, such as water, clear fruit juice, black coffee, and plain tea.  Eating and drinking restrictions  Follow instructions from your health care provider about eating and drinking, which may include:  · 8 hours before the procedure - stop eating heavy meals or foods such as meat, fried foods, or fatty foods.  · 6 hours before the procedure - stop eating light meals or foods, such as toast or cereal.  · 6 hours before the procedure - stop drinking milk or drinks that contain milk.  · 2 hours before the procedure - stop drinking clear  liquids.  Medicine  · You may be given medicines to lower anxiety.  · Ask your health care provider about:  ? Changing or stopping your regular medicines. This is especially important if you are taking diabetes medicines or blood thinners.  ? Taking medicines such as aspirin and ibuprofen. These medicines can thin your blood. Do not take these medicines before your procedure if your health care provider instructs you not to.  General instructions  · Plan to have someone take you home from the hospital or clinic.  What happens during the procedure?  · You may receive a medicine to help you relax (sedative).  · You will be asked to lie on your abdomen.  · The injection site will be cleaned.  · A numbing medicine (local anesthetic) will be used to numb the injection site.  · A needle will be inserted through your skin into the epidural space. You may feel some discomfort when this happens. An X-ray machine will be used to make sure the needle is put as close as possible to the affected nerve.  · A steroid medicine and a local anesthetic will be injected into the epidural space.  · The needle will be removed.  · A bandage (dressing) will be put over the injection site.  What happens after the procedure?  · Your blood pressure, heart rate, breathing rate, and blood oxygen level will be monitored until the medicines you were given have worn off.  · Your arm or leg may feel weak or numb for a few hours.  · The injection site may feel sore.  · Do not drive for 24 hours if you received a sedative.  This information is not intended to replace advice given to you by your health care provider. Make sure you discuss any questions you have with your health care provider.  Document Released: 03/26/2009 Document Revised: 11/30/2018 Document Reviewed: 04/04/2017  Elsevier Patient Education © 2020 [employer]    Epidural Steroid Injection, Care After  Refer to this sheet in the next few weeks. These instructions provide you with  information about caring for yourself after your procedure. Your health care provider may also give you more specific instructions. Your treatment has been planned according to current medical practices, but problems sometimes occur. Call your health care provider if you have any problems or questions after your procedure.  What can I expect after the procedure?  After your procedure, it is common to feel a little discomfort at the injection site.  Follow these instructions at home:  · For 24 hours after the procedure:  ? Avoid using heat on the injection site.  ? Do not take a tub bath, and do not soak in water.  ? Do not drive if you received a medicine to help you relax (sedative).  · If directed, put ice on the injection site:  ? Put ice in a plastic bag.  ? Place a towel between your skin and the bag.  ? Leave the ice on for 20 minutes, 2-3 times a day.  · Return to your normal activities as told by your health care provider. Ask your health care provider what activities are safe for you.  · You may remove the bandage (dressing) after 24 hours.  · Take over-the-counter and prescription medicines only as told by your health care provider.  · Keep all follow-up visits as told by your health care provider. This is important.  Contact a health care provider if:  · You have a fever.  · You continue to have pain and soreness around the injection site, even after taking over-the-counter pain medicine.  · You have severe, sudden, or lasting nausea or vomiting.  Get help right away if:  · You have severe pain at the injection site that is not relieved by medicines.  · You develop a severe headache or a stiff neck.  · You become sensitive to light.  · You have any new numbness or weakness in your legs or arms.  · You lose control of your bladder or bowel movements.  · You have trouble breathing.  This information is not intended to replace advice given to you by your health care provider. Make sure you discuss any  questions you have with your health care provider.  Document Released: 04/03/2012 Document Revised: 11/30/2018 Document Reviewed: 04/04/2017  Elsevier Patient Education © 2020 [employer]

## 2020-06-16 ENCOUNTER — RESULTS ENCOUNTER (OUTPATIENT)
Dept: PAIN MEDICINE | Facility: CLINIC | Age: 70
End: 2020-06-16

## 2020-06-16 ENCOUNTER — OFFICE VISIT (OUTPATIENT)
Dept: FAMILY MEDICINE CLINIC | Facility: CLINIC | Age: 70
End: 2020-06-16

## 2020-06-16 VITALS
OXYGEN SATURATION: 94 % | TEMPERATURE: 94.1 F | WEIGHT: 251.5 LBS | HEART RATE: 74 BPM | BODY MASS INDEX: 38.24 KG/M2 | SYSTOLIC BLOOD PRESSURE: 127 MMHG | DIASTOLIC BLOOD PRESSURE: 82 MMHG

## 2020-06-16 DIAGNOSIS — M51.26 DISPLACEMENT OF LUMBAR INTERVERTEBRAL DISC WITHOUT MYELOPATHY: ICD-10-CM

## 2020-06-16 DIAGNOSIS — M47.816 OSTEOARTHRITIS OF LUMBAR SPINE, UNSPECIFIED SPINAL OSTEOARTHRITIS COMPLICATION STATUS: ICD-10-CM

## 2020-06-16 DIAGNOSIS — G89.29 CHRONIC RIGHT-SIDED LOW BACK PAIN WITH SCIATICA, SCIATICA LATERALITY UNSPECIFIED: ICD-10-CM

## 2020-06-16 DIAGNOSIS — G89.29 CHRONIC RIGHT-SIDED LOW BACK PAIN WITH RIGHT-SIDED SCIATICA: ICD-10-CM

## 2020-06-16 DIAGNOSIS — H61.21 IMPACTED CERUMEN OF RIGHT EAR: Primary | ICD-10-CM

## 2020-06-16 DIAGNOSIS — M54.40 CHRONIC RIGHT-SIDED LOW BACK PAIN WITH SCIATICA, SCIATICA LATERALITY UNSPECIFIED: ICD-10-CM

## 2020-06-16 DIAGNOSIS — M54.41 CHRONIC RIGHT-SIDED LOW BACK PAIN WITH RIGHT-SIDED SCIATICA: ICD-10-CM

## 2020-06-16 DIAGNOSIS — M54.16 LUMBAR RADICULITIS: ICD-10-CM

## 2020-06-16 DIAGNOSIS — M43.10 ANTEROLISTHESIS: ICD-10-CM

## 2020-06-16 PROCEDURE — 99214 OFFICE O/P EST MOD 30 MIN: CPT | Performed by: NURSE PRACTITIONER

## 2020-06-16 RX ORDER — TRAMADOL HYDROCHLORIDE 50 MG/1
TABLET ORAL
Qty: 30 TABLET | Refills: 0 | Status: SHIPPED | OUTPATIENT
Start: 2020-06-16 | End: 2020-09-02

## 2020-06-16 NOTE — PROGRESS NOTES
Subjective   Alesia Resendez is a 70 y.o. female.     Patient complains of excessive earwax right ear mild discomfort no fever no chills some slight decreased hearing  Symptoms chronic nothing makes better or worse  She also has spinal stenosis degenerative changes lumbar spine chronic back pain pain unrelieved with  more conservative measures she will be getting back injection soon she request tramadol  She was given a very brief prescription several weeks ago helped her pain considerably is a temporary medication for acute pain  I reviewed the risk of addiction dependence sedation  Severe injury including death strategy to mitigate risk  Appropriate use of this disposal  And she will follow-up with pain management if she needs a refill that should be limited quantity  And manage pain without the use of any opiates for long-term or chronic pain      Ear Fullness    Pertinent negatives include no abdominal pain, coughing, diarrhea or neck pain.   Earache    There is pain in both ears. This is a recurrent problem. The current episode started more than 1 month ago. The problem occurs every few hours. The problem has been waxing and waning. There has been no fever. The pain is at a severity of 4/10. Pertinent negatives include no abdominal pain, coughing, diarrhea or neck pain.        /82   Pulse 74   Temp 94.1 °F (34.5 °C) (Temporal)   Wt 114 kg (251 lb 8 oz)   SpO2 94%   BMI 38.24 kg/m²       The following portions of the patient's history were reviewed and updated as appropriate: allergies, current medications, past family history, past medical history, past social history, past surgical history and problem list.    Review of Systems   HENT: Positive for ear pain.    Respiratory: Negative for cough.    Gastrointestinal: Negative for abdominal pain and diarrhea.   Musculoskeletal: Positive for back pain. Negative for neck pain.       Objective   Physical Exam   Constitutional: She is oriented to person,  place, and time. She appears well-developed and well-nourished. No distress.   HENT:   Head: Normocephalic.   Right wax impaction no redness or swelling left is clear   Eyes: Pupils are equal, round, and reactive to light. Conjunctivae are normal.   Neck: Neck supple.   Cardiovascular: Exam reveals no friction rub.   No murmur heard.  Pulmonary/Chest: Effort normal. No respiratory distress. She has no wheezes.   Musculoskeletal: She exhibits no edema.   Able to get on the table with some mild grimacing  Normal gait  No focal weakness  Sitting without distress   Neurological: She is alert and oriented to person, place, and time.   Skin: Skin is warm and dry.   Psychiatric: She has a normal mood and affect. Her behavior is normal. Judgment and thought content normal.   Vitals reviewed.        Assessment/Plan   Alesia was seen today for ear fullness.    Diagnoses and all orders for this visit:    Impacted cerumen of right ear    Chronic right-sided low back pain with sciatica, sciatica laterality unspecified  -     traMADol (ULTRAM) 50 MG tablet; 1 to 2 tablets by mouth up to 3 times a day as needed for uncontrolled back pain use sparingly    Chronic right-sided low back pain with right-sided sciatica    Ear irrigation completed looks great  Control subs agreement at follow-up  Reviewed verbally and consent obtained  sadaf  Limited quantity follow-up with pain management  Discussed tramadol with pain management hopefully injections  Will improve her pain  Continue exercises                  There are no Patient Instructions on file for this visit.           Answers for HPI/ROS submitted by the patient on 6/14/2020   Ear pain  What is the primary reason for your visit?: Ear Pain

## 2020-06-17 ENCOUNTER — OFFICE VISIT (OUTPATIENT)
Dept: ORTHOPEDIC SURGERY | Facility: CLINIC | Age: 70
End: 2020-06-17

## 2020-06-17 VITALS — BODY MASS INDEX: 38.34 KG/M2 | HEIGHT: 68 IN | TEMPERATURE: 97.8 F | WEIGHT: 253 LBS

## 2020-06-17 DIAGNOSIS — M48.062 SPINAL STENOSIS OF LUMBAR REGION WITH NEUROGENIC CLAUDICATION: ICD-10-CM

## 2020-06-17 DIAGNOSIS — M43.16 SPONDYLOLISTHESIS OF LUMBAR REGION: Primary | ICD-10-CM

## 2020-06-17 PROCEDURE — 99204 OFFICE O/P NEW MOD 45 MIN: CPT | Performed by: ORTHOPAEDIC SURGERY

## 2020-06-17 NOTE — PROGRESS NOTES
New patient or new problem visit    Chief Complaint   Patient presents with   • Lumbar Spine - Establish Care       HPI: She complains of increasing pain right buttock thigh and leg worsening low back pain which accompanies it which is been ongoing now since January or thereabout the pain is severe constant stabbing aching worse with activity she is failed to improve with dry needling physical therapy chiropractory and water aerobics.  She has a lumbar epidural steroid injection planned.  No balance difficulties bowel or bladder complaints.    PFSH: See chart- reviewed    Review of Systems   Constitutional: Negative for chills, fever and unexpected weight change.   HENT: Negative for trouble swallowing and voice change.    Eyes: Negative for visual disturbance.   Respiratory: Negative for cough and shortness of breath.    Cardiovascular: Negative for chest pain and leg swelling.   Gastrointestinal: Negative for abdominal pain, nausea and vomiting.   Endocrine: Negative for cold intolerance and heat intolerance.   Genitourinary: Negative for difficulty urinating, dysuria, frequency, pelvic pain and urgency.   Musculoskeletal: Positive for back pain.   Skin: Negative for rash and wound.   Allergic/Immunologic: Negative for immunocompromised state.   Neurological: Positive for weakness. Negative for numbness and headaches.   Hematological: Does not bruise/bleed easily.   Psychiatric/Behavioral: Negative for dysphoric mood. The patient is not nervous/anxious.        PE: Constitutional: Vital signs above-noted.  Awake, alert and oriented    Psychiatric: Affect and insight do not appear grossly disturbed.    Pulmonary: Breathing is unlabored, color is good.    Skin: Warm, dry and normal turgor    Cardiac: Pedal pulses intact.  No edema.    Eyesight and hearing appear adequate for examination purposes      Musculoskeletal:  There is no tenderness to percussion and palpation of the spine. Motion appears undisturbed.   Posture is unremarkable to coronal and sagittal inspection.    The skin about the area is intact.  There is no palpable or visible deformity.  There is no local spasm.       Neurologic:   Reflexes are absent in the patellae and achilles.   Motor function is undisturbed in quadriceps, EHL, and gastrocnemius   sensation appears symmetrically intact to light touch.  In the bilateral lower extremities there is no evidence of atrophy.   Clonus is absent..  Gait appears undisturbed.  SLR test negative      MEDICAL DECISION MAKING    XRAY: Plain film x-rays of the lumbar spine show L4-5 spondylolisthesis which increases 2-1/2 mm on flexion-extension films.  Also has a slight retrolisthesis at L2-3.  MRI scan of the lumbar spine shows severe stenosis at L4-5 mild to moderate changes at L3-4 and minimal changes above and below.  I reviewed the radiologist report with which I agree.    Other: n/a    Impression: Lumbar spondylolisthesis L4-5, lumbar spinal stenosis L3-4 L4-5 probably her symptoms are all coming from 4 5    Plan: For now we will try epidural injections if she fails to improve, or develops neurologic symptoms and she is likely going to need at least L4-5 laminectomy and fusion and we would need to decide whether or not to do 3 4.  A myelogram and CT scan might be useful at that point to help decide.

## 2020-06-18 ENCOUNTER — LAB REQUISITION (OUTPATIENT)
Dept: LAB | Facility: HOSPITAL | Age: 70
End: 2020-06-18

## 2020-06-18 DIAGNOSIS — Z00.00 ENCOUNTER FOR GENERAL ADULT MEDICAL EXAMINATION WITHOUT ABNORMAL FINDINGS: ICD-10-CM

## 2020-06-19 PROCEDURE — U0004 COV-19 TEST NON-CDC HGH THRU: HCPCS | Performed by: ANESTHESIOLOGY

## 2020-06-20 LAB
REF LAB TEST METHOD: NORMAL
SARS-COV-2 RNA RESP QL NAA+PROBE: NOT DETECTED

## 2020-06-22 ENCOUNTER — OUTSIDE FACILITY SERVICE (OUTPATIENT)
Dept: PAIN MEDICINE | Facility: CLINIC | Age: 70
End: 2020-06-22

## 2020-06-22 ENCOUNTER — DOCUMENTATION (OUTPATIENT)
Dept: PAIN MEDICINE | Facility: CLINIC | Age: 70
End: 2020-06-22

## 2020-06-22 PROCEDURE — 64483 NJX AA&/STRD TFRM EPI L/S 1: CPT | Performed by: ANESTHESIOLOGY

## 2020-06-22 NOTE — PROGRESS NOTES
Lumbar Transforaminal Epidural Steroid Injection (one level Unilateral)  [facility]      PREOPERATIVE DIAGNOSIS:  Lumbar Degenerative Disc Disease, Lumbar Disc Displacement and Lumbar Radiculitis    POSTOPERATIVE DIAGNOSIS:  Same as preop diagnosis    PROCEDURE:  CPT 45967 --  Diagnostic & Therapeutic Transforaminal Epidural Steroid Injection at the L5 level, on the right     PRE-PROCEDURE DISCUSSION WITH PATIENT:    Risks and complications were discussed with the patient prior to starting the procedure and informed consent was obtained.  We discussed various topics including but not limited to bleeding, infection, injury, nerve injury, paralysis, coma, death, postprocedural painful flare-up, postprocedural site soreness, and a lack of pain relief.  We discussed the diagnostic aspect of transforaminal epidural / selective nerve root blockade.    SURGEON:  Estrellita Sheppard MD    REASON FOR PROCEDURE:    Diagnostic injection at this level is needed, Degenerative changes are noted in the area., Radiating pattern of pain is likely consistent with degenerative changes in the area. and Radicular pain pattern seems consistent with this dermatome.    SEDATION:  Versed 2mg & Fentanyl 50 mcg IV  ANESTHETIC:  Marcaine 0.25%  STEROID:  Dexamethasone 15mg    DESCRIPTON OF PROCEDURE:  After obtaining informed consent, an I.V. was started in the preoperative area. The patient taken to the operating room and was placed in the prone position with a pillow under the abdomen.  All pressure points were well padded.  EKG, blood pressure, and pulse oximeter were monitored.  The lumbar area was prepped with Chloraprep and draped in a sterile fashion. Under fluoroscopic guidance in an oblique dimension on the above mentioned side, the transverse process of the aforementioned vertebra at the junction of the body at 6 o'clock position was identified. Skin and subcutaneous tissue was anesthetized with 1% lidocaine. A  22-gauge spinal needle was introduced under fluoroscopic guidance at the above junction into the foramen without parasthesias and into the epidural space. After confirming the position of the needle with PA, lateral, and oblique fluoroscopic views, aspiration was checked and was clear of blood or CSF.  Next, 1 mL of Omnipaque was injected. After seeing adequate spread on the corresponding nerve root, a total volume 3mL of injectate containing 1ml of the above mentioned local anesthetic, 1 ml saline,  and the above mentioned corticosteroid was injected into the epidural space.    The needle was removed intact.  Vital signs were stable throughout.        ESTIMATED BLOOD LOSS:  <5 mL  SPECIMENS:  none    COMPLICATIONS:   No complications were noted., There was no indication of vascular uptake on live injection of contrast dye. and There was no indication of intrathecal uptake on live injection of contrast dye.    TOLERANCE & DISCHARGE CONDITION:    The patient tolerated the procedure well.  The patient was transported to the recovery area without difficulties.  The patient was discharged to home under the care of family in stable and satisfactory condition.    PLAN OF CARE:  1. The patient was given our standard instruction sheet.  2. The patient will Return to clinic 3-4 wks.  3. The patient will resume all medications as per the medication reconciliation sheet.

## 2020-07-13 NOTE — PROGRESS NOTES
TELEPHONE VISIT    You have chosen to receive care through a telephone visit. Do you consent to use a telephone visit for your medical care today? Yes.  Name, , and Address confirmed.     CHIEF COMPLAINT:       Subjective   Alesia Resendez is a 70 y.o. female  who presents for follow-up.  She has a history of lumbar radiculitis, anterolisthesis, lumbar spondylosis, lumbar disc displacement, and hematoma right thigh.  On 2020 she underwent right L5-S1 transforaminal epidural steroid injection which provided 20% relief for several weeks.    Pain is located in the right butt cheek (pounding pain) and radiates down to right calf and ankle.  Pain is worse in the morning or getting up in the middle of the night is very painful.  She rates it a 8/10 in the morning, and it lightens up through the day.     She has been able to return to water aerobics, which seems to be helping.  An ice pack is also helping with the pain some.     Her hematoma has improved slightly with gentle massage and heat.        PEG Assessment   What number best describes your pain on average in the past week?6  What number best describes how, during the past week, pain has interfered with your enjoyment of life?6  What number best describes how, during the past week, pain has interfered with your general activity?  6      The following portions of the patient's history were reviewed and updated as appropriate: allergies, current medications, past family history, past medical history, past social history, past surgical history and problem list.    Review of Systems   Constitutional: Negative for chills and fever.   Respiratory: Negative for shortness of breath.    Cardiovascular: Negative for chest pain.   Gastrointestinal: Negative for diarrhea, nausea and vomiting.       Vitals:    20 1124   PainSc:   4         Objective   Physical Exam   Constitutional: She is oriented to person, place, and time.   Neurological: She is alert and  oriented to person, place, and time.   Psychiatric: She has a normal mood and affect. Her behavior is normal. Thought content normal.       Assessment/Plan   Problems Addressed this Visit     None      Visit Diagnoses     Displacement of lumbar intervertebral disc without myelopathy    -  Primary    Relevant Orders    Case Request    Lumbar radiculitis        Relevant Orders    Case Request    Anterolisthesis        Hematoma of right thigh, subsequent encounter            - She would like to continue avoiding medications at this time.   - Will schedule for right L5-S1 Transforaminal epidural steroid injection.  Risks discussed.     --- Follow-up for injection, then 6-8 weeks for office visit.            JAN REPORT    JAN report has been reviewed and scanned into the patient's chart.    As the clinician, I personally reviewed the JAN from 7/13/2020.     Estrellita Sheppard MD  Pain Management         Our practice is offering alternative &/or electronic methods to continue to follow our patients while at the same time further the efforts toward social distancing, in accordance with our organizational policies, professional societies' guidance, and gubernatorial mandates.  I support the Healthy at Home campaign and in this visit I have counseled the patient on our needs to limit in-person office visits and physical encounters with medical facilities whenever possible.  I have also educated the patient on the medical necessities of maintaining social distancing while we continue to function during this crisis period.       The patient agreed to a Telephone Check-In.     TIME:  Total Time:  9 minutes.

## 2020-07-14 ENCOUNTER — OFFICE VISIT (OUTPATIENT)
Dept: PAIN MEDICINE | Facility: CLINIC | Age: 70
End: 2020-07-14

## 2020-07-14 DIAGNOSIS — M51.26 DISPLACEMENT OF LUMBAR INTERVERTEBRAL DISC WITHOUT MYELOPATHY: Primary | ICD-10-CM

## 2020-07-14 DIAGNOSIS — S70.11XD HEMATOMA OF RIGHT THIGH, SUBSEQUENT ENCOUNTER: ICD-10-CM

## 2020-07-14 DIAGNOSIS — M54.16 LUMBAR RADICULITIS: ICD-10-CM

## 2020-07-14 DIAGNOSIS — M43.10 ANTEROLISTHESIS: ICD-10-CM

## 2020-07-14 PROCEDURE — 99441 PR PHYS/QHP TELEPHONE EVALUATION 5-10 MIN: CPT | Performed by: ANESTHESIOLOGY

## 2020-07-17 ENCOUNTER — LAB REQUISITION (OUTPATIENT)
Dept: LAB | Facility: HOSPITAL | Age: 70
End: 2020-07-17

## 2020-07-17 DIAGNOSIS — Z00.00 ENCOUNTER FOR GENERAL ADULT MEDICAL EXAMINATION WITHOUT ABNORMAL FINDINGS: ICD-10-CM

## 2020-07-17 PROCEDURE — U0004 COV-19 TEST NON-CDC HGH THRU: HCPCS | Performed by: ANESTHESIOLOGY

## 2020-07-18 LAB
REF LAB TEST METHOD: NORMAL
SARS-COV-2 RNA RESP QL NAA+PROBE: NOT DETECTED

## 2020-07-20 ENCOUNTER — OUTSIDE FACILITY SERVICE (OUTPATIENT)
Dept: PAIN MEDICINE | Facility: CLINIC | Age: 70
End: 2020-07-20

## 2020-07-20 ENCOUNTER — DOCUMENTATION (OUTPATIENT)
Dept: PAIN MEDICINE | Facility: CLINIC | Age: 70
End: 2020-07-20

## 2020-07-20 PROCEDURE — 64483 NJX AA&/STRD TFRM EPI L/S 1: CPT | Performed by: ANESTHESIOLOGY

## 2020-07-20 NOTE — PROGRESS NOTES
Lumbar Transforaminal Epidural Steroid Injection (one level Unilateral)  [facility]      PREOPERATIVE DIAGNOSIS:  Lumbar Degenerative Disc Disease, Lumbar Disc Displacement and Lumbar Radiculitis    POSTOPERATIVE DIAGNOSIS:  Same as preop diagnosis    PROCEDURE:  CPT 47896 --  Diagnostic & Therapeutic Transforaminal Epidural Steroid Injection at the L5 level, on the right     PRE-PROCEDURE DISCUSSION WITH PATIENT:    Risks and complications were discussed with the patient prior to starting the procedure and informed consent was obtained.  We discussed various topics including but not limited to bleeding, infection, injury, nerve injury, paralysis, coma, death, postprocedural painful flare-up, postprocedural site soreness, and a lack of pain relief.  We discussed the diagnostic aspect of transforaminal epidural / selective nerve root blockade.    SURGEON:  Estrellita Sheppard MD    REASON FOR PROCEDURE:    Previous diagnostic positivity from injection at same location, Degenerative changes are noted in the area., Radiating pattern of pain is likely consistent with degenerative changes in the area. and Radicular pain pattern seems consistent with this dermatome.    SEDATION:  Versed 2mg & Fentanyl 50 mcg IV  ANESTHETIC:  Marcaine 0.25%  STEROID:  Dexamethasone 15mg    DESCRIPTON OF PROCEDURE:  After obtaining informed consent, an I.V. was started in the preoperative area. The patient taken to the operating room and was placed in the prone position with a pillow under the abdomen.  All pressure points were well padded.  EKG, blood pressure, and pulse oximeter were monitored.  The lumbar area was prepped with Chloraprep and draped in a sterile fashion. Under fluoroscopic guidance in an oblique dimension on the above mentioned side, the transverse process of the aforementioned vertebra at the junction of the body at 6 o'clock position was identified. Skin and subcutaneous tissue was anesthetized with 1%  lidocaine. A 22-gauge spinal needle was introduced under fluoroscopic guidance at the above junction into the foramen without parasthesias and into the epidural space. After confirming the position of the needle with PA, lateral, and oblique fluoroscopic views, aspiration was checked and was clear of blood or CSF.  Next, 1 mL of Omnipaque was injected. After seeing adequate spread on the corresponding nerve root, a total volume 3mL of injectate containing 1ml of the above mentioned local anesthetic, 1 ml saline,  and the above mentioned corticosteroid was injected into the epidural space.    The needle was removed intact.  Vital signs were stable throughout.        ESTIMATED BLOOD LOSS:  <5 mL  SPECIMENS:  none    COMPLICATIONS:   No complications were noted., There was no indication of vascular uptake on live injection of contrast dye. and There was no indication of intrathecal uptake on live injection of contrast dye.    TOLERANCE & DISCHARGE CONDITION:    The patient tolerated the procedure well.  The patient was transported to the recovery area without difficulties.  The patient was discharged to home under the care of family in stable and satisfactory condition.    PLAN OF CARE:  1. The patient was given our standard instruction sheet.  2. The patient will Return to clinic in 8 wks.  3. The patient will resume all medications as per the medication reconciliation sheet.

## 2020-08-13 RX ORDER — LOSARTAN POTASSIUM 100 MG/1
TABLET ORAL
Qty: 90 TABLET | Refills: 1 | Status: SHIPPED | OUTPATIENT
Start: 2020-08-13 | End: 2020-11-04 | Stop reason: SDUPTHER

## 2020-09-02 ENCOUNTER — OFFICE VISIT (OUTPATIENT)
Dept: PAIN MEDICINE | Facility: CLINIC | Age: 70
End: 2020-09-02

## 2020-09-02 VITALS
DIASTOLIC BLOOD PRESSURE: 79 MMHG | RESPIRATION RATE: 18 BRPM | HEIGHT: 68 IN | WEIGHT: 249.6 LBS | BODY MASS INDEX: 37.83 KG/M2 | OXYGEN SATURATION: 94 % | HEART RATE: 74 BPM | TEMPERATURE: 96.4 F | SYSTOLIC BLOOD PRESSURE: 119 MMHG

## 2020-09-02 DIAGNOSIS — M51.26 DISPLACEMENT OF LUMBAR INTERVERTEBRAL DISC WITHOUT MYELOPATHY: ICD-10-CM

## 2020-09-02 DIAGNOSIS — M46.1 SACROILIITIS (HCC): Primary | ICD-10-CM

## 2020-09-02 DIAGNOSIS — M54.16 LUMBAR RADICULITIS: ICD-10-CM

## 2020-09-02 PROCEDURE — 99213 OFFICE O/P EST LOW 20 MIN: CPT | Performed by: ANESTHESIOLOGY

## 2020-09-02 NOTE — PATIENT INSTRUCTIONS
Sacroiliac Joint Dysfunction    Sacroiliac joint dysfunction is a condition that causes inflammation on one or both sides of the sacroiliac (SI) joint. The SI joint connects the lower part of the spine (sacrum) with the two upper portions of the pelvis (ilium). This condition causes deep aching or burning pain in the low back. In some cases, the pain may also spread into one or both buttocks, hips, or thighs.  What are the causes?  This condition may be caused by:  · Pregnancy. During pregnancy, extra stress is put on the SI joints because the pelvis widens.  · Injury, such as:  ? Injuries from car accidents.  ? Sports-related injuries.  ? Work-related injuries.  · Having one leg that is shorter than the other.  · Conditions that affect the joints, such as:  ? Rheumatoid arthritis.  ? Gout.  ? Psoriatic arthritis.  ? Joint infection (septic arthritis).  Sometimes, the cause of SI joint dysfunction is not known.  What are the signs or symptoms?  Symptoms of this condition include:  · Aching or burning pain in the lower back. The pain may also spread to other areas, such as:  ? Buttocks.  ? Groin.  ? Thighs.  · Muscle spasms in or around the painful areas.  · Increased pain when standing, walking, running, stair climbing, bending, or lifting.  How is this diagnosed?  This condition is diagnosed with a physical exam and medical history. During the exam, the health care provider may move one or both of your legs to different positions to check for pain. Various tests may be done to confirm the diagnosis, including:  · Imaging tests to look for other causes of pain. These may include:  ? MRI.  ? CT scan.  ? Bone scan.  · Diagnostic injection. A numbing medicine is injected into the SI joint using a needle. If your pain is temporarily improved or stopped after the injection, this can indicate that SI joint dysfunction is the problem.  How is this treated?  Treatment depends on the cause and severity of your condition.  Treatment options may include:  · Ice or heat applied to the lower back area after an injury. This may help reduce pain and muscle spasms.  · Medicines to relieve pain or inflammation or to relax the muscles.  · Wearing a back brace (sacroiliac brace) to help support the joint while your back is healing.  · Physical therapy to increase muscle strength around the joint and flexibility at the joint. This may also involve learning proper body positions and ways of moving to relieve stress on the joint.  · Direct manipulation of the SI joint.  · Injections of steroid medicine into the joint to reduce pain and swelling.  · Radiofrequency ablation to burn away nerves that are carrying pain messages from the joint.  · Use of a device that provides electrical stimulation to help reduce pain at the joint.  · Surgery to put in screws and plates that limit or prevent joint motion. This is rare.  Follow these instructions at home:  Medicines  · Take over-the-counter and prescription medicines only as told by your health care provider.  · Do not drive or use heavy machinery while taking prescription pain medicine.  · If you are taking prescription pain medicine, take actions to prevent or treat constipation. Your health care provider may recommend that you:  ? Drink enough fluid to keep your urine pale yellow.  ? Eat foods that are high in fiber, such as fresh fruits and vegetables, whole grains, and beans.  ? Limit foods that are high in fat and processed sugars, such as fried or sweet foods.  ? Take an over-the-counter or prescription medicine for constipation.  If you have a brace:  · Wear the brace as told by your health care provider. Remove it only as told by your health care provider.  · Keep the brace clean.  · If the brace is not waterproof:  ? Do not let it get wet.  ? Cover it with a watertight covering when you take a bath or a shower.  Managing pain, stiffness, and swelling         · Icing can help with pain and  swelling. Heat may help with muscle tension or spasms. Ask your health care provider if you should use ice or heat.  · If directed, put ice on the affected area:  ? If you have a removable brace, remove it as told by your health care provider.  ? Put ice in a plastic bag.  ? Place a towel between your skin and the bag.  ? Leave the ice on for 20 minutes, 2-3 times a day.  · If directed, apply heat to the affected area. Use the heat source that your health care provider recommends, such as a moist heat pack or a heating pad.  ? Place a towel between your skin and the heat source.  ? Leave the heat on for 20-30 minutes.  ? Remove the heat if your skin turns bright red. This is especially important if you are unable to feel pain, heat, or cold. You may have a greater risk of getting burned.  General instructions  · Rest as needed. Ask your health care provider what activities are safe for you.  · Return to your normal activities as told by your health care provider.  · Exercise as directed by your health care provider or physical therapist.  · Do not use any products that contain nicotine or tobacco, such as cigarettes and e-cigarettes. These can delay bone healing. If you need help quitting, ask your health care provider.  · Keep all follow-up visits as told by your health care provider. This is important.  Contact a health care provider if:  · Your pain is not controlled with medicine.  · You have a fever.  · Your pain is getting worse.  Get help right away if:  · You have weakness, numbness, or tingling in your legs or feet.  · You lose control of your bladder or bowel.  Summary  · Sacroiliac joint dysfunction is a condition that causes inflammation on one or both sides of the sacroiliac (SI) joint.  · This condition causes deep aching or burning pain in the low back. In some cases, the pain may also spread into one or both buttocks, hips, or thighs.  · Treatment depends on the cause and severity of your condition.  It may include medicines to reduce pain and swelling or to relax muscles.  This information is not intended to replace advice given to you by your health care provider. Make sure you discuss any questions you have with your health care provider.  Document Released: 03/16/2010 Document Revised: 08/14/2019 Document Reviewed: 01/28/2019  [employer] Patient Education © 2020 [employer]

## 2020-09-04 ENCOUNTER — OFFICE VISIT (OUTPATIENT)
Dept: FAMILY MEDICINE CLINIC | Facility: CLINIC | Age: 70
End: 2020-09-04

## 2020-09-04 DIAGNOSIS — K52.9 GASTROENTERITIS: Primary | ICD-10-CM

## 2020-09-04 PROCEDURE — 99442 PR PHYS/QHP TELEPHONE EVALUATION 11-20 MIN: CPT | Performed by: NURSE PRACTITIONER

## 2020-09-04 NOTE — PROGRESS NOTES
Subjective   Alesia Resendez is a 70 y.o. female.     No chief complaint on file.     CC: diarrhea, chills, fever    HPI this is a new patient to me.  He is an otherwise healthy female who had a sudden onset this past Tuesday of diarrhea with terrible nausea.  She denies any vomiting.  She has had diarrhea that is almost uncontrollable every time she eats or drinks.  She has had trouble controlling her diarrhea.  She has been tolerating p.o. liquids like Sprite and water but it still goes through her.    She developed chills yesterday and is very achy and fatigued.  She checked her temperature today and it was just above 99.0.    She has been very careful to avoid any sick contacts.  He has been to the dermatologist this past week and also to pain management this past week.  She reports she always wears gloves and a mask anywhere she goes.  She does not grocery shop but uses [employer] list.  She has not been around any family members.  But she does remember her daughter dropped off see she this past Sunday.  Her daughter ate the same sushi and did not get sick.  She otherwise lives alone.    She says she has never felt like this before.    Has had 2- COVID test, last one was in July.    She has recurrent breast cancer currently in remission.    His chronic back pain followed by pain management.    Social History     Tobacco Use   • Smoking status: Never Smoker   • Smokeless tobacco: Never Used   Substance Use Topics   • Alcohol use: No   • Drug use: No       The following portions of the patient's history were reviewed and updated as appropriate: allergies, current medications, past family history, past medical history, past social history, past surgical history and problem list.    Review of Systems   Constitutional: Positive for activity change, appetite change, chills, fatigue and fever.   HENT: Negative for congestion, rhinorrhea and sore throat.    Respiratory: Negative for cough and shortness of breath.     Cardiovascular: Negative for chest pain and palpitations.   Gastrointestinal: Positive for abdominal pain, diarrhea and nausea. Negative for vomiting.        Food is going straight thru    3 episodes of diarrhea    Waking up at night to have BM and if she coughs, sneezes, bends over    No tenesmus   Genitourinary: Negative for difficulty urinating, dysuria and hematuria.   Musculoskeletal: Positive for arthralgias and myalgias.   Neurological: Positive for dizziness, weakness and headaches.       Objective   not currently breastfeeding.  There is no height or weight on file to calculate BMI.    Physical Exam  Limited by telephone visit.  She is well sounding and does not seem to be distressed.  She seems alert and oriented and her mood and affect are normal, good historian of medical history.  No cough or dyspnea appreciated, able to complete sentences without problem. She is not congested sounding.  She does not sound weak.       Assessment   Problem List Items Addressed This Visit     None      Visit Diagnoses     Gastroenteritis    -  Primary           Procedures           Impression and Plan: Not sure the etiology of her gastroenteritis.  She probably should be COVID tested to rule that out.  I am concerned about the ongoing diarrhea with her age and would like for her to have an evaluation at the urgent care at the very least.  She may be referred to the ER if fluids or other treatment needed, but she is willing to have her friend drive her to the urgent care today for evaluation.  We will be concerned about dehydration.  Especially going into the holiday weekend I want to make sure she gets evaluated today.  She is agreeable with this plan and will contact her friend and ghost straight to the urgent care today.      Health Maintenance Due   Topic Date Due   • Pneumococcal Vaccine Once at 65 Years Old  02/13/2015   • MEDICARE ANNUAL WELLNESS  03/30/2016   • COLONOSCOPY  03/30/2016   • MAMMOGRAM  07/05/2019    • INFLUENZA VACCINE  08/01/2020        EMR Dragon/Transcription disclaimer:   Much of this encounter note is an electronic transcription/translation of spoken language to printed text. The electronic translation of spoken language may permit erroneous, or at times, nonsensical words or phrases to be inadvertently transcribed; Although I have reviewed the note for such errors, some may still exist.      Patient gave consent today for a telehealth telephone visit as following recommendations of our governor and CDC during the COVID-19 pandemic.    13 min was spent in discussion with pt and greater than 50% of that time was spent counseling.    In preparation for this visit I have reviewed patients most recent labs, recent imaging, last PCP note and consult notes-including oncology, pain mgmt.

## 2020-09-08 ENCOUNTER — TELEPHONE (OUTPATIENT)
Dept: FAMILY MEDICINE CLINIC | Facility: CLINIC | Age: 70
End: 2020-09-08

## 2020-09-08 NOTE — TELEPHONE ENCOUNTER
PATIENT WOULD LIKE A CALL WITH HER COVID TEST RESULTS FROM Friday AT THE [facility].    PLEASE ADVISE  [phone]

## 2020-09-08 NOTE — TELEPHONE ENCOUNTER
Spoke with patient in detail. Advised that we can not seen her results at this time and she would probably have to contact UC to get the results.

## 2020-09-09 ENCOUNTER — TELEPHONE (OUTPATIENT)
Dept: FAMILY MEDICINE CLINIC | Facility: CLINIC | Age: 70
End: 2020-09-09

## 2020-09-09 NOTE — TELEPHONE ENCOUNTER
Schedule appointment tomorrow with patient urgent care  For further evaluation of her symptoms thank you any severe symptoms high fever vomiting weakness emergency room

## 2020-09-09 NOTE — TELEPHONE ENCOUNTER
PATIENT IS REQUESTING A CALL BACK REGARDING CONTINUED SYMPTOMS, STATES SHE HAD TELEPHONE VISIT WITH DR GUTIERRES LAST Friday 9/4/20 AND THEN WAS SEEN AT [facility]  FOR HER SYMPTOMS , PATIENT INDICATED SHE IS STILL AFTER 10 DAYS EXPERIENCING THE SAME SYMPTOMS AND SYMPTOMS ARE THE SAME , WANTED TO KNOW IF THERE IS SOMETHING THAT CAN BE CALLED IN OR WHAT SHE NEEDS TO DO ?      PT CALL BACK # [phone]

## 2020-09-10 ENCOUNTER — OFFICE VISIT (OUTPATIENT)
Dept: ONCOLOGY | Facility: CLINIC | Age: 70
End: 2020-09-10

## 2020-09-10 ENCOUNTER — LAB (OUTPATIENT)
Dept: OTHER | Facility: HOSPITAL | Age: 70
End: 2020-09-10

## 2020-09-10 ENCOUNTER — APPOINTMENT (OUTPATIENT)
Dept: OTHER | Facility: HOSPITAL | Age: 70
End: 2020-09-10

## 2020-09-10 VITALS
RESPIRATION RATE: 16 BRPM | WEIGHT: 241.8 LBS | TEMPERATURE: 97.5 F | OXYGEN SATURATION: 94 % | HEART RATE: 72 BPM | DIASTOLIC BLOOD PRESSURE: 74 MMHG | BODY MASS INDEX: 36.65 KG/M2 | SYSTOLIC BLOOD PRESSURE: 107 MMHG | HEIGHT: 68 IN

## 2020-09-10 DIAGNOSIS — R89.9 ABNORMAL LABORATORY TEST: ICD-10-CM

## 2020-09-10 DIAGNOSIS — Z12.11 ENCOUNTER FOR SCREENING FOR MALIGNANT NEOPLASM OF COLON: Primary | ICD-10-CM

## 2020-09-10 DIAGNOSIS — C50.912 RECURRENT MALIGNANT NEOPLASM OF LEFT BREAST (HCC): Primary | ICD-10-CM

## 2020-09-10 LAB
BASOPHILS # BLD AUTO: 0.06 10*3/MM3 (ref 0–0.2)
BASOPHILS NFR BLD AUTO: 0.7 % (ref 0–1.5)
DEPRECATED RDW RBC AUTO: 40.5 FL (ref 37–54)
EOSINOPHIL # BLD AUTO: 0.27 10*3/MM3 (ref 0–0.4)
EOSINOPHIL NFR BLD AUTO: 3 % (ref 0.3–6.2)
ERYTHROCYTE [DISTWIDTH] IN BLOOD BY AUTOMATED COUNT: 13 % (ref 12.3–15.4)
HCT VFR BLD AUTO: 43.7 % (ref 34–46.6)
HGB BLD-MCNC: 15.2 G/DL (ref 12–15.9)
IMM GRANULOCYTES # BLD AUTO: 0.08 10*3/MM3 (ref 0–0.05)
IMM GRANULOCYTES NFR BLD AUTO: 0.9 % (ref 0–0.5)
LYMPHOCYTES # BLD AUTO: 3.45 10*3/MM3 (ref 0.7–3.1)
LYMPHOCYTES NFR BLD AUTO: 37.9 % (ref 19.6–45.3)
MCH RBC QN AUTO: 29.7 PG (ref 26.6–33)
MCHC RBC AUTO-ENTMCNC: 34.8 G/DL (ref 31.5–35.7)
MCV RBC AUTO: 85.4 FL (ref 79–97)
MONOCYTES # BLD AUTO: 0.67 10*3/MM3 (ref 0.1–0.9)
MONOCYTES NFR BLD AUTO: 7.4 % (ref 5–12)
NEUTROPHILS NFR BLD AUTO: 4.57 10*3/MM3 (ref 1.7–7)
NEUTROPHILS NFR BLD AUTO: 50.1 % (ref 42.7–76)
NRBC BLD AUTO-RTO: 0.3 /100 WBC (ref 0–0.2)
PLATELET # BLD AUTO: 267 10*3/MM3 (ref 140–450)
PMV BLD AUTO: 9.6 FL (ref 6–12)
RBC # BLD AUTO: 5.12 10*6/MM3 (ref 3.77–5.28)
WBC # BLD AUTO: 9.1 10*3/MM3 (ref 3.4–10.8)

## 2020-09-10 PROCEDURE — 85025 COMPLETE CBC W/AUTO DIFF WBC: CPT | Performed by: INTERNAL MEDICINE

## 2020-09-10 PROCEDURE — 99213 OFFICE O/P EST LOW 20 MIN: CPT | Performed by: INTERNAL MEDICINE

## 2020-09-10 PROCEDURE — 36415 COLL VENOUS BLD VENIPUNCTURE: CPT

## 2020-09-10 NOTE — PROGRESS NOTES
Subjective .     REASON FOR FOLLOWUP:     1.  Recurrent left breast cancer status post bilateral mastectomy on 8/21/2017.  The tumor is stage IA (pT1 cN0 M0), ER/RI positive, HER-2 negative.  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   2.  Oncotype DX score 16, corresponding to a 10 year disease recurrence 10%.       3.  Bone density scan on 9/14/2017 reported normal results.   4.  Patient declined adjuvant endocrine therapy in September 2017.        HISTORY OF PRESENT ILLNESS:  The patient is a 70 y.o. female who is here for annual follow-up evaluation.      Patient reports that she is doing well, with excellent performance status ECOG 0.  She denies headaches vision changes, no bone pains.    She does regular self examination, reports no suspicious lesions on her chest, axillary.    She has some chronic back pain, however no worsening.      Laboratory study today on 9/10/2020 reported complete normal CBC including Hb 15.2, platelets 267,000, WBC 9100 including ANC 4570 lymphocytes 3450.        Past Medical History:   Diagnosis Date   • Anxiety    • Arthritis    • Breast cancer (CMS/HCC) 2017    LEFT   • History of radiation therapy 2006    LT BREAST   • History of skin cancer     SQUAMOUS CELL REMOVED FROM LT UPPER LIP AND RT THUMB     Past Surgical History:   Procedure Laterality Date   • APPENDECTOMY  2014   • BREAST LUMPECTOMY W/ NEEDLE LOCALIZATION Left 2006   • COLONOSCOPY  2014   • GALLBLADDER SURGERY  2007   • HYSTERECTOMY Secondary to cervical carcinoma in situ   1987   • MASTECTOMY W/ SENTINEL NODE BIOPSY Bilateral 8/21/2017    Procedure: BILATEREAL BREAST MASTECTOMY WITH SENTINEL NODE BIOPSY ON THE LEFT The sentinel lymph node biopsy will only be performed on the left side;  Surgeon: Diallo Eisenberg MD;  Location: [facility] OR [facility];  Service:    • TOTAL KNEE ARTHROPLASTY Bilateral 2000 2001      *  Bilateral cataract surgery  in 2018.     *  Resection of squamous cell carcinoma from her nose bridge and left lower leg in 2018.  *  2020 skull [facility] resected 32 stitches Dr. Egan ,       OB/GYN history: .  Menarche age 12.  Was on Premarin prior to her diagnosis of first breast cancer in .     HEMATOLOGIC/ONCOLOGIC HISTORY: The patient is a 67 y.o. year old female who is here for initial evaluation on 2017with the above-mentioned history.    Ms. Resendez reports that she has healed wound, no infection.  She actually will see Dr. Eisenberg this afternoon for removing the stitches.  She has no plan for reconstruction surgery.  She reports she had her 1st episode of breast cancer back in , at that time she was seen at the [facility].  She had a lumpectomy with a 6 mm DCIS and had radiation therapy followed by 5 years of tamoxifen.     The patient had an abnormal mammogram study in early 2017.  She subsequently had ultrasound-guided left breast biopsy on 2017.  This study reported invasive ductal carcinoma with the largest dimension 11 mm; it was a grade 2 tumor.  Further biomarker study at the [facility] reported      It was reported strongly positive for ER 98% and strongly for W[place] 61%, HER-2 was negative, IHC 0 and Ki-67 at 13.7%.      The patient was referred to see Dr. Eisenberg who performed bilateral mastectomy on 2017.  Pathology evaluation from the mastectomy sample reported invasive ductal carcinoma with the largest dimension 8 mm, grade 2 Francesco score 6/10 with a single focus of invasive carcinoma.  There is also DCIS component.  All margins were clear.  All 3 sentinel lymph nodes were negative for malignancy.  There were also 2 lymph nodes in the mastectomy sample, which were also negative.      We obtained Oncotype DX study which comes back with a low risk score of 16, corresponding to 10 year disease recurrence risk 10%.  She also had normal bone density scan.     Discussed with patient for endocrine therapy using aromatase inhibitor and its side effects. The patient reported that she had tremendous hot flashes, sweating when she was taking tamoxifen previously after she was diagnosed with DCIS. She also cited that she already had arthritis with joint pains involving her hands, she does not want to have risk to have worsening arthralgia from the aromatase inhibitors because she is physically active she does not want to have any limitation because of side effects from the aromatase inhibitor. I also presented data from the [facility].n[facility]uk web site, and this looked at 5 year and 10 year survival benefit. Endocrine therapy using tamoxifen carries very small benefit, in 5 years it saves 1 out of 10 patients who would die from the disease and in 20 years it saves 2 out of 20 patients from dying of breast cancer. After consideration, the patient reports she does not want to have endocrine therapy at all. She would rather take the risk.    Laboratory study 03/12/2019 reported marginal neutrophil 1750 which is at her baseline level in the past 12-month, which is certainly lower than her counts in 2015 and 2017 above 2400.  She has normal lymphocytes 3420 and monocytes 430 and a total WBC 5940.  She has baseline normal hemoglobin 14.9 and platelets 279,000.    MEDICATIONS    Current Outpatient Medications:   •  albuterol sulfate  (90 Base) MCG/ACT inhaler, Inhale 2 puffs Every 4 (Four) Hours As Needed for Wheezing., Disp: 1 inhaler, Rfl: 0  •  aMILoride-hydrochlorothiazide (MODURETIC) 5-50 MG per tablet, Take 1 tablet by mouth Daily., Disp: 60 tablet, Rfl: 2  •  atenolol (TENORMIN) 50 MG tablet, Take 1 tablet by mouth Daily., Disp: 90 tablet, Rfl: 3  •  buPROPion XL (WELLBUTRIN XL) 150 MG 24 hr tablet, Take 1 tablet by mouth Daily., Disp: 90 tablet, Rfl: 3  •  celecoxib (CeleBREX) 200 MG capsule, Take 1 capsule by mouth Daily., Disp: 90  capsule, Rfl: 2  •  erythromycin (ROMYCIN) 5 MG/GM ophthalmic ointment, Administer  to both eyes 3 (Three) Times a Day., Disp: 1 each, Rfl: 0  •  escitalopram (LEXAPRO) 10 MG tablet, Take 1 tablet by mouth Daily., Disp: 90 tablet, Rfl: 1  •  losartan (COZAAR) 100 MG tablet, TAKE ONE TABLET BY MOUTH DAILY, Disp: 90 tablet, Rfl: 1  •  Multiple Vitamins-Minerals (CENTRUM SILVER PO), Take  by mouth., Disp: , Rfl:   •  Omega-3 Fatty Acids (FISH OIL) 1000 MG capsule capsule, Take 1,200 mg by mouth Daily With Breakfast., Disp: , Rfl:   •  omeprazole (priLOSEC) 20 MG capsule, TAKE ONE CAPSULE BY MOUTH DAILY, Disp: 90 capsule, Rfl: 0  •  oxybutynin (DITROPAN) 5 MG tablet, Take 1 tablet by mouth 2 (Two) Times a Day., Disp: 180 tablet, Rfl: 1    ALLERGIES:   No Known Allergies    SOCIAL HISTORY:       Social History     Social History   • Marital status:      Spouse name: N/A   • Number of children: 3   • Years of education: College education      Occupational History   • From [employer]  Retired     Social History Main Topics   • Smoking status: Never Smoker   • Smokeless tobacco: Never Used   • Alcohol use 2 drinks per month    • Drug use: No   • Sexual activity: Defer      Comment:          FAMILY HISTORY:  Family History   Problem Relation Age of Onset   • Heart attack and hypertension  Mother    • Esophageal cancer Mother, diagnosed at age 74, and the past week at age 76 due to cancer.      •      • Alcohol abuse Father    • Diabetes Father      type 2   • Heart attack Father    • Heart failure Father    • Hyperlipidemia Father    • Hypertension Father    • Prostate cancerDiagnosed at age 85.   Father,  at age 87 due to multiple medical problems    • Clotting disorder Father    • Birth defects Brother    • Heart attack Brother    • Clotting disorder Daughter    • breast cancer  Paternal great aunt all       Review of Systems   Constitutional: Negative for activity change, appetite  "change, diaphoresis, fatigue, fever and unexpected weight change.   HENT: Negative for hearing loss, mouth sores, sore throat and trouble swallowing.    Eyes: Negative for photophobia and visual disturbance.   Respiratory: Negative for cough, chest tightness and shortness of breath.    Cardiovascular: Negative for chest pain and leg swelling.   Gastrointestinal: Negative for abdominal pain, blood in stool, diarrhea and nausea.   Endocrine: Negative for cold intolerance and heat intolerance.   Genitourinary: Negative for dysuria and hematuria.   Musculoskeletal: Positive for back pain (chronic). Negative for arthralgias and joint swelling.   Skin: Negative for color change and rash.   Allergic/Immunologic: Negative for immunocompromised state.   Neurological: Negative for dizziness, numbness and headaches.   Hematological: Negative for adenopathy. Does not bruise/bleed easily.   Psychiatric/Behavioral: Negative for behavioral problems and confusion.         Objective    Vitals:    09/10/20 1533   BP: 107/74   Pulse: 72   Resp: 16   Temp: 97.5 °F (36.4 °C)   SpO2: 94%   Weight: 110 kg (241 lb 12.8 oz)   Height: 172.7 cm (67.99\")   PainSc: 0-No pain     Current Status 9/10/2020   ECOG score 0      PHYSICAL EXAM:      GENERAL:  Well-developed, well-nourished  female in no acute distress.   SKIN:  Warm, dry without rashes, purpura or petechiae.  EYES:  Pupils equal, round.  Conjunctivae normal.  EARS:  Hearing intact.  NOSE:  Patient wears mask due to the pandemic coronavirus infection.   MOUTH: Same as above.  THROAT: Same as above.  NECK:  Supple; no thyromegaly or masses.  LYMPHATICS:  No cervical, supraclavicular, axillary adenopathy.  CHEST: Normal respiratory effort.  Lungs clear to auscultation. Good airflow.  CARDIAC:  Regular rate and rhythm without murmurs, rubs or gallops. Normal S1,S2.  ABDOMEN:  Soft, nontender with no hepatosplenomegaly or masses. Bowel sounds normal.  EXTREMITIES: No edema no " cyanosis.  NEUROLOGICAL:  Cranial Nerves II-XII grossly intact.    PSYCHIATRIC:  Normal affect and mood.      RECENT LABS:  Lab Results   Component Value Date    WBC 9.10 09/10/2020    HGB 15.2 09/10/2020    HCT 43.7 09/10/2020    MCV 85.4 09/10/2020     09/10/2020     Lab Results   Component Value Date    NEUTROABS 4.57 09/10/2020         Assessment/Plan      1. History of recurrent left breast cancer, post bilateral mastectomy in August 2017. Stage 1A, strongly ER/MS positive and negative for HER-2.  Has low Oncotype DX score 16, corresponding to a 10 year disease recurrence risk 10%.  Certainly she is not a candidate for adjuvant chemotherapy.   · Patient declined adjuvant endocrine therapy in September 2017.  She prefers follow-up with us in the clinic for monitoring purposes.     · Patient does routine self examination and reports no abnormalities.  · Today on 9/10/2020 patient had negative review of system.  Physical examination showed no suspicion lesions in axillary or neck area.  There is no evidence for disease recurrence.        PLAN:   1. I will arrange patient come back for follow-up in 12 months with checking CBC and CMP.       Peyton Keyes MD PhD  9/10/2020    CC: Diallo Eisenberg MD

## 2020-09-11 DIAGNOSIS — I10 ESSENTIAL HYPERTENSION: Primary | ICD-10-CM

## 2020-09-11 DIAGNOSIS — E78.2 MIXED HYPERLIPIDEMIA: ICD-10-CM

## 2020-09-12 LAB
ALBUMIN SERPL-MCNC: 3.9 G/DL (ref 3.5–5.2)
ALBUMIN/GLOB SERPL: 1.4 G/DL
ALP SERPL-CCNC: 88 U/L (ref 39–117)
ALT SERPL-CCNC: 50 U/L (ref 1–33)
AST SERPL-CCNC: 74 U/L (ref 1–32)
BILIRUB SERPL-MCNC: 0.3 MG/DL (ref 0–1.2)
BUN SERPL-MCNC: 15 MG/DL (ref 8–23)
BUN/CREAT SERPL: 21.7 (ref 7–25)
CALCIUM SERPL-MCNC: 9.9 MG/DL (ref 8.6–10.5)
CHLORIDE SERPL-SCNC: 100 MMOL/L (ref 98–107)
CHOLEST SERPL-MCNC: 136 MG/DL (ref 0–200)
CO2 SERPL-SCNC: 30.1 MMOL/L (ref 22–29)
CREAT SERPL-MCNC: 0.69 MG/DL (ref 0.57–1)
GLOBULIN SER CALC-MCNC: 2.8 GM/DL
GLUCOSE SERPL-MCNC: 99 MG/DL (ref 65–99)
HDLC SERPL-MCNC: 32 MG/DL (ref 40–60)
LDLC SERPL CALC-MCNC: 56 MG/DL (ref 0–100)
LDLC/HDLC SERPL: 1.76 {RATIO}
POTASSIUM SERPL-SCNC: 3.6 MMOL/L (ref 3.5–5.2)
PROT SERPL-MCNC: 6.7 G/DL (ref 6–8.5)
SODIUM SERPL-SCNC: 141 MMOL/L (ref 136–145)
TRIGL SERPL-MCNC: 238 MG/DL (ref 0–150)
VLDLC SERPL CALC-MCNC: 47.6 MG/DL (ref 5–40)

## 2020-11-04 ENCOUNTER — OFFICE VISIT (OUTPATIENT)
Dept: FAMILY MEDICINE CLINIC | Facility: CLINIC | Age: 70
End: 2020-11-04

## 2020-11-04 VITALS
HEIGHT: 68 IN | HEART RATE: 68 BPM | WEIGHT: 248 LBS | OXYGEN SATURATION: 95 % | BODY MASS INDEX: 37.59 KG/M2 | DIASTOLIC BLOOD PRESSURE: 89 MMHG | TEMPERATURE: 97.1 F | SYSTOLIC BLOOD PRESSURE: 137 MMHG

## 2020-11-04 DIAGNOSIS — E78.2 MIXED HYPERLIPIDEMIA: ICD-10-CM

## 2020-11-04 DIAGNOSIS — R79.89 ELEVATED LIVER FUNCTION TESTS: ICD-10-CM

## 2020-11-04 DIAGNOSIS — Z12.11 ENCOUNTER FOR SCREENING COLONOSCOPY: ICD-10-CM

## 2020-11-04 DIAGNOSIS — I10 ESSENTIAL HYPERTENSION: Primary | ICD-10-CM

## 2020-11-04 PROCEDURE — G0439 PPPS, SUBSEQ VISIT: HCPCS | Performed by: NURSE PRACTITIONER

## 2020-11-04 PROCEDURE — 99213 OFFICE O/P EST LOW 20 MIN: CPT | Performed by: NURSE PRACTITIONER

## 2020-11-04 PROCEDURE — 93000 ELECTROCARDIOGRAM COMPLETE: CPT | Performed by: NURSE PRACTITIONER

## 2020-11-04 RX ORDER — ESCITALOPRAM OXALATE 10 MG/1
10 TABLET ORAL DAILY
Qty: 90 TABLET | Refills: 1 | Status: SHIPPED | OUTPATIENT
Start: 2020-11-04 | End: 2021-05-13

## 2020-11-04 RX ORDER — ATENOLOL 50 MG/1
50 TABLET ORAL DAILY
Qty: 90 TABLET | Refills: 1 | Status: SHIPPED | OUTPATIENT
Start: 2020-11-04 | End: 2021-05-13

## 2020-11-04 RX ORDER — BUPROPION HYDROCHLORIDE 150 MG/1
150 TABLET ORAL DAILY
Qty: 90 TABLET | Refills: 1 | Status: SHIPPED | OUTPATIENT
Start: 2020-11-04 | End: 2021-03-01

## 2020-11-04 RX ORDER — LOSARTAN POTASSIUM 100 MG/1
100 TABLET ORAL DAILY
Qty: 90 TABLET | Refills: 1 | Status: SHIPPED | OUTPATIENT
Start: 2020-11-04 | End: 2021-03-01

## 2020-11-04 RX ORDER — CELECOXIB 200 MG/1
200 CAPSULE ORAL DAILY
Qty: 90 CAPSULE | Refills: 1 | Status: SHIPPED | OUTPATIENT
Start: 2020-11-04 | End: 2020-11-11 | Stop reason: SDUPTHER

## 2020-11-04 RX ORDER — OXYBUTYNIN CHLORIDE 5 MG/1
5 TABLET ORAL 2 TIMES DAILY
Qty: 180 TABLET | Refills: 1 | Status: SHIPPED | OUTPATIENT
Start: 2020-11-04 | End: 2021-03-01

## 2020-11-04 RX ORDER — AMILORIDE HYDROCHLORIDE AND HYDROCHLOROTHIAZIDE 5; 50 MG/1; MG/1
1 TABLET ORAL DAILY
Qty: 90 TABLET | Refills: 1 | Status: SHIPPED | OUTPATIENT
Start: 2020-11-04 | End: 2021-03-01

## 2020-11-04 RX ORDER — OMEPRAZOLE 20 MG/1
20 CAPSULE, DELAYED RELEASE ORAL DAILY
Qty: 90 CAPSULE | Refills: 1 | Status: SHIPPED | OUTPATIENT
Start: 2020-11-04 | End: 2021-03-01

## 2020-11-04 NOTE — PATIENT INSTRUCTIONS
Discharge instructions continue healthy diet regular exercise  Shingles vaccine Shingrix tomorrow  Okay to get your pneumonia shot with this as well will clarify this today    Continue healthy diet regular exercise follow-up in 6 months    Likely will need to start a statin as discussed if your liver test is still elevated you will need to see gastroenterology as well as get an ultrasound    [email or web address] hi filtration mask [email or web address] 2 or $3 each with caution

## 2020-11-04 NOTE — PROGRESS NOTES
"Subjective   Alesia Resendez is a 70 y.o. female.     Pleasant patient here today, follow-up for Medicare wellness exam  As well as  Follow-up essential hypertension presently controlled  At home systolic usually in the 120s diastolic in the 70s up to 85  Her goal would be less than 130/80  No chest pain shortness of breath  Mixed hyperlipidemia elevated triglycerides  Triglycerides were in the low 200 range and HDL in the 30 range  While taking TriCor generic liver function test mildly elevated temporarily we discontinued the TriCor to see if we can determine where the elevated liver function test is originated  She is having no GI symptoms she is doing well overall    Breast cancer  Bilateral mastectomy in 2017  Patient's had been having some back pain and she is seeing Dr. Jarquin she is presently seeing pain management for injections epidurals which are helping    Mixed hyperlipidemia diet controlled  Depression stable with bupropion wants to continue    Colonoscopy approximately 5 years ago normal per patient she had polyps but this is been quite a bit ago       /89   Pulse 68   Temp 97.1 °F (36.2 °C) (Temporal)   Ht 172.7 cm (67.99\")   Wt 112 kg (248 lb)   SpO2 95%   BMI 37.72 kg/m²       The following portions of the patient's history were reviewed and updated as appropriate: allergies, current medications, past family history, past medical history, past social history, past surgical history and problem list.    Review of Systems   Constitutional: Negative for chills, fatigue, fever and unexpected weight loss.   HENT: Negative.  Negative for trouble swallowing.    Eyes: Negative.    Respiratory: Negative for cough and shortness of breath.    Cardiovascular: Negative for chest pain, palpitations and leg swelling.   Gastrointestinal: Negative for abdominal pain and blood in stool.   Genitourinary: Negative.  Negative for pelvic pain.   Musculoskeletal: Negative.    Skin: Negative.    Neurological: " Negative.  Negative for dizziness, speech difficulty, weakness and confusion.   Psychiatric/Behavioral: Negative.        Objective   Physical Exam  Vitals signs reviewed.   Constitutional:       Appearance: She is well-developed.   HENT:      Head: Normocephalic.      Nose: Nose normal.   Eyes:      General: No scleral icterus.     Conjunctiva/sclera: Conjunctivae normal.      Pupils: Pupils are equal, round, and reactive to light.   Neck:      Musculoskeletal: Neck supple.      Thyroid: No thyromegaly.      Vascular: No JVD.   Cardiovascular:      Rate and Rhythm: Normal rate and regular rhythm.      Heart sounds: Normal heart sounds. No murmur. No friction rub. No gallop.    Pulmonary:      Effort: Pulmonary effort is normal. No respiratory distress.      Breath sounds: Normal breath sounds. No stridor. No wheezing or rales.   Abdominal:      General: Bowel sounds are normal. There is no distension.      Palpations: Abdomen is soft.      Tenderness: There is no abdominal tenderness.      Comments: No hepatosplenomegaly, no ascites,   Musculoskeletal:         General: No tenderness.   Lymphadenopathy:      Cervical: No cervical adenopathy.   Skin:     General: Skin is warm and dry.      Findings: No erythema or rash.   Neurological:      General: No focal deficit present.      Mental Status: She is alert and oriented to person, place, and time.      Deep Tendon Reflexes: Reflexes are normal and symmetric.   Psychiatric:         Behavior: Behavior normal.         Thought Content: Thought content normal.         Judgment: Judgment normal.           Assessment/Plan   Diagnoses and all orders for this visit:    1. Essential hypertension (Primary)  -     ECG 12 Lead    2. Encounter for screening colonoscopy  -     Ambulatory Referral For Screening Colonoscopy    3. Elevated liver function tests  -     Hepatic Function Panel    Other orders  -     omeprazole (priLOSEC) 20 MG capsule; Take 1 capsule by mouth Daily.   Dispense: 90 capsule; Refill: 1  -     Discontinue: celecoxib (CeleBREX) 200 MG capsule; Take 1 capsule by mouth Daily.  Dispense: 90 capsule; Refill: 1  -     buPROPion XL (WELLBUTRIN XL) 150 MG 24 hr tablet; Take 1 tablet by mouth Daily.  Dispense: 90 tablet; Refill: 1  -     oxybutynin (DITROPAN) 5 MG tablet; Take 1 tablet by mouth 2 (Two) Times a Day.  Dispense: 180 tablet; Refill: 1  -     aMILoride-hydrochlorothiazide (MODURETIC) 5-50 MG per tablet; Take 1 tablet by mouth Daily.  Dispense: 90 tablet; Refill: 1  -     losartan (COZAAR) 100 MG tablet; Take 1 tablet by mouth Daily.  Dispense: 90 tablet; Refill: 1  -     atenolol (TENORMIN) 50 MG tablet; Take 1 tablet by mouth Daily.  Dispense: 90 tablet; Refill: 1  -     escitalopram (LEXAPRO) 10 MG tablet; Take 1 tablet by mouth Daily.  Dispense: 90 tablet; Refill: 1  -     SCANNED EKG        Healthy diet regular exercise continue present regimen for blood pressure control depression cholesterol obesity  She will follow up with pain management  64 ounces of water daily  Recheck in 6 months  Check blood pressure weekly should be less than 130/80  She will continue Lexapro as well and bupropion for depression anxiety          There are no Patient Instructions on file for this visit.

## 2020-11-04 NOTE — PROGRESS NOTES
The ABCs of the Annual Wellness Visit  Subsequent Medicare Wellness Visit    Chief Complaint   Patient presents with   • Medicare Wellness-subsequent       Subjective   History of Present Illness:  Alesia Resendez is a 70 y.o. female who presents for a Subsequent Medicare Wellness Visit.    HEALTH RISK ASSESSMENT    Recent Hospitalizations:  No hospitalization(s) within the last year.    Current Medical Providers:  Patient Care Team:  Epley, James, APRN as PCP - General (Family Medicine)  Virgil Moeller II, MD as Consulting Physician (Radiology)  Diallo Eisenberg MD as Referring Physician (Breast Surgery)  Diallo Eisenberg MD as Referring Physician (Breast Surgery)  Peyton Keyes MD PhD as Consulting Physician (Hematology and Oncology)    Smoking Status:  Social History     Tobacco Use   Smoking Status Never Smoker   Smokeless Tobacco Never Used       Alcohol Consumption:  Social History     Substance and Sexual Activity   Alcohol Use No       Depression Screen:   PHQ-2/PHQ-9 Depression Screening 11/4/2020   Little interest or pleasure in doing things 0   Feeling down, depressed, or hopeless 0   Total Score 0       Fall Risk Screen:  STEADI Fall Risk Assessment was completed, and patient is at LOW risk for falls.Assessment completed on:11/4/2020    Health Habits and Functional and Cognitive Screening:  Functional & Cognitive Status 11/4/2020   Do you have difficulty preparing food and eating? No   Do you have difficulty bathing yourself, getting dressed or grooming yourself? No   Do you have difficulty using the toilet? No   Do you have difficulty moving around from place to place? No   Do you have trouble with steps or getting out of a bed or a chair? No   Current Diet Low Carb Diet   Dental Exam Up to date   Eye Exam Up to date   Exercise (times per week) 4 times per week   Current Exercise Activities Include (No Data)   Do you need help using the phone?  No   Are you deaf or do you have  serious difficulty hearing?  No   Do you need help with transportation? No   Do you need help shopping? No   Do you need help preparing meals?  No   Do you need help with housework?  No   Do you need help with laundry? No   Do you need help taking your medications? No   Do you need help managing money? No   Do you ever drive or ride in a car without wearing a seat belt? No   Have you felt unusual stress, anger or loneliness in the last month? No   Who do you live with? Alone   If you need help, do you have trouble finding someone available to you? No   Have you been bothered in the last four weeks by sexual problems? No   Do you have difficulty concentrating, remembering or making decisions? No         Does the patient have evidence of cognitive impairment? No    Asprin use counseling:Does not need ASA (and currently is not on it)    Age-appropriate Screening Schedule:  Refer to the list below for future screening recommendations based on patient's age, sex and/or medical conditions. Orders for these recommended tests are listed in the plan section. The patient has been provided with a written plan.    Health Maintenance   Topic Date Due   • COLONOSCOPY  1950   • MAMMOGRAM  07/05/2019   • INFLUENZA VACCINE  08/01/2020   • LIPID PANEL  09/11/2021   • TDAP/TD VACCINES (2 - Td) 09/03/2025   • ZOSTER VACCINE  Discontinued          The following portions of the patient's history were reviewed and updated as appropriate: allergies, current medications, past family history, past medical history, past social history, past surgical history and problem list.    Outpatient Medications Prior to Visit   Medication Sig Dispense Refill   • albuterol sulfate  (90 Base) MCG/ACT inhaler Inhale 2 puffs Every 4 (Four) Hours As Needed for Wheezing. 1 inhaler 0   • Multiple Vitamins-Minerals (CENTRUM SILVER PO) Take  by mouth.     • Omega-3 Fatty Acids (FISH OIL) 1000 MG capsule capsule Take 1,200 mg by mouth Daily With  Breakfast.     • aMILoride-hydrochlorothiazide (MODURETIC) 5-50 MG per tablet Take 1 tablet by mouth Daily. 60 tablet 2   • atenolol (TENORMIN) 50 MG tablet Take 1 tablet by mouth Daily. 90 tablet 3   • buPROPion XL (WELLBUTRIN XL) 150 MG 24 hr tablet Take 1 tablet by mouth Daily. 90 tablet 3   • celecoxib (CeleBREX) 200 MG capsule Take 1 capsule by mouth Daily. 90 capsule 2   • escitalopram (LEXAPRO) 10 MG tablet Take 1 tablet by mouth Daily. 90 tablet 1   • losartan (COZAAR) 100 MG tablet TAKE ONE TABLET BY MOUTH DAILY 90 tablet 1   • omeprazole (priLOSEC) 20 MG capsule TAKE ONE CAPSULE BY MOUTH DAILY 90 capsule 0   • oxybutynin (DITROPAN) 5 MG tablet Take 1 tablet by mouth 2 (Two) Times a Day. 180 tablet 1   • erythromycin (ROMYCIN) 5 MG/GM ophthalmic ointment Administer  to both eyes 3 (Three) Times a Day. 1 each 0     No facility-administered medications prior to visit.        Patient Active Problem List   Diagnosis   • Arthritis   • Atrophic vaginitis   • Depression   • Gastroesophageal reflux disease without esophagitis   • Mixed hyperlipidemia   • Essential hypertension   • Menopause present   • Overactive bladder   • History of colonic polyps   • Rectal hemorrhage   • Proctocele   • Encounter for screening for malignant neoplasm of colon   • Stress incontinence in female   • Recurrent breast cancer (CMS/HCC)   • Viral syndrome   • Chronic fatigue   • Snoring   • Tremor   • Community acquired pneumonia   • Chronic right-sided low back pain with sciatica   • Elevated liver function tests       Advanced Care Planning:  ACP discussion was declined by the patient. Patient has an advance directive in EMR which is still valid.     Review of Systems    Compared to one year ago, the patient feels her physical health is the same.  Compared to one year ago, the patient feels her mental health is the same.    Reviewed chart for potential of high risk medication in the elderly: yes  Reviewed chart for potential of  "harmful drug interactions in the elderly:yes    Objective         Vitals:    11/04/20 1349   BP: 137/89   Pulse: 68   Temp: 97.1 °F (36.2 °C)   TempSrc: Temporal   SpO2: 95%   Weight: 112 kg (248 lb)   Height: 172.7 cm (67.99\")       Body mass index is 37.72 kg/m².  Discussed the patient's BMI with her. The BMI is above average; BMI management plan is completed.    Physical Exam    Lab Results   Component Value Date    GLU 99 09/11/2020    CHLPL 136 09/11/2020    TRIG 238 (H) 09/11/2020    HDL 32 (L) 09/11/2020    LDL 56 09/11/2020    VLDL 47.6 (H) 09/11/2020        Assessment/Plan   Medicare Risks and Personalized Health Plan  CMS Preventative Services Quick Reference  Abdominal Aortic Aneurysm Screening  Fall Risk  Immunizations Discussed/Encouraged (specific immunizations; Shingrix )  Obesity/Overweight     The above risks/problems have been discussed with the patient.  Pertinent information has been shared with the patient in the After Visit Summary.  Follow up plans and orders are seen below in the Assessment/Plan Section.    Diagnoses and all orders for this visit:    1. Encounter for screening colonoscopy (Primary)  -     Ambulatory Referral For Screening Colonoscopy    Other orders  -     omeprazole (priLOSEC) 20 MG capsule; Take 1 capsule by mouth Daily.  Dispense: 90 capsule; Refill: 1  -     celecoxib (CeleBREX) 200 MG capsule; Take 1 capsule by mouth Daily.  Dispense: 90 capsule; Refill: 1  -     buPROPion XL (WELLBUTRIN XL) 150 MG 24 hr tablet; Take 1 tablet by mouth Daily.  Dispense: 90 tablet; Refill: 1  -     oxybutynin (DITROPAN) 5 MG tablet; Take 1 tablet by mouth 2 (Two) Times a Day.  Dispense: 180 tablet; Refill: 1  -     aMILoride-hydrochlorothiazide (MODURETIC) 5-50 MG per tablet; Take 1 tablet by mouth Daily.  Dispense: 90 tablet; Refill: 1  -     losartan (COZAAR) 100 MG tablet; Take 1 tablet by mouth Daily.  Dispense: 90 tablet; Refill: 1  -     atenolol (TENORMIN) 50 MG tablet; Take 1 " tablet by mouth Daily.  Dispense: 90 tablet; Refill: 1  -     escitalopram (LEXAPRO) 10 MG tablet; Take 1 tablet by mouth Daily.  Dispense: 90 tablet; Refill: 1      Follow Up:  No follow-ups on file.     An After Visit Summary and PPPS were given to the patient.

## 2020-11-05 LAB
ALBUMIN SERPL-MCNC: 4.2 G/DL (ref 3.5–5.2)
ALP SERPL-CCNC: 98 U/L (ref 39–117)
ALT SERPL-CCNC: 54 U/L (ref 1–33)
AST SERPL-CCNC: 76 U/L (ref 1–32)
BILIRUB DIRECT SERPL-MCNC: 0.2 MG/DL (ref 0–0.3)
BILIRUB SERPL-MCNC: 0.4 MG/DL (ref 0–1.2)
PROT SERPL-MCNC: 6.7 G/DL (ref 6–8.5)

## 2020-11-09 NOTE — PROGRESS NOTES
The patient has a pain history of the following:  Lumbar radiculitis  Anterolisthesis   Lumbar spondylosis  Lumbar disc displacement  Right thigh hematoma      Previous interventions that the patient has received include:   Right L5-S1 Transforaminal epidural steroid injection 6/22/2020, 7/20/2020     Pain medications include:  Celebrex qd  Tylenol prn     Previously: Tramadol     Other conservative modalities which the patient reports using include:  Physical therapy Jan 2020  Water aerobics  Ice pack     Past Significant Surgical History:  None     HPI:     TELEPHONE VISIT    You have chosen to receive care through a telephone visit. Do you consent to use a telephone visit for your medical care today? Yes      CHIEF COMPLAINT Back Pain and Extremity Pain      Subjective   Alesia Resendez is a 70 y.o. female  who presents for follow-up.  She has a history of chronic low back pain and radiculopathy.  At her last visit we discussed sacroiliitis and tylenol dosing.      Since her last visit she states she's been doing well.  She takes Tylenol arthritis as needed for pain and Celebrex.  She requests a prescription for celebrex today since it was recently sent to mail-order pharmacy and is much less expensive at her local pharmacy.      She continues to do water aerobics 5 days a week and gets 7500 steps per day.  Her exercises help keep her pain at bay and she's sleeping without issue.  She states that when she first came to our clinic she had a bad limp and struggled to drive due to pain when pressing the gas peddle - all of which has resolved.  Today her pain is 4/10 in severity and is along the right buttock and into the hip region.          REVIEW OF PERTINENT MEDICAL DATA  No new     The following portions of the patient's history were reviewed and updated as appropriate: allergies, current medications, past family history, past medical history, past social history, past surgical history and problem  list.    Review of Systems   Musculoskeletal: Positive for arthralgias and back pain. Negative for gait problem.       Vitals:    11/11/20 1114   PainSc:   4         Objective   Physical Exam  Vitals signs reviewed.   Pulmonary:      Effort: Pulmonary effort is normal. No respiratory distress.   Neurological:      Mental Status: She is alert and oriented to person, place, and time.      Comments: Negative slurring of speech   Psychiatric:         Mood and Affect: Mood normal.         Thought Content: Thought content normal.             Assessment/Plan   Diagnoses and all orders for this visit:    1. Displacement of lumbar intervertebral disc without myelopathy (Primary)  -     celecoxib (CeleBREX) 200 MG capsule; Take 1 capsule by mouth Daily.  Dispense: 90 capsule; Refill: 1    2. Lumbar radiculitis  -     celecoxib (CeleBREX) 200 MG capsule; Take 1 capsule by mouth Daily.  Dispense: 90 capsule; Refill: 1    3. Sacroiliitis (CMS/HCC)  -     celecoxib (CeleBREX) 200 MG capsule; Take 1 capsule by mouth Daily.  Dispense: 90 capsule; Refill: 1      - Will hold on further injections since she continues to do well.   - Celebrex prescription sent.      --- Follow-up January 2021 office visit            JAN REPORT    JAN report has been reviewed and scanned into the patient's chart.    As the clinician, I personally reviewed the JAN from 11/11/2020 .       Estrellita Sheppard MD  Pain Management    Our practice is offering alternative &/or electronic methods to continue to follow our patients while at the same time further the efforts toward social distancing, in accordance with our organizational policies, professional societies' guidance, and gubernatorial mandates.  I support the Healthy at Home campaign and in this visit I have counseled the patient on our needs to limit in-person office visits and physical encounters with medical facilities whenever possible.  I have also educated the patient on the medical  necessities of maintaining social distancing while we continue to function during this crisis period.       The patient agreed to a Telephone Check-In.     TIME:  Total Time:  12 minutes.

## 2020-11-11 ENCOUNTER — TELEMEDICINE (OUTPATIENT)
Dept: PAIN MEDICINE | Facility: CLINIC | Age: 70
End: 2020-11-11

## 2020-11-11 DIAGNOSIS — M54.16 LUMBAR RADICULITIS: ICD-10-CM

## 2020-11-11 DIAGNOSIS — M51.26 DISPLACEMENT OF LUMBAR INTERVERTEBRAL DISC WITHOUT MYELOPATHY: Primary | ICD-10-CM

## 2020-11-11 DIAGNOSIS — M46.1 SACROILIITIS (HCC): ICD-10-CM

## 2020-11-11 PROCEDURE — 99442 PR PHYS/QHP TELEPHONE EVALUATION 11-20 MIN: CPT | Performed by: ANESTHESIOLOGY

## 2020-11-11 RX ORDER — CELECOXIB 200 MG/1
200 CAPSULE ORAL DAILY
Qty: 90 CAPSULE | Refills: 1 | Status: SHIPPED | OUTPATIENT
Start: 2020-11-11 | End: 2021-05-05 | Stop reason: SDUPTHER

## 2020-11-20 ENCOUNTER — PREP FOR SURGERY (OUTPATIENT)
Dept: GASTROENTEROLOGY | Facility: CLINIC | Age: 70
End: 2020-11-20

## 2020-11-20 DIAGNOSIS — Z12.11 ENCOUNTER FOR SCREENING FOR MALIGNANT NEOPLASM OF COLON: Primary | ICD-10-CM

## 2020-11-20 DIAGNOSIS — Z83.71 FAMILY HISTORY OF POLYPS IN THE COLON: ICD-10-CM

## 2020-11-20 DIAGNOSIS — Z86.010 PERSONAL HISTORY OF COLONIC POLYPS: ICD-10-CM

## 2020-11-24 ENCOUNTER — LAB REQUISITION (OUTPATIENT)
Dept: LAB | Facility: HOSPITAL | Age: 70
End: 2020-11-24

## 2020-11-24 DIAGNOSIS — Z00.00 ENCOUNTER FOR GENERAL ADULT MEDICAL EXAMINATION WITHOUT ABNORMAL FINDINGS: ICD-10-CM

## 2021-01-06 ENCOUNTER — OFFICE VISIT (OUTPATIENT)
Dept: PAIN MEDICINE | Facility: CLINIC | Age: 71
End: 2021-01-06

## 2021-01-06 ENCOUNTER — LAB REQUISITION (OUTPATIENT)
Dept: LAB | Facility: HOSPITAL | Age: 71
End: 2021-01-06

## 2021-01-06 VITALS
SYSTOLIC BLOOD PRESSURE: 148 MMHG | DIASTOLIC BLOOD PRESSURE: 95 MMHG | RESPIRATION RATE: 20 BRPM | OXYGEN SATURATION: 97 % | BODY MASS INDEX: 37.47 KG/M2 | HEIGHT: 68 IN | WEIGHT: 247.2 LBS | TEMPERATURE: 96.9 F | HEART RATE: 64 BPM

## 2021-01-06 DIAGNOSIS — Z00.00 ENCOUNTER FOR GENERAL ADULT MEDICAL EXAMINATION WITHOUT ABNORMAL FINDINGS: ICD-10-CM

## 2021-01-06 DIAGNOSIS — M46.1 SACROILIITIS (HCC): Primary | ICD-10-CM

## 2021-01-06 DIAGNOSIS — M70.62 TROCHANTERIC BURSITIS OF LEFT HIP: ICD-10-CM

## 2021-01-06 PROCEDURE — 99213 OFFICE O/P EST LOW 20 MIN: CPT | Performed by: ANESTHESIOLOGY

## 2021-01-06 NOTE — PROGRESS NOTES
The patient has a pain history of the following:  Lumbar radiculitis  Anterolisthesis   Lumbar spondylosis  Lumbar disc displacement  Right thigh hematoma   Sacroiliitis      Previous interventions that the patient has received include:   Right L5-S1 Transforaminal epidural steroid injection 6/22/2020, 7/20/2020     Pain medications include:  Celebrex qd  Tylenol prn     Previously: Tramadol     Other conservative modalities which the patient reports using include:  Physical therapy Jan 2020  Water aerobics 5 days/week  Daily walking  Ice pack     Past Significant Surgical History:  None     HPI:     CHIEF COMPLAINT Back Pain  F/u back pain. Pt sts increased pain in left sciatica since last ov.     Subjective   Alesia Resendez is a 70 y.o. female  who presents for follow-up.  She has a history of chronic low back pain and radiculopathy.  At her last visit we discussed sacroiliitis and I sent a prescription of Celebrex to her pharmacy (not a new medication).  Her pain was well-controlled with her medications and exercises.    She states her right leg pain has significantly improved.  She is participating in water aerobics 5 days of the week and continues taking celebrex without side effects and with benefit.  Today she mentions that her left hip wakes her up at night when she rolls onto the left side.  It feels like a dagger stabs her.  Tylenol PM keeps this from happening and it doesn't bother her during the day.        PEG Assessment   What number best describes your pain on average in the past week?3  What number best describes how, during the past week, pain has interfered with your enjoyment of life?3  What number best describes how, during the past week, pain has interfered with your general activity?  3    REVIEW OF PERTINENT MEDICAL DATA  No new     The following portions of the patient's history were reviewed and updated as appropriate: allergies, current medications, past family history, past medical  "history, past social history, past surgical history and problem list.    Review of Systems   Constitutional: Negative for activity change, fatigue and fever.   HENT: Negative for congestion.    Eyes: Negative for visual disturbance.   Respiratory: Negative for cough and shortness of breath.    Cardiovascular: Negative for chest pain.   Gastrointestinal: Negative for constipation and diarrhea.   Genitourinary: Negative for difficulty urinating.   Musculoskeletal: Positive for back pain.   Allergic/Immunologic: Negative for immunocompromised state.   Neurological: Negative for dizziness, weakness, light-headedness, numbness and headaches.   Psychiatric/Behavioral: Negative for agitation, sleep disturbance and suicidal ideas. The patient is not nervous/anxious.      I have reviewed and confirmed the accuracy of the ROS as documented by the MA/LPN/RN Estrellita Sheppard MD    Vitals:    01/06/21 1119   BP: 148/95  Comment: pt sts had bp meds last night   Pulse: 64   Resp: 20   Temp: 96.9 °F (36.1 °C)   SpO2: 97%   Weight: 112 kg (247 lb 3.2 oz)   Height: 172.7 cm (67.99\")   PainSc:   3   PainLoc: Back         Objective   Physical Exam  Vitals signs reviewed.   Pulmonary:      Effort: Pulmonary effort is normal. No respiratory distress.   Musculoskeletal:      Comments: Mild tenderness to palpation of the left sacroiliac joint as well as the left trochanteric bursa.  Negative Fabers test.  Negative pain with internal rotation of the left hip.    Skin:     General: Skin is warm and dry.   Psychiatric:         Mood and Affect: Mood normal.         Thought Content: Thought content normal.             Assessment/Plan   Diagnoses and all orders for this visit:    1. Sacroiliitis (CMS/HCC) (Primary)    2. Trochanteric bursitis of left hip      - Continue activity as tolerated.   - Consider ice and topical NSAID prior to bed to help with trochanteric bursitis.   - Discussed we can consider steroid injection for bursitis if pain " worsens.     --- Follow-up in 6 months.  May call if needed prior to visit.            JAN REPORT    JAN report has been reviewed and scanned into the patient's chart.    As the clinician, I personally reviewed the JAN from 1/6/21 .    While examining this patient, I wore protective equipment including a mask, eye shield and gloves.  I washed my hands before and after this patient encounter.  The patient wore a mask throughout the visit as well.     Estrellita Sheppard MD  Pain Management

## 2021-01-08 PROCEDURE — U0004 COV-19 TEST NON-CDC HGH THRU: HCPCS | Performed by: INTERNAL MEDICINE

## 2021-01-09 LAB — SARS-COV-2 ORF1AB RESP QL NAA+PROBE: NOT DETECTED

## 2021-01-10 ENCOUNTER — APPOINTMENT (OUTPATIENT)
Dept: GENERAL RADIOLOGY | Facility: HOSPITAL | Age: 71
End: 2021-01-10

## 2021-01-10 PROCEDURE — 73110 X-RAY EXAM OF WRIST: CPT | Performed by: FAMILY MEDICINE

## 2021-01-10 PROCEDURE — 73130 X-RAY EXAM OF HAND: CPT | Performed by: FAMILY MEDICINE

## 2021-01-28 ENCOUNTER — IMMUNIZATION (OUTPATIENT)
Dept: VACCINE CLINIC | Facility: HOSPITAL | Age: 71
End: 2021-01-28

## 2021-01-28 PROCEDURE — 0001A: CPT | Performed by: INTERNAL MEDICINE

## 2021-01-28 PROCEDURE — 91300 HC SARSCOV02 VAC 30MCG/0.3ML IM: CPT | Performed by: INTERNAL MEDICINE

## 2021-02-18 ENCOUNTER — IMMUNIZATION (OUTPATIENT)
Dept: VACCINE CLINIC | Facility: HOSPITAL | Age: 71
End: 2021-02-18

## 2021-02-18 PROCEDURE — 91300 HC SARSCOV02 VAC 30MCG/0.3ML IM: CPT | Performed by: INTERNAL MEDICINE

## 2021-02-18 PROCEDURE — 0002A: CPT | Performed by: INTERNAL MEDICINE

## 2021-03-01 RX ORDER — OXYBUTYNIN CHLORIDE 5 MG/1
TABLET ORAL
Qty: 180 TABLET | Refills: 3 | Status: SHIPPED | OUTPATIENT
Start: 2021-03-01 | End: 2022-01-13

## 2021-03-01 RX ORDER — LOSARTAN POTASSIUM 100 MG/1
100 TABLET ORAL DAILY
Qty: 90 TABLET | Refills: 3 | Status: SHIPPED | OUTPATIENT
Start: 2021-03-01 | End: 2021-11-05

## 2021-03-01 RX ORDER — BUPROPION HYDROCHLORIDE 150 MG/1
150 TABLET ORAL DAILY
Qty: 90 TABLET | Refills: 3 | Status: SHIPPED | OUTPATIENT
Start: 2021-03-01 | End: 2022-01-13

## 2021-03-01 RX ORDER — AMILORIDE HYDROCHLORIDE AND HYDROCHLOROTHIAZIDE 5; 50 MG/1; MG/1
1 TABLET ORAL DAILY
Qty: 90 TABLET | Refills: 3 | Status: SHIPPED | OUTPATIENT
Start: 2021-03-01 | End: 2021-11-05 | Stop reason: ALTCHOICE

## 2021-03-01 RX ORDER — OMEPRAZOLE 20 MG/1
20 CAPSULE, DELAYED RELEASE ORAL DAILY
Qty: 90 CAPSULE | Refills: 3 | Status: SHIPPED | OUTPATIENT
Start: 2021-03-01 | End: 2022-01-13

## 2021-05-03 ENCOUNTER — PREP FOR SURGERY (OUTPATIENT)
Dept: SURGERY | Facility: SURGERY CENTER | Age: 71
End: 2021-05-03

## 2021-05-03 DIAGNOSIS — Z12.11 ENCOUNTER FOR SCREENING FOR MALIGNANT NEOPLASM OF COLON: Primary | ICD-10-CM

## 2021-05-04 ENCOUNTER — TRANSCRIBE ORDERS (OUTPATIENT)
Dept: LAB | Facility: SURGERY CENTER | Age: 71
End: 2021-05-04

## 2021-05-04 DIAGNOSIS — Z01.818 OTHER SPECIFIED PRE-OPERATIVE EXAMINATION: Primary | ICD-10-CM

## 2021-05-04 RX ORDER — SODIUM CHLORIDE, SODIUM LACTATE, POTASSIUM CHLORIDE, CALCIUM CHLORIDE 600; 310; 30; 20 MG/100ML; MG/100ML; MG/100ML; MG/100ML
30 INJECTION, SOLUTION INTRAVENOUS CONTINUOUS PRN
Status: CANCELLED | OUTPATIENT
Start: 2021-05-04

## 2021-05-04 RX ORDER — SODIUM CHLORIDE 0.9 % (FLUSH) 0.9 %
10 SYRINGE (ML) INJECTION AS NEEDED
Status: CANCELLED | OUTPATIENT
Start: 2021-05-04

## 2021-05-04 RX ORDER — SODIUM CHLORIDE 0.9 % (FLUSH) 0.9 %
3 SYRINGE (ML) INJECTION EVERY 12 HOURS SCHEDULED
Status: CANCELLED | OUTPATIENT
Start: 2021-05-04

## 2021-05-05 ENCOUNTER — OFFICE VISIT (OUTPATIENT)
Dept: FAMILY MEDICINE CLINIC | Facility: CLINIC | Age: 71
End: 2021-05-05

## 2021-05-05 VITALS
BODY MASS INDEX: 32.89 KG/M2 | OXYGEN SATURATION: 94 % | WEIGHT: 217 LBS | SYSTOLIC BLOOD PRESSURE: 118 MMHG | HEIGHT: 68 IN | TEMPERATURE: 97.3 F | HEART RATE: 60 BPM | DIASTOLIC BLOOD PRESSURE: 74 MMHG

## 2021-05-05 DIAGNOSIS — I10 ESSENTIAL HYPERTENSION: Primary | ICD-10-CM

## 2021-05-05 DIAGNOSIS — M46.1 SACROILIITIS (HCC): ICD-10-CM

## 2021-05-05 DIAGNOSIS — M51.26 DISPLACEMENT OF LUMBAR INTERVERTEBRAL DISC WITHOUT MYELOPATHY: ICD-10-CM

## 2021-05-05 DIAGNOSIS — E78.2 MIXED HYPERLIPIDEMIA: ICD-10-CM

## 2021-05-05 DIAGNOSIS — Z11.59 NEED FOR HEPATITIS C SCREENING TEST: ICD-10-CM

## 2021-05-05 DIAGNOSIS — M54.16 LUMBAR RADICULITIS: ICD-10-CM

## 2021-05-05 PROCEDURE — 99213 OFFICE O/P EST LOW 20 MIN: CPT | Performed by: NURSE PRACTITIONER

## 2021-05-05 RX ORDER — ATENOLOL 50 MG/1
50 TABLET ORAL DAILY
Status: ON HOLD | COMMUNITY
End: 2021-05-27 | Stop reason: SDUPTHER

## 2021-05-05 RX ORDER — CELECOXIB 200 MG/1
200 CAPSULE ORAL DAILY
Qty: 90 CAPSULE | Refills: 1 | Status: SHIPPED | OUTPATIENT
Start: 2021-05-05 | End: 2021-11-10 | Stop reason: SDUPTHER

## 2021-05-10 ENCOUNTER — LAB (OUTPATIENT)
Dept: LAB | Facility: HOSPITAL | Age: 71
End: 2021-05-10

## 2021-05-10 PROCEDURE — 85025 COMPLETE CBC W/AUTO DIFF WBC: CPT | Performed by: NURSE PRACTITIONER

## 2021-05-10 PROCEDURE — 80053 COMPREHEN METABOLIC PANEL: CPT | Performed by: NURSE PRACTITIONER

## 2021-05-10 PROCEDURE — 84443 ASSAY THYROID STIM HORMONE: CPT | Performed by: NURSE PRACTITIONER

## 2021-05-10 PROCEDURE — 81003 URINALYSIS AUTO W/O SCOPE: CPT | Performed by: NURSE PRACTITIONER

## 2021-05-10 PROCEDURE — 80061 LIPID PANEL: CPT | Performed by: NURSE PRACTITIONER

## 2021-05-13 RX ORDER — ATENOLOL 50 MG/1
50 TABLET ORAL DAILY
Qty: 90 TABLET | Refills: 1 | Status: SHIPPED | OUTPATIENT
Start: 2021-05-13 | End: 2021-10-18

## 2021-05-13 RX ORDER — ESCITALOPRAM OXALATE 10 MG/1
10 TABLET ORAL DAILY
Qty: 90 TABLET | Refills: 1 | Status: SHIPPED | OUTPATIENT
Start: 2021-05-13 | End: 2021-10-18

## 2021-05-24 NOTE — SIGNIFICANT NOTE
Education provided the Patient on the following:    - Nothing to Eat or Drink after MN the night before the procedure  - Avoid red/purple fluids while completing their bowel prep as ordered by physician  -Contact Gastrointerologist office for any questions about specific details regarding colon prep    -You will need to have someone drive you home after your colonoscopy and remain with you for 24 hours after the procedure  - The date of your Surgery, your ride is welcome to either remain in our parking lot or within 10-15 minutes of [facility] [place]  -Please wear warm socks when you arrive for your colonoscopy  -Remove all jewelry and leave any valuables before arriving the day of your procedure (all will have to be removed before leaving preop)  -You will need to arrive at    0700       on     5/27      for your colonoscopy    -Feel free to contact us at: [phone] with any additional questions/concerns

## 2021-05-25 ENCOUNTER — APPOINTMENT (OUTPATIENT)
Dept: LAB | Facility: SURGERY CENTER | Age: 71
End: 2021-05-25

## 2021-05-27 ENCOUNTER — ANESTHESIA EVENT (OUTPATIENT)
Dept: SURGERY | Facility: SURGERY CENTER | Age: 71
End: 2021-05-27

## 2021-05-27 ENCOUNTER — ANESTHESIA (OUTPATIENT)
Dept: SURGERY | Facility: SURGERY CENTER | Age: 71
End: 2021-05-27

## 2021-05-27 ENCOUNTER — HOSPITAL ENCOUNTER (OUTPATIENT)
Facility: SURGERY CENTER | Age: 71
Setting detail: HOSPITAL OUTPATIENT SURGERY
Discharge: HOME | End: 2021-05-27
Attending: INTERNAL MEDICINE | Admitting: INTERNAL MEDICINE

## 2021-05-27 VITALS
OXYGEN SATURATION: 95 % | HEART RATE: 53 BPM | HEIGHT: 68 IN | RESPIRATION RATE: 20 BRPM | SYSTOLIC BLOOD PRESSURE: 144 MMHG | DIASTOLIC BLOOD PRESSURE: 69 MMHG | BODY MASS INDEX: 32.48 KG/M2 | TEMPERATURE: 95.9 F | WEIGHT: 214.3 LBS

## 2021-05-27 DIAGNOSIS — Z12.11 ENCOUNTER FOR SCREENING FOR MALIGNANT NEOPLASM OF COLON: ICD-10-CM

## 2021-05-27 PROCEDURE — 88305 TISSUE EXAM BY PATHOLOGIST: CPT | Performed by: INTERNAL MEDICINE

## 2021-05-27 PROCEDURE — 0DBN8ZX EXCISION OF SIGMOID COLON, VIA NATURAL OR ARTIFICIAL OPENING ENDOSCOPIC, DIAGNOSTIC: ICD-10-PCS | Performed by: INTERNAL MEDICINE

## 2021-05-27 PROCEDURE — 0DBM8ZX EXCISION OF DESCENDING COLON, VIA NATURAL OR ARTIFICIAL OPENING ENDOSCOPIC, DIAGNOSTIC: ICD-10-PCS | Performed by: INTERNAL MEDICINE

## 2021-05-27 PROCEDURE — 25010000002 PROPOFOL 10 MG/ML EMULSION: Performed by: ANESTHESIOLOGY

## 2021-05-27 PROCEDURE — 45385 COLONOSCOPY W/LESION REMOVAL: CPT | Performed by: INTERNAL MEDICINE

## 2021-05-27 PROCEDURE — 0DBL8ZX EXCISION OF TRANSVERSE COLON, VIA NATURAL OR ARTIFICIAL OPENING ENDOSCOPIC, DIAGNOSTIC: ICD-10-PCS | Performed by: INTERNAL MEDICINE

## 2021-05-27 PROCEDURE — 0DBH8ZX EXCISION OF CECUM, VIA NATURAL OR ARTIFICIAL OPENING ENDOSCOPIC, DIAGNOSTIC: ICD-10-PCS | Performed by: INTERNAL MEDICINE

## 2021-05-27 RX ORDER — SODIUM CHLORIDE, SODIUM LACTATE, POTASSIUM CHLORIDE, CALCIUM CHLORIDE 600; 310; 30; 20 MG/100ML; MG/100ML; MG/100ML; MG/100ML
1000 INJECTION, SOLUTION INTRAVENOUS CONTINUOUS
Status: DISCONTINUED | OUTPATIENT
Start: 2021-05-27 | End: 2021-05-27 | Stop reason: HOSPADM

## 2021-05-27 RX ORDER — SODIUM CHLORIDE 0.9 % (FLUSH) 0.9 %
10 SYRINGE (ML) INJECTION AS NEEDED
Status: DISCONTINUED | OUTPATIENT
Start: 2021-05-27 | End: 2021-05-27 | Stop reason: HOSPADM

## 2021-05-27 RX ORDER — SODIUM CHLORIDE 0.9 % (FLUSH) 0.9 %
3 SYRINGE (ML) INJECTION EVERY 12 HOURS SCHEDULED
Status: DISCONTINUED | OUTPATIENT
Start: 2021-05-27 | End: 2021-05-27 | Stop reason: HOSPADM

## 2021-05-27 RX ORDER — LIDOCAINE HYDROCHLORIDE 20 MG/ML
INJECTION, SOLUTION INFILTRATION; PERINEURAL AS NEEDED
Status: DISCONTINUED | OUTPATIENT
Start: 2021-05-27 | End: 2021-05-27 | Stop reason: SURG

## 2021-05-27 RX ORDER — PROPOFOL 10 MG/ML
VIAL (ML) INTRAVENOUS AS NEEDED
Status: DISCONTINUED | OUTPATIENT
Start: 2021-05-27 | End: 2021-05-27 | Stop reason: SURG

## 2021-05-27 RX ORDER — LIDOCAINE HYDROCHLORIDE 10 MG/ML
0.5 INJECTION, SOLUTION INFILTRATION; PERINEURAL ONCE AS NEEDED
Status: DISCONTINUED | OUTPATIENT
Start: 2021-05-27 | End: 2021-05-27 | Stop reason: HOSPADM

## 2021-05-27 RX ADMIN — PROPOFOL INJECTABLE EMULSION 140 MCG/KG/MIN: 10 INJECTION, EMULSION INTRAVENOUS at 08:16

## 2021-05-27 RX ADMIN — SODIUM CHLORIDE, POTASSIUM CHLORIDE, SODIUM LACTATE AND CALCIUM CHLORIDE 1000 ML: 600; 310; 30; 20 INJECTION, SOLUTION INTRAVENOUS at 07:38

## 2021-05-27 RX ADMIN — PROPOFOL 100 MG: 10 INJECTION, EMULSION INTRAVENOUS at 08:16

## 2021-05-27 RX ADMIN — LIDOCAINE HYDROCHLORIDE 60 MG: 20 INJECTION, SOLUTION INFILTRATION; PERINEURAL at 08:16

## 2021-05-27 NOTE — ANESTHESIA PREPROCEDURE EVALUATION
Anesthesia Evaluation     Patient summary reviewed and Nursing notes reviewed   no history of anesthetic complications:  NPO Solid Status: > 8 hours  NPO Liquid Status: > 2 hours           Airway   Mallampati: II  TM distance: >3 FB  Neck ROM: full  No difficulty expected  Dental    (+) implants        Pulmonary    Cardiovascular     (+) hypertension, hyperlipidemia,       Neuro/Psych  (+) tremors, numbness (right lbp, sciatica), psychiatric history Anxiety and Depression,     GI/Hepatic/Renal/Endo    (+)  GERD,      Musculoskeletal     Abdominal    Substance History      OB/GYN          Other   arthritis,    history of cancer (breast cancer)                  Anesthesia Plan    ASA 2     MAC   (I have reviewed the patient's history with the patient and the chart, including all pertinent laboratory results and imaging. I have explained the risks of anesthesia including but not limited to dental damage, corneal abrasion, nerve injury, MI, stroke, and death.  )  intravenous induction     Anesthetic plan, all risks, benefits, and alternatives have been provided, discussed and informed consent has been obtained with: patient.

## 2021-05-27 NOTE — H&P
No chief complaint on file.      HPI  Patient today for screening colonoscopy.         Problem List:    Patient Active Problem List   Diagnosis   • Arthritis   • Atrophic vaginitis   • Depression   • Gastroesophageal reflux disease without esophagitis   • Mixed hyperlipidemia   • Essential hypertension   • Menopause present   • Overactive bladder   • History of colonic polyps   • Rectal hemorrhage   • Proctocele   • Encounter for screening for malignant neoplasm of colon   • Stress incontinence in female   • Recurrent breast cancer (CMS/HCC)   • Viral syndrome   • Chronic fatigue   • Snoring   • Tremor   • Community acquired pneumonia   • Chronic right-sided low back pain with sciatica   • Elevated liver function tests       Medical History:    Past Medical History:   Diagnosis Date   • Anxiety    • Arthritis    • Breast cancer (CMS/HCC) 2017    LEFT   • History of radiation therapy 2006    LT BREAST   • History of skin cancer     SQUAMOUS CELL REMOVED FROM LT UPPER LIP AND RT THUMB        Social History:    Social History     Socioeconomic History   • Marital status:      Spouse name: Not on file   • Number of children: 3   • Years of education: College   • Highest education level: Not on file   Tobacco Use   • Smoking status: Never Smoker   • Smokeless tobacco: Never Used   Substance and Sexual Activity   • Alcohol use: No   • Drug use: No   • Sexual activity: Defer     Comment:        Family History:   Family History   Problem Relation Age of Onset   • Heart attack Mother    • Esophageal cancer Mother 74   • Stomach cancer Mother    • Heart disease Mother    • Hypertension Mother    • Alcohol abuse Father    • Diabetes Father         type 2   • Heart attack Father    • Heart failure Father    • Hyperlipidemia Father    • Hypertension Father    • Prostate cancer Father 85   • Clotting disorder Father    • Heart disease Father    • Birth defects Brother    • Heart attack Brother    • Heart disease  Brother    • Hypertension Brother    • Clotting disorder Daughter    • Malig Hyperthermia Neg Hx        Surgical History:   Past Surgical History:   Procedure Laterality Date   • ANAL FISSURECTOMY  2003   • APPENDECTOMY  2014   • BREAST LUMPECTOMY W/ NEEDLE LOCALIZATION Left 2006   • CATARACT EXTRACTION Bilateral 07/2018   • COLONOSCOPY  2014   • GALLBLADDER SURGERY  2007   • HYSTERECTOMY  1987   • KNEE SURGERY Right 2014    Knee cap replaced   • MASTECTOMY W/ SENTINEL NODE BIOPSY Bilateral 8/21/2017    Procedure: BILATEREAL BREAST MASTECTOMY WITH SENTINEL NODE BIOPSY ON THE LEFT The sentinel lymph node biopsy will only be performed on the left side;  Surgeon: Diallo Eisenberg MD;  Location: [facility] OR [facility] [place];  Service:    • OOPHORECTOMY  2000    BSO   • POSTERIOR REPAIR  05/17/2016    with enterocele repair, midurethral sling, perineoplasty   • TONSILLECTOMY  1970   • TOTAL KNEE ARTHROPLASTY Bilateral 2000, 2001         Current Facility-Administered Medications:   •  lactated ringers infusion 1,000 mL, 1,000 mL, Intravenous, Continuous, Sridhar James MD  •  lidocaine (XYLOCAINE) 1 % injection 0.5 mL, 0.5 mL, Intradermal, Once PRN, Sridhar James MD  •  sodium chloride 0.9 % flush 10 mL, 10 mL, Intravenous, PRN, Sridhar James MD  •  sodium chloride 0.9 % flush 10 mL, 10 mL, Intravenous, PRN, Sridhar James MD  •  sodium chloride 0.9 % flush 3 mL, 3 mL, Intravenous, Q12H, Sridhar James MD    Allergies: No Known Allergies     The following portions of the patient's history were reviewed by me and updated as appropriate: review of systems, allergies, current medications, past family history, past medical history, past social history, past surgical history and problem list.    There were no vitals filed for this visit.    PHYSICAL EXAM:    CONSTITUTIONAL:  today's vital signs reviewed by me  GASTROINTESTINAL: abdomen is soft nontender nondistended with normal active bowel sounds, no  masses are appreciated    Assessment/ Plan  We will proceed today with screening colonoscopy.    Risks and benefits as well as alternatives to endoscopic evaluation were explained to the patient and they voiced understanding and wish to proceed.  These risks include but are not limited to the risk of bleeding, perforation, adverse reaction to sedation, and missed lesions.  The patient was given the opportunity to ask questions prior to the endoscopic procedure.

## 2021-05-27 NOTE — ANESTHESIA POSTPROCEDURE EVALUATION
Patient: Alesia Resendez    Procedure Summary     Date: 05/27/21 Room / Location: [facility]    Anesthesia Start: 0813 Anesthesia Stop: 0842    Procedure: COLONOSCOPY (Left ) Diagnosis:       Encounter for screening for malignant neoplasm of colon      (Encounter for screening for malignant neoplasm of colon [Z12.11])    Surgeons: Sridhar James MD Provider: Leslye Flores MD    Anesthesia Type: MAC ASA Status: 2          Anesthesia Type: MAC    Vitals  Vitals Value Taken Time   /67 05/27/21 0850   Temp 35.5 °C (95.9 °F) 05/27/21 0843   Pulse 59 05/27/21 0850   Resp 20 05/27/21 0850   SpO2 98 % 05/27/21 0850           Post Anesthesia Care and Evaluation    Patient location during evaluation: bedside  Patient participation: complete - patient participated  Level of consciousness: awake and alert  Pain management: adequate  Airway patency: patent  Anesthetic complications: No anesthetic complications  PONV Status: controlled  Cardiovascular status: acceptable  Respiratory status: acceptable  Hydration status: acceptable

## 2021-05-28 LAB
CYTO UR: NORMAL
LAB AP CASE REPORT: NORMAL
LAB AP CLINICAL INFORMATION: NORMAL
PATH REPORT.FINAL DX SPEC: NORMAL
PATH REPORT.GROSS SPEC: NORMAL

## 2021-07-06 ENCOUNTER — OFFICE VISIT (OUTPATIENT)
Dept: PAIN MEDICINE | Facility: CLINIC | Age: 71
End: 2021-07-06

## 2021-07-06 ENCOUNTER — TRANSCRIBE ORDERS (OUTPATIENT)
Dept: SURGERY | Facility: SURGERY CENTER | Age: 71
End: 2021-07-06

## 2021-07-06 ENCOUNTER — PREP FOR SURGERY (OUTPATIENT)
Dept: SURGERY | Facility: SURGERY CENTER | Age: 71
End: 2021-07-06

## 2021-07-06 VITALS
OXYGEN SATURATION: 95 % | SYSTOLIC BLOOD PRESSURE: 108 MMHG | TEMPERATURE: 97.3 F | DIASTOLIC BLOOD PRESSURE: 70 MMHG | RESPIRATION RATE: 18 BRPM | WEIGHT: 212.4 LBS | HEIGHT: 68 IN | BODY MASS INDEX: 32.19 KG/M2 | HEART RATE: 62 BPM

## 2021-07-06 DIAGNOSIS — G57.02 PIRIFORMIS SYNDROME OF LEFT SIDE: Primary | ICD-10-CM

## 2021-07-06 DIAGNOSIS — M60.9 MYOFASCIITIS: ICD-10-CM

## 2021-07-06 PROCEDURE — 99214 OFFICE O/P EST MOD 30 MIN: CPT | Performed by: ANESTHESIOLOGY

## 2021-07-06 RX ORDER — SODIUM CHLORIDE 0.9 % (FLUSH) 0.9 %
10 SYRINGE (ML) INJECTION AS NEEDED
Status: CANCELLED | OUTPATIENT
Start: 2021-07-06

## 2021-07-06 RX ORDER — SODIUM CHLORIDE 0.9 % (FLUSH) 0.9 %
10 SYRINGE (ML) INJECTION EVERY 12 HOURS SCHEDULED
Status: CANCELLED | OUTPATIENT
Start: 2021-07-06

## 2021-07-06 NOTE — PATIENT INSTRUCTIONS
Piriformis Syndrome    Piriformis syndrome is a condition that can cause pain and numbness in your buttocks and down the back of your leg. Piriformis syndrome happens when the small muscle that connects the base of your spine to your hip (piriformis muscle) presses on the nerve that runs down the back of your leg (sciatic nerve).  The piriformis muscle helps your hip rotate and helps to bring your leg back and out. It also helps shift your weight to keep you stable while you are walking. The sciatic nerve runs under or through the piriformis muscle. Damage to the piriformis muscle can cause spasms that put pressure on the nerve below. This causes pain and discomfort while sitting and moving. The pain may feel as if it begins in the buttock and spreads (radiates) down your hip and thigh.  What are the causes?  This condition is caused by pressure on the sciatic nerve from the piriformis muscle. The piriformis muscle can get irritated with overuse, especially if other hip muscles are weak and the piriformis muscle has to do extra work.  Piriformis syndrome can also occur after an injury, like a fall onto your buttocks.  What increases the risk?  You are more likely to develop this condition if you:  · Are a woman.  · Sit for long periods of time.  · Are a cyclist.  · Have weak buttocks muscles (gluteal muscles).  What are the signs or symptoms?  Symptoms of this condition include:  · Pain, tingling, or numbness that starts in the buttock and runs down the back of your leg (sciatica).  · Pain in the groin or thigh area.  Your symptoms may get worse:  · The longer you sit.  · When you walk, run, or climb stairs.  · When straining to have a bowel movement.  How is this diagnosed?  This condition is diagnosed based on your symptoms, medical history, and physical exam.  · During the exam, your health care provider may:  ? Move your leg into different positions to check for pain.  ? Press on the muscles of your hip and  buttock to see if that increases your symptoms.  · You may also have tests, including:  ? Imaging tests such as X-rays, MRI, or ultrasound.  ? Electromyogram (EMG). This test measures electrical signals sent by your nerves into the muscles.  ? Nerve conduction study. This test measures how well electrical signals pass through your nerves.  How is this treated?  This condition may be treated by:  · Stopping all activities that cause pain or make your condition worse.  · Applying ice or using heat therapy.  · Taking medicines to reduce pain and swelling.  · Taking a muscle relaxer (muscle relaxant) to stop muscle spasms.  · Doing range-of-motion and strengthening exercises (physical therapy) as told by your health care provider.  · Massaging the area.  · Having acupuncture.  · Getting an injection of medicine in the piriformis muscle. Your health care provider will choose the medicine based on your condition. He or she may inject:  ? An anti-inflammatory medicine (steroid) to reduce swelling.  ? A numbing medicine (local anesthetic) to block the pain.  ? Botulinum toxin. The toxin blocks nerve impulses to specific muscles to reduce muscle tension.  In rare cases, you may need surgery to cut the muscle and release pressure on the nerve if other treatments do not work.  Follow these instructions at home:  Activity  · Do not sit for long periods. Get up and walk around every 20 minutes or as often as told by your health care provider.  ? When driving long distances, make sure to take frequent stops to get up and stretch.  · Use a cushion when you sit on hard surfaces.  · Do exercises as told by your health care provider.  · Return to your normal activities as told by your health care provider. Ask your health care provider what activities are safe for you.  Managing pain, stiffness, and swelling         · If directed, apply heat to the affected area as often as told by your health care provider. Use the heat source that  your health care provider recommends, such as a moist heat pack or a heating pad.  ? Place a towel between your skin and the heat source.  ? Leave the heat on for 20-30 minutes.  ? Remove the heat if your skin turns bright red. This is especially important if you are unable to feel pain, heat, or cold. You may have a greater risk of getting burned.  · If directed, put ice on the injured area.  ? Put ice in a plastic bag.  ? Place a towel between your skin and the bag.  ? Leave the ice on for 20 minutes, 2-3 times a day.  General instructions  · Take over-the-counter and prescription medicines only as told by your health care provider.  · Ask your health care provider if the medicine prescribed to you requires you to avoid driving or using heavy machinery.  · You may need to take actions to prevent or treat constipation, such as:  ? Drink enough fluid to keep your urine pale yellow.  ? Take over-the-counter or prescription medicines.  ? Eat foods that are high in fiber, such as beans, whole grains, and fresh fruits and vegetables.  ? Limit foods that are high in fat and processed sugars, such as fried or sweet foods.  · Keep all follow-up visits as told by your health care provider. This is important.  How is this prevented?  · Do not sit for longer than 20 minutes at a time. When you sit, choose padded surfaces.  · Warm up and stretch before being active.  · Cool down and stretch after being active.  · Give your body time to rest between periods of activity.  · Make sure to use equipment that fits you.  · Maintain physical fitness, including:  ? Strength.  ? Flexibility.  Contact a health care provider if:  · Your pain and stiffness continue or get worse.  · Your leg or hip becomes weak.  · You have changes in your bowel function or bladder function.  Summary  · Piriformis syndrome is a condition that can cause pain, tingling, and numbness in your buttocks and down the back of your leg.  · You may try applying heat  or ice to relieve the pain.  · Do not sit for long periods. Get up and walk around every 20 minutes or as often as told by your health care provider.  This information is not intended to replace advice given to you by your health care provider. Make sure you discuss any questions you have with your health care provider.  Document Revised: 04/09/2020 Document Reviewed: 08/14/2019  [employer] Patient Education © 2021 [employer]

## 2021-07-14 NOTE — SIGNIFICANT NOTE
Patient educated on the following :    - If you are receiving Sedation for your procedure Nothing to Eat 6 hours and only clear liquids for 2 hours prior to your procedure.    -Your required COVID Test is Scheduled on          Between the Hours of 0158-9293  -You will only be notified if your COVID test Result is POSITIVE  -The importance of reducing your number of contacts by self quarantining after you COVID test until the date of your PROCEDURE  -You will need to have someone drive you home after your PROCEDURE and remain with you for 24 hours after the PROCEDURE  - The date of your procedure, your are welcome to have one visitor at bedside or remain within 10-15 minutes of "[employer]point  -You will need to arrive at 1100 on 7/19/21 for your PROCEDURE  -Please contact [employer] [facility] at: [phone] with any questions and/or concerns

## 2021-07-15 NOTE — DISCHARGE INSTRUCTIONS
[facility] [place] [facility] - Post-procedure Instructions          --  While there are no absolute restrictions, it is recommended that you do not perform strenuous activity today. In the morning, you may resume your level of activity as before your block.    --  If you have a band-aid at your injection site, please remove it later today. Observe the area for any redness, swelling, pus-like drainage, or a temperature over 101°. If any of these symptoms occur, please call your doctor at [phone]. If after office hours, leave a message and the on-call provider will return your call.    --  Ice may be applied to your injection site. It is recommended you avoid direct heat (heating pad; hot tub) for 1-2 days.    --  Call [facility] [place] [facility] at [phone] if you experience persistent headache, persistent bleeding from the injection site, or severe pain not relieved by heat or oral medication.    --  Do not make important decisions today.    --  Due to the effects of the block and/or the I.V. Sedation, DO NOT drive or operate hazardous machinery for 12 hours.    --  Do not drink alcohol for 12 hours.    -- You may return to work tomorrow, or as directed by your referring doctor.    --  Occasionally you may notice a slight increase in your pain after the procedure. This should start to improve within the next 24-48 hours. Radiofrequency ablation procedure pain may last 3-4 weeks.    --  It may take as long as 3-4 days before you notice a gradual improvement in your pain and/or other symptoms.    -- You may continue to take your prescribed pain medication as needed.    --  Some normal possible side effects of steroid use could include fluid retention, increased blood sugar, dull headache, increased sweating, increased appetite, mood swings and flushing.    --  Diabetics are recommended to watch their blood glucose level closely for 24-48 hours after the injection.    --  Must stay in PACU for 20 min upon arrival and  prove no leg weakness before being discharged.    --  IN THE EVENT OF A LIFE THREATENING EMERGENCY, (CHEST PAIN, BREATHING DIFFICULTIES, PARALYSIS…) YOU SHOULD GO TO YOUR NEAREST EMERGENCY ROOM.    --  You should be contacted by our office within 2-3 days to schedule follow up or next appointment date.  If not contacted within 7 days, please call the office at [phone]

## 2021-07-19 ENCOUNTER — HOSPITAL ENCOUNTER (OUTPATIENT)
Dept: GENERAL RADIOLOGY | Facility: SURGERY CENTER | Age: 71
Setting detail: HOSPITAL OUTPATIENT SURGERY
End: 2021-07-19

## 2021-07-19 ENCOUNTER — HOSPITAL ENCOUNTER (OUTPATIENT)
Facility: SURGERY CENTER | Age: 71
Setting detail: HOSPITAL OUTPATIENT SURGERY
Discharge: HOME | End: 2021-07-19
Attending: ANESTHESIOLOGY | Admitting: ANESTHESIOLOGY

## 2021-07-19 VITALS
DIASTOLIC BLOOD PRESSURE: 81 MMHG | SYSTOLIC BLOOD PRESSURE: 123 MMHG | TEMPERATURE: 98 F | BODY MASS INDEX: 31.52 KG/M2 | HEIGHT: 68 IN | HEART RATE: 53 BPM | OXYGEN SATURATION: 93 % | WEIGHT: 208 LBS | RESPIRATION RATE: 16 BRPM

## 2021-07-19 DIAGNOSIS — G57.02 PIRIFORMIS SYNDROME OF LEFT SIDE: ICD-10-CM

## 2021-07-19 PROCEDURE — 20552 NJX 1/MLT TRIGGER POINT 1/2: CPT | Performed by: ANESTHESIOLOGY

## 2021-07-19 PROCEDURE — 3E0233Z INTRODUCTION OF ANTI-INFLAMMATORY INTO MUSCLE, PERCUTANEOUS APPROACH: ICD-10-PCS | Performed by: ANESTHESIOLOGY

## 2021-07-19 PROCEDURE — 25010000002 MIDAZOLAM PER 1 MG: Performed by: ANESTHESIOLOGY

## 2021-07-19 PROCEDURE — 25010000002 DEXAMETHASONE SODIUM PHOSPHATE 100 MG/10ML SOLUTION 10 ML VIAL: Performed by: ANESTHESIOLOGY

## 2021-07-19 PROCEDURE — 25010000002 FENTANYL CITRATE (PF) 50 MCG/ML SOLUTION: Performed by: ANESTHESIOLOGY

## 2021-07-19 PROCEDURE — 77002 NEEDLE LOCALIZATION BY XRAY: CPT | Performed by: ANESTHESIOLOGY

## 2021-07-19 RX ORDER — MIDAZOLAM HYDROCHLORIDE 1 MG/ML
INJECTION INTRAMUSCULAR; INTRAVENOUS AS NEEDED
Status: DISCONTINUED | OUTPATIENT
Start: 2021-07-19 | End: 2021-07-19 | Stop reason: HOSPADM

## 2021-07-19 RX ORDER — FENTANYL CITRATE 50 UG/ML
INJECTION, SOLUTION INTRAMUSCULAR; INTRAVENOUS AS NEEDED
Status: DISCONTINUED | OUTPATIENT
Start: 2021-07-19 | End: 2021-07-19 | Stop reason: HOSPADM

## 2021-07-19 NOTE — OP NOTE
LEFT Piriformis Injection  [facility]      PREOPERATIVE DIAGNOSIS:   Piriformis Syndrome, Myofasciitis    POSTOPERATIVE DIAGNOSIS:  Piriformis Syndrome, Myofasciitis     PROCEDURE:  Piriformis Muscle Injection, with fluoroscopic guidance [facility] [id number], [id number]    PRE-PROCEDURE DISCUSSION WITH PATIENT:    Risks and complications were discussed with the patient prior to starting the procedure and informed consent was obtained.  We discussed various topics including but not limited to bleeding, infection, injury, postprocedural site soreness, painful flareup, worsening of clinical picture, paralysis, coma, and death.     SURGEON:  Estrellita Sheppard MD    REASON FOR PROCEDURE:    Tenderness to palpation over the piriformis muscle with radiating pain into the posterior lower extremity    SEDATION:  Versed 2mg & Fentanyl 50 mcg IV  ANESTHETIC AGENT:  0.5% Bupivacaine  STEROID AGENT:  15mg Dexamethasone    DESCRIPTON OF PROCEDURE:  After obtaining informed consent, IV access was obtained in the preoperative area.  The patient was transported to the operative suite and placed in the prone position with a pillow under the pelvic area. EKG, blood pressure, and pulse oximeter were monitored. The lumbosacral area was prepped with Chloraprep and draped in a sterile fashion.     Attention was turned to the left piriformis muscle.  The femoral head on the left side was identified in an AP fluoroscopic image.  The skin and subcutaneous tissue superior to that target was anesthetized with 1% lidocaine. A 22-gauge spinal needle was then advanced percutaneously through the anesthetized skin tract under fluoroscopic guidance into the belly of the piriformis muscle.  After negative aspiration for blood a volume of 3mL of air was injected, producing a pneumogram of the piriformis muscle.  Subsequently, a volume of 5mL consisting of 15mg Dexamethasone with 0.5% Bupivacaine was injected without resistance.    Vital signs  remained stable.  The onset of analgesia was noted.      ESTIMATED BLOOD LOSS:  minimal  SPECIMENS:  None  COMPLICATIONS:  No complications were noted.    TOLERANCE & DISCHARGE CONDITION:    The patient tolerated the procedure well.  The patient was transported to the recovery area without difficulties.  The patient was discharged to home under the care of family in stable and satisfactory condition.    PLAN OF CARE:  1. The patient was given our standard instruction sheet and will resume all medications as per the medication reconciliation sheet.  2. The patient will Return to clinic 6 wks.  3. The patient is instructed to keep an account of pain relief after the procedure.

## 2021-08-30 ENCOUNTER — OFFICE VISIT (OUTPATIENT)
Dept: PAIN MEDICINE | Facility: CLINIC | Age: 71
End: 2021-08-30

## 2021-08-30 VITALS
DIASTOLIC BLOOD PRESSURE: 75 MMHG | BODY MASS INDEX: 31.67 KG/M2 | OXYGEN SATURATION: 98 % | TEMPERATURE: 96.3 F | WEIGHT: 209 LBS | RESPIRATION RATE: 16 BRPM | SYSTOLIC BLOOD PRESSURE: 111 MMHG | HEART RATE: 61 BPM | HEIGHT: 68 IN

## 2021-08-30 DIAGNOSIS — M54.41 CHRONIC RIGHT-SIDED LOW BACK PAIN WITH RIGHT-SIDED SCIATICA: ICD-10-CM

## 2021-08-30 DIAGNOSIS — G57.02 PIRIFORMIS SYNDROME OF LEFT SIDE: Primary | ICD-10-CM

## 2021-08-30 DIAGNOSIS — M60.9 MYOFASCIITIS: ICD-10-CM

## 2021-08-30 DIAGNOSIS — G89.29 CHRONIC RIGHT-SIDED LOW BACK PAIN WITH RIGHT-SIDED SCIATICA: ICD-10-CM

## 2021-08-30 PROCEDURE — 99212 OFFICE O/P EST SF 10 MIN: CPT | Performed by: NURSE PRACTITIONER

## 2021-08-30 NOTE — PROGRESS NOTES
CHIEF COMPLAINT  F/U procedure .-  Left piriformis injection.-for back pain . Pt states her pain stayed the same , after receiving injection .     Subjective   Alesia Resednez is a 71 y.o. female  who presents to the office for follow-up of procedure. Office visit from 6/11/2020 with Dr. Sheppard reviewed.  Patient referred by KYLIE Mohamud.  Patient referred for worsening low back pain.  Plan for right L5 TFESI.    Patient completed a right L5 TFESI on 6/22/2020 with 20% relief for several weeks.  She repeated a right L5 TFESI on 7/20/2020 with 40% ongoing relief reported on 9/2/2020 at a follow-up visit with Dr. Sheppard.  During this office visit patient reported left sacroiliac pain with plan to proceed with sacroiliac joint injection if pain did not improve with conservative measures.    Patient was seen on 1/6/2021 and reported significant improvement in her right leg pain, but mentions that her left hip wakes her up at night when she rolls onto her left side.  Discussed consideration of steroid injection for bursitis if pain worsens.  Office visit from 7/6/2021 reviewed.  During this visit patient complains of left buttock pain which is sharp in nature and very painful when she rolls over at night.  This pain does not radiate.  Plan for left piriformis injection.     She completed a left piriformis injection   on  7/19/2021 performed by Dr. Sheppard for management of left buttock pain. Patient reports 50% relief from the procedure x 2 weeks and then her pain returned to baseline.     Today her pain is 3/10VAS in severity. She states that this is not unbearable pain.  She is utilizing a TENS unit, Ice, Heat, water aerobics. She will also use OTC tylenol which is helpful for her pain.     Patient remained masked during entire encounter. No cough present. I donned a mask and eye protection throughout entire visit. Prior to donning mask and eye protection, hand hygiene was performed, as well as when it was doffed.  I  was closer than 6 feet, but not for an extended period of time. No obvious exposure to any bodily fluids.    Pain  Chronicity: Pin point Left buttock pain. The current episode started more than 1 month ago. The problem occurs intermittently. The problem has been unchanged. Pertinent negatives include no abdominal pain, chest pain, congestion, coughing, fatigue, fever, headaches, numbness or weakness. Exacerbated by: prolonged sitting. She has tried NSAIDs, ice and heat (TENS) for the symptoms.      PEG Assessment   What number best describes your pain on average in the past week?5  What number best describes how, during the past week, pain has interfered with your enjoyment of life?5  What number best describes how, during the past week, pain has interfered with your general activity?  5    The following portions of the patient's history were reviewed and updated as appropriate: allergies, current medications, past family history, past medical history, past social history, past surgical history and problem list.    Review of Systems   Constitutional: Negative for activity change, fatigue and fever.   HENT: Negative for congestion.    Eyes: Positive for visual disturbance (blurred vision).   Respiratory: Negative for cough and chest tightness.    Cardiovascular: Negative for chest pain.   Gastrointestinal: Positive for diarrhea. Negative for abdominal pain and constipation.   Genitourinary: Negative for difficulty urinating and dysuria.   Musculoskeletal: Positive for back pain.   Neurological: Negative for dizziness, weakness, light-headedness, numbness and headaches.   Psychiatric/Behavioral: Negative for agitation, sleep disturbance and suicidal ideas. The patient is not nervous/anxious.      --  The aforementioned information the Chief Complaint section and above subjective data including any HPI data, and also the Review of Systems data, has been personally reviewed and affirmed.  --     Vitals:    08/30/21 1041  "  BP: 111/75   Pulse: 61   Resp: 16   Temp: 96.3 °F (35.7 °C)   TempSrc: Temporal   SpO2: 98%   Weight: 94.8 kg (209 lb)   Height: 172.7 cm (68\")   PainSc:   3   PainLoc: Back     Objective   Physical Exam  Vitals and nursing note reviewed.   Constitutional:       Appearance: Normal appearance. She is well-developed.   Eyes:      General: Lids are normal.   Cardiovascular:      Rate and Rhythm: Normal rate.   Pulmonary:      Effort: Pulmonary effort is normal.   Musculoskeletal:      Cervical back: Normal range of motion.      Lumbar back: Tenderness and bony tenderness present. Negative right straight leg raise test and negative left straight leg raise test.      Comments: POS Pain over Left pirifrmois  NEG Left SI Compression   Neurological:      Mental Status: She is alert and oriented to person, place, and time.      Gait: Gait normal.   Psychiatric:         Attention and Perception: Attention normal.         Mood and Affect: Mood normal.         Speech: Speech normal.         Behavior: Behavior normal.         Judgment: Judgment normal.       Assessment/Plan   Diagnoses and all orders for this visit:    1. Piriformis syndrome of left side (Primary)    2. Myofasciitis    3. Chronic right-sided low back pain with right-sided sciatica      --- Declines PT referral for piriformis syndrome.  --- Patient states that at this time she would like to hold off on procedures. She states  Her pain is currently bearable.  She requests to follow up with Dr. Sheppard in 3 months or so   --- Follow-up 3 months or sooner with Dr. Sheppard per patient request.      JAN REPORT    As the clinician, I personally reviewed the JAN from 8/30/2021 while the patient was in the office today.     Dictated utilizing Dragon dictation.    "

## 2021-09-02 ENCOUNTER — LAB (OUTPATIENT)
Dept: OTHER | Facility: HOSPITAL | Age: 71
End: 2021-09-02

## 2021-09-02 ENCOUNTER — OFFICE VISIT (OUTPATIENT)
Dept: ONCOLOGY | Facility: CLINIC | Age: 71
End: 2021-09-02

## 2021-09-02 VITALS
OXYGEN SATURATION: 94 % | HEIGHT: 68 IN | WEIGHT: 207.9 LBS | RESPIRATION RATE: 16 BRPM | SYSTOLIC BLOOD PRESSURE: 104 MMHG | HEART RATE: 61 BPM | BODY MASS INDEX: 31.51 KG/M2 | TEMPERATURE: 97.4 F | DIASTOLIC BLOOD PRESSURE: 67 MMHG

## 2021-09-02 DIAGNOSIS — Z85.3 HISTORY OF BREAST CANCER: Primary | ICD-10-CM

## 2021-09-02 DIAGNOSIS — C50.912 RECURRENT MALIGNANT NEOPLASM OF LEFT BREAST (HCC): ICD-10-CM

## 2021-09-02 LAB
ALBUMIN SERPL-MCNC: 4.4 G/DL (ref 3.5–5.2)
ALBUMIN/GLOB SERPL: 1.6 G/DL
ALP SERPL-CCNC: 72 U/L (ref 39–117)
ALT SERPL W P-5'-P-CCNC: 26 U/L (ref 1–33)
ANION GAP SERPL CALCULATED.3IONS-SCNC: 10.3 MMOL/L (ref 5–15)
AST SERPL-CCNC: 30 U/L (ref 1–32)
BASOPHILS # BLD AUTO: 0.06 10*3/MM3 (ref 0–0.2)
BASOPHILS NFR BLD AUTO: 0.8 % (ref 0–1.5)
BILIRUB SERPL-MCNC: 0.7 MG/DL (ref 0–1.2)
BUN SERPL-MCNC: 23 MG/DL (ref 8–23)
BUN/CREAT SERPL: 23 (ref 7–25)
CALCIUM SPEC-SCNC: 9.8 MG/DL (ref 8.6–10.5)
CHLORIDE SERPL-SCNC: 102 MMOL/L (ref 98–107)
CO2 SERPL-SCNC: 28.7 MMOL/L (ref 22–29)
CREAT SERPL-MCNC: 1 MG/DL (ref 0.57–1)
DEPRECATED RDW RBC AUTO: 40.7 FL (ref 37–54)
EOSINOPHIL # BLD AUTO: 0.21 10*3/MM3 (ref 0–0.4)
EOSINOPHIL NFR BLD AUTO: 2.8 % (ref 0.3–6.2)
ERYTHROCYTE [DISTWIDTH] IN BLOOD BY AUTOMATED COUNT: 12.5 % (ref 12.3–15.4)
GFR SERPL CREATININE-BSD FRML MDRD: 55 ML/MIN/1.73
GLOBULIN UR ELPH-MCNC: 2.8 GM/DL
GLUCOSE SERPL-MCNC: 93 MG/DL (ref 65–99)
HCT VFR BLD AUTO: 44.7 % (ref 34–46.6)
HGB BLD-MCNC: 15 G/DL (ref 12–15.9)
IMM GRANULOCYTES # BLD AUTO: 0.01 10*3/MM3 (ref 0–0.05)
IMM GRANULOCYTES NFR BLD AUTO: 0.1 % (ref 0–0.5)
LYMPHOCYTES # BLD AUTO: 3.62 10*3/MM3 (ref 0.7–3.1)
LYMPHOCYTES NFR BLD AUTO: 49 % (ref 19.6–45.3)
MCH RBC QN AUTO: 30.2 PG (ref 26.6–33)
MCHC RBC AUTO-ENTMCNC: 33.6 G/DL (ref 31.5–35.7)
MCV RBC AUTO: 89.9 FL (ref 79–97)
MONOCYTES # BLD AUTO: 0.52 10*3/MM3 (ref 0.1–0.9)
MONOCYTES NFR BLD AUTO: 7 % (ref 5–12)
NEUTROPHILS NFR BLD AUTO: 2.97 10*3/MM3 (ref 1.7–7)
NEUTROPHILS NFR BLD AUTO: 40.3 % (ref 42.7–76)
NRBC BLD AUTO-RTO: 0 /100 WBC (ref 0–0.2)
PLATELET # BLD AUTO: 230 10*3/MM3 (ref 140–450)
PMV BLD AUTO: 9.6 FL (ref 6–12)
POTASSIUM SERPL-SCNC: 3.9 MMOL/L (ref 3.5–5.2)
PROT SERPL-MCNC: 7.2 G/DL (ref 6–8.5)
RBC # BLD AUTO: 4.97 10*6/MM3 (ref 3.77–5.28)
SODIUM SERPL-SCNC: 141 MMOL/L (ref 136–145)
WBC # BLD AUTO: 7.39 10*3/MM3 (ref 3.4–10.8)

## 2021-09-02 PROCEDURE — 99213 OFFICE O/P EST LOW 20 MIN: CPT | Performed by: INTERNAL MEDICINE

## 2021-09-02 PROCEDURE — 85025 COMPLETE CBC W/AUTO DIFF WBC: CPT | Performed by: INTERNAL MEDICINE

## 2021-09-02 PROCEDURE — 36415 COLL VENOUS BLD VENIPUNCTURE: CPT

## 2021-09-02 PROCEDURE — 80053 COMPREHEN METABOLIC PANEL: CPT | Performed by: INTERNAL MEDICINE

## 2021-09-02 NOTE — PROGRESS NOTES
Subjective .     REASON FOR FOLLOWUP:     1.  Recurrent left breast cancer status post bilateral mastectomy on 8/21/2017.  The tumor is stage IA (pT1 cN0 M0), ER/DC positive, HER-2 negative.  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   2.  Oncotype DX score 16, corresponding to a 10 year disease recurrence 10%.       3.  Bone density scan on 9/14/2017 reported normal results.   4.  Patient declined adjuvant endocrine therapy in September 2017.        HISTORY OF PRESENT ILLNESS:  The patient is a 71 y.o. female who is here for annual follow-up evaluation.      Patient reports she is doing well.  She does self examination and reports no lymphadenopathy.  She denies headaches vision changes, no bone pains.  No palpable nodules on her chest at the site of bilateral mastectomy.  Patient has excellent performance status ECOG 0.      She has some chronic back pain, however no worsening.  She also was diagnosed piriformis syndrome/mild fasciitis, and receives injection therapy.  I reviewed the clinic note from Dr. Sheppard dated 7/19/2021.  Patient reports ingestion greatly helped her symptoms.    Patient reports she is fully vaccinated against COVID-19.  She also received a third booster vaccine yesterday!     Laboratory study today on 9/2/2021 reported complete normal CBC, and unremarkable CMP with a creatinine 1.0, normal electrolytes, glucose and liver function panel.      Past Medical History:   Diagnosis Date   • Anxiety    • Arthritis    • Breast cancer (CMS/HCC) 2017    LEFT   • GERD (gastroesophageal reflux disease)    • History of radiation therapy 2006    LT BREAST   • History of skin cancer     SQUAMOUS CELL REMOVED FROM LT UPPER LIP AND RT THUMB   • Hypertension      Past Surgical History:   Procedure Laterality Date   • APPENDECTOMY  2014   • BREAST LUMPECTOMY W/ NEEDLE LOCALIZATION Left 2006   • COLONOSCOPY  2014   • GALLBLADDER  SURGERY     • HYSTERECTOMY Secondary to cervical carcinoma in situ      • MASTECTOMY W/ SENTINEL NODE BIOPSY Bilateral 2017    Procedure: BILATEREAL BREAST MASTECTOMY WITH SENTINEL NODE BIOPSY ON THE LEFT The sentinel lymph node biopsy will only be performed on the left side;  Surgeon: Diallo Eisenberg MD;  [place] [facility] [place] [facility] [place]  Service:    • TOTAL KNEE ARTHROPLASTY Bilateral 2000      *  Bilateral cataract surgery in 2018.     *  Resection of squamous cell carcinoma from her nose bridge and left lower leg in 2018.  *  2020 skull SqCC resected 32 stitches Dr. Egan ,       OB/GYN history: .  Menarche age 12.  Was on Premarin prior to her diagnosis of first breast cancer in .     HEMATOLOGIC/ONCOLOGIC HISTORY: The patient is a 67 y.o. year old female who is here for initial evaluation on 2017with the above-mentioned history.    Ms. Resendez reports that she has healed wound, no infection.  She actually will see Dr. Eisenberg this afternoon for removing the stitches.  She has no plan for reconstruction surgery.  She reports she had her 1st episode of breast cancer back in , at that time she was seen at the [facility].  She had a lumpectomy with a 6 mm DCIS and had radiation therapy followed by 5 years of tamoxifen.     The patient had an abnormal mammogram study in early 2017.  She subsequently had ultrasound-guided left breast biopsy on 2017.  This study reported invasive ductal carcinoma with the largest dimension 11 mm; it was a grade 2 tumor.  Further biomarker study at the [facility] reported      It was reported strongly positive for ER 98% and strongly for [place] 61%, HER-2 was negative, IHC 0 and Ki-67 at 13.7%.      The patient was referred to see Dr. Eisenberg who performed bilateral mastectomy on 2017.  Pathology evaluation from the mastectomy sample reported invasive ductal  carcinoma with the largest dimension 8 mm, grade 2 Francesco score 6/10 with a single focus of invasive carcinoma.  There is also DCIS component.  All margins were clear.  All 3 sentinel lymph nodes were negative for malignancy.  There were also 2 lymph nodes in the mastectomy sample, which were also negative.      We obtained Oncotype DX study which comes back with a low risk score of 16, corresponding to 10 year disease recurrence risk 10%. She also had normal bone density scan.     Discussed with patient for endocrine therapy using aromatase inhibitor and its side effects. The patient reported that she had tremendous hot flashes, sweating when she was taking tamoxifen previously after she was diagnosed with DCIS. She also cited that she already had arthritis with joint pains involving her hands, she does not want to have risk to have worsening arthralgia from the aromatase inhibitors because she is physically active she does not want to have any limitation because of side effects from the aromatase inhibitor. I also presented data from the [facility] web site, and this looked at 5 year and 10 year survival benefit. Endocrine therapy using tamoxifen carries very small benefit, in 5 years it saves 1 out of 10 patients who would die from the disease and in 20 years it saves 2 out of 20 patients from dying of breast cancer. After consideration, the patient reports she does not want to have endocrine therapy at all. She would rather take the risk.    Laboratory study 03/12/2019 reported marginal neutrophil 1750 which is at her baseline level in the past 12-month, which is certainly lower than her counts in 2015 and 2017 above 2400.  She has normal lymphocytes 3420 and monocytes 430 and a total WBC 5940.  She has baseline normal hemoglobin 14.9 and platelets 279,000.    Laboratory study on 9/10/2020 reported complete normal CBC including Hb 15.2, platelets 267,000, WBC 9100 including ANC 4570 lymphocytes 3450.   CMP was unremarkable.      MEDICATIONS    Current Outpatient Medications:   •  aMILoride-hydrochlorothiazide (MODURETIC) 5-50 MG per tablet, TAKE 1 TABLET BY MOUTH  DAILY, Disp: 90 tablet, Rfl: 3  •  atenolol (TENORMIN) 50 MG tablet, TAKE 1 TABLET BY MOUTH  DAILY, Disp: 90 tablet, Rfl: 1  •  buPROPion XL (WELLBUTRIN XL) 150 MG 24 hr tablet, TAKE 1 TABLET BY MOUTH  DAILY, Disp: 90 tablet, Rfl: 3  •  celecoxib (CeleBREX) 200 MG capsule, Take 1 capsule by mouth Daily., Disp: 90 capsule, Rfl: 1  •  escitalopram (LEXAPRO) 10 MG tablet, TAKE 1 TABLET BY MOUTH  DAILY, Disp: 90 tablet, Rfl: 1  •  losartan (COZAAR) 100 MG tablet, TAKE 1 TABLET BY MOUTH  DAILY, Disp: 90 tablet, Rfl: 3  •  Multiple Vitamins-Minerals (CENTRUM SILVER PO), Take  by mouth., Disp: , Rfl:   •  Omega-3 Fatty Acids (FISH OIL) 1000 MG capsule capsule, Take 1,200 mg by mouth Daily With Breakfast., Disp: , Rfl:   •  omeprazole (priLOSEC) 20 MG capsule, TAKE 1 CAPSULE BY MOUTH  DAILY, Disp: 90 capsule, Rfl: 3  •  oxybutynin (DITROPAN) 5 MG tablet, TAKE 1 TABLET BY MOUTH  TWICE DAILY, Disp: 180 tablet, Rfl: 3    ALLERGIES:   No Known Allergies    SOCIAL HISTORY:       Social History     Social History   • Marital status:      Spouse name: N/A   • Number of children: 3   • Years of education: College education      Occupational History   • From [employer]  Retired     Social History Main Topics   • Smoking status: Never Smoker   • Smokeless tobacco: Never Used   • Alcohol use 2 drinks per month    • Drug use: No   • Sexual activity: Defer      Comment:          FAMILY HISTORY:  Family History   Problem Relation Age of Onset   • Heart attack and hypertension  Mother    • Esophageal cancer Mother, diagnosed at age 74, and the past week at age 76 due to cancer.      •      • Alcohol abuse Father    • Diabetes Father      type 2   • Heart attack Father    • Heart failure Father    • Hyperlipidemia Father    • Hypertension  "Father    • Prostate cancerDiagnosed at age 85.   Father,  at age 87 due to multiple medical problems    • Clotting disorder Father    • Birth defects Brother    • Heart attack Brother    • Clotting disorder Daughter    • breast cancer  Paternal great aunt all       Review of Systems   Constitutional: Negative for activity change, appetite change, diaphoresis, fatigue, fever and unexpected weight change.   HENT: Negative for hearing loss, mouth sores, sore throat and trouble swallowing.    Eyes: Negative for photophobia and visual disturbance.   Respiratory: Negative for cough, chest tightness and shortness of breath.    Cardiovascular: Negative for chest pain and leg swelling.   Gastrointestinal: Negative for abdominal pain, blood in stool, diarrhea and nausea.   Endocrine: Negative for cold intolerance and heat intolerance.   Genitourinary: Negative for dysuria and hematuria.   Musculoskeletal: Positive for back pain (chronic). Negative for arthralgias and joint swelling.   Skin: Negative for color change and rash.   Allergic/Immunologic: Negative for immunocompromised state.   Neurological: Negative for dizziness, numbness and headaches.   Hematological: Negative for adenopathy. Does not bruise/bleed easily.   Psychiatric/Behavioral: Negative for behavioral problems and confusion.         Objective    Vitals:    21 1523   BP: 104/67   Pulse: 61   Resp: 16   Temp: 97.4 °F (36.3 °C)   TempSrc: Temporal   SpO2: 94%   Weight: 94.3 kg (207 lb 14.4 oz)   Height: 172.7 cm (67.99\")   PainSc: 0-No pain     Current Status 9/10/2020   ECOG score 0      PHYSICAL EXAM:    GENERAL:  Well-developed, well-nourished female in no acute distress.  Orientated to time place and the people.  SKIN:  Warm, dry without rashes, purpura or petechiae.  HEENT:  Normocephalic.  Wearing mask.   LYMPHATICS:  No cervical, supraclavicular, subclavicular or axillary adenopathy.  CHEST: Normal respiratory effort.  Lungs clear to " auscultation. Good airflow.  CARDIAC:  Regular rate and rhythm without murmurs. Normal S1,S2.  ABDOMEN:  Soft, nontender with no organomegaly or masses.  Bowel sounds normal.  EXTREMITIES:  No clubbing, cyanosis or edema.  NEUROLOGICAL:  Grossly intact.    PSYCHIATRIC:  Normal affect and mood.          RECENT LABS:  Lab Results   Component Value Date    WBC 6.56 05/10/2021    HGB 14.5 05/10/2021    HCT 43.3 05/10/2021    MCV 89.3 05/10/2021     05/10/2021     Lab Results   Component Value Date    NEUTROABS 2.51 05/10/2021     Lab Results   Component Value Date    GLUCOSE 93 09/02/2021    BUN 23 09/02/2021    CREATININE 1.00 09/02/2021    EGFRIFNONA 55 (L) 09/02/2021    EGFRIFAFRI 102 09/11/2020    BCR 23.0 09/02/2021    K 3.9 09/02/2021    CO2 28.7 09/02/2021    CALCIUM 9.8 09/02/2021    PROTENTOTREF 6.7 11/04/2020    ALBUMIN 4.40 09/02/2021    LABIL2 1.4 09/11/2020    AST 30 09/02/2021    ALT 26 09/02/2021           Assessment/Plan      1. History of recurrent left breast cancer, post bilateral mastectomy in August 2017. Stage 1A, strongly ER/VT positive and negative for HER-2.  Has low Oncotype DX score 16, corresponding to a 10-year disease recurrence risk 10%.  Certainly she is not a candidate for adjuvant chemotherapy.   · Patient declined adjuvant endocrine therapy in September 2017.  She prefers follow-up with us in the clinic for monitoring purposes.     · Patient does routine self examination and reports no abnormalities.  · On 9/10/2020 patient had negative review of system.  Physical examination showed no suspicion lesions in axillary or neck area.  There is no evidence for disease recurrence.    · Patient presented today for reevaluation on 9/2/2021, negative review of system.  Physical examination has no evidence of disease recurrence.  Laboratory study shows complete normal CBC, and CMP.    2.  COVID-19 vaccination.  · Patient reports she is fully vaccinated against COVID-19.  She actually  received a third booster dose yesterday on 9/1/2021.  Patient reports no apparent side effects.      PLAN:   1. I will arrange patient fo follow-up with me in 12 months, checking CBC and CMP.       Peyton Keyes MD PhD  9/2/2021      CC: Diallo Eisenberg MD

## 2021-10-18 RX ORDER — ATENOLOL 50 MG/1
50 TABLET ORAL DAILY
Qty: 90 TABLET | Refills: 3 | Status: SHIPPED | OUTPATIENT
Start: 2021-10-18 | End: 2021-11-05

## 2021-10-18 RX ORDER — ESCITALOPRAM OXALATE 10 MG/1
10 TABLET ORAL DAILY
Qty: 90 TABLET | Refills: 3 | Status: SHIPPED | OUTPATIENT
Start: 2021-10-18 | End: 2021-11-05

## 2021-11-05 ENCOUNTER — OFFICE VISIT (OUTPATIENT)
Dept: FAMILY MEDICINE CLINIC | Facility: CLINIC | Age: 71
End: 2021-11-05

## 2021-11-05 VITALS
HEIGHT: 68 IN | OXYGEN SATURATION: 96 % | WEIGHT: 204.6 LBS | DIASTOLIC BLOOD PRESSURE: 64 MMHG | SYSTOLIC BLOOD PRESSURE: 108 MMHG | BODY MASS INDEX: 31.01 KG/M2 | HEART RATE: 56 BPM

## 2021-11-05 DIAGNOSIS — F32.0 MILD MAJOR DEPRESSION (HCC): ICD-10-CM

## 2021-11-05 DIAGNOSIS — I10 ESSENTIAL HYPERTENSION: Primary | ICD-10-CM

## 2021-11-05 PROCEDURE — 99213 OFFICE O/P EST LOW 20 MIN: CPT | Performed by: NURSE PRACTITIONER

## 2021-11-05 RX ORDER — LOSARTAN POTASSIUM 25 MG/1
25 TABLET ORAL DAILY
Qty: 90 TABLET | Refills: 1 | Status: SHIPPED | OUTPATIENT
Start: 2021-11-05 | End: 2021-12-27 | Stop reason: ALTCHOICE

## 2021-11-05 RX ORDER — HYDROCHLOROTHIAZIDE 25 MG/1
TABLET ORAL
COMMUNITY
End: 2021-11-05

## 2021-11-05 RX ORDER — ATENOLOL 25 MG/1
25 TABLET ORAL DAILY
Qty: 90 TABLET | Refills: 1 | Status: SHIPPED | OUTPATIENT
Start: 2021-11-05

## 2021-11-05 RX ORDER — ESCITALOPRAM OXALATE 5 MG/1
5 TABLET ORAL DAILY
Qty: 90 TABLET | Refills: 1 | Status: SHIPPED | OUTPATIENT
Start: 2021-11-05 | End: 2022-07-28 | Stop reason: SDUPTHER

## 2021-11-05 NOTE — PATIENT INSTRUCTIONS
Discharge instructions  Discontinue HCTZ  Decrease losartan to 25 mg daily  Atenolol 25 mg daily  You can try Prilosec every other day  You can try discontinuing oxybutynin to once a day    Lexapro 10 mg alternate with 5 mg for 2 weeks then decrease to 5 mg thereafter  Continue bupropion 150    Call me back some blood pressure readings  In 3 weeks at least 2 days and heart rate  Check blood pressure weekly should be less than 130/80 and greater than 110/70  Plenty of fluids to decrease risk of orthostatic hypotension and falls    Should you get Covid reach out to me immediately if you get sick test early  So we can treat Covid antibodies should you get Covid before you get too sick.

## 2021-11-05 NOTE — PROGRESS NOTES
"Chief Complaint  Hypertension (6 month Follow Up)    Subjective          Alesia Resendez presents to [facility]  Very pleasant patient here today follow-up essential hypertension she has been doing spectacular has lost a substantial amount of weight  Blood pressure his drop is still around 1 awake today but will need to adjust her medications. Lexapro for anxiety  Patient would like to continue  Depression doing well with Lexapro      Objective   Vital Signs:   /64   Pulse 56   Ht 172.7 cm (67.99\")   Wt 92.8 kg (204 lb 9.6 oz)   SpO2 96%   BMI 31.12 kg/m²     Physical Exam   Result Review :                 Assessment and Plan    Diagnoses and all orders for this visit:    1. Essential hypertension (Primary)    2. Mild major depression (HCC)    Other orders  -     losartan (COZAAR) 25 MG tablet; Take 1 tablet by mouth Daily.  Dispense: 90 tablet; Refill: 1  -     atenolol (TENORMIN) 25 MG tablet; Take 1 tablet by mouth Daily.  Dispense: 90 tablet; Refill: 1  -     escitalopram (LEXAPRO) 5 MG tablet; Take 1 tablet by mouth Daily.  Dispense: 90 tablet; Refill: 1        Follow Up   No follow-ups on file.  Patient was given instructions and counseling regarding her condition or for health maintenance advice. Please see specific information pulled into the AVS if appropriate.       Discharge instructions  Discontinue HCTZ  Decrease losartan to 25 mg daily  Atenolol 25 mg daily  You can try Prilosec every other day  You can try discontinuing oxybutynin to once a day    Lexapro 10 mg alternate with 5 mg for 2 weeks then decrease to 5 mg thereafter  Continue bupropion 150    Call me back some blood pressure readings  In 3 weeks at least 2 days and heart rate  Check blood pressure weekly should be less than 130/80 and greater than 110/70  Plenty of fluids to decrease risk of orthostatic hypotension and falls    Should you get Covid reach out to me immediately if you get sick test " early  So we can treat Covid antibodies should you get Covid before you get too sick.

## 2021-11-10 ENCOUNTER — TELEPHONE (OUTPATIENT)
Dept: FAMILY MEDICINE CLINIC | Facility: CLINIC | Age: 71
End: 2021-11-10

## 2021-11-10 RX ORDER — CELECOXIB 200 MG/1
200 CAPSULE ORAL DAILY
Qty: 90 CAPSULE | Refills: 1 | Status: SHIPPED | OUTPATIENT
Start: 2021-11-10 | End: 2022-05-31

## 2021-11-10 NOTE — TELEPHONE ENCOUNTER
Caller: Mal, Alesia M    Relationship: Self    Best call back number: [phone] (H)    Requested Prescriptions:   Requested Prescriptions     Pending Prescriptions Disp Refills   • celecoxib (CeleBREX) 200 MG capsule 90 capsule 1     Sig: Take 1 capsule by mouth Daily.        Pharmacy where request should be sent:  [facility] [address] AT [facility] - [phone] [facility] [phone]   [phone]    Additional details provided by patient: PATIENT STATES THIS MEDICATION WAS NOT CALLED IN WITH THE REST. PLEASE CALL IN TO PHARMACY LISTED ABOVE. PATIENT IS OUT OF THIS MEDICATION. PLEASE ADVISE. THANK YOU.    Does the patient have less than a 3 day supply:  [x] Yes  [] No    Fred Dodge Rep   11/10/21 10:13 EST

## 2021-11-10 NOTE — TELEPHONE ENCOUNTER
Rx Refill Note  Requested Prescriptions     Pending Prescriptions Disp Refills   • celecoxib (CeleBREX) 200 MG capsule 90 capsule 1     Sig: Take 1 capsule by mouth Daily.      Last office visit with prescribing clinician: 11/5/2021      Next office visit with prescribing clinician: 5/13/2022            Fred Gutierrez Rep  11/10/21, 12:28 EST

## 2021-11-19 ENCOUNTER — TELEPHONE (OUTPATIENT)
Dept: FAMILY MEDICINE CLINIC | Facility: CLINIC | Age: 71
End: 2021-11-19

## 2021-12-27 RX ORDER — LOSARTAN POTASSIUM 100 MG/1
TABLET ORAL
Qty: 90 TABLET | Refills: 3 | Status: SHIPPED | OUTPATIENT
Start: 2021-12-27 | End: 2022-07-28

## 2021-12-28 NOTE — PROGRESS NOTES
The patient has a pain history of the following:  Lumbar radiculitis  Anterolisthesis   Lumbar spondylosis  Lumbar disc displacement  Right thigh hematoma   Sacroiliitis      Previous interventions that the patient has received include:   Left piriformis injection 7/19/21 - 50% relief for 2 weeks  Right L5-S1 Transforaminal epidural steroid injection 6/22/2020, 7/20/2020     Pain medications include:  Celebrex qd  Tylenol prn     Previously: Tramadol     Other conservative modalities which the patient reports using include:  Physical therapy Jan 2020  Water aerobics 5 days/week  Daily walking  Ice pack  Heat   TENS     Past Significant Surgical History:  None     HPI:     CHIEF COMPLAINT Back Pain  F/U back pain- patient states that her pain has worsened since her last visit.     Subjective   Alesia Resendez is a 71 y.o. female  who presents for follow-up.  She has a history of chronic low back pain with radiculopathy.  At her last visit she wanted to hold on further procedures since her pain was bearable.  She declined a physical therapy referral for piriformis syndrome..    She has been bothered a lot lately from left lower extremity pain that radiates through her buttocks and down to her heel along the posterior aspect of her leg.  It's worse with sitting and it wakes her up at night.         PEG Assessment   What number best describes your pain on average in the past week?5  What number best describes how, during the past week, pain has interfered with your enjoyment of life?5  What number best describes how, during the past week, pain has interfered with your general activity?  3    REVIEW OF PERTINENT MEDICAL DATA  No new    The following portions of the patient's history were reviewed and updated as appropriate: allergies, current medications, past family history, past medical history, past social history, past surgical history and problem list.    Review of Systems   Constitutional: Negative for activity  "change, chills, fatigue and fever.   HENT: Negative for congestion.    Eyes: Negative for visual disturbance.   Respiratory: Negative for chest tightness and shortness of breath.    Cardiovascular: Negative for chest pain.   Gastrointestinal: Positive for diarrhea. Negative for abdominal pain and constipation.   Genitourinary: Negative for difficulty urinating, dyspareunia and dysuria.   Musculoskeletal: Positive for back pain.   Neurological: Negative for dizziness, weakness, light-headedness, numbness and headaches.   Psychiatric/Behavioral: Positive for sleep disturbance. Negative for agitation, self-injury and suicidal ideas. The patient is not nervous/anxious.      I have reviewed and confirmed the accuracy of the ROS as documented by the MA/LPN/RN Estrellita Sheppard MD    Vitals:    12/30/21 0753   BP: 108/72   Pulse: 59   Temp: 96.6 °F (35.9 °C)   SpO2: 92%   Weight: 97.9 kg (215 lb 12.8 oz)   Height: 172.7 cm (68\")   PainSc:   6   PainLoc: Back         Objective   Physical Exam  Vitals reviewed.   Constitutional:       General: She is not in acute distress.  Pulmonary:      Effort: Pulmonary effort is normal. No respiratory distress.   Musculoskeletal:      Comments: Ambulation: Without assistive device.   Lumbar Exam:  Appearance: Scoliotic curve absent and scarring absent  Palpated over lumbosacral paravertebral regions and transverse processes with negative tenderness appreciated, Bilateral.   Sacroiliac joints are not tender, Bilateral.  Trochanteric bursa are tender, Left.  Straight leg raise is negative radiculopathy, Left.  Slump test is negative  radiculopathy, Left.  Geovanny Alejandrina's test is negative sacroiliac pain, Left.  Positive pain with stretching the piriformis muscle and with palpation of the general location.    Skin:     General: Skin is warm and dry.   Neurological:      General: No focal deficit present.      Mental Status: She is alert.   Psychiatric:         Mood and Affect: Mood normal.   "       Thought Content: Thought content normal.             Assessment/Plan   Diagnoses and all orders for this visit:    1. Piriformis syndrome of left side (Primary)  -     Case Request    2. Myofasciitis  -     Case Request          - Left piriformis injection under fluoroscopy.  Risks discussed including but not limited to bleeding, bruising, infection, damage to surrounding structures, headache, and rare things such as being paralyzed, seizure, stroke, heart attack and death.  The risk of steroid medications include but are not limited to immunosuppression, which can increase the risk of prema an infectious disease as well as decrease the immune response to a vaccine.    - Alesia Resendez reports a pain score of 6.  Given her pain assessment as noted, treatment options were discussed and the following options were decided upon as a follow-up plan to address the patient's pain: steroid injections.      --- Follow-up for left piriformis injection and office visit 4 weeks after.          While examining this patient, I wore protective equipment including a mask, eye shield and gloves.  I washed my hands before and after this patient encounter.  The patient wore a mask throughout the visit as well.     Estrellita Sheppard MD  Pain Management

## 2021-12-30 ENCOUNTER — PREP FOR SURGERY (OUTPATIENT)
Dept: SURGERY | Facility: SURGERY CENTER | Age: 71
End: 2021-12-30

## 2021-12-30 ENCOUNTER — TRANSCRIBE ORDERS (OUTPATIENT)
Dept: SURGERY | Facility: SURGERY CENTER | Age: 71
End: 2021-12-30

## 2021-12-30 ENCOUNTER — OFFICE VISIT (OUTPATIENT)
Dept: PAIN MEDICINE | Facility: CLINIC | Age: 71
End: 2021-12-30

## 2021-12-30 VITALS
HEART RATE: 59 BPM | BODY MASS INDEX: 32.71 KG/M2 | OXYGEN SATURATION: 92 % | TEMPERATURE: 96.6 F | HEIGHT: 68 IN | WEIGHT: 215.8 LBS | DIASTOLIC BLOOD PRESSURE: 72 MMHG | SYSTOLIC BLOOD PRESSURE: 108 MMHG

## 2021-12-30 DIAGNOSIS — G57.02 PIRIFORMIS SYNDROME OF LEFT SIDE: Primary | ICD-10-CM

## 2021-12-30 DIAGNOSIS — M60.9 MYOFASCIITIS: ICD-10-CM

## 2021-12-30 PROCEDURE — 99214 OFFICE O/P EST MOD 30 MIN: CPT | Performed by: ANESTHESIOLOGY

## 2021-12-30 RX ORDER — SODIUM CHLORIDE 0.9 % (FLUSH) 0.9 %
10 SYRINGE (ML) INJECTION EVERY 12 HOURS SCHEDULED
Status: CANCELLED | OUTPATIENT
Start: 2021-12-30

## 2021-12-30 RX ORDER — SODIUM CHLORIDE 0.9 % (FLUSH) 0.9 %
10 SYRINGE (ML) INJECTION AS NEEDED
Status: CANCELLED | OUTPATIENT
Start: 2021-12-30

## 2022-01-03 ENCOUNTER — HOSPITAL ENCOUNTER (OUTPATIENT)
Dept: GENERAL RADIOLOGY | Facility: SURGERY CENTER | Age: 72
Setting detail: HOSPITAL OUTPATIENT SURGERY
End: 2022-01-03

## 2022-01-03 ENCOUNTER — HOSPITAL ENCOUNTER (OUTPATIENT)
Facility: SURGERY CENTER | Age: 72
Setting detail: HOSPITAL OUTPATIENT SURGERY
Discharge: HOME | End: 2022-01-03
Attending: ANESTHESIOLOGY | Admitting: ANESTHESIOLOGY

## 2022-01-03 VITALS
SYSTOLIC BLOOD PRESSURE: 144 MMHG | WEIGHT: 210 LBS | TEMPERATURE: 97.8 F | BODY MASS INDEX: 31.83 KG/M2 | HEART RATE: 55 BPM | HEIGHT: 68 IN | DIASTOLIC BLOOD PRESSURE: 91 MMHG | OXYGEN SATURATION: 94 % | RESPIRATION RATE: 16 BRPM

## 2022-01-03 DIAGNOSIS — G57.02 PIRIFORMIS SYNDROME OF LEFT SIDE: ICD-10-CM

## 2022-01-03 DIAGNOSIS — M60.9 MYOFASCIITIS: ICD-10-CM

## 2022-01-03 PROCEDURE — 20552 NJX 1/MLT TRIGGER POINT 1/2: CPT | Performed by: ANESTHESIOLOGY

## 2022-01-03 PROCEDURE — 25010000002 DEXAMETHASONE SODIUM PHOSPHATE 100 MG/10ML SOLUTION 10 ML VIAL: Performed by: ANESTHESIOLOGY

## 2022-01-03 PROCEDURE — 77002 NEEDLE LOCALIZATION BY XRAY: CPT | Performed by: ANESTHESIOLOGY

## 2022-01-03 PROCEDURE — 77002 NEEDLE LOCALIZATION BY XRAY: CPT

## 2022-01-03 PROCEDURE — 3E023BZ INTRODUCTION OF ANESTHETIC AGENT INTO MUSCLE, PERCUTANEOUS APPROACH: ICD-10-PCS | Performed by: ANESTHESIOLOGY

## 2022-01-03 PROCEDURE — BW11ZZZ FLUOROSCOPY OF ABDOMEN AND PELVIS: ICD-10-PCS | Performed by: ANESTHESIOLOGY

## 2022-01-03 RX ORDER — SODIUM CHLORIDE 0.9 % (FLUSH) 0.9 %
10 SYRINGE (ML) INJECTION EVERY 12 HOURS SCHEDULED
Status: DISCONTINUED | OUTPATIENT
Start: 2022-01-03 | End: 2022-01-03 | Stop reason: HOSPADM

## 2022-01-03 RX ORDER — SODIUM CHLORIDE 0.9 % (FLUSH) 0.9 %
10 SYRINGE (ML) INJECTION AS NEEDED
Status: DISCONTINUED | OUTPATIENT
Start: 2022-01-03 | End: 2022-01-03 | Stop reason: HOSPADM

## 2022-01-03 NOTE — DISCHARGE INSTRUCTIONS
[facility] [place] [facility] - Post-procedure Instructions          --  While there are no absolute restrictions, it is recommended that you do not perform strenuous activity today. In the morning, you may resume your level of activity as before your block.    --  If you have a band-aid at your injection site, please remove it later today. Observe the area for any redness, swelling, pus-like drainage, or a temperature over 101°. If any of these symptoms occur, please call your doctor at [phone]. If after office hours, leave a message and the on-call provider will return your call.    --  Ice may be applied to your injection site. It is recommended you avoid direct heat (heating pad; hot tub) for 1-2 days.    --  Call [facility] [place]-[facility] Management at [phone] if you experience persistent headache, persistent bleeding from the injection site, or severe pain not relieved by heat or oral medication.    --  Do not make important decisions today.    --  Due to the effects of the block and/or the I.V. Sedation, DO NOT drive or operate hazardous machinery for 12 hours.  Local anesthetics may cause numbness after procedure and precautions must be taken with regards to operating equipment as well as with walking, even if ambulating with assistance of another person or with an assistive device.    --  Do not drink alcohol for 12 hours.    -- You may return to work tomorrow, or as directed by your referring doctor.    --  Occasionally you may notice a slight increase in your pain after the procedure. This should start to improve within the next 24-48 hours. Radiofrequency ablation procedure pain may last 3-4 weeks.    --  It may take as long as 3-4 days before you notice a gradual improvement in your pain and/or other symptoms.    -- You may continue to take your prescribed pain medication as needed.    --  Some normal possible side effects of steroid use could include fluid retention, increased blood sugar, dull headache,  increased sweating, increased appetite, mood swings and flushing.    --  Diabetics are recommended to watch their blood glucose level closely for 24-48 hours after the injection.    --  Must stay in PACU for 20 min upon arrival and prove no leg weakness before being discharged.    --  IN THE EVENT OF A LIFE THREATENING EMERGENCY, (CHEST PAIN, BREATHING DIFFICULTIES, PARALYSIS…) YOU SHOULD GO TO YOUR NEAREST EMERGENCY ROOM.    --  You should be contacted by our office within 2-3 days to schedule follow up or next appointment date.  If not contacted within 7 days, please call the office at [phone]

## 2022-01-03 NOTE — OP NOTE
LEFT Piriformis Injection  [facility]      PREOPERATIVE DIAGNOSIS:   Piriformis Syndrome, Myofasciitis    POSTOPERATIVE DIAGNOSIS:  Piriformis Syndrome, Myofasciitis     PROCEDURE:  Piriformis Muscle Injection, with fluoroscopic guidance [facility] [id number], [id number]    PRE-PROCEDURE DISCUSSION WITH PATIENT:    Risks and complications were discussed with the patient prior to starting the procedure and informed consent was obtained.  We discussed various topics including but not limited to bleeding, infection, injury, postprocedural site soreness, painful flareup, worsening of clinical picture, paralysis, coma, and death.     SURGEON:  Estrellita Sheppard MD    REASON FOR PROCEDURE:    Tenderness to palpation over the piriformis muscle with radiating pain into the posterior lower extremity    SEDATION:  Patient declined administration of moderate sedation    ANESTHETIC AGENT:  0.5% Bupivacaine  STEROID AGENT:  15mg Dexamethasone    DESCRIPTON OF PROCEDURE:  After obtaining informed consent, IV access was not obtained in the preoperative area.  The patient was transported to the operative suite and placed in the prone position with a pillow under the pelvic area. EKG, blood pressure, and pulse oximeter were monitored. The lumbosacral area was prepped with Chloraprep and draped in a sterile fashion.     Attention was turned to the left piriformis muscle.  The femoral head on the left side was identified in an AP fluoroscopic image.  The skin and subcutaneous tissue superior to that target was anesthetized with 1% lidocaine. A 22-gauge spinal needle was then advanced percutaneously through the anesthetized skin tract under fluoroscopic guidance into the belly of the piriformis muscle.  After negative aspiration for blood a volume of 3mL of air was injected, producing a pneumogram of the piriformis muscle.  Subsequently, a volume of 5mL consisting of 15mg Dexamethasone with 0.5% Bupivacaine was injected without  resistance.    Vital signs remained stable.  The onset of analgesia was noted.      ESTIMATED BLOOD LOSS:  minimal  SPECIMENS:  None  COMPLICATIONS:  No complications were noted.    TOLERANCE & DISCHARGE CONDITION:    The patient tolerated the procedure well.  The patient was transported to the recovery area without difficulties.  The patient was discharged to home under the care of family in stable and satisfactory condition.    PLAN OF CARE:  1. The patient was given our standard instruction sheet and will resume all medications as per the medication reconciliation sheet.  2. The patient will Return to clinic 3-4 wks.  3. The patient is instructed to keep an account of pain relief after the procedure.

## 2022-01-13 RX ORDER — OMEPRAZOLE 20 MG/1
20 CAPSULE, DELAYED RELEASE ORAL DAILY
Qty: 90 CAPSULE | Refills: 3 | Status: SHIPPED | OUTPATIENT
Start: 2022-01-13 | End: 2022-12-06

## 2022-01-13 RX ORDER — AMILORIDE HYDROCHLORIDE AND HYDROCHLOROTHIAZIDE 5; 50 MG/1; MG/1
TABLET ORAL
Qty: 90 TABLET | Refills: 1 | Status: SHIPPED | OUTPATIENT
Start: 2022-01-13 | End: 2022-03-11

## 2022-01-13 RX ORDER — BUPROPION HYDROCHLORIDE 150 MG/1
150 TABLET ORAL DAILY
Qty: 90 TABLET | Refills: 1 | Status: SHIPPED | OUTPATIENT
Start: 2022-01-13 | End: 2022-06-20 | Stop reason: SDUPTHER

## 2022-01-13 RX ORDER — OXYBUTYNIN CHLORIDE 5 MG/1
TABLET ORAL
Qty: 180 TABLET | Refills: 3 | Status: SHIPPED | OUTPATIENT
Start: 2022-01-13 | End: 2022-12-06

## 2022-01-13 NOTE — TELEPHONE ENCOUNTER
Rx Refill Note  Requested Prescriptions     Pending Prescriptions Disp Refills   • aMILoride-hydrochlorothiazide (MODURETIC) 5-50 MG per tablet [Pharmacy Med Name: AMILoride/HCTZ TAB 5-50MG] 90 tablet 3     Sig: TAKE 1 TABLET BY MOUTH  DAILY   • buPROPion XL (WELLBUTRIN XL) 150 MG 24 hr tablet [Pharmacy Med Name: buPROPion HCl ER (XL) 150 MG Oral Tablet Extended Release 24 Hour] 90 tablet 3     Sig: TAKE 1 TABLET BY MOUTH  DAILY   • oxybutynin (DITROPAN) 5 MG tablet [Pharmacy Med Name: Oxybutynin Chloride 5 MG Oral Tablet] 180 tablet 3     Sig: TAKE 1 TABLET BY MOUTH  TWICE DAILY   • omeprazole (priLOSEC) 20 MG capsule [Pharmacy Med Name: Omeprazole 20 MG Oral Capsule Delayed Release] 90 capsule 3     Sig: TAKE 1 CAPSULE BY MOUTH  DAILY      Last office visit with prescribing clinician: 11/5/2021      Next office visit with prescribing clinician: 5/13/2022   {TIP  Encounters:23}         Fred Gutierrez Rep  01/13/22, 14:01 EST

## 2022-03-07 ENCOUNTER — OFFICE VISIT (OUTPATIENT)
Dept: FAMILY MEDICINE CLINIC | Facility: CLINIC | Age: 72
End: 2022-03-07

## 2022-03-07 VITALS
HEIGHT: 68 IN | BODY MASS INDEX: 33.36 KG/M2 | SYSTOLIC BLOOD PRESSURE: 185 MMHG | RESPIRATION RATE: 12 BRPM | HEART RATE: 65 BPM | OXYGEN SATURATION: 96 % | DIASTOLIC BLOOD PRESSURE: 103 MMHG | WEIGHT: 220.1 LBS | TEMPERATURE: 97 F

## 2022-03-07 DIAGNOSIS — R51.9 FREQUENT HEADACHES: ICD-10-CM

## 2022-03-07 DIAGNOSIS — I10 UNCONTROLLED HYPERTENSION: ICD-10-CM

## 2022-03-07 DIAGNOSIS — G45.9 TIA (TRANSIENT ISCHEMIC ATTACK): Primary | ICD-10-CM

## 2022-03-07 PROCEDURE — 99213 OFFICE O/P EST LOW 20 MIN: CPT | Performed by: NURSE PRACTITIONER

## 2022-03-07 NOTE — PROGRESS NOTES
"Chief Complaint  Fatigue, Memory Loss, and Headache    Subjective          Alesia Resendez presents to [facility]  Pleasant patient here today follow-up hypertension but she reports she has been having headaches last several weeks and last week Tuesday Wednesday had 2 separate occasions of confusion without focal paresthesias slurred speech vision loss but when she wakes up in the morning her left eye vision is decreased then resolves  Said no swallowing difficulties no gait difficulties urination difficulty no chest pain shortness breath nausea vomiting other issues.  She has a headache presently but no confusion does not feel good overall has some fatigue.  Recheck blood pressure similar results    Fatigue  Associated symptoms include fatigue and headaches.   Memory Loss  Associated symptoms include fatigue and headaches.   Headache      Objective   Vital Signs:   BP (!) 185/103   Pulse 65   Temp 97 °F (36.1 °C) (Infrared)   Resp 12   Ht 172.7 cm (68\")   Wt 99.8 kg (220 lb 1.6 oz)   SpO2 96%   BMI 33.47 kg/m²     Physical Exam  Vitals reviewed.   Constitutional:       Appearance: Normal appearance. She is well-developed.      Comments: Alert dylan appears well   HENT:      Head: Normocephalic.      Nose: Nose normal.   Eyes:      General: No scleral icterus.     Conjunctiva/sclera: Conjunctivae normal.      Pupils: Pupils are equal, round, and reactive to light.   Neck:      Thyroid: No thyromegaly.      Vascular: No JVD.   Cardiovascular:      Rate and Rhythm: Normal rate and regular rhythm.      Heart sounds: Normal heart sounds. No murmur heard.    No friction rub. No gallop.   Pulmonary:      Effort: Pulmonary effort is normal. No respiratory distress.      Breath sounds: Normal breath sounds. No stridor. No wheezing or rales.   Abdominal:      General: Bowel sounds are normal. There is no distension.      Palpations: Abdomen is soft.      Tenderness: There is no " abdominal tenderness.      Comments: No hepatosplenomegaly, no ascites,   Musculoskeletal:         General: No tenderness.      Cervical back: Neck supple.   Lymphadenopathy:      Cervical: No cervical adenopathy.   Skin:     General: Skin is warm and dry.      Findings: No erythema or rash.   Neurological:      General: No focal deficit present.      Mental Status: She is alert and oriented to person, place, and time. Mental status is at baseline.      Cranial Nerves: No cranial nerve deficit.      Sensory: No sensory deficit.      Motor: No weakness.      Coordination: Coordination normal.      Gait: Gait normal.      Deep Tendon Reflexes: Reflexes are normal and symmetric. Reflexes normal.      Comments: PERRLA speech clear cognitively intact   Psychiatric:         Mood and Affect: Mood normal.         Behavior: Behavior normal.         Thought Content: Thought content normal.         Judgment: Judgment normal.        Result Review :                 Assessment and Plan    Diagnoses and all orders for this visit:    1. TIA (transient ischemic attack) (Primary)    2. Uncontrolled hypertension    3. Frequent headaches      I spent 20  minutes caring for Alesia on this date of service. This time includes time spent by me in the following activities:obtaining and/or reviewing a separately obtained history, performing a medically appropriate examination and/or evaluation , referring and communicating with other health care professionals , documenting information in the medical record and care coordination  Follow Up   No follow-ups on file.  Patient was given instructions and counseling regarding her condition or for health maintenance advice. Please see specific information pulled into the AVS if appropriate.     Patient is without severe headache confusion presently lethargy  Neuro exam normal she is coherent  As she is without confusion she is presently stable she should go to the emergency room further evaluation of  possible TIA episodes of confusion uncontrolled hypertension she prefers to go to [place] closer to her home and she will do this now she is reliable she will call family member to take her  She is leaving without distress without any focal sign of confusion slurred speech weakness and without chest pain  She will now for further evaluation follow-up with me in a few days or per their instructions  Answers for HPI/ROS submitted by the patient on 3/7/2022  Please describe your symptoms.: Headach for 2 weeks, blurry vision, lethargy  Have you had these symptoms before?: No  How long have you been having these symptoms?: 1-2 weeks  Please list any medications you are currently taking for this condition.: Tylenol  Please describe any probable cause for these symptoms. : ??  What is the primary reason for your visit?: Other

## 2022-03-11 ENCOUNTER — OFFICE VISIT (OUTPATIENT)
Dept: FAMILY MEDICINE CLINIC | Facility: CLINIC | Age: 72
End: 2022-03-11

## 2022-03-11 VITALS
WEIGHT: 216.1 LBS | DIASTOLIC BLOOD PRESSURE: 99 MMHG | OXYGEN SATURATION: 95 % | TEMPERATURE: 96.4 F | BODY MASS INDEX: 32.75 KG/M2 | HEART RATE: 63 BPM | HEIGHT: 68 IN | RESPIRATION RATE: 12 BRPM | SYSTOLIC BLOOD PRESSURE: 155 MMHG

## 2022-03-11 DIAGNOSIS — I67.4 HYPERTENSIVE ENCEPHALOPATHY: Primary | ICD-10-CM

## 2022-03-11 PROCEDURE — 99214 OFFICE O/P EST MOD 30 MIN: CPT | Performed by: NURSE PRACTITIONER

## 2022-03-11 RX ORDER — AMLODIPINE BESYLATE 5 MG/1
5 TABLET ORAL DAILY
COMMUNITY
Start: 2022-03-09 | End: 2022-03-11

## 2022-03-11 RX ORDER — ATORVASTATIN CALCIUM 40 MG/1
40 TABLET, FILM COATED ORAL DAILY
Qty: 90 TABLET | Refills: 3 | Status: SHIPPED | OUTPATIENT
Start: 2022-03-11 | End: 2022-07-28 | Stop reason: SDUPTHER

## 2022-03-11 RX ORDER — ASPIRIN 81 MG/1
81 TABLET, CHEWABLE ORAL DAILY
COMMUNITY
Start: 2022-03-09 | End: 2022-09-10

## 2022-03-11 RX ORDER — ATORVASTATIN CALCIUM 40 MG/1
40 TABLET, FILM COATED ORAL DAILY
Qty: 90 TABLET | Refills: 3 | Status: SHIPPED | OUTPATIENT
Start: 2022-03-11 | End: 2022-03-11 | Stop reason: SDUPTHER

## 2022-03-11 RX ORDER — HYDROCHLOROTHIAZIDE 12.5 MG/1
12.5 TABLET ORAL DAILY
Qty: 30 TABLET | Refills: 1 | Status: SHIPPED | OUTPATIENT
Start: 2022-03-11 | End: 2022-07-28 | Stop reason: SDUPTHER

## 2022-03-11 RX ORDER — ATORVASTATIN CALCIUM 40 MG/1
40 TABLET, FILM COATED ORAL DAILY
COMMUNITY
Start: 2022-03-08 | End: 2022-03-11 | Stop reason: SDUPTHER

## 2022-03-11 RX ORDER — AMLODIPINE BESYLATE 10 MG/1
10 TABLET ORAL DAILY
Qty: 90 TABLET | Refills: 1 | Status: SHIPPED | OUTPATIENT
Start: 2022-03-11 | End: 2022-07-28 | Stop reason: SDUPTHER

## 2022-03-11 NOTE — PATIENT INSTRUCTIONS
Discharge instructions  Increase amlodipine to 5 mg daily  Add HCTZ 12.5 mg daily  Continue check blood pressure daily  If blood pressure  Is consistently above 170 or higher go back to the emergency room for further guidance  if chest pain increased shortness of breath lethargy weakness emergency room otherwise recheck in 1 week\  Your blood pressure will continue to go down over the next several days

## 2022-03-11 NOTE — PROGRESS NOTES
"Chief Complaint  Hypertension (Pt state has had elevated BP for the last two half weeks/was admitted to the hospital on Monday and BP is still elevated)    Subjective          Alesia Resendez presents to [facility]  Okay very pleasant patient here today follow-up hypertensive encephalopathy, patient had very elevated blood pressure, some vision loss and episodes of confusion she went to the emergency room she had extensive evaluation including a negative venogram, CT angiogram head neck as well as an MRI no evidence of stroke, she did have some mild small vessel disease likely ischemic, and some evidence of some mild atherosclerotic disease but no significant stenosis no carotid stenosis.  Her blood pressure medications had been changed,  Losartan increased to 100, amlodipine added,  Lipitor atorvastatin 40 added TriCor discontinued    Several months prior patient had lost quite a bit of weight and she is actually getting hypotensive and she was on a combo diuretic which was discontinued and her medications were adjusted.  She had been doing fine until more recently with the headaches.    Today she is feeling better, but her blood pressures been running the 150s 160s systolic and diastolic around 100 at home  It we need to adjust her medicine today.  She is very organized and has the medications listed out nicely for us to make these changes.    Hypertension        Objective   Vital Signs:   /99   Pulse 63   Temp 96.4 °F (35.8 °C) (Infrared)   Resp 12   Ht 172.7 cm (68\")   Wt 98 kg (216 lb 1.6 oz)   SpO2 95%   BMI 32.86 kg/m²     Physical Exam  Vitals reviewed.   Constitutional:       Appearance: Normal appearance. She is well-developed. She is not ill-appearing or diaphoretic.   HENT:      Head: Normocephalic.      Mouth/Throat:      Pharynx: No oropharyngeal exudate.   Eyes:      Conjunctiva/sclera: Conjunctivae normal.      Pupils: Pupils are equal, round, and reactive " to light.   Neck:      Vascular: No JVD.   Cardiovascular:      Rate and Rhythm: Normal rate and regular rhythm.      Heart sounds: Normal heart sounds. No murmur heard.    No friction rub.   Pulmonary:      Effort: Pulmonary effort is normal. No respiratory distress.      Breath sounds: Normal breath sounds. No wheezing.   Abdominal:      General: Bowel sounds are normal. There is no distension.      Palpations: Abdomen is soft. There is no mass.      Tenderness: There is no abdominal tenderness. There is no guarding.      Hernia: No hernia is present.   Musculoskeletal:         General: No tenderness.      Cervical back: Neck supple.   Lymphadenopathy:      Cervical: No cervical adenopathy.   Skin:     General: Skin is warm and dry.   Neurological:      Mental Status: She is alert and oriented to person, place, and time.   Psychiatric:         Behavior: Behavior normal.         Thought Content: Thought content normal.         Judgment: Judgment normal.        Result Review :                 Assessment and Plan {CC Problem List  Visit Diagnosis   ROS  Review (Popup)  Health Maintenance  Quality  BestPractice  Medications  SmartSets  SnapShot Encounters  Media :23}   Diagnoses and all orders for this visit:    1. Hypertensive encephalopathy (Primary)    Other orders  -     amLODIPine (NORVASC) 10 MG tablet; Take 1 tablet by mouth Daily.  Dispense: 90 tablet; Refill: 1  -     hydroCHLOROthiazide (HYDRODIURIL) 12.5 MG tablet; Take 1 tablet by mouth Daily. For hypertension  Dispense: 30 tablet; Refill: 1        Follow Up   No follow-ups on file.  Patient was given instructions and counseling regarding her condition or for health maintenance advice. Please see specific information pulled into the AVS if appropriate.     Discharge instructions  Increase amlodipine to 5 mg daily  Add HCTZ 12.5 mg daily  Continue check blood pressure daily  If blood pressure  Is consistently above 170 or higher go back to the  emergency room for further guidance  if chest pain increased shortness of breath lethargy weakness emergency room otherwise recheck in 1 week\  Your blood pressure will continue to go down over the next several days

## 2022-03-18 ENCOUNTER — OFFICE VISIT (OUTPATIENT)
Dept: FAMILY MEDICINE CLINIC | Facility: CLINIC | Age: 72
End: 2022-03-18

## 2022-03-18 VITALS
SYSTOLIC BLOOD PRESSURE: 126 MMHG | DIASTOLIC BLOOD PRESSURE: 74 MMHG | TEMPERATURE: 97.1 F | WEIGHT: 215.5 LBS | BODY MASS INDEX: 32.77 KG/M2 | OXYGEN SATURATION: 98 % | HEART RATE: 88 BPM

## 2022-03-18 DIAGNOSIS — I10 HYPERTENSION, UNSPECIFIED TYPE: Primary | ICD-10-CM

## 2022-03-18 DIAGNOSIS — E83.42 HYPOMAGNESEMIA: ICD-10-CM

## 2022-03-18 PROCEDURE — 99213 OFFICE O/P EST LOW 20 MIN: CPT | Performed by: NURSE PRACTITIONER

## 2022-03-18 NOTE — PATIENT INSTRUCTIONS
Discharge instructions continue present plan    Magnesium 200 mg approximately daily    Outpatient  Magnesium  Potassium 6 weeks    Check blood pressure weekly should be less than 130/80 greater than 110/70    Continue aspirin 81 mg a day    Celebrex lowest effective dose shortest time possible    64 ounces of water a day minimum while taking Celebrex    As long as you are well follow-up in 6 months    Send me some blood pressure readings in 3 to 4 weeks

## 2022-03-23 NOTE — PROGRESS NOTES
Chief Complaint  Hypertension    Subjective          Alesia Resendez presents to [facility]  Pleasant patient here today follow-up essential hypertension controlled hyperlipidemia mixed stable  Magnesium is been low in the past she would like this checked,  Fully vaccinated for COVID    Hypertension        Objective   Vital Signs:   /74   Pulse 88   Temp 97.1 °F (36.2 °C)   Wt 97.8 kg (215 lb 8 oz)   SpO2 98%   BMI 32.77 kg/m²            Physical Exam  Vitals reviewed.   Constitutional:       Appearance: She is well-developed.   HENT:      Head: Normocephalic.      Nose: Nose normal.   Eyes:      General: No scleral icterus.     Conjunctiva/sclera: Conjunctivae normal.      Pupils: Pupils are equal, round, and reactive to light.   Neck:      Thyroid: No thyromegaly.      Vascular: No JVD.      Comments: Carotids clear  Cardiovascular:      Rate and Rhythm: Normal rate and regular rhythm.      Heart sounds: Normal heart sounds. No murmur heard.    No friction rub. No gallop.   Pulmonary:      Effort: Pulmonary effort is normal. No respiratory distress.      Breath sounds: Normal breath sounds. No stridor. No wheezing or rales.   Abdominal:      General: Bowel sounds are normal. There is no distension.      Palpations: Abdomen is soft.      Tenderness: There is no abdominal tenderness.      Comments: No hepatosplenomegaly, no ascites,   Musculoskeletal:         General: No tenderness.      Cervical back: Neck supple.   Lymphadenopathy:      Cervical: No cervical adenopathy.   Skin:     General: Skin is warm and dry.      Findings: No erythema or rash.   Neurological:      Mental Status: She is alert and oriented to person, place, and time.      Deep Tendon Reflexes: Reflexes are normal and symmetric.   Psychiatric:         Behavior: Behavior normal.         Thought Content: Thought content normal.         Judgment: Judgment normal.        Result Review :                  Assessment and Plan    Diagnoses and all orders for this visit:    1. Hypertension, unspecified type (Primary)  -     Magnesium; Future  -     Potassium; Future    2. Hypomagnesemia  -     Magnesium; Future  -     Potassium; Future        Follow Up   Return in about 6 months (around 9/18/2022), or Outpatient nonfasting lab 6 weeks recheck in office fasting lab first 6 months.  Patient was given instructions and counseling regarding her condition or for health maintenance advice. Please see specific information pulled into the AVS if appropriate.     Discharge instructions continue present plan    Magnesium 200 mg approximately daily  Celebrex risk-benefit kidney failure stroke heart attack elevated blood pressure strategy to mitigate risk alternatives push fluids need to monitor    Outpatient  Magnesium  Potassium 6 weeks    Check blood pressure weekly should be less than 130/80 greater than 110/70    Continue aspirin 81 mg a day    Celebrex lowest effective dose shortest time possible    64 ounces of water a day minimum while taking Celebrex    As long as you are well follow-up in 6 months    Send me some blood pressure readings in 3 to 4 weeks

## 2022-04-07 DIAGNOSIS — E78.2 MIXED HYPERLIPIDEMIA: Primary | ICD-10-CM

## 2022-04-07 DIAGNOSIS — I10 ESSENTIAL HYPERTENSION: ICD-10-CM

## 2022-04-07 DIAGNOSIS — I10 UNCONTROLLED HYPERTENSION: ICD-10-CM

## 2022-04-07 DIAGNOSIS — Z00.00 HEALTH CARE MAINTENANCE: ICD-10-CM

## 2022-04-07 DIAGNOSIS — R73.03 PREDIABETES: ICD-10-CM

## 2022-04-15 DIAGNOSIS — Z85.3 PERSONAL HISTORY OF BREAST CANCER: ICD-10-CM

## 2022-04-15 DIAGNOSIS — C50.912 RECURRENT MALIGNANT NEOPLASM OF LEFT BREAST: Primary | ICD-10-CM

## 2022-04-21 ENCOUNTER — OFFICE VISIT (OUTPATIENT)
Dept: FAMILY MEDICINE CLINIC | Facility: CLINIC | Age: 72
End: 2022-04-21

## 2022-04-21 VITALS
HEART RATE: 76 BPM | SYSTOLIC BLOOD PRESSURE: 110 MMHG | BODY MASS INDEX: 32.68 KG/M2 | RESPIRATION RATE: 12 BRPM | WEIGHT: 215.61 LBS | OXYGEN SATURATION: 93 % | TEMPERATURE: 97.1 F | HEIGHT: 68 IN | DIASTOLIC BLOOD PRESSURE: 60 MMHG

## 2022-04-21 DIAGNOSIS — R06.2 WHEEZING: ICD-10-CM

## 2022-04-21 DIAGNOSIS — J20.9 ACUTE BRONCHITIS, UNSPECIFIED ORGANISM: Primary | ICD-10-CM

## 2022-04-21 PROCEDURE — 99214 OFFICE O/P EST MOD 30 MIN: CPT | Performed by: NURSE PRACTITIONER

## 2022-04-21 PROCEDURE — 87880 STREP A ASSAY W/OPTIC: CPT | Performed by: NURSE PRACTITIONER

## 2022-04-21 RX ORDER — PREDNISONE 10 MG/1
40 TABLET ORAL DAILY
Qty: 20 TABLET | Refills: 0 | Status: SHIPPED | OUTPATIENT
Start: 2022-04-21 | End: 2022-04-26

## 2022-04-21 RX ORDER — ALBUTEROL SULFATE 90 UG/1
2 AEROSOL, METERED RESPIRATORY (INHALATION) EVERY 4 HOURS PRN
Qty: 6.7 G | Refills: 0 | Status: SHIPPED | OUTPATIENT
Start: 2022-04-21 | End: 2022-09-10

## 2022-04-21 RX ORDER — AZITHROMYCIN 250 MG/1
TABLET, FILM COATED ORAL
Qty: 6 TABLET | Refills: 0 | Status: SHIPPED | OUTPATIENT
Start: 2022-04-21 | End: 2022-07-28

## 2022-04-21 NOTE — PATIENT INSTRUCTIONS
Discharge instructions push fluids plenty of rest Mucinex DM for thick phlegm and cough or the one you are taking is okay,    Prednisone 40 mg a day x5 days and albuterol 2 puffs inhalation 4 times a day until better  Zithromax now to cover any possibility of early pneumonia developing,    Recheck Monday or Tuesday  If greatly better however canceled the appointment  If high fever chest pain shortness of breath weakness lethargy worsening symptoms emergency room    Debrox OTC  Several drops a day right ear x10 days then return to office for irrigation    Unlikely COVID but cannot rule out if your COVID test is negative today it would be very very unlikely to be COVID

## 2022-04-22 LAB
EXPIRATION DATE: ABNORMAL
INTERNAL CONTROL: ABNORMAL
Lab: ABNORMAL
S PYO AG THROAT QL: POSITIVE

## 2022-04-23 LAB
LABCORP SARS-COV-2, NAA 2 DAY TAT: NORMAL
SARS-COV-2 RNA RESP QL NAA+PROBE: NOT DETECTED

## 2022-04-26 ENCOUNTER — TELEPHONE (OUTPATIENT)
Dept: FAMILY MEDICINE CLINIC | Facility: CLINIC | Age: 72
End: 2022-04-26

## 2022-04-26 NOTE — TELEPHONE ENCOUNTER
PATIENT CALLED STATING THAT HAS FINISHED WITH ROUND OF ANTIBIOTICS AND STEROIDS AND STILL WITH CONGESTION, HEADACHES, FATIGUE    PLEASE ADVISE    [phone]

## 2022-04-27 NOTE — TELEPHONE ENCOUNTER
Have her recheck in the office tomorrow  Make sure she is not getting pneumonia  Symptoms of bronchitis can last several weeks  But we should have a recheck to make sure nothing else thank you

## 2022-04-28 ENCOUNTER — OFFICE VISIT (OUTPATIENT)
Dept: FAMILY MEDICINE CLINIC | Facility: CLINIC | Age: 72
End: 2022-04-28

## 2022-04-28 VITALS
DIASTOLIC BLOOD PRESSURE: 69 MMHG | WEIGHT: 218.6 LBS | RESPIRATION RATE: 12 BRPM | HEIGHT: 68 IN | BODY MASS INDEX: 33.13 KG/M2 | SYSTOLIC BLOOD PRESSURE: 107 MMHG | HEART RATE: 60 BPM | OXYGEN SATURATION: 95 % | TEMPERATURE: 97 F

## 2022-04-28 DIAGNOSIS — J20.9 ACUTE BRONCHITIS, UNSPECIFIED ORGANISM: Primary | ICD-10-CM

## 2022-04-28 PROCEDURE — 99213 OFFICE O/P EST LOW 20 MIN: CPT | Performed by: NURSE PRACTITIONER

## 2022-04-28 NOTE — PATIENT INSTRUCTIONS
Discharge instructions  Continue albuterol 4 times a day until better  Add Mucinex 600 mg 2 or 3 times a day  For congested cough okay to take with Coricidin    Saline nasal spray as needed  Menthol as needed  Fluids fluids fluids  Post viral cough may last several more weeks but should you have increased pain increased shortness of breath fever chills  Seek immediate treatment something else is going on    His lungs are doing well no need to follow-up  Otherwise recheck in 7 to 10 days, should your cough last longer than 3 weeks from now we should get a chest x-ray

## 2022-04-28 NOTE — PROGRESS NOTES
"Chief Complaint  Follow-up    Subjective          Alesia Resendez presents to [facility]  Very pleasant patient here today follow-up recent bronchitis, with cough congestion, she finished prednisone helped some feels better but still has pretty considerable cough congested, some phlegm.  A lot of sinus pressure, not feeling better in this area.  With no worsening no increasing shortness of breath or chest pain, finished Zithromax, as she had a positive strep which I believe likely a false positive or carrier state.  Nonetheless her throat is better,  She has had 2 negative COVID test but she cannot smell or taste,   Previously vaccinated for COVID with booster          Objective   Vital Signs:   /69   Pulse 60   Temp 97 °F (36.1 °C) (Infrared)   Resp 12   Ht 172.7 cm (68\")   Wt 99.2 kg (218 lb 9.6 oz)   SpO2 95%   BMI 33.24 kg/m²     Physical Exam  Constitutional:       Appearance: Normal appearance. She is not ill-appearing or diaphoretic.      Comments: Quite pleasant appears well   Eyes:      Conjunctiva/sclera: Conjunctivae normal.      Pupils: Pupils are equal, round, and reactive to light.   Cardiovascular:      Rate and Rhythm: Normal rate and regular rhythm.   Pulmonary:      Comments: Few expiratory wheezes good air exchange no expiratory crackles respirations unlabored respirations less than 20 able to complete full sentences without dyspnea  Skin:     General: Skin is warm and dry.   Neurological:      General: No focal deficit present.      Mental Status: She is alert and oriented to person, place, and time.   Psychiatric:         Mood and Affect: Mood normal.         Behavior: Behavior normal.        Result Review :                 Assessment and Plan    Diagnoses and all orders for this visit:    1. Acute bronchitis, unspecified organism (Primary)               Follow Up   No follow-ups on file.  Patient was given instructions and counseling regarding her " condition or for health maintenance advice. Please see specific information pulled into the AVS if appropriate.   Discharge instructions  Continue albuterol 4 times a day until better  Add Mucinex 600 mg 2 or 3 times a day  For congested cough okay to take with Coricidin    Saline nasal spray as needed  Menthol as needed  Fluids fluids fluids  Post viral cough may last several more weeks but should you have increased pain increased shortness of breath fever chills  Seek immediate treatment something else is going on    His lungs are doing well no need to follow-up  Otherwise recheck in 7 to 10 days, should your cough last longer than 3 weeks from now we should get a chest x-ray

## 2022-05-31 RX ORDER — CELECOXIB 200 MG/1
CAPSULE ORAL
Qty: 90 CAPSULE | Refills: 1 | Status: SHIPPED | OUTPATIENT
Start: 2022-05-31 | End: 2022-08-08 | Stop reason: SDUPTHER

## 2022-05-31 NOTE — TELEPHONE ENCOUNTER
Rx Refill Note  Requested Prescriptions     Pending Prescriptions Disp Refills   • celecoxib (CeleBREX) 200 MG capsule [Pharmacy Med Name: CELECOXIB 200 MG CAPSULE] 90 capsule 1     Sig: TAKE ONE CAPSULE BY MOUTH DAILY      Last office visit with prescribing clinician: 4/28/2022      Next office visit with prescribing clinician: 7/28/2022            Fred Gutierrez Rep  05/31/22, 13:16 EDT

## 2022-06-20 RX ORDER — BUPROPION HYDROCHLORIDE 150 MG/1
150 TABLET ORAL DAILY
Qty: 90 TABLET | Refills: 0 | OUTPATIENT
Start: 2022-06-20

## 2022-06-20 RX ORDER — AMILORIDE HYDROCHLORIDE AND HYDROCHLOROTHIAZIDE 5; 50 MG/1; MG/1
TABLET ORAL
Qty: 90 TABLET | Refills: 0 | OUTPATIENT
Start: 2022-06-20

## 2022-06-20 RX ORDER — BUPROPION HYDROCHLORIDE 150 MG/1
150 TABLET ORAL DAILY
Qty: 90 TABLET | Refills: 0 | Status: SHIPPED | OUTPATIENT
Start: 2022-06-20 | End: 2022-07-28 | Stop reason: SDUPTHER

## 2022-06-20 NOTE — TELEPHONE ENCOUNTER
Rx Refill Note  Requested Prescriptions     Pending Prescriptions Disp Refills   • buPROPion XL (WELLBUTRIN XL) 150 MG 24 hr tablet [Pharmacy Med Name: buPROPion HCl ER (XL) 150 MG Oral Tablet Extended Release 24 Hour] 90 tablet 0     Sig: TAKE 1 TABLET BY MOUTH  DAILY   • aMILoride-hydrochlorothiazide (MODURETIC) 5-50 MG per tablet [Pharmacy Med Name: AMILoride/HCTZ TAB 5-50MG] 90 tablet 0     Sig: TAKE 1 TABLET BY MOUTH  DAILY      Last office visit with prescribing clinician: 4/28/2022      Next office visit with prescribing clinician: 7/28/2022            Diamond Valles MA  06/20/22, 08:26 EDT

## 2022-07-28 ENCOUNTER — OFFICE VISIT (OUTPATIENT)
Dept: FAMILY MEDICINE CLINIC | Facility: CLINIC | Age: 72
End: 2022-07-28

## 2022-07-28 VITALS
BODY MASS INDEX: 34.54 KG/M2 | HEART RATE: 74 BPM | WEIGHT: 227.9 LBS | RESPIRATION RATE: 18 BRPM | DIASTOLIC BLOOD PRESSURE: 72 MMHG | OXYGEN SATURATION: 96 % | SYSTOLIC BLOOD PRESSURE: 128 MMHG | HEIGHT: 68 IN | TEMPERATURE: 96.3 F

## 2022-07-28 DIAGNOSIS — R73.03 PREDIABETES: ICD-10-CM

## 2022-07-28 DIAGNOSIS — R10.10 PAIN OF UPPER ABDOMEN: ICD-10-CM

## 2022-07-28 DIAGNOSIS — E78.2 MIXED HYPERLIPIDEMIA: ICD-10-CM

## 2022-07-28 DIAGNOSIS — I10 ESSENTIAL HYPERTENSION: Primary | ICD-10-CM

## 2022-07-28 DIAGNOSIS — Z00.00 MEDICARE ANNUAL WELLNESS VISIT, SUBSEQUENT: ICD-10-CM

## 2022-07-28 DIAGNOSIS — Z78.0 POST-MENOPAUSAL: ICD-10-CM

## 2022-07-28 PROCEDURE — G0439 PPPS, SUBSEQ VISIT: HCPCS | Performed by: NURSE PRACTITIONER

## 2022-07-28 PROCEDURE — 1160F RVW MEDS BY RX/DR IN RCRD: CPT | Performed by: NURSE PRACTITIONER

## 2022-07-28 PROCEDURE — 1170F FXNL STATUS ASSESSED: CPT | Performed by: NURSE PRACTITIONER

## 2022-07-28 PROCEDURE — 99213 OFFICE O/P EST LOW 20 MIN: CPT | Performed by: NURSE PRACTITIONER

## 2022-07-28 RX ORDER — AMLODIPINE BESYLATE 10 MG/1
10 TABLET ORAL DAILY
Qty: 90 TABLET | Refills: 1 | Status: SHIPPED | OUTPATIENT
Start: 2022-07-28 | End: 2023-01-10

## 2022-07-28 RX ORDER — ATORVASTATIN CALCIUM 40 MG/1
40 TABLET, FILM COATED ORAL DAILY
Qty: 90 TABLET | Refills: 3 | Status: SHIPPED | OUTPATIENT
Start: 2022-07-28 | End: 2023-02-02 | Stop reason: SDUPTHER

## 2022-07-28 RX ORDER — ESCITALOPRAM OXALATE 5 MG/1
5 TABLET ORAL DAILY
Qty: 90 TABLET | Refills: 1 | Status: SHIPPED | OUTPATIENT
Start: 2022-07-28 | End: 2022-12-14

## 2022-07-28 RX ORDER — HYDROCHLOROTHIAZIDE 12.5 MG/1
12.5 TABLET ORAL DAILY
Qty: 90 TABLET | Refills: 1 | Status: SHIPPED | OUTPATIENT
Start: 2022-07-28 | End: 2022-09-10

## 2022-07-28 RX ORDER — LOSARTAN POTASSIUM 25 MG/1
25 TABLET ORAL DAILY
Qty: 90 TABLET | Refills: 1 | Status: SHIPPED | OUTPATIENT
Start: 2022-07-28 | End: 2022-12-14

## 2022-07-28 RX ORDER — BUPROPION HYDROCHLORIDE 150 MG/1
150 TABLET ORAL DAILY
Qty: 90 TABLET | Refills: 1 | Status: SHIPPED | OUTPATIENT
Start: 2022-07-28 | End: 2022-12-14

## 2022-07-28 NOTE — PROGRESS NOTES
"Chief Complaint  Medicare Wellness-subsequent    Subjective        Alesia Resendez presents to [facility]  Pleasant patient here today for Medicare wellness exam as well as follow-up hypertension essential presently controlled nicely, she is doing well no problems blood pressure medication she will need refills, no chest pain shortness of breath no headaches or dizziness she is up-to-date with her oncologist,   She no longer gets Pap smears nothing is changed in her history, and she is not sexually active or at risk and no longer wishes pelvic or Pap smear as she has had a total hysterectomy  Up-to-date with her COVID vaccines,  As well as colonoscopy      Objective   Vital Signs:  /72 (BP Location: Right arm, Patient Position: Sitting, Cuff Size: Adult)   Pulse 74   Temp 96.3 °F (35.7 °C) (Temporal)   Resp 18   Ht 172.7 cm (67.99\")   Wt 103 kg (227 lb 14.4 oz)   SpO2 96%   BMI 34.66 kg/m²   Estimated body mass index is 34.66 kg/m² as calculated from the following:    Height as of this encounter: 172.7 cm (67.99\").    Weight as of this encounter: 103 kg (227 lb 14.4 oz).          Physical Exam  Vitals reviewed.   Constitutional:       General: She is not in acute distress.     Appearance: Normal appearance. She is well-developed. She is not ill-appearing, toxic-appearing or diaphoretic.   HENT:      Head: Normocephalic.      Nose: Nose normal.   Eyes:      General: No scleral icterus.     Conjunctiva/sclera: Conjunctivae normal.      Pupils: Pupils are equal, round, and reactive to light.   Neck:      Thyroid: No thyromegaly.      Vascular: No JVD.      Comments: Carotids clear  Cardiovascular:      Rate and Rhythm: Normal rate and regular rhythm.      Heart sounds: Normal heart sounds. No murmur heard.    No friction rub. No gallop.   Pulmonary:      Effort: Pulmonary effort is normal. No respiratory distress.      Breath sounds: Normal breath sounds. No stridor. No " wheezing or rales.   Abdominal:      General: Bowel sounds are normal. There is no distension.      Palpations: Abdomen is soft.      Tenderness: There is no abdominal tenderness.      Comments: No hepatosplenomegaly, no ascites,   Musculoskeletal:         General: No tenderness.      Cervical back: Neck supple.   Lymphadenopathy:      Cervical: No cervical adenopathy.   Skin:     General: Skin is warm and dry.      Findings: No erythema or rash.   Neurological:      Mental Status: She is alert and oriented to person, place, and time.      Deep Tendon Reflexes: Reflexes are normal and symmetric.   Psychiatric:         Behavior: Behavior normal.         Thought Content: Thought content normal.         Judgment: Judgment normal.        Result Review :                Assessment and Plan   Diagnoses and all orders for this visit:    1. Essential hypertension (Primary)    2. Medicare annual wellness visit, subsequent    3. Post-menopausal  -     DEXA Bone Density Axial    4. Pain of upper abdomen  -     Ambulatory Referral to Gastroenterology    5. Prediabetes    6. Mixed hyperlipidemia    Other orders  -     losartan (COZAAR) 25 MG tablet; Take 1 tablet by mouth Daily.  Dispense: 90 tablet; Refill: 1  -     hydroCHLOROthiazide (HYDRODIURIL) 12.5 MG tablet; Take 1 tablet by mouth Daily. For hypertension  Dispense: 90 tablet; Refill: 1  -     atorvastatin (LIPITOR) 40 MG tablet; Take 1 tablet by mouth Daily.  Dispense: 90 tablet; Refill: 3  -     amLODIPine (NORVASC) 10 MG tablet; Take 1 tablet by mouth Daily.  Dispense: 90 tablet; Refill: 1  -     escitalopram (LEXAPRO) 5 MG tablet; Take 1 tablet by mouth Daily.  Dispense: 90 tablet; Refill: 1  -     buPROPion XL (WELLBUTRIN XL) 150 MG 24 hr tablet; Take 1 tablet by mouth Daily.  Dispense: 90 tablet; Refill: 1             Follow Up   Return lab and visit 6 mos.  Patient was given instructions and counseling regarding her condition or for health maintenance advice. Please  see specific information pulled into the AVS if appropriate.     Healthy diet regular exercise lots of vegetables chicken fish falls precaution,  Discharge instructions consider CT calcium score   Cardiovascular screening   Recommend DEXA scan   Check blood pressure weekly should be less than 130/80 greater than 110/70   Consider Ozempic   Consid ct calcium score    COVID plan antiviral if needed    Recheck 6 mos

## 2022-07-28 NOTE — PATIENT INSTRUCTIONS
Discharge instructions consider CT calcium score   Cardiovascular screening   Recommend DEXA scan   Check blood pressure weekly should be less than 130/80 greater than 110/70   Consider Ozempic   Consid ct calcium score    COVID plan antiviral if needed    Recheck 6 mos

## 2022-07-28 NOTE — PROGRESS NOTES
The ABCs of the Annual Wellness Visit  Subsequent Medicare Wellness Visit    Chief Complaint   Patient presents with   • Medicare Wellness-subsequent      Subjective    History of Present Illness:  Alesia Resendez is a 72 y.o. female who presents for a Subsequent Medicare Wellness Visit.    The following portions of the patient's history were reviewed and   updated as appropriate: allergies, current medications, past family history, past medical history, past social history, past surgical history and problem list.    Compared to one year ago, the patient feels her physical   health is the same.    Compared to one year ago, the patient feels her mental   health is the same.    Recent Hospitalizations:  She was not admitted to the hospital during the last year.       Current Medical Providers:  Patient Care Team:  Epley, James, APRN as PCP - General (Family Medicine)  Virgil Moeller II, MD as Consulting Physician (Radiology)  Diallo Eisenberg MD as Referring Physician (Breast Surgery)  Diallo Eisenberg MD as Referring Physician (Breast Surgery)  Peyton Keyes MD PhD as Consulting Physician (Hematology and Oncology)    Outpatient Medications Prior to Visit   Medication Sig Dispense Refill   • atenolol (TENORMIN) 25 MG tablet Take 1 tablet by mouth Daily. 90 tablet 1   • celecoxib (CeleBREX) 200 MG capsule TAKE ONE CAPSULE BY MOUTH DAILY 90 capsule 1   • Multiple Vitamins-Minerals (CENTRUM SILVER PO) Take  by mouth.     • Omega-3 Fatty Acids (FISH OIL) 1000 MG capsule capsule Take 1,200 mg by mouth Daily With Breakfast.     • omeprazole (priLOSEC) 20 MG capsule TAKE 1 CAPSULE BY MOUTH  DAILY 90 capsule 3   • oxybutynin (DITROPAN) 5 MG tablet TAKE 1 TABLET BY MOUTH  TWICE DAILY 180 tablet 3   • amLODIPine (NORVASC) 10 MG tablet Take 1 tablet by mouth Daily. 90 tablet 1   • atorvastatin (LIPITOR) 40 MG tablet Take 1 tablet by mouth Daily. 90 tablet 3   • buPROPion XL (WELLBUTRIN XL) 150 MG 24 hr tablet  Take 1 tablet by mouth Daily. 90 tablet 0   • escitalopram (LEXAPRO) 5 MG tablet Take 1 tablet by mouth Daily. 90 tablet 1   • hydroCHLOROthiazide (HYDRODIURIL) 12.5 MG tablet Take 1 tablet by mouth Daily. For hypertension 30 tablet 1   • losartan (COZAAR) 100 MG tablet TAKE 1 TABLET BY MOUTH  DAILY (Patient taking differently: Take 25 mg by mouth Daily.) 90 tablet 3   • albuterol sulfate  (90 Base) MCG/ACT inhaler Inhale 2 puffs Every 4 (Four) Hours As Needed for Wheezing. 6.7 g 0   • aspirin 81 MG chewable tablet Chew 81 mg Daily.     • azithromycin (Zithromax Z-Jimy) 250 MG tablet Take 2 tablets the first day, then 1 tablet daily for 4 days. 6 tablet 0     No facility-administered medications prior to visit.       No opioid medication identified on active medication list. I have reviewed chart for other potential  high risk medication/s and harmful drug interactions in the elderly.          Aspirin is on active medication list. Aspirin use is indicated based on review of current medical condition/s. Pros and cons of this therapy have been discussed today. Benefits of this medication outweigh potential harm.  Patient has been encouraged to continue taking this medication.  .      Patient Active Problem List   Diagnosis   • Arthritis   • Atrophic vaginitis   • Depression   • Gastroesophageal reflux disease without esophagitis   • Mixed hyperlipidemia   • Essential hypertension   • Menopause present   • Overactive bladder   • History of colonic polyps   • Rectal hemorrhage   • Proctocele   • Encounter for screening for malignant neoplasm of colon   • Stress incontinence in female   • Recurrent breast cancer (HCC)   • Viral syndrome   • Chronic fatigue   • Snoring   • Tremor   • Community acquired pneumonia   • Chronic right-sided low back pain with sciatica   • Elevated liver function tests   • Piriformis syndrome of left side   • Myofasciitis   • History of breast cancer     Advance Care Planning  Advance  "Directive is on file.  ACP discussion was declined by the patient. Patient has an advance directive in EMR which is still valid.           Objective    Vitals:    07/28/22 0838   BP: 128/72   BP Location: Right arm   Patient Position: Sitting   Cuff Size: Adult   Pulse: 74   Resp: 18   Temp: 96.3 °F (35.7 °C)   TempSrc: Temporal   SpO2: 96%   Weight: 103 kg (227 lb 14.4 oz)   Height: 172.7 cm (67.99\")     Estimated body mass index is 34.66 kg/m² as calculated from the following:    Height as of this encounter: 172.7 cm (67.99\").    Weight as of this encounter: 103 kg (227 lb 14.4 oz).    BMI is >= 30 and <35. (Class 1 Obesity). The following options were offered after discussion;: exercise counseling/recommendations and nutrition counseling/recommendations      Does the patient have evidence of cognitive impairment? No    Physical Exam  Lab Results   Component Value Date    CHLPL 129 07/21/2022    TRIG 232 (H) 07/21/2022    HDL 40 07/21/2022    LDL 52 07/21/2022    VLDL 37 07/21/2022    HGBA1C 5.7 (H) 07/21/2022            HEALTH RISK ASSESSMENT    Smoking Status:  Social History     Tobacco Use   Smoking Status Never Smoker   Smokeless Tobacco Never Used     Alcohol Consumption:  Social History     Substance and Sexual Activity   Alcohol Use No     Fall Risk Screen:    STEADI Fall Risk Assessment was completed, and patient is at LOW risk for falls.Assessment completed on:3/7/2022    Depression Screening:  PHQ-2/PHQ-9 Depression Screening 3/7/2022   Retired Total Score -   Little Interest or Pleasure in Doing Things 0-->not at all   Feeling Down, Depressed or Hopeless 0-->not at all       Health Habits and Functional and Cognitive Screening:  Functional & Cognitive Status 3/7/2022   Do you have difficulty preparing food and eating? No   Do you have difficulty bathing yourself, getting dressed or grooming yourself? No   Do you have difficulty using the toilet? No   Do you have difficulty moving around from place to " place? No   Do you have trouble with steps or getting out of a bed or a chair? No   Current Diet Low Carb Diet   Dental Exam Not up to date   Eye Exam Up to date   Exercise (times per week) 5 times per week   Current Exercises Include Swim Aerobics Class   Current Exercise Activities Include -   Do you need help using the phone?  No   Are you deaf or do you have serious difficulty hearing?  No   Do you need help with transportation? (No Data)   Do you need help shopping? No   Do you need help preparing meals?  No   Do you need help with housework?  No   Do you need help with laundry? No   Do you need help taking your medications? No   Do you need help managing money? No   Do you ever drive or ride in a car without wearing a seat belt? No   Have you felt unusual stress, anger or loneliness in the last month? No   Who do you live with? Alone   If you need help, do you have trouble finding someone available to you? No   Have you been bothered in the last four weeks by sexual problems? No   Do you have difficulty concentrating, remembering or making decisions? (No Data)       Age-appropriate Screening Schedule:  Refer to the list below for future screening recommendations based on patient's age, sex and/or medical conditions. Orders for these recommended tests are listed in the plan section. The patient has been provided with a written plan.    Health Maintenance   Topic Date Due   • MAMMOGRAM  07/05/2019   • DXA SCAN  09/14/2019   • INFLUENZA VACCINE  10/01/2022   • LIPID PANEL  07/21/2023   • TDAP/TD VACCINES (2 - Td or Tdap) 09/03/2025   • ZOSTER VACCINE  Discontinued              Assessment & Plan   CMS Preventative Services Quick Reference  Risk Factors Identified During Encounter  Fall Risk-High or Moderate  Immunizations Discussed/Encouraged (specific Immunizations; Influenza and COVID19  Obesity/Overweight   The above risks/problems have been discussed with the patient.  Follow up actions/plans if indicated are  seen below in the Assessment/Plan Section.  Pertinent information has been shared with the patient in the After Visit Summary.    Diagnoses and all orders for this visit:    1. Essential hypertension (Primary)    2. Medicare annual wellness visit, subsequent    Other orders  -     losartan (COZAAR) 25 MG tablet; Take 1 tablet by mouth Daily.  Dispense: 90 tablet; Refill: 1  -     hydroCHLOROthiazide (HYDRODIURIL) 12.5 MG tablet; Take 1 tablet by mouth Daily. For hypertension  Dispense: 90 tablet; Refill: 1  -     atorvastatin (LIPITOR) 40 MG tablet; Take 1 tablet by mouth Daily.  Dispense: 90 tablet; Refill: 3  -     amLODIPine (NORVASC) 10 MG tablet; Take 1 tablet by mouth Daily.  Dispense: 90 tablet; Refill: 1  -     escitalopram (LEXAPRO) 5 MG tablet; Take 1 tablet by mouth Daily.  Dispense: 90 tablet; Refill: 1  -     buPROPion XL (WELLBUTRIN XL) 150 MG 24 hr tablet; Take 1 tablet by mouth Daily.  Dispense: 90 tablet; Refill: 1        Follow Up:   No follow-ups on file.     An After Visit Summary and PPPS were made available to the patient.

## 2022-08-09 RX ORDER — CELECOXIB 200 MG/1
200 CAPSULE ORAL DAILY
Qty: 90 CAPSULE | Refills: 1 | Status: SHIPPED | OUTPATIENT
Start: 2022-08-09 | End: 2023-02-02 | Stop reason: SDUPTHER

## 2022-08-09 NOTE — TELEPHONE ENCOUNTER
Rx Refill Note  Requested Prescriptions     Pending Prescriptions Disp Refills   • celecoxib (CeleBREX) 200 MG capsule 90 capsule 1     Sig: Take 1 capsule by mouth Daily.      Last office visit with prescribing clinician: 7/28/2022      Next office visit with prescribing clinician: 2/2/2023            RAMONA Mercado  08/09/22, 16:17 EDT

## 2022-08-18 ENCOUNTER — OFFICE VISIT (OUTPATIENT)
Dept: ONCOLOGY | Facility: CLINIC | Age: 72
End: 2022-08-18

## 2022-08-18 ENCOUNTER — LAB (OUTPATIENT)
Dept: OTHER | Facility: HOSPITAL | Age: 72
End: 2022-08-18

## 2022-08-18 VITALS
DIASTOLIC BLOOD PRESSURE: 82 MMHG | HEART RATE: 63 BPM | SYSTOLIC BLOOD PRESSURE: 128 MMHG | OXYGEN SATURATION: 95 % | RESPIRATION RATE: 18 BRPM | WEIGHT: 229.7 LBS | BODY MASS INDEX: 34.81 KG/M2 | HEIGHT: 68 IN | TEMPERATURE: 96.9 F

## 2022-08-18 DIAGNOSIS — Z85.3 HISTORY OF BREAST CANCER: ICD-10-CM

## 2022-08-18 DIAGNOSIS — Z85.3 HISTORY OF LEFT BREAST CANCER: Primary | ICD-10-CM

## 2022-08-18 LAB
ALBUMIN SERPL-MCNC: 4.1 G/DL (ref 3.5–5.2)
ALBUMIN/GLOB SERPL: 1.6 G/DL
ALP SERPL-CCNC: 77 U/L (ref 39–117)
ALT SERPL W P-5'-P-CCNC: 19 U/L (ref 1–33)
ANION GAP SERPL CALCULATED.3IONS-SCNC: 12.4 MMOL/L (ref 5–15)
AST SERPL-CCNC: 30 U/L (ref 1–32)
BASOPHILS # BLD AUTO: 0.06 10*3/MM3 (ref 0–0.2)
BASOPHILS NFR BLD AUTO: 0.9 % (ref 0–1.5)
BILIRUB SERPL-MCNC: 0.4 MG/DL (ref 0–1.2)
BUN SERPL-MCNC: 20 MG/DL (ref 8–23)
BUN/CREAT SERPL: 29 (ref 7–25)
CALCIUM SPEC-SCNC: 9.7 MG/DL (ref 8.6–10.5)
CHLORIDE SERPL-SCNC: 104 MMOL/L (ref 98–107)
CO2 SERPL-SCNC: 28.6 MMOL/L (ref 22–29)
CREAT SERPL-MCNC: 0.69 MG/DL (ref 0.57–1)
DEPRECATED RDW RBC AUTO: 38.9 FL (ref 37–54)
EGFRCR SERPLBLD CKD-EPI 2021: 92.3 ML/MIN/1.73
EOSINOPHIL # BLD AUTO: 0.22 10*3/MM3 (ref 0–0.4)
EOSINOPHIL NFR BLD AUTO: 3.3 % (ref 0.3–6.2)
ERYTHROCYTE [DISTWIDTH] IN BLOOD BY AUTOMATED COUNT: 11.9 % (ref 12.3–15.4)
GLOBULIN UR ELPH-MCNC: 2.6 GM/DL
GLUCOSE SERPL-MCNC: 122 MG/DL (ref 65–99)
HCT VFR BLD AUTO: 42.3 % (ref 34–46.6)
HGB BLD-MCNC: 14.5 G/DL (ref 12–15.9)
IMM GRANULOCYTES # BLD AUTO: 0.01 10*3/MM3 (ref 0–0.05)
IMM GRANULOCYTES NFR BLD AUTO: 0.1 % (ref 0–0.5)
LYMPHOCYTES # BLD AUTO: 2.94 10*3/MM3 (ref 0.7–3.1)
LYMPHOCYTES NFR BLD AUTO: 43.5 % (ref 19.6–45.3)
MCH RBC QN AUTO: 30.3 PG (ref 26.6–33)
MCHC RBC AUTO-ENTMCNC: 34.3 G/DL (ref 31.5–35.7)
MCV RBC AUTO: 88.5 FL (ref 79–97)
MONOCYTES # BLD AUTO: 0.58 10*3/MM3 (ref 0.1–0.9)
MONOCYTES NFR BLD AUTO: 8.6 % (ref 5–12)
NEUTROPHILS NFR BLD AUTO: 2.95 10*3/MM3 (ref 1.7–7)
NEUTROPHILS NFR BLD AUTO: 43.6 % (ref 42.7–76)
NRBC BLD AUTO-RTO: 0 /100 WBC (ref 0–0.2)
PLATELET # BLD AUTO: 215 10*3/MM3 (ref 140–450)
PMV BLD AUTO: 8.8 FL (ref 6–12)
POTASSIUM SERPL-SCNC: 3.6 MMOL/L (ref 3.5–5.2)
PROT SERPL-MCNC: 6.7 G/DL (ref 6–8.5)
RBC # BLD AUTO: 4.78 10*6/MM3 (ref 3.77–5.28)
SODIUM SERPL-SCNC: 145 MMOL/L (ref 136–145)
WBC NRBC COR # BLD: 6.76 10*3/MM3 (ref 3.4–10.8)

## 2022-08-18 PROCEDURE — 80053 COMPREHEN METABOLIC PANEL: CPT | Performed by: INTERNAL MEDICINE

## 2022-08-18 PROCEDURE — 99213 OFFICE O/P EST LOW 20 MIN: CPT | Performed by: INTERNAL MEDICINE

## 2022-08-18 PROCEDURE — 85025 COMPLETE CBC W/AUTO DIFF WBC: CPT | Performed by: INTERNAL MEDICINE

## 2022-08-18 PROCEDURE — 36415 COLL VENOUS BLD VENIPUNCTURE: CPT

## 2022-08-24 ENCOUNTER — PREP FOR SURGERY (OUTPATIENT)
Dept: SURGERY | Facility: SURGERY CENTER | Age: 72
End: 2022-08-24

## 2022-08-24 ENCOUNTER — OFFICE VISIT (OUTPATIENT)
Dept: GASTROENTEROLOGY | Facility: CLINIC | Age: 72
End: 2022-08-24

## 2022-08-24 VITALS
HEIGHT: 68 IN | DIASTOLIC BLOOD PRESSURE: 80 MMHG | WEIGHT: 226.7 LBS | OXYGEN SATURATION: 96 % | SYSTOLIC BLOOD PRESSURE: 130 MMHG | BODY MASS INDEX: 34.36 KG/M2 | TEMPERATURE: 97.5 F | HEART RATE: 64 BPM

## 2022-08-24 DIAGNOSIS — Z80.0 FAMILY HISTORY OF ESOPHAGEAL CANCER: ICD-10-CM

## 2022-08-24 DIAGNOSIS — K21.9 GASTROESOPHAGEAL REFLUX DISEASE, UNSPECIFIED WHETHER ESOPHAGITIS PRESENT: ICD-10-CM

## 2022-08-24 DIAGNOSIS — Z86.010 HISTORY OF COLON POLYPS: Primary | ICD-10-CM

## 2022-08-24 DIAGNOSIS — R19.7 DIARRHEA, UNSPECIFIED TYPE: ICD-10-CM

## 2022-08-24 PROCEDURE — 99214 OFFICE O/P EST MOD 30 MIN: CPT | Performed by: NURSE PRACTITIONER

## 2022-08-24 RX ORDER — SODIUM CHLORIDE, SODIUM LACTATE, POTASSIUM CHLORIDE, CALCIUM CHLORIDE 600; 310; 30; 20 MG/100ML; MG/100ML; MG/100ML; MG/100ML
30 INJECTION, SOLUTION INTRAVENOUS CONTINUOUS PRN
Status: CANCELLED | OUTPATIENT
Start: 2022-08-24

## 2022-08-24 RX ORDER — SODIUM CHLORIDE 0.9 % (FLUSH) 0.9 %
3 SYRINGE (ML) INJECTION EVERY 12 HOURS SCHEDULED
Status: CANCELLED | OUTPATIENT
Start: 2022-08-24

## 2022-08-24 RX ORDER — SODIUM CHLORIDE 0.9 % (FLUSH) 0.9 %
10 SYRINGE (ML) INJECTION AS NEEDED
Status: CANCELLED | OUTPATIENT
Start: 2022-08-24

## 2022-09-06 RX ORDER — ATENOLOL 50 MG/1
50 TABLET ORAL DAILY
Qty: 90 TABLET | Refills: 3 | Status: ON HOLD | OUTPATIENT
Start: 2022-09-06 | End: 2022-10-10

## 2022-09-06 NOTE — TELEPHONE ENCOUNTER
Rx Refill Note  Requested Prescriptions     Pending Prescriptions Disp Refills   • atenolol (TENORMIN) 50 MG tablet [Pharmacy Med Name: Atenolol 50 MG Oral Tablet] 90 tablet 3     Sig: TAKE 1 TABLET BY MOUTH  DAILY      Last office visit with prescribing clinician: 7/28/2022      Next office visit with prescribing clinician: 2/2/2023            Fred Gutierrez Rep  09/06/22, 10:50 EDT

## 2022-09-08 ENCOUNTER — TELEPHONE (OUTPATIENT)
Dept: FAMILY MEDICINE CLINIC | Facility: CLINIC | Age: 72
End: 2022-09-08

## 2022-09-08 NOTE — TELEPHONE ENCOUNTER
Caller: Alesia Resendez    Relationship: Self    Best call back number:[phone]       What was the call regarding: TRIED TO WARM TRANSFER TO THE OFFICE BUT WAS NOT SUCCESSFUL. PATIENT CALLED STATING THAT SHE THINKS THAT SHE MIGHT HAVE FRACTURED HER WRIST. PATIENT WOULD LIKE AN XRAY DONE. PATIENT DOES NOT WANT TO GO TO THE ER.    Do you require a callback: YES

## 2022-09-09 ENCOUNTER — OFFICE VISIT (OUTPATIENT)
Dept: FAMILY MEDICINE CLINIC | Facility: CLINIC | Age: 72
End: 2022-09-09

## 2022-09-09 ENCOUNTER — HOSPITAL ENCOUNTER (OUTPATIENT)
Dept: GENERAL RADIOLOGY | Facility: HOSPITAL | Age: 72
Discharge: HOME | End: 2022-09-09
Admitting: NURSE PRACTITIONER

## 2022-09-09 VITALS
TEMPERATURE: 98.5 F | DIASTOLIC BLOOD PRESSURE: 75 MMHG | RESPIRATION RATE: 12 BRPM | OXYGEN SATURATION: 97 % | HEIGHT: 68 IN | HEART RATE: 64 BPM | BODY MASS INDEX: 35.09 KG/M2 | SYSTOLIC BLOOD PRESSURE: 135 MMHG | WEIGHT: 231.5 LBS

## 2022-09-09 DIAGNOSIS — S69.92XA LEFT WRIST INJURY, INITIAL ENCOUNTER: Primary | ICD-10-CM

## 2022-09-09 PROCEDURE — 73110 X-RAY EXAM OF WRIST: CPT

## 2022-09-09 PROCEDURE — 99213 OFFICE O/P EST LOW 20 MIN: CPT | Performed by: NURSE PRACTITIONER

## 2022-09-09 NOTE — PROGRESS NOTES
"Chief Complaint  Wrist Injury (Pt states fell 1 week ago and landed on her left wrist)    Subjective        Alesia Resendez presents to [facility]  History of Present Illness   The patient presents today for a left wrist injury. She states that she tripped on a step and fell 1 week ago and injured her left wrist. It is still painful and there is no improvement. She notes having a high pain threshold and wants to ensure her wrist heals correctly. The patient notes pain when closing the car door and picking up anything. The pain has caused her to drop things. She reports that the pain radiates into her left arm and is located mainly in her distal radius. She initially attributed the pain to her arthritis.      Objective   Vital Signs:  /75   Pulse 64   Temp 98.5 °F (36.9 °C) (Infrared)   Resp 12   Ht 172.7 cm (68\")   Wt 105 kg (231 lb 8 oz)   SpO2 97%   BMI 35.20 kg/m²   Estimated body mass index is 35.2 kg/m² as calculated from the following:    Height as of this encounter: 172.7 cm (68\").    Weight as of this encounter: 105 kg (231 lb 8 oz).          Physical Exam  Vitals reviewed.   Constitutional:       Appearance: She is well-developed.      Comments: orientated   HENT:      Head: Normocephalic.      Nose: Nose normal.   Eyes:      General: No scleral icterus.     Conjunctiva/sclera: Conjunctivae normal.      Pupils: Pupils are equal, round, and reactive to light.   Neck:      Thyroid: No thyromegaly.      Vascular: No JVD.   Cardiovascular:      Rate and Rhythm: Normal rate and regular rhythm.      Heart sounds: Normal heart sounds. No murmur heard.    No friction rub. No gallop.   Pulmonary:      Effort: Pulmonary effort is normal. No respiratory distress.      Breath sounds: Normal breath sounds. No stridor. No wheezing or rales.   Abdominal:      General: Bowel sounds are normal. There is no distension.      Palpations: Abdomen is soft.      Tenderness: There is no " abdominal tenderness.      Comments: No hepatosplenomegaly, no ascites,   Musculoskeletal:      Left wrist: Tenderness (Mild tenderness distal radius. No ulnar tenderness. No significant metacarpal tenderness or carpal bone tenderness proximal thumb and carpal bones are all negative for any tenderness.) present. No swelling (Proximal thumb and carpal bones are all negative for any  swelling) or deformity. Decreased range of motion (mild to moderately limited range of motion).      Cervical back: Neck supple.      Comments: She has chronically limited modestly range of motion due to arthritis which is her baseline. Lower extremity: no significant increased swelling at her baseline.   Lymphadenopathy:      Cervical: No cervical adenopathy.   Skin:     General: Skin is warm and dry.      Findings: No erythema or rash.      Comments: No significant bruising and redness over her left wrist.    Neurological:      Mental Status: She is alert and oriented to person, place, and time.      Deep Tendon Reflexes: Reflexes are normal and symmetric.   Psychiatric:         Behavior: Behavior normal.         Thought Content: Thought content normal.         Judgment: Judgment normal.        Result Review :                Assessment and Plan   Diagnoses and all orders for this visit:    1. Left wrist injury, initial encounter (Primary)  -     Cancel: XR Wrist 2 View Left  -     XR Wrist 3+ View Left    1. Left wrist injury            -  Suggested temporary over-the- counter wrist splint or Ace wrap, prior to results.       -  We will contact the patient with results, recommendations and/or referral.        -  If her left wrist starts swelling or get red she is to go to the emergency room.        -  Patient to monitor for 2 to 3 weeks and follow-up if symptoms worsen or there is no improvement.     Tylenol as needed or ibuprofen 600  3 times a day as needed severe pain weakness numbness emergency room  Wrist splint  Phone call in 1  week for follow-up    Follow Up   No follow-ups on file.  Patient was given instructions and counseling regarding her condition or for health maintenance advice. Please see specific information pulled into the AVS if appropriate.     Transcribed from ambient dictation for James Epley, APRN by Dipika Gray.  09/09/22   12:02 EDT    Patient verbalized consent to the visit recording.

## 2022-10-05 NOTE — SIGNIFICANT NOTE
Education provided the Patient on the following:    - Nothing to Eat or Drink after MN the night before the procedure    - Avoid red/purple fluids while completing their bowel prep as ordered by physician  -Contact Gastrointerologist office for any questions about specific details regarding colon prep    -You will need to have someone drive you home after your colonoscopy and remain with you for 24 hours after the procedure  - The date of your procedure, your are welcome to have one visitor at bedside or remain within 10-15 minutes of [facility]  -Please wear warm socks when you arrive for your procedure  -Remove all jewelry and leave any valuables before arriving the day of your procedure (all will have to be removed before leaving preop)  -You will need to arrive at 1030 on 10/10 for your procedure    -Feel free to contact us at: [phone] with any additional questions/concerns

## 2022-10-10 ENCOUNTER — ANESTHESIA (OUTPATIENT)
Dept: SURGERY | Facility: SURGERY CENTER | Age: 72
End: 2022-10-10

## 2022-10-10 ENCOUNTER — HOSPITAL ENCOUNTER (OUTPATIENT)
Facility: SURGERY CENTER | Age: 72
Setting detail: HOSPITAL OUTPATIENT SURGERY
Discharge: HOME | End: 2022-10-10
Attending: INTERNAL MEDICINE | Admitting: INTERNAL MEDICINE

## 2022-10-10 ENCOUNTER — ANESTHESIA EVENT (OUTPATIENT)
Dept: SURGERY | Facility: SURGERY CENTER | Age: 72
End: 2022-10-10

## 2022-10-10 VITALS
RESPIRATION RATE: 16 BRPM | BODY MASS INDEX: 33.98 KG/M2 | TEMPERATURE: 97.5 F | HEIGHT: 68 IN | DIASTOLIC BLOOD PRESSURE: 72 MMHG | OXYGEN SATURATION: 95 % | SYSTOLIC BLOOD PRESSURE: 111 MMHG | WEIGHT: 224.2 LBS | HEART RATE: 47 BPM

## 2022-10-10 DIAGNOSIS — R19.7 DIARRHEA, UNSPECIFIED TYPE: ICD-10-CM

## 2022-10-10 DIAGNOSIS — Z80.0 FAMILY HISTORY OF ESOPHAGEAL CANCER: ICD-10-CM

## 2022-10-10 DIAGNOSIS — K21.9 GASTROESOPHAGEAL REFLUX DISEASE, UNSPECIFIED WHETHER ESOPHAGITIS PRESENT: ICD-10-CM

## 2022-10-10 DIAGNOSIS — Z86.010 HISTORY OF COLON POLYPS: ICD-10-CM

## 2022-10-10 PROCEDURE — 88305 TISSUE EXAM BY PATHOLOGIST: CPT | Performed by: INTERNAL MEDICINE

## 2022-10-10 PROCEDURE — 43239 EGD BIOPSY SINGLE/MULTIPLE: CPT | Performed by: INTERNAL MEDICINE

## 2022-10-10 PROCEDURE — 45380 COLONOSCOPY AND BIOPSY: CPT | Performed by: INTERNAL MEDICINE

## 2022-10-10 PROCEDURE — 87081 CULTURE SCREEN ONLY: CPT | Performed by: INTERNAL MEDICINE

## 2022-10-10 PROCEDURE — 0 LIDOCAINE 1 % SOLUTION: Performed by: ANESTHESIOLOGY

## 2022-10-10 PROCEDURE — 25010000002 PROPOFOL 10 MG/ML EMULSION: Performed by: ANESTHESIOLOGY

## 2022-10-10 RX ORDER — FENTANYL CITRATE 50 UG/ML
25 INJECTION, SOLUTION INTRAMUSCULAR; INTRAVENOUS
Status: DISCONTINUED | OUTPATIENT
Start: 2022-10-10 | End: 2022-10-10 | Stop reason: HOSPADM

## 2022-10-10 RX ORDER — SODIUM CHLORIDE 0.9 % (FLUSH) 0.9 %
10 SYRINGE (ML) INJECTION AS NEEDED
Status: DISCONTINUED | OUTPATIENT
Start: 2022-10-10 | End: 2022-10-10 | Stop reason: HOSPADM

## 2022-10-10 RX ORDER — HYDRALAZINE HYDROCHLORIDE 20 MG/ML
10 INJECTION INTRAMUSCULAR; INTRAVENOUS
Status: DISCONTINUED | OUTPATIENT
Start: 2022-10-10 | End: 2022-10-10 | Stop reason: HOSPADM

## 2022-10-10 RX ORDER — SODIUM CHLORIDE, SODIUM LACTATE, POTASSIUM CHLORIDE, CALCIUM CHLORIDE 600; 310; 30; 20 MG/100ML; MG/100ML; MG/100ML; MG/100ML
30 INJECTION, SOLUTION INTRAVENOUS CONTINUOUS PRN
Status: DISCONTINUED | OUTPATIENT
Start: 2022-10-10 | End: 2022-10-10 | Stop reason: HOSPADM

## 2022-10-10 RX ORDER — PROPOFOL 10 MG/ML
VIAL (ML) INTRAVENOUS AS NEEDED
Status: DISCONTINUED | OUTPATIENT
Start: 2022-10-10 | End: 2022-10-10 | Stop reason: SURG

## 2022-10-10 RX ORDER — SODIUM CHLORIDE 0.9 % (FLUSH) 0.9 %
3 SYRINGE (ML) INJECTION EVERY 12 HOURS SCHEDULED
Status: DISCONTINUED | OUTPATIENT
Start: 2022-10-10 | End: 2022-10-10 | Stop reason: HOSPADM

## 2022-10-10 RX ORDER — LIDOCAINE HYDROCHLORIDE 10 MG/ML
INJECTION, SOLUTION INFILTRATION; PERINEURAL AS NEEDED
Status: DISCONTINUED | OUTPATIENT
Start: 2022-10-10 | End: 2022-10-10 | Stop reason: SURG

## 2022-10-10 RX ORDER — LABETALOL HYDROCHLORIDE 5 MG/ML
5 INJECTION, SOLUTION INTRAVENOUS
Status: DISCONTINUED | OUTPATIENT
Start: 2022-10-10 | End: 2022-10-10 | Stop reason: HOSPADM

## 2022-10-10 RX ORDER — ONDANSETRON 2 MG/ML
4 INJECTION INTRAMUSCULAR; INTRAVENOUS ONCE AS NEEDED
Status: DISCONTINUED | OUTPATIENT
Start: 2022-10-10 | End: 2022-10-10 | Stop reason: HOSPADM

## 2022-10-10 RX ORDER — MAGNESIUM HYDROXIDE 1200 MG/15ML
LIQUID ORAL AS NEEDED
Status: DISCONTINUED | OUTPATIENT
Start: 2022-10-10 | End: 2022-10-10 | Stop reason: HOSPADM

## 2022-10-10 RX ADMIN — PROPOFOL INJECTABLE EMULSION 160 MCG/KG/MIN: 10 INJECTION, EMULSION INTRAVENOUS at 11:56

## 2022-10-10 RX ADMIN — SODIUM CHLORIDE, SODIUM LACTATE, POTASSIUM CHLORIDE, AND CALCIUM CHLORIDE 30 ML/HR: .6; .31; .03; .02 INJECTION, SOLUTION INTRAVENOUS at 10:28

## 2022-10-10 RX ADMIN — LIDOCAINE HYDROCHLORIDE 30 MG: 10 INJECTION, SOLUTION INFILTRATION; PERINEURAL at 11:56

## 2022-10-10 RX ADMIN — PROPOFOL 100 MG: 10 INJECTION, EMULSION INTRAVENOUS at 11:56

## 2022-10-10 NOTE — ANESTHESIA POSTPROCEDURE EVALUATION
"Patient: Alesia Resendez    Procedure Summary     Date: 10/10/22 Room / Location: [facility]    Anesthesia Start: 1149 Anesthesia Stop: 1224    Procedures:       ESOPHAGOGASTRODUODENOSCOPY WITH BIOPSY      COLONOSCOPY FOR SCREENING Diagnosis:       History of colon polyps      Diarrhea, unspecified type      Gastroesophageal reflux disease, unspecified whether esophagitis present      Family history of esophageal cancer      (History of colon polyps [Z86.010])      (Diarrhea, unspecified type [R19.7])      (Gastroesophageal reflux disease, unspecified whether esophagitis present [K21.9])      (Family history of esophageal cancer [Z80.0])    Surgeons: Sridhar James MD Provider: Anderson Argueta MD    Anesthesia Type: MAC ASA Status: 3          Anesthesia Type: MAC    Vitals  Vitals Value Taken Time   /72 10/10/22 1243   Temp 36.4 °C (97.5 °F) 10/10/22 1225   Pulse 45 10/10/22 1242   Resp 16 10/10/22 1240   SpO2 97 % 10/10/22 1242   Vitals shown include unvalidated device data.        Post Anesthesia Care and Evaluation    Patient location during evaluation: bedside  Patient participation: complete - patient participated  Level of consciousness: awake  Pain management: adequate    Airway patency: patent  Anesthetic complications: No anesthetic complications    Cardiovascular status: acceptable  Respiratory status: acceptable  Hydration status: acceptable    Comments: */72 (BP Location: Right arm, Patient Position: Lying)   Pulse (!) 47   Temp 36.4 °C (97.5 °F) (Temporal)   Resp 16   Ht 172.7 cm (68\")   Wt 102 kg (224 lb 3.2 oz)   SpO2 95%   BMI 34.09 kg/m²         "

## 2022-10-10 NOTE — H&P
No chief complaint on file.      HPI  Patient today for an EGD due to epigastric pain and GERD and a family history of H. pylori infection.  She also has a family history of esophageal cancer.  Also here for colonoscopy due to diarrhea and history of polyps.         Problem List:    Patient Active Problem List   Diagnosis   • Arthritis   • Atrophic vaginitis   • Depression   • Gastroesophageal reflux disease without esophagitis   • Mixed hyperlipidemia   • Essential hypertension   • Menopause present   • Overactive bladder   • History of colonic polyps   • Rectal hemorrhage   • Proctocele   • Encounter for screening for malignant neoplasm of colon   • Stress incontinence in female   • Recurrent breast cancer (HCC)   • Viral syndrome   • Chronic fatigue   • Snoring   • Tremor   • Community acquired pneumonia   • Chronic right-sided low back pain with sciatica   • Elevated liver function tests   • Piriformis syndrome of left side   • Myofasciitis   • History of breast cancer   • Diarrhea   • Family history of esophageal cancer       Medical History:    Past Medical History:   Diagnosis Date   • Anxiety    • Arthritis    • Breast cancer (HCC) 2017    LEFT   • Cholelithiasis    • Colon polyp 11/22    Removed   • GERD (gastroesophageal reflux disease)    • History of radiation therapy 2006    LT BREAST   • History of skin cancer     SQUAMOUS CELL REMOVED FROM LT UPPER LIP AND RT THUMB   • Hyperlipidemia 2005   • Hypertension    • Irritable bowel syndrome In my 40’s        Social History:    Social History     Socioeconomic History   • Marital status:    • Number of children: 3   • Years of education: College   Tobacco Use   • Smoking status: Never   • Smokeless tobacco: Never   Vaping Use   • Vaping Use: Never used   Substance and Sexual Activity   • Alcohol use: No   • Drug use: No   • Sexual activity: Not Currently     Partners: Male     Birth control/protection: Post-menopausal     Comment:         Family History:   Family History   Problem Relation Age of Onset   • Heart attack Mother    • Esophageal cancer Mother 74   • Stomach cancer Mother    • Heart disease Mother    • Hypertension Mother    • Colon polyps Father    • Alcohol abuse Father            • Diabetes Father         type 2   • Heart attack Father    • Heart failure Father    • Hyperlipidemia Father    • Hypertension Father    • Prostate cancer Father 85   • Clotting disorder Father    • Heart disease Father    • Birth defects Brother    • Heart attack Brother    • Heart disease Brother    • Hypertension Brother    • Crohn's disease Daughter    • Clotting disorder Daughter    • Malig Hyperthermia Neg Hx    • Colon cancer Neg Hx    • Inflammatory bowel disease Neg Hx    • Ulcerative colitis Neg Hx        Surgical History:   Past Surgical History:   Procedure Laterality Date   • ABDOMINAL SURGERY  Hysterectomy    • ANAL FISSURECTOMY     • APPENDECTOMY     • BREAST LUMPECTOMY W/ NEEDLE LOCALIZATION Left    • CATARACT EXTRACTION Bilateral 2018   • CHOLECYSTECTOMY     • COLONOSCOPY     • COLONOSCOPY Left 2021    Procedure: COLONOSCOPY;  Surgeon: Sridhar James MD;  Location: [facility] [place] [facility] OR;  Service: Gastroenterology;  Laterality: Left;   • GALLBLADDER SURGERY     • HYSTERECTOMY     • KNEE SURGERY Right     Knee cap replaced   • MASTECTOMY W/ SENTINEL NODE BIOPSY Bilateral 2017    Procedure: BILATEREAL BREAST MASTECTOMY WITH SENTINEL NODE BIOPSY ON THE LEFT The sentinel lymph node biopsy will only be performed on the left side;  Surgeon: Diallo Eisenberg MD;  Location: [facility];  Service:    • OOPHORECTOMY      BSO   • PIRIFORMUS INJECTION Left 2021    Procedure: Left piriformis injection;  Surgeon: Estrellita Sheppard MD;  Location: [facility] [place] MAIN OR;  Service: Pain Management;  Laterality: Left;   • PIRIFORMUS INJECTION Left 2022    Procedure: PIRIFORMIS INJECTION  - Left;  Surgeon: Estrellita Sheppard MD;  [place] [facility] [place];  Service: Pain Management;  Laterality: Left;   • POSTERIOR REPAIR  05/17/2016    with enterocele repair, midurethral sling, perineoplasty   • TONSILLECTOMY  1970   • TOTAL KNEE ARTHROPLASTY Bilateral 2000, 2001         Current Facility-Administered Medications:   •  lactated ringers infusion, 30 mL/hr, Intravenous, Continuous PRN, Krishna, Rimma G, APRN, Last Rate: 30 mL/hr at 10/10/22 1028, 30 mL/hr at 10/10/22 1028  •  sodium chloride 0.9 % flush 10 mL, 10 mL, Intravenous, PRN, Krishna, Rimma G, APRN  •  sodium chloride 0.9 % flush 3 mL, 3 mL, Intravenous, Q12H, Krishna, Rimma G, APRN  •  sterile water irrigation solution, , , PRN, Sridhar James MD, 1,000 mL at 10/10/22 1107    Allergies: No Known Allergies     The following portions of the patient's history were reviewed by me and updated as appropriate: review of systems, allergies, current medications, past family history, past medical history, past social history, past surgical history and problem list.    Vitals:    10/10/22 1025   BP: 138/95   Pulse: 56   Resp: 16   Temp: 98 °F (36.7 °C)   SpO2: 96%       PHYSICAL EXAM:    CONSTITUTIONAL:  today's vital signs reviewed by me  GASTROINTESTINAL: abdomen is soft nontender nondistended with normal active bowel sounds, no masses are appreciated    Assessment/ Plan  We will proceed today with EGD and colonoscopy.    Risks and benefits as well as alternatives to endoscopic evaluation were explained to the patient and they voiced understanding and wish to proceed.  These risks include but are not limited to the risk of bleeding, perforation, adverse reaction to sedation, and missed lesions.  The patient was given the opportunity to ask questions prior to the endoscopic procedure.

## 2022-10-10 NOTE — ANESTHESIA PREPROCEDURE EVALUATION
Anesthesia Evaluation     no history of anesthetic complications:  NPO Solid Status: > 8 hours  NPO Liquid Status: > 2 hours           Airway   Mallampati: II  Neck ROM: full  no difficulty expected  Dental - normal exam     Pulmonary - normal exam   (+) pneumonia ,   (-) COPD, asthma, sleep apnea, not a smoker    PE comment: nonlabored  Cardiovascular - normal exam  Exercise tolerance: good (4-7 METS)    Rhythm: regular  Rate: normal    (+) hypertension, hyperlipidemia,   (-) valvular problems/murmurs, past MI, CAD, dysrhythmias, angina      Neuro/Psych  (+) tremors, psychiatric history Anxiety and Depression,    (-) seizures, TIA, CVA  GI/Hepatic/Renal/Endo    (+) obesity,  GERD, GI bleeding , liver disease history of elevated LFT,   (-) no renal disease, diabetes, no thyroid disorder    Musculoskeletal     (+) arthralgias, back pain, chronic pain,   Abdominal    Substance History      OB/GYN          Other   arthritis,    history of cancer (L breast cancer, skin cancer (squamous))                  Anesthesia Plan    ASA 3     MAC       Anesthetic plan, risks, benefits, and alternatives have been provided, discussed and informed consent has been obtained with: patient.        CODE STATUS:

## 2022-10-11 LAB
LAB AP CASE REPORT: NORMAL
LAB AP CLINICAL INFORMATION: NORMAL
PATH REPORT.FINAL DX SPEC: NORMAL
PATH REPORT.GROSS SPEC: NORMAL

## 2022-10-12 LAB — UREASE TISS QL: NEGATIVE

## 2022-11-11 ENCOUNTER — OFFICE VISIT (OUTPATIENT)
Dept: GASTROENTEROLOGY | Facility: CLINIC | Age: 72
End: 2022-11-11

## 2022-11-11 ENCOUNTER — HOSPITAL ENCOUNTER (OUTPATIENT)
Dept: BONE DENSITY | Facility: HOSPITAL | Age: 72
Discharge: HOME | End: 2022-11-11
Admitting: NURSE PRACTITIONER

## 2022-11-11 VITALS
HEART RATE: 75 BPM | DIASTOLIC BLOOD PRESSURE: 72 MMHG | WEIGHT: 226.4 LBS | OXYGEN SATURATION: 94 % | BODY MASS INDEX: 34.31 KG/M2 | TEMPERATURE: 98 F | SYSTOLIC BLOOD PRESSURE: 130 MMHG | HEIGHT: 68 IN

## 2022-11-11 DIAGNOSIS — R19.7 DIARRHEA, UNSPECIFIED TYPE: Primary | ICD-10-CM

## 2022-11-11 DIAGNOSIS — K21.9 GASTROESOPHAGEAL REFLUX DISEASE WITHOUT ESOPHAGITIS: ICD-10-CM

## 2022-11-11 DIAGNOSIS — Z86.010 HISTORY OF COLON POLYPS: ICD-10-CM

## 2022-11-11 DIAGNOSIS — Z80.0 FAMILY HISTORY OF ESOPHAGEAL CANCER: ICD-10-CM

## 2022-11-11 PROCEDURE — 77080 DXA BONE DENSITY AXIAL: CPT

## 2022-11-11 PROCEDURE — 99214 OFFICE O/P EST MOD 30 MIN: CPT | Performed by: NURSE PRACTITIONER

## 2022-11-11 RX ORDER — MONTELUKAST SODIUM 4 MG/1
1 TABLET, CHEWABLE ORAL 2 TIMES DAILY
Qty: 180 TABLET | Refills: 3 | Status: ON HOLD | OUTPATIENT
Start: 2022-11-11 | End: 2023-01-18

## 2022-11-11 NOTE — PROGRESS NOTES
"Chief Complaint   Patient presents with   • Diarrhea         History of Present Illness  Patient is a 72-year-old female who presents today for follow-up. She was seen in the office August 2022 for history of colon polyps, diarrhea, GERD.  She underwent EGD and colonoscopy October 2022.  EGD with irregular Z-line, gastric polyps, otherwise normal.  Biopsies were negative for Landin's esophagus.  Colonoscopy with diverticulosis, otherwise normal.  Random biopsies were obtained and were negative for colitis.    Patient presents today for follow-up.  She continues on omeprazole for GERD and reports symptoms are under good control.    She has continued to experience issues with diarrhea and fecal urgency.  Generally has 4-5 bowel movements per day and at times has fecal incontinence.  She has had a cholecystectomy.  She denies any abdominal pain, nausea, or vomiting.     Result Review :       Tissue Pathology Exam (10/10/2022 12:00)   COLONOSCOPY (10/10/2022 11:41)   UPPER GI ENDOSCOPY (10/10/2022 11:44)   Office Visit with Rimma Keller APRN (08/24/2022)   CBC & Differential (08/18/2022 08:15)   Comprehensive Metabolic Panel (08/18/2022 08:15)   Office Visit with Epley, James, APRN (07/28/2022)   Tissue Pathology Exam (05/27/2021 08:19)   COLONOSCOPY (05/27/2021 08:09)   Referral to Gastroenterology for Pain of upper abdomen (07/28/2022)       Vital Signs:   /72   Pulse 75   Temp 98 °F (36.7 °C)   Ht 172.7 cm (68\")   Wt 103 kg (226 lb 6.4 oz)   SpO2 94%   BMI 34.42 kg/m²     Body mass index is 34.42 kg/m².     Physical Exam  Vitals reviewed.   Constitutional:       General: She is not in acute distress.     Appearance: She is well-developed.   HENT:      Head: Normocephalic and atraumatic.   Pulmonary:      Effort: Pulmonary effort is normal. No respiratory distress.   Skin:     General: Skin is dry.      Coloration: Skin is not pale.   Neurological:      Mental Status: She is alert and oriented to " person, place, and time.   Psychiatric:         Thought Content: Thought content normal.           Assessment and Plan    Diagnoses and all orders for this visit:    1. Diarrhea, unspecified type (Primary)    2. History of colon polyps    3. Gastroesophageal reflux disease without esophagitis    4. Family history of esophageal cancer    Other orders  -     colestipol (COLESTID) 1 g tablet; Take 1 tablet by mouth 2 (Two) Times a Day.  Dispense: 180 tablet; Refill: 3         Discussion  Patient presents today for follow-up after EGD and colonoscopy.  EGD with no evidence of Landin's esophagus, history of polyps.  We will plan on EGD at 3-year interval due to history of chronic reflux and family history of colon cancer.  Colonoscopy with no polyps on this exam though she does have a history of large colon polyps.  Next colonoscopy recommended at 3-year interval.    Discussed with patient today feel diarrhea is either secondary to prior cholecystectomy or related to irritable bowel syndrome with diarrhea.  Recommended trial of colestipol.  If this does not lead to any improvement, could consider trial of dicyclomine or Xifaxan.          Follow Up   Return in about 1 year (around 11/11/2023), or if symptoms worsen or fail to improve.    Patient Instructions   For diarrhea, begin trial of colestipol.  Prescription sent to pharmacy.    For surveillance of colon polyps, colonoscopy recommended at 3-year interval, due October 2025.  We will plan on EGD at same time due to chronic reflux and family history of esophageal cancer.

## 2022-11-11 NOTE — PATIENT INSTRUCTIONS
For diarrhea, begin trial of colestipol.  Prescription sent to pharmacy.    For surveillance of colon polyps, colonoscopy recommended at 3-year interval, due October 2025.  We will plan on EGD at same time due to chronic reflux and family history of esophageal cancer.

## 2022-11-23 ENCOUNTER — TELEPHONE (OUTPATIENT)
Dept: FAMILY MEDICINE CLINIC | Facility: CLINIC | Age: 72
End: 2022-11-23

## 2022-11-23 NOTE — TELEPHONE ENCOUNTER
Caller: Elizabeth Resenedz FRANSISCO    Relationship: Self      Best call back number:[phone]     What medication are you requesting: JAMES EPLEY SUGGESTION    What are your current symptoms:NO SYMPTOMS     How long have you been experiencing symptoms:NONE       If a prescription is needed, what is your preferred pharmacy and phone number:  [facility] [id number] - [place], [place] - [address] AT [facility] [phone] [facility] [phone]   [phone]          Additional notes:    ELIZABETH HAD AN URINALYSIS DONE FOR PRE OP 11/23/2022, WAS TOLD THAT SHE HAS AN UTI, SHE WOULD LIKE TO KNOW IF JAMES EPLEY WILL SEND MEDICATION   SHE HAS SURGERY SCHEDULED FOR 12/6/2022

## 2022-11-23 NOTE — TELEPHONE ENCOUNTER
I do not have her urinalysis I looked  She can go to urgent care so they can check her urine and send a culture out  To make sure we give her the correct antibiotic for surgery  That is probably the best option    If she is unable to do this and she does not have any symptoms  She can come in for an evaluation Monday  So we can repeat her urine and culture at    I would rather not blindly treat if she is not symptomatic without a culture especially as she has had recent loose stools

## 2022-11-28 NOTE — PROGRESS NOTES
The patient has a pain history of the following:  Lumbar radiculitis  Anterolisthesis   Lumbar spondylosis  Lumbar disc displacement  Right thigh hematoma   Sacroiliitis      Previous interventions that the patient has received include:   Left piriformis injection 1/3/22, 7/19/21 - 50% relief for 2 weeks  Right L5-S1 Transforaminal epidural steroid injection 6/22/2020, 7/20/2020     Pain medications include:  Celebrex qd  Tylenol prn     Previously: Tramadol     Other conservative modalities which the patient reports using include:  Physical therapy Jan 2020  Water aerobics 5 days/week  Daily walking  Ice pack  Heat   TENS     Past Significant Surgical History:  Knee replacement    HPI:     CHIEF COMPLAINT Back Pain  F/U back pain-PIRIFORMIS INJECTION-  Patient states that she had 90% improvement for 11 months.     Subjective   Alesia Resendez is a 72 y.o. female  who presents to the office for follow-up of procedure.  She completed a Left piriformis injection 1/3/22 for management of left lower extremity pain. Patient reports 90% relief from the procedure for 11.     History of Present Illness  Ms. Resendez presents today with the same complaints of left buttocks pain that radiates into the left lower extremity.  She also complains of right-sided low back pain that has been occurring lately.  That pain does not radiate into her lower extremity.  A few weeks ago she experienced neck pain and went to the ER for evaluation of this pain.  She underwent a CT of her cervical spine which showed degenerative changes, arthritis, and stenosis.  She tells me that her neck pain has resolved at this point in time.    She plans to undergo left knee revision surgery next week since her knee replacement surgery was 22 years ago.       PEG Assessment   What number best describes your pain on average in the past week?5  What number best describes how, during the past week, pain has interfered with your enjoyment of life?5  What  number best describes how, during the past week, pain has interfered with your general activity?  5    REVIEW OF PERTINENT MEDICAL DATA  22 CT Cervical Spine Wo Contrast  Order: [id number]  Narrative    REVIEWING YOUR TEST RESULTS IN [employer] IS NOT A SUBSTITUTE FOR DISCUSSING THOSE RESULTS WITH YOUR HEALTH CARE PROVIDER.   PLEASE CONTACT YOUR PROVIDER VIA SupportPay TO DISCUSS ANY QUESTIONS OR CONCERNS YOU MAY HAVE REGARDING THESE TEST RESULTS.     RADIOLOGY REPORT     FACILITY:  [facility]   UNIT/AGE/GENDER: J.ED  ER      AGE:72 Y          SEX:F   PATIENT NAME/:  ELIZABETH MCFARLAND MARIE    1950   UNIT NUMBER:  [id number]   ACCOUNT NUMBER:  [id number]   ACCESSION NUMBER:  [id number]       CT Cervical Spine without Contrast,     2022 at 1003 hours     HISTORY: Acute neck pain.         TECHNIQUE: Multiple axial images were obtained of the cervical spine. Sagittal and coronal reformats were performed. No IV or intrathecal contrast was given. The radiation dose reduction technique was used to obtain ALARA for the exam.     COMPARISON: CTA of the neck performed on 2022     FINDINGS:     Mild to moderate degenerative disc disease is at C3-C4 and C6-C7. Moderate size anterior osteophytes are noted at C4-C5, C5-C6 and C6-C7. There is multilevel facet arthropathy. There is no evidence of fracture. Alignment is within normal limits.     The prevertebral soft tissues are unremarkable.     C2-3: Left-sided facet disease resulting in mild left neuroforaminal narrowing     C3-4: A broad-based disc osteophyte complex, uncovertebral spurring and facet disease resulting in moderate bilateral neuroforamina narrowing and mild spinal stenosis     C4-5: Bilateral facet disease resulting in mild bilateral neuroforaminal narrowing     C5-6: Left uncovertebral spurring and facet disease resulting in mild left neuroforaminal narrowing     C6-7: A broad-based disc osteophyte complex,  left uncovertebral spur and facet disease resulting in moderate left neuroforaminal narrowing     C7-T1: Negative     IMPRESSION:     1. Multilevel degenerative changes of the cervical spine.   2. No evidence of fracture.     Dictated by: Sandra Esquivel M.D.     Images and Report reviewed and interpreted by: Sandra Esquivel M.D.     <PS><Electronically signed by: Sandra Esquivel M.D.>   11/17/2022 1029     D: 11/17/2022 1025   T: 11/17/2022 1025    6/11/2020 SEVEN-VIEW LUMBAR SPINE WITH LATERAL FLEXION AND EXTENSION     HISTORY: Back pain extending to the right.     There is mild-to-moderate disc space narrowing and spurring throughout  the lumbar spine, particularly at L4-5 and L5-S1. There is extensive  spurring of the posterior facets associated with anterior subluxation at  L4-5 measuring 10 mm in flexion and 7-8 mm in extension. There is  posterior subluxation at L2-3 measuring 6 mm that does not change during  flexion and extension. There is also posterior subluxation at L3-4  measuring 3 or 4 mm that does not change during flexion and extension.     This report was finalized on 6/12/2020 2:37 PM by Dr. Vicente Irby M.D.    The following portions of the patient's history were reviewed and updated as appropriate: allergies, current medications, past family history, past medical history, past social history, past surgical history and problem list.    Review of Systems   Constitutional: Negative for activity change, chills, fatigue and fever.   HENT: Negative for congestion.    Eyes: Negative for visual disturbance.   Respiratory: Negative for chest tightness and shortness of breath.    Cardiovascular: Negative for chest pain.   Gastrointestinal: Positive for diarrhea. Negative for abdominal pain and constipation.   Genitourinary: Negative for difficulty urinating, dyspareunia and dysuria.   Musculoskeletal: Positive for back pain.   Neurological: Negative for dizziness, weakness, light-headedness and  "numbness.   Psychiatric/Behavioral: Negative for agitation and sleep disturbance. The patient is not nervous/anxious.        Vitals:    11/30/22 0934   BP: 112/80   Pulse: 61   Temp: 98.7 °F (37.1 °C)   SpO2: 99%   Weight: 102 kg (225 lb 12.8 oz)   Height: 172.7 cm (68\")   PainSc:   6   PainLoc: Back         Objective   Physical Exam  Vitals reviewed.   Constitutional:       General: She is not in acute distress.  Pulmonary:      Effort: Pulmonary effort is normal. No respiratory distress.   Musculoskeletal:      Comments: Lumbar exam: Positive lower lumbar facet loading on the right side.  Positive tenderness to palpation of the lumbar facets on the right.  Positive tenderness to palpation of the left piriformis muscle.  Positive pain with stretching of the left piriformis muscle.   Skin:     General: Skin is warm and dry.   Neurological:      Mental Status: She is alert.   Psychiatric:         Mood and Affect: Mood normal.         Thought Content: Thought content normal.             Assessment & Plan   Diagnoses and all orders for this visit:    1. Piriformis syndrome of left side (Primary)  -     Case Request    2. Spondylosis of lumbar region without myelopathy or radiculopathy    3. Cervical stenosis of spinal canal    4. Cervical spondylosis without myelopathy        - Reviewed cervical CT showing spondylosis and cervical stenosis.   - Will schedule for left piriformis muscle injection.  Risks discussed including but not limited to bleeding, bruising, infection, damage to surrounding structures, headache, and rare things such as being paralyzed, seizure, stroke, heart attack and death.  The risk of steroid medications include but are not limited to immunosuppression, which can increase the risk of prema an infectious disease as well as decrease the immune response to a vaccine.    - Discussed consideration of right lumbar facet treatment - including diagnostic Medial branch blocks x2 and Radiofrequency " ablation (thermal, non-pulsed) if good results.  She will call if she would like to move forward with this treatment.  Explained I use no steroids with these procedures, so she could likely move forward with this soon after her knee surgery if needed.   - Alesia Resendez reports a pain score of 6.  Given her pain assessment as noted, treatment options were discussed and the following options were decided upon as a follow-up plan to address the patient's pain: steroid injections.    --- Follow-up for left piriformis muscle injection and office visit 4-6 weeks following.              While examining this patient, I wore protective equipment including a mask, eye sheild and gloves.  I washed my hands before and after this patient encounter.  The patient wore a mask throughout the visit as well.     Estrellita Sheppard MD  Pain Management

## 2022-11-30 ENCOUNTER — PREP FOR SURGERY (OUTPATIENT)
Dept: SURGERY | Facility: SURGERY CENTER | Age: 72
End: 2022-11-30

## 2022-11-30 ENCOUNTER — OFFICE VISIT (OUTPATIENT)
Dept: PAIN MEDICINE | Facility: CLINIC | Age: 72
End: 2022-11-30

## 2022-11-30 VITALS
SYSTOLIC BLOOD PRESSURE: 112 MMHG | WEIGHT: 225.8 LBS | DIASTOLIC BLOOD PRESSURE: 80 MMHG | TEMPERATURE: 98.7 F | BODY MASS INDEX: 34.22 KG/M2 | HEIGHT: 68 IN | OXYGEN SATURATION: 99 % | HEART RATE: 61 BPM

## 2022-11-30 DIAGNOSIS — G57.02 PIRIFORMIS SYNDROME OF LEFT SIDE: Primary | ICD-10-CM

## 2022-11-30 DIAGNOSIS — M47.816 SPONDYLOSIS OF LUMBAR REGION WITHOUT MYELOPATHY OR RADICULOPATHY: ICD-10-CM

## 2022-11-30 DIAGNOSIS — M48.02 CERVICAL STENOSIS OF SPINAL CANAL: ICD-10-CM

## 2022-11-30 DIAGNOSIS — M47.812 CERVICAL SPONDYLOSIS WITHOUT MYELOPATHY: ICD-10-CM

## 2022-11-30 PROCEDURE — 99214 OFFICE O/P EST MOD 30 MIN: CPT | Performed by: ANESTHESIOLOGY

## 2022-11-30 RX ORDER — SODIUM CHLORIDE 0.9 % (FLUSH) 0.9 %
10 SYRINGE (ML) INJECTION AS NEEDED
Status: CANCELLED | OUTPATIENT
Start: 2022-11-30

## 2022-11-30 RX ORDER — SODIUM CHLORIDE 0.9 % (FLUSH) 0.9 %
10 SYRINGE (ML) INJECTION EVERY 12 HOURS SCHEDULED
Status: CANCELLED | OUTPATIENT
Start: 2022-11-30

## 2022-11-30 RX ORDER — HYDROCHLOROTHIAZIDE 25 MG/1
25 TABLET ORAL
COMMUNITY
Start: 2022-11-23 | End: 2023-01-17 | Stop reason: SDUPTHER

## 2022-11-30 RX ORDER — SULFAMETHOXAZOLE AND TRIMETHOPRIM 800; 160 MG/1; MG/1
1 TABLET ORAL
COMMUNITY
Start: 2022-11-25 | End: 2022-12-05

## 2022-12-02 ENCOUNTER — TRANSCRIBE ORDERS (OUTPATIENT)
Dept: SURGERY | Facility: SURGERY CENTER | Age: 72
End: 2022-12-02

## 2022-12-02 DIAGNOSIS — Z41.9 SURGERY, ELECTIVE: Primary | ICD-10-CM

## 2022-12-06 RX ORDER — OMEPRAZOLE 20 MG/1
20 CAPSULE, DELAYED RELEASE ORAL DAILY
Qty: 90 CAPSULE | Refills: 3 | Status: SHIPPED | OUTPATIENT
Start: 2022-12-06

## 2022-12-06 RX ORDER — OXYBUTYNIN CHLORIDE 5 MG/1
TABLET ORAL
Qty: 180 TABLET | Refills: 3 | Status: SHIPPED | OUTPATIENT
Start: 2022-12-06

## 2022-12-14 RX ORDER — ESCITALOPRAM OXALATE 5 MG/1
5 TABLET ORAL DAILY
Qty: 90 TABLET | Refills: 3 | Status: SHIPPED | OUTPATIENT
Start: 2022-12-14

## 2022-12-14 RX ORDER — BUPROPION HYDROCHLORIDE 150 MG/1
150 TABLET ORAL DAILY
Qty: 90 TABLET | Refills: 3 | Status: SHIPPED | OUTPATIENT
Start: 2022-12-14

## 2022-12-14 RX ORDER — LOSARTAN POTASSIUM 25 MG/1
25 TABLET ORAL DAILY
Qty: 90 TABLET | Refills: 3 | Status: SHIPPED | OUTPATIENT
Start: 2022-12-14

## 2022-12-14 NOTE — TELEPHONE ENCOUNTER
Rx Refill Note  Requested Prescriptions     Pending Prescriptions Disp Refills   • buPROPion XL (WELLBUTRIN XL) 150 MG 24 hr tablet [Pharmacy Med Name: buPROPion HCl ER (XL) 150 MG Oral Tablet Extended Release 24 Hour] 90 tablet 3     Sig: TAKE 1 TABLET BY MOUTH  DAILY   • escitalopram (LEXAPRO) 5 MG tablet [Pharmacy Med Name: Escitalopram Oxalate 5 MG Oral Tablet] 90 tablet 3     Sig: TAKE 1 TABLET BY MOUTH  DAILY   • losartan (COZAAR) 25 MG tablet [Pharmacy Med Name: Losartan Potassium 25 MG Oral Tablet] 90 tablet 3     Sig: TAKE 1 TABLET BY MOUTH  DAILY      Last office visit with prescribing clinician: 9/9/2022   Last telemedicine visit with prescribing clinician: 1/26/2023   Next office visit with prescribing clinician: 2/2/2023                         Would you like a call back once the refill request has been completed: [] Yes [] No    If the office needs to give you a call back, can they leave a voicemail: [] Yes [] No    Fred Gutierrez Rep  12/14/22, 11:11 EST

## 2022-12-15 ENCOUNTER — TELEPHONE (OUTPATIENT)
Dept: FAMILY MEDICINE CLINIC | Facility: CLINIC | Age: 72
End: 2022-12-15

## 2022-12-28 ENCOUNTER — TELEPHONE (OUTPATIENT)
Dept: FAMILY MEDICINE CLINIC | Facility: CLINIC | Age: 72
End: 2022-12-28
Payer: MEDICARE

## 2022-12-28 DIAGNOSIS — Z00.00 HEALTH CARE MAINTENANCE: ICD-10-CM

## 2022-12-28 DIAGNOSIS — R73.03 PREDIABETES: ICD-10-CM

## 2022-12-28 DIAGNOSIS — E78.2 MIXED HYPERLIPIDEMIA: ICD-10-CM

## 2022-12-28 DIAGNOSIS — I10 ESSENTIAL HYPERTENSION: Primary | ICD-10-CM

## 2022-12-28 DIAGNOSIS — I10 HYPERTENSION, UNSPECIFIED TYPE: ICD-10-CM

## 2023-01-10 RX ORDER — AMLODIPINE BESYLATE 10 MG/1
10 TABLET ORAL DAILY
Qty: 90 TABLET | Refills: 3 | Status: SHIPPED | OUTPATIENT
Start: 2023-01-10

## 2023-01-16 NOTE — DISCHARGE INSTRUCTIONS
[facility] - Post-procedure Instructions          --  While there are no absolute restrictions, it is recommended that you do not perform strenuous activity today. In the morning, you may resume your level of activity as before your block.    --  If you have a band-aid at your injection site, please remove it later today. Observe the area for any redness, swelling, pus-like drainage, or a temperature over 101°. If any of these symptoms occur, please call your doctor at [phone]. If after office hours, leave a message and the on-call provider will return your call.    --  Ice may be applied to your injection site. It is recommended you avoid direct heat (heating pad; hot tub) for 1-2 days.    --  Call [facility] at [phone] if you experience persistent headache, persistent bleeding from the injection site, or severe pain not relieved by heat or oral medication.    --  Do not make important decisions today.    --  Due to the effects of the block and/or the I.V. Sedation, DO NOT drive or operate hazardous machinery for 12 hours.  Local anesthetics may cause numbness after procedure and precautions must be taken with regards to operating equipment as well as with walking, even if ambulating with assistance of another person or with an assistive device.    --  Do not drink alcohol for 12 hours.    -- You may return to work tomorrow, or as directed by your referring doctor.    --  Occasionally you may notice a slight increase in your pain after the procedure. This should start to improve within the next 24-48 hours. Radiofrequency ablation procedure pain may last 3-4 weeks.    --  It may take as long as 3-4 days before you notice a gradual improvement in your pain and/or other symptoms.    -- You may continue to take your prescribed pain medication as needed.    --  Some normal possible side effects of steroid use could include fluid retention, increased blood sugar, dull headache,  increased sweating, increased appetite, mood swings and flushing.    --  Diabetics are recommended to watch their blood glucose level closely for 24-48 hours after the injection.    --  Must stay in PACU for 20 min upon arrival and prove no leg weakness before being discharged.    --  IN THE EVENT OF A LIFE THREATENING EMERGENCY, (CHEST PAIN, BREATHING DIFFICULTIES, PARALYSIS…) YOU SHOULD GO TO YOUR NEAREST EMERGENCY ROOM.    --  You should be contacted by our office within 2-3 days to schedule follow up or next appointment date.  If not contacted within 7 days, please call the office at [phone]

## 2023-01-16 NOTE — H&P
[facility]   HISTORY AND PHYSICAL    Patient Name: Alesia Resendez  : 1950  MRN: [id number]  Primary Care Physician:  Epley, James, APRN  Date of admission: 2023    Subjective   Subjective     Chief Complaint: Left lower extremity pain    History of Present Illness  She has chronic left buttocks pain that radiates into the left lower extremity.      Review of Systems   Constitutional: Negative for chills and fever.   Respiratory: Negative for cough and shortness of breath.    Musculoskeletal: Positive for back pain.        Personal History     Past Medical History:   Diagnosis Date   • Anxiety    • Arthritis    • Breast cancer (HCC) 2017    LEFT   • Cholelithiasis    • Colon polyp     Removed   • GERD (gastroesophageal reflux disease)    • History of radiation therapy 2006    LT BREAST   • History of skin cancer     SQUAMOUS CELL REMOVED FROM LT UPPER LIP AND RT THUMB   • Hyperlipidemia    • Hypertension    • Irritable bowel syndrome In my 40’s       Past Surgical History:   Procedure Laterality Date   • ABDOMINAL SURGERY  Hysterectomy    • ANAL FISSURECTOMY     • APPENDECTOMY     • BREAST LUMPECTOMY W/ NEEDLE LOCALIZATION Left    • CATARACT EXTRACTION Bilateral 2018   • CHOLECYSTECTOMY     • COLONOSCOPY  2014   • COLONOSCOPY Left 2021    Procedure: COLONOSCOPY;  Surgeon: Sridhar James MD;  [place] [facility]  Service: Gastroenterology;  Laterality: Left;   • COLONOSCOPY N/A 10/10/2022    Procedure: COLONOSCOPY FOR SCREENING;  Surgeon: Sridhar James MD;  [place] [facility];  Service: Gastroenterology;  Laterality: N/A;  NORMAL   • ENDOSCOPY N/A 10/10/2022    Procedure: ESOPHAGOGASTRODUODENOSCOPY WITH BIOPSY;  Surgeon: Sridhar James MD;  [place] [facility];  Service: Gastroenterology;  Laterality: N/A;  GASTRIC POLYPS, IRREGULAR Z LINE   • GALLBLADDER SURGERY     • HYSTERECTOMY     • KNEE SURGERY Right     Knee cap  replaced   • KNEE SURGERY Left 12/06/2022    joint replacement   • MASTECTOMY W/ SENTINEL NODE BIOPSY Bilateral 08/21/2017    Procedure: BILATEREAL BREAST MASTECTOMY WITH SENTINEL NODE BIOPSY ON THE LEFT The sentinel lymph node biopsy will only be performed on the left side;  Surgeon: Diallo Eisenberg MD;  [place]  [facility] [place]  Service:    • OOPHORECTOMY  2000    BSO   • PIRIFORMUS INJECTION Left 07/19/2021    Procedure: Left piriformis injection;  Surgeon: Estrellita Sheppard MD;  Location: [facility];  Service: Pain Management;  Laterality: Left;   • PIRIFORMUS INJECTION Left 01/03/2022    Procedure: PIRIFORMIS INJECTION - Left;  Surgeon: Estrellita Sheppard MD;  Location: [facility];  Service: Pain Management;  Laterality: Left;   • POSTERIOR REPAIR  05/17/2016    with enterocele repair, midurethral sling, perineoplasty   • TONSILLECTOMY  1970   • TOTAL KNEE ARTHROPLASTY Bilateral 2000, 2001   • UPPER GASTROINTESTINAL ENDOSCOPY  10/10/2022       Family History: family history includes Alcohol abuse in her father; Birth defects in her brother; Clotting disorder in her daughter and father; Colon polyps in her father; Crohn's disease in her daughter; Diabetes in her father; Esophageal cancer in her mother; Heart attack in her brother, father, and mother; Heart disease in her brother, father, and mother; Heart failure in her father; Hyperlipidemia in her father; Hypertension in her brother, father, and mother; Prostate cancer (age of onset: 85) in her father; Stomach cancer in her mother. Otherwise pertinent FHx was reviewed and not pertinent to current issue.    Social History:  reports that she has never smoked. She has never used smokeless tobacco. She reports that she does not drink alcohol and does not use drugs.    Home Medications:  amLODIPine, atenolol, atorvastatin, buPROPion XL, celecoxib, escitalopram, fish oil, hydroCHLOROthiazide, losartan, multivitamin with minerals, omeprazole, and  oxybutynin    Allergies:  Allergies   Allergen Reactions   • Morphine Itching       Objective    Objective     Vitals:        Physical Exam  Constitutional:       General: She is not in acute distress.  Pulmonary:      Effort: Pulmonary effort is normal. No respiratory distress.   Neurological:      Mental Status: She is alert.   Psychiatric:         Mood and Affect: Mood normal.         Thought Content: Thought content normal.         Result Review    Result Review:  I have personally reviewed the results from the time of this admission to 1/18/2023 08:10 EST and agree with these findings:  []  Laboratory list / accordion  []  Microbiology  []  Radiology  []  EKG/Telemetry   []  Cardiology/Vascular   []  Pathology  []  Old records  []  Other:  Most notable findings include: No new      Assessment & Plan   Assessment / Plan     Brief Patient Summary:  Alesia Resendez is a 72 y.o. female who has chronic left buttocks and lower extremity pain.    Active Hospital Problems:  Active Hospital Problems    Diagnosis    • **Piriformis syndrome of left side      Plan: Left piriformis injection    DVT prophylaxis:  No DVT prophylaxis order currently exists.    Estrellita Sheppard MD

## 2023-01-17 RX ORDER — HYDROCHLOROTHIAZIDE 25 MG/1
25 TABLET ORAL DAILY
Qty: 90 TABLET | Refills: 1 | Status: SHIPPED | OUTPATIENT
Start: 2023-01-17

## 2023-01-17 NOTE — TELEPHONE ENCOUNTER
Rx Refill Note  Requested Prescriptions     Pending Prescriptions Disp Refills   • hydroCHLOROthiazide (HYDRODIURIL) 25 MG tablet       Si tablet.      Last office visit with prescribing clinician: 2022   Last telemedicine visit with prescribing clinician: 2023   Next office visit with prescribing clinician: 2023                         Would you like a call back once the refill request has been completed: [] Yes [] No    If the office needs to give you a call back, can they leave a voicemail: [] Yes [] No    rFed Gutierrez Rep  23, 16:12 EST

## 2023-01-18 ENCOUNTER — HOSPITAL ENCOUNTER (OUTPATIENT)
Facility: SURGERY CENTER | Age: 73
Setting detail: HOSPITAL OUTPATIENT SURGERY
Discharge: HOME | End: 2023-01-18
Attending: ANESTHESIOLOGY | Admitting: ANESTHESIOLOGY
Payer: MEDICARE

## 2023-01-18 ENCOUNTER — HOSPITAL ENCOUNTER (OUTPATIENT)
Dept: GENERAL RADIOLOGY | Facility: SURGERY CENTER | Age: 73
Setting detail: HOSPITAL OUTPATIENT SURGERY
End: 2023-01-18
Payer: MEDICARE

## 2023-01-18 VITALS
OXYGEN SATURATION: 97 % | RESPIRATION RATE: 20 BRPM | WEIGHT: 220 LBS | HEART RATE: 55 BPM | HEIGHT: 68 IN | DIASTOLIC BLOOD PRESSURE: 98 MMHG | BODY MASS INDEX: 33.34 KG/M2 | TEMPERATURE: 98.4 F | SYSTOLIC BLOOD PRESSURE: 175 MMHG

## 2023-01-18 DIAGNOSIS — Z41.9 SURGERY, ELECTIVE: ICD-10-CM

## 2023-01-18 DIAGNOSIS — G57.02 PIRIFORMIS SYNDROME OF LEFT SIDE: ICD-10-CM

## 2023-01-18 PROCEDURE — 20552 NJX 1/MLT TRIGGER POINT 1/2: CPT | Performed by: ANESTHESIOLOGY

## 2023-01-18 PROCEDURE — 77002 NEEDLE LOCALIZATION BY XRAY: CPT | Performed by: ANESTHESIOLOGY

## 2023-01-18 PROCEDURE — 77002 NEEDLE LOCALIZATION BY XRAY: CPT

## 2023-01-18 PROCEDURE — 25010000002 DEXAMETHASONE SODIUM PHOSPHATE 100 MG/10ML SOLUTION 10 ML VIAL: Performed by: ANESTHESIOLOGY

## 2023-01-18 RX ORDER — SODIUM CHLORIDE 0.9 % (FLUSH) 0.9 %
10 SYRINGE (ML) INJECTION AS NEEDED
Status: DISCONTINUED | OUTPATIENT
Start: 2023-01-18 | End: 2023-01-18 | Stop reason: HOSPADM

## 2023-01-18 RX ORDER — SODIUM CHLORIDE 0.9 % (FLUSH) 0.9 %
10 SYRINGE (ML) INJECTION EVERY 12 HOURS SCHEDULED
Status: DISCONTINUED | OUTPATIENT
Start: 2023-01-18 | End: 2023-01-18 | Stop reason: HOSPADM

## 2023-01-18 NOTE — OP NOTE
LEFT Piriformis Injection  [facility]      PREOPERATIVE DIAGNOSIS:   Piriformis Syndrome, Myofasciitis    POSTOPERATIVE DIAGNOSIS:  Piriformis Syndrome, Myofasciitis     PROCEDURE:  Piriformis Muscle Injection, with fluoroscopic guidance [facility] [id number], [id number]    PRE-PROCEDURE DISCUSSION WITH PATIENT:    Risks and complications were discussed with the patient prior to starting the procedure and informed consent was obtained.  We discussed various topics including but not limited to bleeding, infection, injury, postprocedural site soreness, painful flareup, worsening of clinical picture, paralysis, coma, and death.     SURGEON:  Estrellita Sheppard MD    REASON FOR PROCEDURE:    Tenderness to palpation over the piriformis muscle with radiating pain into the posterior lower extremity    SEDATION:  Patient declined administration of moderate sedation    ANESTHETIC AGENT:  0.5% Bupivacaine  STEROID AGENT:  15mg Dexamethasone    DESCRIPTON OF PROCEDURE:  After obtaining informed consent, IV access was not obtained in the preoperative area.  The patient was transported to the operative suite and placed in the prone position with a pillow under the pelvic area. EKG, blood pressure, and pulse oximeter were monitored. The lumbosacral area was prepped with Chloraprep and draped in a sterile fashion.     Attention was turned to the left piriformis muscle.  The femoral head on the left side was identified in an AP fluoroscopic image.  The skin and subcutaneous tissue superior to that target was anesthetized with 1% lidocaine. A 22-gauge spinal needle was then advanced percutaneously through the anesthetized skin tract under fluoroscopic guidance into the belly of the piriformis muscle.  After negative aspiration for blood a volume of 3mL of air was injected, producing a pneumogram of the piriformis muscle.  Subsequently, a volume of 5mL consisting of 15mg Dexamethasone with 0.5% Bupivacaine was injected without  resistance.    Vital signs remained stable.  The onset of analgesia was noted.      ESTIMATED BLOOD LOSS:  minimal  SPECIMENS:  None  COMPLICATIONS:  No complications were noted.    TOLERANCE & DISCHARGE CONDITION:    The patient tolerated the procedure well.  The patient was transported to the recovery area without difficulties.  The patient was discharged to home under the care of family in stable and satisfactory condition.    PLAN OF CARE:  1. The patient was given our standard instruction sheet and will resume all medications as per the medication reconciliation sheet.  2. The patient will Return to clinic 4-6 wks.  3. The patient is instructed to keep an account of pain relief after the procedure.

## 2023-02-02 ENCOUNTER — OFFICE VISIT (OUTPATIENT)
Dept: FAMILY MEDICINE CLINIC | Facility: CLINIC | Age: 73
End: 2023-02-02
Payer: MEDICARE

## 2023-02-02 VITALS
DIASTOLIC BLOOD PRESSURE: 80 MMHG | BODY MASS INDEX: 34.25 KG/M2 | SYSTOLIC BLOOD PRESSURE: 137 MMHG | TEMPERATURE: 97 F | RESPIRATION RATE: 14 BRPM | HEART RATE: 65 BPM | OXYGEN SATURATION: 97 % | HEIGHT: 68 IN | WEIGHT: 226 LBS

## 2023-02-02 DIAGNOSIS — E78.2 MIXED HYPERLIPIDEMIA: ICD-10-CM

## 2023-02-02 DIAGNOSIS — R73.03 PREDIABETES: ICD-10-CM

## 2023-02-02 DIAGNOSIS — I10 ESSENTIAL HYPERTENSION: Primary | ICD-10-CM

## 2023-02-02 DIAGNOSIS — N30.00 ACUTE CYSTITIS WITHOUT HEMATURIA: ICD-10-CM

## 2023-02-02 PROCEDURE — 99214 OFFICE O/P EST MOD 30 MIN: CPT | Performed by: NURSE PRACTITIONER

## 2023-02-02 RX ORDER — CELECOXIB 200 MG/1
200 CAPSULE ORAL DAILY
Qty: 90 CAPSULE | Refills: 1 | Status: SHIPPED | OUTPATIENT
Start: 2023-02-02

## 2023-02-02 RX ORDER — CELECOXIB 200 MG/1
200 CAPSULE ORAL DAILY
Qty: 90 CAPSULE | Refills: 1 | Status: CANCELLED | OUTPATIENT
Start: 2023-02-02

## 2023-02-02 RX ORDER — ATORVASTATIN CALCIUM 20 MG/1
20 TABLET, FILM COATED ORAL DAILY
Qty: 90 TABLET | Refills: 1 | Status: SHIPPED | OUTPATIENT
Start: 2023-02-02

## 2023-02-02 RX ORDER — NITROFURANTOIN 25; 75 MG/1; MG/1
100 CAPSULE ORAL 2 TIMES DAILY
Qty: 6 CAPSULE | Refills: 0 | Status: ON HOLD | OUTPATIENT
Start: 2023-02-02 | End: 2023-03-20

## 2023-02-02 NOTE — PROGRESS NOTES
"Chief Complaint  Follow-up (6 month f/u)    Subjective        Alesia Resendez presents to [facility]  History of Present Illness  Very pleasant patient here today follow-up essential hypertension controlled,  Mixed hyperlipidemia hypertriglyceridemia and elevated LDL with prediabetes, patient's been improving her diet substantially she is decreased her A1c  To normal range  Greatly improved her fasting glucose to near normal  She is working, water aerobics just had her left knee replaced, overall she is doing well.  Has no calf tenderness swelling medial thigh tenderness swelling limited ankle edema she takes HCTZ which is improved.  She takes Celebrex  As well as amlodipine which may contribute to this as well as some dependent edema.  No history of PE DVT.    She is quite motivated to continue her improved diet, and hopefully improve mobility with a new knee.  LDL in the 30s great improvement little lower than that prefer  40 mg atorvastatin previously was on fibrate.  Switched over due to prediabetes.  And benefit of statin over fibrate.    Urine positive for plus bacteria nitrate otherwise normal  She does report some frequency likely a low-level UTI      Objective   Vital Signs:  /80   Pulse 65   Temp 97 °F (36.1 °C) (Infrared)   Resp 14   Ht 172.7 cm (68\")   Wt 103 kg (226 lb)   SpO2 97%   BMI 34.36 kg/m²   Estimated body mass index is 34.36 kg/m² as calculated from the following:    Height as of this encounter: 172.7 cm (68\").    Weight as of this encounter: 103 kg (226 lb).          Physical Exam  Vitals reviewed.   Constitutional:       General: She is not in acute distress.     Appearance: Normal appearance. She is well-developed. She is not ill-appearing, toxic-appearing or diaphoretic.   HENT:      Head: Normocephalic.      Right Ear: Tympanic membrane normal.      Left Ear: Tympanic membrane normal.      Mouth/Throat:      Pharynx: No oropharyngeal exudate. "   Eyes:      Conjunctiva/sclera: Conjunctivae normal.      Pupils: Pupils are equal, round, and reactive to light.   Neck:      Vascular: No JVD.   Cardiovascular:      Rate and Rhythm: Normal rate and regular rhythm.      Heart sounds: Normal heart sounds. No murmur heard.    No friction rub.   Pulmonary:      Effort: Pulmonary effort is normal. No respiratory distress.      Breath sounds: Normal breath sounds. No wheezing.   Abdominal:      General: Bowel sounds are normal. There is no distension.      Palpations: Abdomen is soft. There is no mass.      Tenderness: There is no abdominal tenderness. There is no guarding.      Hernia: No hernia is present.   Musculoskeletal:         General: No tenderness.      Cervical back: Neck supple.   Lymphadenopathy:      Cervical: No cervical adenopathy.   Skin:     General: Skin is warm and dry.   Neurological:      General: No focal deficit present.      Mental Status: She is alert and oriented to person, place, and time.   Psychiatric:         Mood and Affect: Mood normal.         Behavior: Behavior normal.         Thought Content: Thought content normal.         Judgment: Judgment normal.        Result Review :                Assessment and Plan   Diagnoses and all orders for this visit:    1. Essential hypertension (Primary)  -     CBC & Differential; Future  -     Comprehensive Metabolic Panel; Future  -     Lipid Panel With LDL / HDL Ratio; Future  -     Urinalysis With Microscopic If Indicated (No Culture) - Urine, Clean Catch; Future  -     Hemoglobin A1c; Future    2. Mixed hyperlipidemia  -     CBC & Differential; Future  -     Comprehensive Metabolic Panel; Future  -     Lipid Panel With LDL / HDL Ratio; Future  -     Urinalysis With Microscopic If Indicated (No Culture) - Urine, Clean Catch; Future  -     Hemoglobin A1c; Future    3. Prediabetes  -     CBC & Differential; Future  -     Comprehensive Metabolic Panel; Future  -     Lipid Panel With LDL / HDL  Ratio; Future  -     Urinalysis With Microscopic If Indicated (No Culture) - Urine, Clean Catch; Future  -     Hemoglobin A1c; Future    4. Acute cystitis without hematuria    Other orders  -     nitrofurantoin, macrocrystal-monohydrate, (Macrobid) 100 MG capsule; Take 1 capsule by mouth 2 (Two) Times a Day.  Dispense: 6 capsule; Refill: 0  -     celecoxib (CeleBREX) 200 MG capsule; Take 1 capsule by mouth Daily.  Dispense: 90 capsule; Refill: 1  -     atorvastatin (LIPITOR) 20 MG tablet; Take 1 tablet by mouth Daily.  Dispense: 90 tablet; Refill: 1             Follow Up   Return in about 6 months (around 8/2/2023), or Labs 6 months.  Patient was given instructions and counseling regarding her condition or for health maintenance advice. Please see specific information pulled into the AVS if appropriate.     Patient Instructions   Discharge instructions continue healthy diet exercise joint sparing exercise water aerobics  Gentle cycling etc.    Loss of water 64 ounces water daily  Minimize Celebrex when possible when doing well consider taking it every other day  To decrease chronic risk which can be substantially mitigated as we are doing    Elevate legs when sitting  Compression socks on the morning off at night    Labs in 6 months for follow-up  Decrease Lipitor to 20 mg daily

## 2023-02-02 NOTE — PATIENT INSTRUCTIONS
Discharge instructions continue healthy diet exercise joint sparing exercise water aerobics  Gentle cycling etc.    Loss of water 64 ounces water daily  Minimize Celebrex when possible when doing well consider taking it every other day  To decrease chronic risk which can be substantially mitigated as we are doing    Elevate legs when sitting  Compression socks on the morning off at night    Labs in 6 months for follow-up  Decrease Lipitor to 20 mg daily

## 2023-02-15 ENCOUNTER — PREP FOR SURGERY (OUTPATIENT)
Dept: SURGERY | Facility: SURGERY CENTER | Age: 73
End: 2023-02-15
Payer: MEDICARE

## 2023-02-15 ENCOUNTER — OFFICE VISIT (OUTPATIENT)
Dept: PAIN MEDICINE | Facility: CLINIC | Age: 73
End: 2023-02-15
Payer: MEDICARE

## 2023-02-15 VITALS
HEIGHT: 68 IN | TEMPERATURE: 97.3 F | BODY MASS INDEX: 35.01 KG/M2 | SYSTOLIC BLOOD PRESSURE: 127 MMHG | OXYGEN SATURATION: 97 % | HEART RATE: 67 BPM | DIASTOLIC BLOOD PRESSURE: 82 MMHG | RESPIRATION RATE: 16 BRPM | WEIGHT: 231 LBS

## 2023-02-15 DIAGNOSIS — M47.816 SPONDYLOSIS OF LUMBAR REGION WITHOUT MYELOPATHY OR RADICULOPATHY: Primary | ICD-10-CM

## 2023-02-15 DIAGNOSIS — G89.29 CHRONIC RIGHT-SIDED LOW BACK PAIN WITH RIGHT-SIDED SCIATICA: ICD-10-CM

## 2023-02-15 DIAGNOSIS — M54.41 CHRONIC RIGHT-SIDED LOW BACK PAIN WITH RIGHT-SIDED SCIATICA: ICD-10-CM

## 2023-02-15 DIAGNOSIS — G57.02 PIRIFORMIS SYNDROME OF LEFT SIDE: ICD-10-CM

## 2023-02-15 PROCEDURE — 99214 OFFICE O/P EST MOD 30 MIN: CPT | Performed by: PHYSICIAN ASSISTANT

## 2023-02-15 NOTE — PROGRESS NOTES
CHIEF COMPLAINT    Procedure follow up     Subjective   Alesia Resendez is a 73 y.o. female  who presents to the office for follow-up of procedure.  She completed a LEFT Piriformis Injection  on 1/18/23 performed by Dr. Sheppard for management of piriformis pain. Patient reports 50% relief from the procedure which is ongoing at this time.  However the patient has noted significant worsening of pain that originates in the midline region of the lumbar spine referred into the bilateral paraspinal muscles, right greater than left usually, which is accentuated with any type of lumbar facet loading maneuvers.  Patient is finding that her back pain is beginning to limit some of her physical activity.    Pain today 3/10 VAS in severity.        Back Pain  This is a chronic problem. The current episode started more than 1 year ago. The problem occurs constantly. The problem has been gradually worsening since onset. The pain is present in the lumbar spine. The quality of the pain is described as aching, burning and cramping. The pain does not radiate. The pain is at a severity of 3/10. The pain is mild. The pain is worse during the day. The symptoms are aggravated by position, standing and twisting. Associated symptoms include numbness. Pertinent negatives include no chest pain or fever.        PEG Assessment   What number best describes your pain on average in the past week?5  What number best describes how, during the past week, pain has interfered with your enjoyment of life?0  What number best describes how, during the past week, pain has interfered with your general activity?  0    Review of Pertinent Medical Data ---  MRI OF THE LUMBAR SPINE WITHOUT CONTRAST     CLINICAL HISTORY: Persistent low back pain and buttock pain since April.     TECHNIQUE: MRI of the lumbar spine was obtained with sagittal T1,  proton-density, and T2-weighted images. Additionally, there are axial T1  and T2-weighted images through the lumbar  spine.     FINDINGS:  The conus medullaris terminates at the L1-2 level and has normal signal  intensity.     At T12-L1, there is a disc bulge. This results in mild canal narrowing.     At L1-2, there is mild facet arthropathy. No significant canal or  foraminal narrowing is noted.     At L2-3, there is a disc bulge eccentric to the left. There is a small  left central disc protrusion. Mild facet arthropathy is noted. There is  a mild to moderate degree of canal stenosis. The left central disc  protrusion indents the ventral aspect of the left portion of the thecal  sac at the expected location of the left L3 nerve root. The left L3  nerve root is likely displaced somewhat posteriorly within the left  aspect of the thecal sac. Mild bilateral foraminal narrowing is noted  secondary to bulging disc material. There is degenerative retrolisthesis  of L2 on L3 by approximately 2-3 mm.     At L3-4, there is a disc bulge which results in a mild-to-moderate  degree of left and a mild degree of right foraminal narrowing. The  bulging disc material as well as a small central disc protrusion results  in a mild-to-moderate degree of central canal stenosis. Mild facet  arthropathy is seen at the L3-4 level. Degenerative retrolisthesis of L3  on L4 by approximately 2-3 mm is noted.     At L4-5, there is severe central canal stenosis secondary to disc  bulging, ligamentum flavum thickening, and facet arthropathy. There is a  moderate degree of bilateral foraminal narrowing secondary to disc  bulging and facet hypertrophic change. There is anterior  spondylolisthesis of L4 on L5 by approximately 7-8 mm.     At L5-S1, there is moderate to severe facet hypertrophic change. There  is a moderate degree of bilateral foraminal narrowing secondary to disc  bulging and facet hypertrophy.     IMPRESSION:  The most severe canal stenosis is seen at L4-5 where there is a severe  degree of central canal stenosis secondary to disc bulging,  "ligamentum  flavum thickening, and facet arthropathy. Anterior spondylolisthesis of  L4 on L5 by 7-8 mm is seen. Moderate bilateral foraminal narrowing is  noted at L4-5 secondary to disc bulging and facet hypertrophic change.     At L4-5 and L5-S1, there is a moderate degree of bilateral foraminal  narrowing secondary to disc bulging and facet arthropathy.     Lesser degrees of canal narrowing and foraminal narrowing are noted at  the L2-3 and L3-4 levels as discussed in detail above.     This report was finalized on 1/31/2020 12:41 PM by Dr. Júnior Archuleta M.D.    The following portions of the patient's history were reviewed and updated as appropriate: allergies, current medications, past family history, past medical history, past social history, past surgical history and problem list.    Review of Systems   Constitutional: Negative for fever.   HENT: Negative for congestion.    Eyes: Negative for visual disturbance.   Respiratory: Negative for shortness of breath.    Cardiovascular: Negative for chest pain.   Gastrointestinal: Negative for constipation and diarrhea.   Genitourinary: Negative for difficulty urinating and dyspareunia.   Musculoskeletal: Positive for back pain.   Neurological: Positive for numbness.   Psychiatric/Behavioral: Negative for sleep disturbance.     I have reviewed and confirmed the accuracy of the ROS as documented by the MA/LPN/RN DORITA Amezcua     Vitals:    02/15/23 1116   BP: 127/82   Pulse: 67   Resp: 16   Temp: 97.3 °F (36.3 °C)   SpO2: 97%   Weight: 105 kg (231 lb)   Height: 172.7 cm (67.99\")   PainSc:   3   PainLoc: Back         Objective   Physical Exam  Vitals and nursing note reviewed.   Constitutional:       Appearance: Normal appearance.   HENT:      Head: Normocephalic.   Pulmonary:      Effort: Pulmonary effort is normal.   Musculoskeletal:      Lumbar back: Spasms and tenderness (Moderate tenderness to palpation within the overlying bilateral lumbar facet joint " spaces) present. Decreased range of motion.      Comments: Pain is increased with lumbar extension and lateral bending   Skin:     General: Skin is warm and dry.   Neurological:      General: No focal deficit present.      Mental Status: She is alert and oriented to person, place, and time.      Cranial Nerves: Cranial nerves 2-12 are intact.      Sensory: Sensation is intact.      Motor: Motor function is intact.      Gait: Gait is intact.   Psychiatric:         Mood and Affect: Mood normal.         Behavior: Behavior normal.         Thought Content: Thought content normal.         Judgment: Judgment normal.         Assessment & Plan   Diagnoses and all orders for this visit:    1. Spondylosis of lumbar region without myelopathy or radiculopathy (Primary)    2. Piriformis syndrome of left side    3. Chronic right-sided low back pain with right-sided sciatica        --- Based on the physical exam and MRI findings I recommend patient returning for #1 diagnostic bilateral L3-5 MBB with plan to progress to RFA assuming favorable response.  The risk and benefits of the procedure been thoroughly discussed with the patient and all of her questions addressed.  --- RTC in 1-2 weeks after injective therapy    Diagnostic Facet Joint Procedure:   MBB     The first diagnostic facet joint procedure is considered medically reasonable and necessary for the diagnosis and treatment of chronic pain for this patient due to the patient meeting all of the following criteria:    - 1. Moderate to severe chronic neck or low back pain, predominantly axial, that causes functional deficit measured on pain or disability scale.  - 2. Pain present for minimum of 3 months with documented failure to respond to noninvasive conservative management (as tolerated)  - 3. Absence of untreated radiculopathy or neurogenic claudication (except for radiculopathy caused by facet joint synovial cyst)  - 4. There is no non-facet pathology per clinical  assessment or radiology studies that could explain the source of the patient’s pain, including but not limited to fracture, tumor, infection, or significant deformity.    The confirmatory diagnostic facet joint procedure is considered medically reasonable and necessary for the diagnosis and treatment of chronic pain for this patient due to the patient meeting all of the following criteria:    - 1. Moderate to severe chronic neck or low back pain, predominantly axial, that causes functional deficit measured on pain or disability scale.  - 2. Pain present for minimum of 3 months with documented failure to respond to noninvasive conservative management (as tolerated)  - 3. Absence of untreated radiculopathy or neurogenic claudication (except for radiculopathy caused by facet joint synovial cyst)  - 4. There is no non-facet pathology per clinical assessment or radiology studies that could explain the source of the patient’s pain, including but not limited to fracture, tumor, infection, or significant deformity.    The patient has also shown a consistent positive response of at least 80% relief of primary (index) pain (with the duration of relief being consistent with the agent used).              Dictated utilizing Dragon dictation.      Patient remained masked during entire encounter. No cough present. I donned a mask and eye protection throughout entire visit. Prior to donning mask and eye protection, hand hygiene was performed, as well as when it was doffed.  I was closer than 6 feet, but not for an extended period of time. No obvious exposure to any bodily fluids.

## 2023-02-23 RX ORDER — MECLIZINE HCL 12.5 MG/1
TABLET ORAL
Qty: 30 TABLET | Refills: 1 | Status: SHIPPED | OUTPATIENT
Start: 2023-02-23

## 2023-02-24 ENCOUNTER — TRANSCRIBE ORDERS (OUTPATIENT)
Dept: SURGERY | Facility: SURGERY CENTER | Age: 73
End: 2023-02-24
Payer: MEDICARE

## 2023-02-24 DIAGNOSIS — Z41.9 SURGERY, ELECTIVE: Primary | ICD-10-CM

## 2023-03-17 NOTE — DISCHARGE INSTRUCTIONS
[facility] [place] [facility] - Post-procedure Instructions          --  While there are no absolute restrictions, it is recommended that you do not perform strenuous activity today. In the morning, you may resume your level of activity as before your block.    --  If you have a band-aid at your injection site, please remove it later today. Observe the area for any redness, swelling, pus-like drainage, or a temperature over 101°. If any of these symptoms occur, please call your doctor at [phone]. If after office hours, leave a message and the on-call provider will return your call.    --  Ice may be applied to your injection site. It is recommended you avoid direct heat (heating pad; hot tub) for 1-2 days.    --  Call [facility] [place] at [phone] if you experience persistent headache, persistent bleeding from the injection site, or severe pain not relieved by heat or oral medication.    --  Do not make important decisions today.    --  Due to the effects of the block and/or the I.V. Sedation, DO NOT drive or operate hazardous machinery for 12 hours.  Local anesthetics may cause numbness after procedure and precautions must be taken with regards to operating equipment as well as with walking, even if ambulating with assistance of another person or with an assistive device.    --  Do not drink alcohol for 12 hours.    -- You may return to work tomorrow, or as directed by your referring doctor.    --  Occasionally you may notice a slight increase in your pain after the procedure. This should start to improve within the next 24-48 hours. Radiofrequency ablation procedure pain may last 3-4 weeks.    --  It may take as long as 3-4 days before you notice a gradual improvement in your pain and/or other symptoms.    -- You may continue to take your prescribed pain medication as needed.    --  Some normal possible side effects of steroid use could include fluid retention, increased blood sugar, dull headache,  increased sweating, increased appetite, mood swings and flushing.    --  Diabetics are recommended to watch their blood glucose level closely for 24-48 hours after the injection.    --  Must stay in PACU for 20 min upon arrival and prove no leg weakness before being discharged.    --  IN THE EVENT OF A LIFE THREATENING EMERGENCY, (CHEST PAIN, BREATHING DIFFICULTIES, PARALYSIS…) YOU SHOULD GO TO YOUR NEAREST EMERGENCY ROOM.    --  You should be contacted by our office within 2-3 days to schedule follow up or next appointment date.  If not contacted within 7 days, please call the office at [phone]    If you have even 1 hour of relief, these injections are considered successful.

## 2023-03-17 NOTE — H&P
[facility]   HISTORY AND PHYSICAL    Patient Name: Alesia Resendez  : 1950  MRN: [id number]  Primary Care Physician:  Epley, James, APRN  Date of admission: 3/20/2023    Subjective   Subjective     Chief Complaint: Low back pain    History of Present Illness  Chronic low back pain that is axial in nature.      Review of Systems   Constitutional: Negative for chills and fever.   Respiratory: Negative for cough and shortness of breath.    Musculoskeletal: Positive for back pain.        Personal History     Past Medical History:   Diagnosis Date   • Anxiety    • Arthritis    • Breast cancer (HCC) 2017    LEFT   • Cataract 2017    Surgery   • Cholelithiasis    • Colon polyp     Removed   • GERD (gastroesophageal reflux disease)    • Headache    • History of radiation therapy 2006    LT BREAST   • History of skin cancer     SQUAMOUS CELL REMOVED FROM LT UPPER LIP AND RT THUMB   • Hyperlipidemia    • Hypertension    • Irritable bowel syndrome In my 40’s   • Low back pain    • Obesity    • Pneumonia 2019       Past Surgical History:   Procedure Laterality Date   • ABDOMINAL SURGERY  Hysterectomy    • ANAL FISSURECTOMY     • APPENDECTOMY     • BREAST LUMPECTOMY W/ NEEDLE LOCALIZATION Left    • BREAST SURGERY  2006 and 2017    breast cancer   • CATARACT EXTRACTION Bilateral 2018   • CHOLECYSTECTOMY     • COLONOSCOPY     • COLONOSCOPY Left 2021    Procedure: COLONOSCOPY;  Surgeon: Sridhar James MD;  Location: [facility] [place] MAIN OR;  Service: Gastroenterology;  Laterality: Left;   • COLONOSCOPY N/A 10/10/2022    Procedure: COLONOSCOPY FOR SCREENING;  Surgeon: Sridhar James MD;  Location: [facility] [place] MAIN OR;  Service: Gastroenterology;  Laterality: N/A;  NORMAL   • ENDOSCOPY N/A 10/10/2022    Procedure: ESOPHAGOGASTRODUODENOSCOPY WITH BIOPSY;  Surgeon: Sridhar James MD;  Location: [facility] [place] MAIN OR;  Service: Gastroenterology;  Laterality: N/A;  GASTRIC POLYPS,  IRREGULAR Z LINE   • GALLBLADDER SURGERY  2007   • HYSTERECTOMY  1987   • JOINT REPLACEMENT  2000 and 2001, 2022    knees   • KNEE SURGERY Right 2014    Knee cap replaced   • KNEE SURGERY Left 12/06/2022    joint replacement   • MASTECTOMY W/ SENTINEL NODE BIOPSY Bilateral 08/21/2017    Procedure: BILATEREAL BREAST MASTECTOMY WITH SENTINEL NODE BIOPSY ON THE LEFT The sentinel lymph node biopsy will only be performed on the left side;  Surgeon: Diallo Eisenberg MD;  Location:  [facility] [place];  Service:    • OOPHORECTOMY  2000    BSO   • PIRIFORMUS INJECTION Left 07/19/2021    Procedure: Left piriformis injection;  Surgeon: Estrellita Sheppard MD;  Location: [facility];  Service: Pain Management;  Laterality: Left;   • PIRIFORMUS INJECTION Left 01/03/2022    Procedure: PIRIFORMIS INJECTION - Left;  Surgeon: Estrellita Sheppard MD;  Location: [facility];  Service: Pain Management;  Laterality: Left;   • PIRIFORMUS INJECTION Left 01/18/2023    Procedure: PIRIFORMIS INJECTION Left;  Surgeon: Estrellita Sheppard MD;  Location: [facility];  Service: Pain Management;  Laterality: Left;   • POSTERIOR REPAIR  05/17/2016    with enterocele repair, midurethral sling, perineoplasty   • SUBTOTAL HYSTERECTOMY  1988   • TONSILLECTOMY  1970   • TOTAL KNEE ARTHROPLASTY Bilateral 2000, 2001   • UPPER GASTROINTESTINAL ENDOSCOPY  10/10/2022       Family History: family history includes Alcohol abuse in her father; Arthritis in her father; Birth defects in her brother; Cancer in her mother; Clotting disorder in her daughter and father; Colon polyps in her father; Crohn's disease in her daughter; Diabetes in her father; Esophageal cancer in her mother; Heart attack in her brother, father, and mother; Heart disease in her brother, father, and mother; Heart failure in her father; Hyperlipidemia in her father; Hypertension in her brother, father, and mother; Learning disabilities in her brother; Prostate cancer (age of onset: 85) in her  father; Stomach cancer in her mother; Stroke in her father. Otherwise pertinent FHx was reviewed and not pertinent to current issue.    Social History:  reports that she has never smoked. She has never used smokeless tobacco. She reports that she does not drink alcohol and does not use drugs.    Home Medications:  amLODIPine, atenolol, atorvastatin, buPROPion XL, celecoxib, escitalopram, fish oil, hydroCHLOROthiazide, losartan, meclizine, multivitamin with minerals, omeprazole, and oxybutynin    Allergies:  Allergies   Allergen Reactions   • Morphine Itching       Objective    Objective     Vitals:   Temp:  [97.4 °F (36.3 °C)-97.8 °F (36.6 °C)] 97.8 °F (36.6 °C)  Heart Rate:  [62-67] 64  Resp:  [16-20] 18  BP: (137-146)/(64-92) 137/84    Physical Exam  Constitutional:       General: She is not in acute distress.  Pulmonary:      Effort: Pulmonary effort is normal. No respiratory distress.   Neurological:      Mental Status: She is alert.   Psychiatric:         Mood and Affect: Mood normal.         Thought Content: Thought content normal.         Result Review    Result Review:  I have personally reviewed the results from the time of this admission to 3/20/2023 10:44 EDT and agree with these findings:  []  Laboratory list / accordion  []  Microbiology  []  Radiology  []  EKG/Telemetry   []  Cardiology/Vascular   []  Pathology  []  Old records  []  Other:  Most notable findings include: No new      Assessment & Plan   Assessment / Plan     Brief Patient Summary:  Alesia Resendez is a 73 y.o. female who has chronic axial low back pain    Active Hospital Problems:  Active Hospital Problems    Diagnosis    • **Spondylosis of lumbar region without myelopathy or radiculopathy      Plan: Lumbar medial branch blocks      DVT prophylaxis:  No DVT prophylaxis order currently exists.      Estrellita Sheppard MD

## 2023-03-20 ENCOUNTER — HOSPITAL ENCOUNTER (OUTPATIENT)
Facility: SURGERY CENTER | Age: 73
Setting detail: HOSPITAL OUTPATIENT SURGERY
Discharge: HOME | End: 2023-03-20
Attending: ANESTHESIOLOGY | Admitting: ANESTHESIOLOGY
Payer: MEDICARE

## 2023-03-20 ENCOUNTER — HOSPITAL ENCOUNTER (OUTPATIENT)
Dept: GENERAL RADIOLOGY | Facility: SURGERY CENTER | Age: 73
Setting detail: HOSPITAL OUTPATIENT SURGERY
End: 2023-03-20
Payer: MEDICARE

## 2023-03-20 VITALS
RESPIRATION RATE: 18 BRPM | WEIGHT: 220 LBS | HEIGHT: 68 IN | DIASTOLIC BLOOD PRESSURE: 84 MMHG | HEART RATE: 64 BPM | OXYGEN SATURATION: 96 % | TEMPERATURE: 97.8 F | SYSTOLIC BLOOD PRESSURE: 137 MMHG | BODY MASS INDEX: 33.34 KG/M2

## 2023-03-20 DIAGNOSIS — Z41.9 SURGERY, ELECTIVE: ICD-10-CM

## 2023-03-20 PROCEDURE — 77002 NEEDLE LOCALIZATION BY XRAY: CPT

## 2023-03-20 PROCEDURE — 64494 INJ PARAVERT F JNT L/S 2 LEV: CPT | Performed by: ANESTHESIOLOGY

## 2023-03-20 PROCEDURE — 0 LIDOCAINE 1 % SOLUTION 10 ML VIAL: Performed by: ANESTHESIOLOGY

## 2023-03-20 PROCEDURE — 64493 INJ PARAVERT F JNT L/S 1 LEV: CPT | Performed by: ANESTHESIOLOGY

## 2023-03-20 RX ORDER — DIAZEPAM 5 MG/1
10 TABLET ORAL ONCE
Status: COMPLETED | OUTPATIENT
Start: 2023-03-20 | End: 2023-03-20

## 2023-03-20 RX ADMIN — DIAZEPAM 10 MG: 5 TABLET ORAL at 09:17

## 2023-03-20 NOTE — OP NOTE
Lumbar Medial Branch Blockade at  Bilateral L4-S1  [facility]      PREOPERATIVE DIAGNOSIS:  Lumbar spondylosis without myelopathy    POSTOPERATIVE DIAGNOSIS:  Lumbar spondylosis without myelopathy    PROCEDURE:   Diagnostic Lumbar Medial Branch Nerve Blockades, with fluoroscopy:  at the L4, L5 transverse processes and the sacral alar groove)   1. 92337-DF, RT -- Lumbar Facet blocks, 1st Level  2. 39764-QD, RT -- Lumbar Facet blocks, 2nd  Level    PRE-PROCEDURE DISCUSSION WITH PATIENT:    Risks and complications were discussed with the patient prior to starting the procedure and informed consent was obtained.      SURGEON:  Estrellita Sheppard MD    REASON FOR PROCEDURE:    The patient complains of pain that seems to have a significant axial component    SEDATION:  Light sedation was used for this procedure which included and PO Valium  ANESTHETIC:  0.5% bupivacaine  STEROID:  None  TOTAL VOLUME OF SOLUTION:  6ml    DESCRIPTON OF PROCEDURE:  After obtaining informed consent, IV access was not obtained in the preoperative area.   The patient was taken to the operating room.  The patient was placed in the prone position with a pillow under the abdomen. All pressure points were well padded.  EKG, blood pressure, and pulse oximeter were monitored.  The patient was monitored and sedated by the RN under my direction. The lumbosacral area was prepped with Chloraprep and draped in a sterile fashion.     AP fluoroscopic image was used to visualize the junction of the right S1 superior articular process with the sacral ala.  The skin and subcutaneous tissue over the area was anesthetized with 1% lidocaine.  A 22-gauge spinal needle was then advanced percutaneously through the anesthetized skin tract under fluoroscopic guidance in a coaxial view to contact periosteum.  After negative aspiration, a volume of 1 mL of the local anesthetic was injected without resistance.  The image was then optimized to maximize  visualization of the junctions of the L4, L5 superior articular processes with the transverse processes.  The skin and subcutaneous tissue over the areas was anesthetized with 1% lidocaine.  A 22-gauge spinal needle was then advanced percutaneously through the anesthetized skin tracts under fluoroscopic guidance in a coaxial view to contact periosteum at the target sites.  After negative aspiration, a volume of 1 mL of the local anesthetic  was injected without resistance at each of the target sites.      The same procedure was then performed on the contralateral side in the exact same fashion.        Onset of analgesia was noted.  Vital signs remained stable throughout.      ESTIMATED BLOOD LOSS:  <5 mL  SPECIMENS:  none    COMPLICATIONS:   No complications were noted.    TOLERANCE & DISCHARGE CONDITION:    The patient tolerated the procedure well.  The patient was transported to the recovery area without difficulties.  The patient was discharged to home under the care of family in stable and satisfactory condition.    Pre-procedure pain score: 6/10 at rest, 6/10 with activity  Post-procedure pain score: 0/10    PLAN OF CARE:  1. The patient was given our standard instruction sheet.  2. We discussed that Lumbar Medial Branch Blockade is a diagnostic procedure in consideration for radiofrequency ablation if two diagnostic procedures prove to be positive for significant benefit.  An alternative plan could be therapeutic lumbar branch blockades.  3. The patient is asked to keep an account of pain relief after the procedure today.  4. The patient will  Return to clinic 1-2 wks.  5. The patient will resume all medications as per the medication reconciliation sheet.

## 2023-03-30 ENCOUNTER — PREP FOR SURGERY (OUTPATIENT)
Dept: SURGERY | Facility: SURGERY CENTER | Age: 73
End: 2023-03-30
Payer: MEDICARE

## 2023-03-30 ENCOUNTER — OFFICE VISIT (OUTPATIENT)
Dept: PAIN MEDICINE | Facility: CLINIC | Age: 73
End: 2023-03-30
Payer: MEDICARE

## 2023-03-30 VITALS
WEIGHT: 230.8 LBS | OXYGEN SATURATION: 95 % | HEIGHT: 68 IN | RESPIRATION RATE: 20 BRPM | BODY MASS INDEX: 34.98 KG/M2 | DIASTOLIC BLOOD PRESSURE: 92 MMHG | TEMPERATURE: 96.2 F | HEART RATE: 75 BPM | SYSTOLIC BLOOD PRESSURE: 138 MMHG

## 2023-03-30 DIAGNOSIS — M48.061 SPINAL STENOSIS OF LUMBAR REGION, UNSPECIFIED WHETHER NEUROGENIC CLAUDICATION PRESENT: Primary | ICD-10-CM

## 2023-03-30 DIAGNOSIS — G89.29 CHRONIC RIGHT-SIDED LOW BACK PAIN WITH RIGHT-SIDED SCIATICA: Primary | ICD-10-CM

## 2023-03-30 DIAGNOSIS — G57.02 PIRIFORMIS SYNDROME OF LEFT SIDE: ICD-10-CM

## 2023-03-30 DIAGNOSIS — M54.41 CHRONIC RIGHT-SIDED LOW BACK PAIN WITH RIGHT-SIDED SCIATICA: Primary | ICD-10-CM

## 2023-03-30 PROCEDURE — 3075F SYST BP GE 130 - 139MM HG: CPT | Performed by: PHYSICIAN ASSISTANT

## 2023-03-30 PROCEDURE — 1159F MED LIST DOCD IN RCRD: CPT | Performed by: PHYSICIAN ASSISTANT

## 2023-03-30 PROCEDURE — 99214 OFFICE O/P EST MOD 30 MIN: CPT | Performed by: PHYSICIAN ASSISTANT

## 2023-03-30 PROCEDURE — 1160F RVW MEDS BY RX/DR IN RCRD: CPT | Performed by: PHYSICIAN ASSISTANT

## 2023-03-30 PROCEDURE — 3080F DIAST BP >= 90 MM HG: CPT | Performed by: PHYSICIAN ASSISTANT

## 2023-03-30 PROCEDURE — 1125F AMNT PAIN NOTED PAIN PRSNT: CPT | Performed by: PHYSICIAN ASSISTANT

## 2023-03-30 RX ORDER — GABAPENTIN 300 MG/1
CAPSULE ORAL
Qty: 90 CAPSULE | Refills: 0 | Status: SHIPPED | OUTPATIENT
Start: 2023-03-30

## 2023-03-30 RX ORDER — SODIUM CHLORIDE 0.9 % (FLUSH) 0.9 %
10 SYRINGE (ML) INJECTION AS NEEDED
OUTPATIENT
Start: 2023-03-30

## 2023-03-30 RX ORDER — SODIUM CHLORIDE 0.9 % (FLUSH) 0.9 %
10 SYRINGE (ML) INJECTION EVERY 12 HOURS SCHEDULED
OUTPATIENT
Start: 2023-03-30

## 2023-03-30 NOTE — PROGRESS NOTES
CHIEF COMPLAINT  Procedure follow up LUMBAR MEDIAL BRANCH BLOCK BILATERAL L3-L5  3/20/2023  Patient reports LMBB was not effective pain level has continued to stay consistent .     Subjective   Alesia Resendez is a 73 y.o. female  who presents to the office for follow-up of procedure.  She completed a #1 diagnostic bilateral L3-L5 MBB   on 3/20/2023 performed by Dr. Sheppard for management of axial low back pain. Patient reports 0% relief from the procedure.  Patient's complaint today is primarily left-sided sciatica pain in conjunction with significant flare of left piriformis pain.  She states that this pain has become very debilitating and is even causing her to have difficulty getting comfortable and sleeping at night.    Pain today 8/10 VAS in severity.    Back Pain  This is a chronic problem. The current episode started more than 1 year ago. The problem occurs constantly. The problem has been gradually worsening since onset. The pain is present in the sacro-iliac (Piriformis muscle on the left). The quality of the pain is described as burning, shooting and stabbing. The pain radiates to the left thigh. The pain is at a severity of 8/10. The pain is severe. The pain is the same all the time. The symptoms are aggravated by position, lying down, sitting and standing. Pertinent negatives include no abdominal pain, chest pain, dysuria, fever, headaches, numbness or weakness.        PEG Assessment   What number best describes your pain on average in the past week?8  What number best describes how, during the past week, pain has interfered with your enjoyment of life?7  What number best describes how, during the past week, pain has interfered with your general activity?  8    Review of Pertinent Medical Data ---  MRI OF THE LUMBAR SPINE WITHOUT CONTRAST     CLINICAL HISTORY: Persistent low back pain and buttock pain since April.     TECHNIQUE: MRI of the lumbar spine was obtained with sagittal T1,  proton-density, and  T2-weighted images. Additionally, there are axial T1  and T2-weighted images through the lumbar spine.     FINDINGS:  The conus medullaris terminates at the L1-2 level and has normal signal  intensity.     At T12-L1, there is a disc bulge. This results in mild canal narrowing.     At L1-2, there is mild facet arthropathy. No significant canal or  foraminal narrowing is noted.     At L2-3, there is a disc bulge eccentric to the left. There is a small  left central disc protrusion. Mild facet arthropathy is noted. There is  a mild to moderate degree of canal stenosis. The left central disc  protrusion indents the ventral aspect of the left portion of the thecal  sac at the expected location of the left L3 nerve root. The left L3  nerve root is likely displaced somewhat posteriorly within the left  aspect of the thecal sac. Mild bilateral foraminal narrowing is noted  secondary to bulging disc material. There is degenerative retrolisthesis  of L2 on L3 by approximately 2-3 mm.     At L3-4, there is a disc bulge which results in a mild-to-moderate  degree of left and a mild degree of right foraminal narrowing. The  bulging disc material as well as a small central disc protrusion results  in a mild-to-moderate degree of central canal stenosis. Mild facet  arthropathy is seen at the L3-4 level. Degenerative retrolisthesis of L3  on L4 by approximately 2-3 mm is noted.     At L4-5, there is severe central canal stenosis secondary to disc  bulging, ligamentum flavum thickening, and facet arthropathy. There is a  moderate degree of bilateral foraminal narrowing secondary to disc  bulging and facet hypertrophic change. There is anterior  spondylolisthesis of L4 on L5 by approximately 7-8 mm.     At L5-S1, there is moderate to severe facet hypertrophic change. There  is a moderate degree of bilateral foraminal narrowing secondary to disc  bulging and facet hypertrophy.     IMPRESSION:  The most severe canal stenosis is seen at  "L4-5 where there is a severe  degree of central canal stenosis secondary to disc bulging, ligamentum  flavum thickening, and facet arthropathy. Anterior spondylolisthesis of  L4 on L5 by 7-8 mm is seen. Moderate bilateral foraminal narrowing is  noted at L4-5 secondary to disc bulging and facet hypertrophic change.     At L4-5 and L5-S1, there is a moderate degree of bilateral foraminal  narrowing secondary to disc bulging and facet arthropathy.     Lesser degrees of canal narrowing and foraminal narrowing are noted at  the L2-3 and L3-4 levels as discussed in detail above.     This report was finalized on 1/31/2020 12:41 PM by Dr. Júnior Archuleta M.D.    The following portions of the patient's history were reviewed and updated as appropriate: allergies, current medications, past family history, past medical history, past social history, past surgical history and problem list.    Review of Systems   Constitutional: Negative for activity change, fatigue and fever.   HENT: Negative for congestion.    Eyes: Negative for visual disturbance.   Respiratory: Negative for cough and chest tightness.    Cardiovascular: Negative for chest pain.   Gastrointestinal: Negative for abdominal pain, constipation and diarrhea.   Genitourinary: Negative for difficulty urinating and dysuria.   Musculoskeletal: Positive for back pain.   Neurological: Positive for light-headedness. Negative for dizziness, weakness, numbness and headaches.   Psychiatric/Behavioral: Negative for agitation, sleep disturbance and suicidal ideas. The patient is not nervous/anxious.      I have reviewed and confirmed the accuracy of the ROS as documented by the MA/LPN/RN DORITA Amezcua     Vitals:    03/30/23 1330   BP: 138/92   BP Location: Right arm   Patient Position: Sitting   Cuff Size: Adult   Pulse: 75   Resp: 20   Temp: 96.2 °F (35.7 °C)   TempSrc: Temporal   SpO2: 95%   Weight: 105 kg (230 lb 12.8 oz)   Height: 172.7 cm (68\")   PainSc:   8 "         Objective   Physical Exam  Vitals and nursing note reviewed.   Constitutional:       Appearance: Normal appearance.   HENT:      Head: Normocephalic.   Pulmonary:      Effort: Pulmonary effort is normal.   Musculoskeletal:      Lumbar back: Spasms and tenderness (Exquisite tenderness to palpation within the overlying left SI joint space as well as within the overlying piriformis muscle; positive Geovanny's, positive SI thrust, and positive SI compression test on the left) present. Decreased range of motion.   Skin:     General: Skin is warm and dry.   Neurological:      General: No focal deficit present.      Mental Status: She is alert and oriented to person, place, and time.      Cranial Nerves: Cranial nerves 2-12 are intact.      Sensory: Sensation is intact.      Motor: Motor function is intact.      Gait: Gait is intact.   Psychiatric:         Mood and Affect: Mood normal.         Behavior: Behavior normal.         Thought Content: Thought content normal.         Judgment: Judgment normal.         Assessment & Plan   Diagnoses and all orders for this visit:    1. Spinal stenosis of lumbar region, unspecified whether neurogenic claudication present (Primary)  -     MRI Lumbar Spine Without Contrast; Future  -     gabapentin (NEURONTIN) 300 MG capsule; Take 1 capsule by mouth in the evening x 5 days; if tolerated may increase to 1 capsule by mouth bid x 5 days; if tolerated may increase to TID  Dispense: 90 capsule; Refill: 0        --- Follow-up in 4 weeks or sooner to review MRI of the lumbar spine as well as to assess her response to the gabapentin trial for persistent sciatica pain  --- Have the patient return for #1 diagnostic/therapeutic left SIJ injection along with piriformis muscle injection  --- Due to  progressive worsening of pain I do recommend proceeding with lumbar MRI without contrast for further evaluation of disc herniation and/or nerve root impingement.                  Dictated  utilizing Dragon dictation.

## 2023-03-31 ENCOUNTER — TRANSCRIBE ORDERS (OUTPATIENT)
Dept: SURGERY | Facility: SURGERY CENTER | Age: 73
End: 2023-03-31
Payer: MEDICARE

## 2023-03-31 DIAGNOSIS — Z41.9 SURGERY, ELECTIVE: Primary | ICD-10-CM

## 2023-04-24 DIAGNOSIS — M48.061 SPINAL STENOSIS OF LUMBAR REGION, UNSPECIFIED WHETHER NEUROGENIC CLAUDICATION PRESENT: ICD-10-CM

## 2023-04-24 RX ORDER — GABAPENTIN 300 MG/1
CAPSULE ORAL
Qty: 270 CAPSULE | Refills: 0 | Status: SHIPPED | OUTPATIENT
Start: 2023-04-24 | End: 2023-04-27 | Stop reason: SINTOL

## 2023-04-25 ENCOUNTER — HOSPITAL ENCOUNTER (OUTPATIENT)
Dept: MRI IMAGING | Facility: HOSPITAL | Age: 73
Discharge: HOME | End: 2023-04-25
Admitting: PHYSICIAN ASSISTANT
Payer: MEDICARE

## 2023-04-25 DIAGNOSIS — M48.061 SPINAL STENOSIS OF LUMBAR REGION, UNSPECIFIED WHETHER NEUROGENIC CLAUDICATION PRESENT: ICD-10-CM

## 2023-04-25 PROCEDURE — 72148 MRI LUMBAR SPINE W/O DYE: CPT

## 2023-04-27 ENCOUNTER — OFFICE VISIT (OUTPATIENT)
Dept: PAIN MEDICINE | Facility: CLINIC | Age: 73
End: 2023-04-27
Payer: MEDICARE

## 2023-04-27 VITALS
TEMPERATURE: 96.4 F | OXYGEN SATURATION: 94 % | SYSTOLIC BLOOD PRESSURE: 120 MMHG | HEART RATE: 70 BPM | DIASTOLIC BLOOD PRESSURE: 79 MMHG | RESPIRATION RATE: 12 BRPM | HEIGHT: 68 IN | BODY MASS INDEX: 35.43 KG/M2 | WEIGHT: 233.8 LBS

## 2023-04-27 DIAGNOSIS — G57.02 PIRIFORMIS SYNDROME OF LEFT SIDE: ICD-10-CM

## 2023-04-27 DIAGNOSIS — M48.061 SPINAL STENOSIS OF LUMBAR REGION, UNSPECIFIED WHETHER NEUROGENIC CLAUDICATION PRESENT: ICD-10-CM

## 2023-04-27 DIAGNOSIS — M47.816 SPONDYLOSIS OF LUMBAR REGION WITHOUT MYELOPATHY OR RADICULOPATHY: ICD-10-CM

## 2023-04-27 DIAGNOSIS — M46.1 SACROILIITIS: Primary | ICD-10-CM

## 2023-04-27 NOTE — DISCHARGE INSTRUCTIONS
[facility] - Post-procedure Instructions          --  While there are no absolute restrictions, it is recommended that you do not perform strenuous activity today. In the morning, you may resume your level of activity as before your block.    --  If you have a band-aid at your injection site, please remove it later today. Observe the area for any redness, swelling, pus-like drainage, or a temperature over 101°. If any of these symptoms occur, please call your doctor at [phone]. If after office hours, leave a message and the on-call provider will return your call.    --  Ice may be applied to your injection site. It is recommended you avoid direct heat (heating pad; hot tub) for 1-2 days.    --  Call [facility] at [phone] if you experience persistent headache, persistent bleeding from the injection site, or severe pain not relieved by heat or oral medication.    --  Do not make important decisions today.    --  Due to the effects of the block and/or the I.V. Sedation, DO NOT drive or operate hazardous machinery for 12 hours.  Local anesthetics may cause numbness after procedure and precautions must be taken with regards to operating equipment as well as with walking, even if ambulating with assistance of another person or with an assistive device.    --  Do not drink alcohol for 12 hours.    -- You may return to work tomorrow, or as directed by your referring doctor.    --  Occasionally you may notice a slight increase in your pain after the procedure. This should start to improve within the next 24-48 hours. Radiofrequency ablation procedure pain may last 3-4 weeks.    --  It may take as long as 3-4 days before you notice a gradual improvement in your pain and/or other symptoms.    -- You may continue to take your prescribed pain medication as needed.    --  Some normal possible side effects of steroid use could include fluid retention, increased blood sugar, dull headache,  increased sweating, increased appetite, mood swings and flushing.    --  Diabetics are recommended to watch their blood glucose level closely for 24-48 hours after the injection.    --  Must stay in PACU for 20 min upon arrival and prove no leg weakness before being discharged.    --  IN THE EVENT OF A LIFE THREATENING EMERGENCY, (CHEST PAIN, BREATHING DIFFICULTIES, PARALYSIS…) YOU SHOULD GO TO YOUR NEAREST EMERGENCY ROOM.    --  You should be contacted by our office within 2-3 days to schedule follow up or next appointment date.  If not contacted within 7 days, please call the office at [phone]

## 2023-04-27 NOTE — PROGRESS NOTES
CHIEF COMPLAINT  Back pain   Patient reports worsened pain; here to discuss lumbar mri results      Subjective   Alesia Resendez is a 73 y.o. female  who presents for follow-up.  She has a history of low back pain and pain secondary to piriformis syndrome.  The patient presents to the office today for review of her lumbar MRI which we have discussed and treatment recommendations have been made as below.  He continues to note persistent and severe pain primarily affecting the left posterior buttock radiating to the posterior aspect of the thigh and lower extremity as well as affecting the lateral aspect of the left calf.  She finds that this pain is significantly aggravated with sitting for any length of time or lying on the left side.    On her last office visit I initiated a trial of gabapentin however the patient states that she was unable to tolerate it as it caused significant nausea.    Pain today 7/10 VAS in severity.      Back Pain  This is a chronic problem. The current episode started more than 1 year ago. The problem occurs constantly. The problem has been gradually worsening since onset. The pain is present in the sacro-iliac, lumbar spine and gluteal. The quality of the pain is described as burning and shooting. The pain radiates to the left thigh (LLE). The pain is at a severity of 7/10. The pain is moderate. The pain is the same all the time. The symptoms are aggravated by sitting and lying down. Associated symptoms include leg pain. Pertinent negatives include no abdominal pain, chest pain, dysuria, fever, headaches, numbness or weakness.        PEG Assessment   What number best describes your pain on average in the past week?8  What number best describes how, during the past week, pain has interfered with your enjoyment of life?7  What number best describes how, during the past week, pain has interfered with your general activity?  7    Review of Pertinent Medical Data ---    MRI EXAMINATION OF THE  LUMBAR SPINE WITHOUT CONTRAST     HISTORY: Back pain, left radiculopathy. Breast cancer.     COMPARISON: MRI lumbar spine 01/30/2020.     FINDINGS: There is moderate-to-severe loss of disc height and disc  desiccation at L5-S1 and a grade 1/grade 2 anterolisthesis of L4 upon  L5, unchanged. There is mild disc desiccation and loss of disc height  from T12 to L5. The conus is at L1 and the caudal aspect of the spinal  cord appears unremarkable.     L1-L2: Mild facet degenerative disease is present bilaterally. There is  no evidence of disc bulge or herniation.     L2-L3: Mild facet degenerative disease is present bilaterally. A left  paracentral disc osteophyte complex is present which results in mild  canal stenosis centrally but contributes to moderate lateral recess  narrowing on the left similar in appearance as compared to the prior  examination. Moderate foraminal stenosis is present on the left and  there is mild foraminal stenosis on the right secondary to extension of  a disc osteophyte complex into the neural foramen. Foraminal stenosis on  the left appears similar to the prior examination.     L3-L4: Mild-to-moderate facet degenerative disease is present  bilaterally. There is moderate-to-severe central canal stenosis and  lateral recess narrowing on the right and severe lateral recess  narrowing on the left secondary to a broad-based disc osteophyte complex  which is more prominent to the left and posterior element degenerative  disease. There is 1 to 2 mm of retrolisthesis of L3 upon L4, unchanged.  The canal stenosis and lateral recess narrowing is slightly more  prominent as compared to the prior examination. Mild foraminal stenosis  is present on the left secondary to extension of a disc osteophyte  complex into the neural foramen.     L4-L5: There is severe central canal stenosis secondary to severe facet  hypertrophy (with the facets being fused bilaterally. The fused facets  are new versus  01/30/2020.) and a broad-based disc osteophyte complex.  Spinal stenosis is accentuated by the anterolisthesis of L4 upon L5.  There was severe spinal stenosis present previously but spinal stenosis  may be even slightly more prominent as compared to the prior  examination.     L5-S1: Moderate-to-severe facet degenerative disease is present  bilaterally. There is mild-to-moderate central canal stenosis. Moderate  and moderate-to-severe lateral recess narrowing is present on the right  and left respectively. The canal stenosis and lateral recess narrowing  is slightly more prominent as compared to the prior examination.  Mild-to-moderate and mild foraminal stenosis is present on the left and  right respectively secondary to extension of a small disc osteophyte  complex into the neural foramen.     IMPRESSION:  Extensive multilevel degenerative disease involving the  lumbar spine is noted as described above. This includes severe spinal  stenosis at L4-L5 which appears similar to slightly more prominent as  compared to the prior examination secondary to posterior element  degenerative disease, broad-based disc osteophyte complex and  anterolisthesis of L4 upon L5. There is fusion of the facets bilaterally  at L4-L5 which is new versus the prior examination. Spinal stenosis and  lateral recess narrowing is also present at L5 S1 and to a slightly  lesser degree also slightly more prominent as compared to the prior  examination. There is no evidence of a focal herniation. See above.     This report was finalized on 4/26/2023 5:07 PM by Dr. Jose Velasquez M.D.    The following portions of the patient's history were reviewed and updated as appropriate: allergies, current medications, past family history, past medical history, past social history, past surgical history and problem list.    Review of Systems   Constitutional: Negative for activity change, fatigue and fever.   HENT: Negative for congestion.    Eyes: Negative  "for visual disturbance.   Respiratory: Negative for cough and chest tightness.    Cardiovascular: Negative for chest pain.   Gastrointestinal: Positive for diarrhea. Negative for abdominal pain and constipation.   Genitourinary: Negative for difficulty urinating and dysuria.   Musculoskeletal: Positive for back pain.   Neurological: Negative for dizziness, weakness, light-headedness, numbness and headaches.   Psychiatric/Behavioral: Positive for agitation. Negative for sleep disturbance and suicidal ideas. The patient is nervous/anxious.      I have reviewed and confirmed the accuracy of the ROS as documented by the MA/LPN/RN DORITA Amezcua    Vitals:    04/27/23 0802   BP: 120/79   BP Location: Right arm   Patient Position: Sitting   Cuff Size: Adult   Pulse: 70   Resp: 12   Temp: 96.4 °F (35.8 °C)   TempSrc: Temporal   SpO2: 94%   Weight: 106 kg (233 lb 12.8 oz)   Height: 172.7 cm (68\")   PainSc:   7         Objective   Physical Exam  Vitals and nursing note reviewed.   Constitutional:       Appearance: Normal appearance. She is obese.   HENT:      Head: Normocephalic.   Pulmonary:      Effort: Pulmonary effort is normal.   Musculoskeletal:      Lumbar back: Spasms and tenderness (exquisite pain to palpation over the left SI joint space and piriformis muscle; +Patricks/+SI compression/+SI thrust tests on the left) present. Decreased range of motion.        Back:    Skin:     General: Skin is warm and dry.   Neurological:      General: No focal deficit present.      Mental Status: She is alert and oriented to person, place, and time.      Cranial Nerves: Cranial nerves 2-12 are intact.      Sensory: Sensation is intact.      Motor: Motor function is intact.      Gait: Gait abnormal.   Psychiatric:         Mood and Affect: Mood normal.         Behavior: Behavior normal.         Thought Content: Thought content normal.         Judgment: Judgment normal.         Assessment & Plan   Diagnoses and all orders for " this visit:    1. Sacroiliitis (Primary)    2. Piriformis syndrome of left side    3. Spondylosis of lumbar region without myelopathy or radiculopathy    4. Spinal stenosis of lumbar region, unspecified whether neurogenic claudication present        --- Follow-up in 4 weeks for further evaluation following her SI joint and piriformis injection.  --- May consider a trial in the future of pregabalin  --- Patient may be a candidate for surgical evaluate for severe L4-5 stenosis              Dictated utilizing Dragon dictation.

## 2023-05-01 ENCOUNTER — HOSPITAL ENCOUNTER (OUTPATIENT)
Facility: SURGERY CENTER | Age: 73
Setting detail: HOSPITAL OUTPATIENT SURGERY
Discharge: HOME | End: 2023-05-01
Attending: ANESTHESIOLOGY | Admitting: ANESTHESIOLOGY
Payer: MEDICARE

## 2023-05-01 ENCOUNTER — HOSPITAL ENCOUNTER (OUTPATIENT)
Dept: GENERAL RADIOLOGY | Facility: SURGERY CENTER | Age: 73
Setting detail: HOSPITAL OUTPATIENT SURGERY
End: 2023-05-01
Payer: MEDICARE

## 2023-05-01 VITALS
BODY MASS INDEX: 35.31 KG/M2 | TEMPERATURE: 98 F | SYSTOLIC BLOOD PRESSURE: 126 MMHG | HEIGHT: 68 IN | WEIGHT: 233 LBS | DIASTOLIC BLOOD PRESSURE: 85 MMHG | OXYGEN SATURATION: 96 % | RESPIRATION RATE: 16 BRPM | HEART RATE: 56 BPM

## 2023-05-01 DIAGNOSIS — Z41.9 SURGERY, ELECTIVE: ICD-10-CM

## 2023-05-01 PROCEDURE — 20552 NJX 1/MLT TRIGGER POINT 1/2: CPT | Performed by: ANESTHESIOLOGY

## 2023-05-01 PROCEDURE — 27096 INJECT SACROILIAC JOINT: CPT | Performed by: ANESTHESIOLOGY

## 2023-05-01 PROCEDURE — 77002 NEEDLE LOCALIZATION BY XRAY: CPT

## 2023-05-01 PROCEDURE — 25510000001 IOPAMIDOL 61 % SOLUTION 30 ML VIAL: Performed by: ANESTHESIOLOGY

## 2023-05-01 PROCEDURE — 25010000002 DEXAMETHASONE SODIUM PHOSPHATE 100 MG/10ML SOLUTION 10 ML VIAL: Performed by: ANESTHESIOLOGY

## 2023-05-01 PROCEDURE — 25010000002 MIDAZOLAM PER 1 MG: Performed by: ANESTHESIOLOGY

## 2023-05-01 PROCEDURE — G0260 INJ FOR SACROILIAC JT ANESTH: HCPCS | Performed by: ANESTHESIOLOGY

## 2023-05-01 RX ORDER — SODIUM CHLORIDE 0.9 % (FLUSH) 0.9 %
10 SYRINGE (ML) INJECTION EVERY 12 HOURS SCHEDULED
Status: DISCONTINUED | OUTPATIENT
Start: 2023-05-01 | End: 2023-05-01 | Stop reason: HOSPADM

## 2023-05-01 RX ORDER — MIDAZOLAM HYDROCHLORIDE 1 MG/ML
INJECTION INTRAMUSCULAR; INTRAVENOUS AS NEEDED
Status: DISCONTINUED | OUTPATIENT
Start: 2023-05-01 | End: 2023-05-01 | Stop reason: HOSPADM

## 2023-05-01 RX ORDER — SODIUM CHLORIDE 0.9 % (FLUSH) 0.9 %
10 SYRINGE (ML) INJECTION AS NEEDED
Status: DISCONTINUED | OUTPATIENT
Start: 2023-05-01 | End: 2023-05-01 | Stop reason: HOSPADM

## 2023-05-01 NOTE — OP NOTE
Left Sacroiliac Joint Injection and Left Piriformis Injection  [facility]      PREOPERATIVE DIAGNOSIS:   Sacroiliac joint dysfunction, Piriformis Syndrome    POSTOPERATIVE DIAGNOSIS:  Sacroiliac joint dysfunction, Piriformis Syndrome    PROCEDURE:  Sacroiliac Joint Injection, with fluoroscopic guidance CTP 69264 -LT, Piriformis injection with fluoroscopic guidance 83105 & [id number]    PRE-PROCEDURE DISCUSSION WITH PATIENT:    Risks and complications were discussed with the patient prior to starting the procedure and informed consent was obtained.  We discussed various topics including but not limited to bleeding, infection, injury, postprocedural site soreness, painful flareup, worsening of clinical picture, paralysis, coma, and death.     SURGEON:  Estrellita Sheppard MD    REASON FOR PROCEDURE:    Patient has pain consistent with SI pathology on history and physical exam. Piriformis tenderness    SEDATION:  Light sedation was used for this procedure which included and Versed 1mg  ANESTHETIC AGENT:  Marcaine 0.5% for piriformis, 1% lidocaine for joint  STEROID AGENT:  15mg Dexamethasone    DESCRIPTON OF PROCEDURE:  After obtaining informed consent, IV access was obtained in the preoperative area.  The patient was transported to the operative suite and placed in the prone position. EKG, blood pressure, and pulse oximeter were monitored. The lumbosacral area was prepped with Chloraprep and draped in a sterile fashion. Under fluoroscopic guidance the inferior most portion of the left SI joint was identified. The overlying skin and subcutaneous tissue was anesthetized with 1% lidocaine. A 22-gauge spinal needle was then advanced under fluoroscopic guidance in a coaxial view into the joint space.  After negative aspiration, 1 mL of Isovue 61% was injected, and after seeing appropriate spread into the joint a volume of 3ml of the above medication was injected.    Needle was removed intact.      Attention was  turned to the left piriformis muscle.  The femoral head on the left side was identified in an AP fluoroscopic image.  The skin and subcutaneous tissue superior to that target was anesthetized with 1% lidocaine. A 22-gauge spinal needle was then advanced percutaneously through the anesthetized skin tract under fluoroscopic guidance into the belly of the piriformis muscle.  After negative aspiration for blood a volume of 3mL of air was injected, producing a pneumogram of the piriformis muscle.  Subsequently, a volume of 5mL consisting of 7.5mg Dexamethasone with 0.5% Bupivacaine was injected without resistance.     Vital signs remained stable.  The onset of analgesia was noted.    Pre-procedure pain score: 6/10 at rest, 7/10 with activity  Post-procedure pain score: 0/10      ESTIMATED BLOOD LOSS:  minimal  SPECIMENS:  None  COMPLICATIONS:  No complications were noted.    TOLERANCE & DISCHARGE CONDITION:    The patient tolerated the procedure well.  The patient was transported to the recovery area without difficulties.  The patient was discharged to home under the care of family in stable and satisfactory condition.    PLAN OF CARE:  1. The patient was given our standard instruction sheet and will resume all medications as per the medication reconciliation sheet.  2. The patient will Return to clinic 4-6 wks.  3. The patient is instructed to keep an account of pain relief after the procedure.

## 2023-05-04 ENCOUNTER — PRE-ADMISSION TESTING (OUTPATIENT)
Dept: PREADMISSION TESTING | Facility: HOSPITAL | Age: 73
End: 2023-05-04
Payer: MEDICARE

## 2023-05-04 VITALS
SYSTOLIC BLOOD PRESSURE: 129 MMHG | TEMPERATURE: 98.1 F | OXYGEN SATURATION: 95 % | RESPIRATION RATE: 18 BRPM | DIASTOLIC BLOOD PRESSURE: 74 MMHG | HEIGHT: 68 IN | BODY MASS INDEX: 34.92 KG/M2 | HEART RATE: 62 BPM | WEIGHT: 230.4 LBS

## 2023-05-04 LAB
ANION GAP SERPL CALCULATED.3IONS-SCNC: 12 MMOL/L (ref 5–15)
BUN SERPL-MCNC: 21 MG/DL (ref 8–23)
BUN/CREAT SERPL: 28.4 (ref 7–25)
CALCIUM SPEC-SCNC: 9.1 MG/DL (ref 8.6–10.5)
CHLORIDE SERPL-SCNC: 102 MMOL/L (ref 98–107)
CO2 SERPL-SCNC: 29 MMOL/L (ref 22–29)
CREAT SERPL-MCNC: 0.74 MG/DL (ref 0.57–1)
DEPRECATED RDW RBC AUTO: 45.1 FL (ref 37–54)
EGFRCR SERPLBLD CKD-EPI 2021: 85.6 ML/MIN/1.73
ERYTHROCYTE [DISTWIDTH] IN BLOOD BY AUTOMATED COUNT: 15.1 % (ref 12.3–15.4)
GLUCOSE SERPL-MCNC: 116 MG/DL (ref 65–99)
HCT VFR BLD AUTO: 41.6 % (ref 34–46.6)
HGB BLD-MCNC: 14.3 G/DL (ref 12–15.9)
MCH RBC QN AUTO: 28 PG (ref 26.6–33)
MCHC RBC AUTO-ENTMCNC: 34.4 G/DL (ref 31.5–35.7)
MCV RBC AUTO: 81.6 FL (ref 79–97)
PLATELET # BLD AUTO: 267 10*3/MM3 (ref 140–450)
PMV BLD AUTO: 9.1 FL (ref 6–12)
POTASSIUM SERPL-SCNC: 3.4 MMOL/L (ref 3.5–5.2)
RBC # BLD AUTO: 5.1 10*6/MM3 (ref 3.77–5.28)
SODIUM SERPL-SCNC: 143 MMOL/L (ref 136–145)
WBC NRBC COR # BLD: 6.97 10*3/MM3 (ref 3.4–10.8)

## 2023-05-04 PROCEDURE — 36415 COLL VENOUS BLD VENIPUNCTURE: CPT

## 2023-05-04 PROCEDURE — 85027 COMPLETE CBC AUTOMATED: CPT

## 2023-05-04 PROCEDURE — 80048 BASIC METABOLIC PNL TOTAL CA: CPT

## 2023-05-04 NOTE — DISCHARGE INSTRUCTIONS
Take the following medications the morning of surgery:  AMLODIPINE,BUPROPION AND OMEPRAZOLE    If you are on prescription narcotic pain medication to control your pain you may also take that medication the morning of surgery.    General Instructions:  Do not eat solid food after midnight the night before surgery.  You may drink clear liquids day of surgery but must stop at least one hour before your hospital arrival time.  4:30 AM  It is beneficial for you to have a clear drink that contains carbohydrates the day of surgery.  We suggest a 12 to 20 ounce bottle of Gatorade or Powerade for non-diabetic patients or a 12 to 20 ounce bottle of G2 or Powerade Zero for diabetic patients. (Pediatric patients, are not advised to drink a 12 to 20 ounce carbohydrate drink)    Clear liquids are liquids you can see through.  Nothing red in color.     Plain water                               Sports drinks  Sodas                                   Gelatin (Jell-O)  Fruit juices without pulp such as white grape juice and apple juice  Popsicles that contain no fruit or yogurt  Tea or coffee (no cream or milk added)  Gatorade / Powerade  G2 / Powerade Zero    Infants may have breast milk up to four hours before surgery.  Infants drinking formula may drink formula up to six hours before surgery.   Patients who avoid smoking, chewing tobacco and alcohol for 4 weeks prior to surgery have a reduced risk of post-operative complications.  Quit smoking as many days before surgery as you can.  Do not smoke, use chewing tobacco or drink alcohol the day of surgery.   If applicable bring your C-PAP/ BI-PAP machine in with you to preop day of surgery.  Bring any papers given to you in the doctor’s office.  Wear clean comfortable clothes.  Do not wear contact lenses, false eyelashes or make-up.  Bring a case for your glasses.   Bring crutches or walker if applicable.  Remove all piercings.  Leave jewelry and any other valuables at home.  Hair  extensions with metal clips must be removed prior to surgery.  The Pre-Admission Testing nurse will instruct you to bring medications if unable to obtain an accurate list in Pre-Admission Testing.        If you were given a blood bank ID arm band remember to bring it with you the day of surgery.    Preventing a Surgical Site Infection:  For 2 to 3 days before surgery, avoid shaving with a razor because the razor can irritate skin and make it easier to develop an infection.    Any areas of open skin can increase the risk of a post-operative wound infection by allowing bacteria to enter and travel throughout the body.  Notify your surgeon if you have any skin wounds / rashes even if it is not near the expected surgical site.  The area will need assessed to determine if surgery should be delayed until it is healed.  The night prior to surgery shower using a fresh bar of anti-bacterial soap (such as Dial) and clean washcloth.  Sleep in a clean bed with clean clothing.  Do not allow pets to sleep with you.  Shower on the morning of surgery using a fresh bar of anti-bacterial soap (such as Dial) and clean washcloth.  Dry with a clean towel and dress in clean clothing.  Ask your surgeon if you will be receiving antibiotics prior to surgery.  Make sure you, your family, and all healthcare providers clean their hands with soap and water or an alcohol based hand  before caring for you or your wound.    Day of surgery: 5/11/2023 ARRIVAL TIME 5:30 AM  Your arrival time is approximately two hours before your scheduled surgery time.  Upon arrival, a Pre-op nurse and Anesthesiologist will review your health history, obtain vital signs, and answer questions you may have.  The only belongings needed at this time will be a list of your home medications and if applicable your C-PAP/BI-PAP machine.  A Pre-op nurse will start an IV and you may receive medication in preparation for surgery, including something to help you relax.      Please be aware that surgery does come with discomfort.  We want to make every effort to control your discomfort so please discuss any uncontrolled symptoms with your nurse.   Your doctor will most likely have prescribed pain medications.      If you are going home after surgery you will receive individualized written care instructions before being discharged.  A responsible adult must drive you to and from the hospital on the day of your surgery and stay with you for 24 hours.  Discharge prescriptions can be filled by the hospital pharmacy during regular pharmacy hours.  If you are having surgery late in the day/evening your prescription may be e-prescribed to your pharmacy.  Please verify your pharmacy hours or chose a 24 hour pharmacy to avoid not having access to your prescription because your pharmacy has closed for the day.    If you are staying overnight following surgery, you will be transported to your hospital room following the recovery period.  [facility] has all private rooms.    If you have any questions please call Pre-Admission Testing at [phone].  Deductibles and co-payments are collected on the day of service. Please be prepared to pay the required co-pay, deductible or deposit on the day of service as defined by your plan.    Call your surgeon immediately if you experience any of the following symptoms:  Sore Throat  Shortness of Breath or difficulty breathing  Cough  Chills  Body soreness or muscle pain  Headache  Fever  New loss of taste or smell  Do not arrive for your surgery ill.  Your procedure will need to be rescheduled to another time.  You will need to call your physician before the day of surgery to avoid any unnecessary exposure to hospital staff as well as other patients.

## 2023-05-11 ENCOUNTER — ANESTHESIA EVENT (OUTPATIENT)
Dept: PERIOP | Facility: HOSPITAL | Age: 73
End: 2023-05-11
Payer: MEDICARE

## 2023-05-11 ENCOUNTER — ANESTHESIA (OUTPATIENT)
Dept: PERIOP | Facility: HOSPITAL | Age: 73
End: 2023-05-11
Payer: MEDICARE

## 2023-05-11 ENCOUNTER — HOSPITAL ENCOUNTER (OUTPATIENT)
Facility: HOSPITAL | Age: 73
Setting detail: HOSPITAL OUTPATIENT SURGERY
Discharge: HOME | End: 2023-05-11
Attending: OPHTHALMOLOGY | Admitting: OPHTHALMOLOGY
Payer: MEDICARE

## 2023-05-11 VITALS
OXYGEN SATURATION: 94 % | RESPIRATION RATE: 16 BRPM | TEMPERATURE: 97.9 F | SYSTOLIC BLOOD PRESSURE: 118 MMHG | DIASTOLIC BLOOD PRESSURE: 66 MMHG | HEART RATE: 61 BPM

## 2023-05-11 DIAGNOSIS — Z98.890 POSTOPERATIVE STATE: Primary | ICD-10-CM

## 2023-05-11 PROCEDURE — 25010000002 PROPOFOL 10 MG/ML EMULSION: Performed by: NURSE ANESTHETIST, CERTIFIED REGISTERED

## 2023-05-11 PROCEDURE — 25010000002 ONDANSETRON PER 1 MG: Performed by: NURSE ANESTHETIST, CERTIFIED REGISTERED

## 2023-05-11 PROCEDURE — 25010000002 CEFAZOLIN IN DEXTROSE 2-4 GM/100ML-% SOLUTION: Performed by: OPHTHALMOLOGY

## 2023-05-11 PROCEDURE — 25010000002 FENTANYL CITRATE (PF) 50 MCG/ML SOLUTION: Performed by: NURSE ANESTHETIST, CERTIFIED REGISTERED

## 2023-05-11 PROCEDURE — 25010000002 METHYLENE BLUE 50 MG/10ML SOLUTION: Performed by: OPHTHALMOLOGY

## 2023-05-11 PROCEDURE — 25010000002 MIDAZOLAM PER 1 MG: Performed by: ANESTHESIOLOGY

## 2023-05-11 PROCEDURE — 25010000002 DEXAMETHASONE SODIUM PHOSPHATE 20 MG/5ML SOLUTION: Performed by: NURSE ANESTHETIST, CERTIFIED REGISTERED

## 2023-05-11 RX ORDER — HYDROCODONE BITARTRATE AND ACETAMINOPHEN 5; 325 MG/1; MG/1
1 TABLET ORAL EVERY 6 HOURS PRN
Qty: 15 TABLET | Refills: 0 | Status: SHIPPED | OUTPATIENT
Start: 2023-05-11

## 2023-05-11 RX ORDER — DIPHENHYDRAMINE HYDROCHLORIDE 50 MG/ML
12.5 INJECTION INTRAMUSCULAR; INTRAVENOUS
Status: DISCONTINUED | OUTPATIENT
Start: 2023-05-11 | End: 2023-05-11 | Stop reason: HOSPADM

## 2023-05-11 RX ORDER — SUCCINYLCHOLINE/SOD CL,ISO/PF 200MG/10ML
SYRINGE (ML) INTRAVENOUS AS NEEDED
Status: DISCONTINUED | OUTPATIENT
Start: 2023-05-11 | End: 2023-05-11 | Stop reason: SURG

## 2023-05-11 RX ORDER — HYDROCODONE BITARTRATE AND ACETAMINOPHEN 7.5; 325 MG/1; MG/1
1 TABLET ORAL ONCE AS NEEDED
Status: COMPLETED | OUTPATIENT
Start: 2023-05-11 | End: 2023-05-11

## 2023-05-11 RX ORDER — CEFAZOLIN SODIUM 2 G/100ML
2 INJECTION, SOLUTION INTRAVENOUS ONCE
Status: COMPLETED | OUTPATIENT
Start: 2023-05-11 | End: 2023-05-11

## 2023-05-11 RX ORDER — HYDROMORPHONE HYDROCHLORIDE 1 MG/ML
0.5 INJECTION, SOLUTION INTRAMUSCULAR; INTRAVENOUS; SUBCUTANEOUS
Status: DISCONTINUED | OUTPATIENT
Start: 2023-05-11 | End: 2023-05-11 | Stop reason: HOSPADM

## 2023-05-11 RX ORDER — DROPERIDOL 2.5 MG/ML
0.62 INJECTION, SOLUTION INTRAMUSCULAR; INTRAVENOUS
Status: DISCONTINUED | OUTPATIENT
Start: 2023-05-11 | End: 2023-05-11 | Stop reason: HOSPADM

## 2023-05-11 RX ORDER — FENTANYL CITRATE 50 UG/ML
INJECTION, SOLUTION INTRAMUSCULAR; INTRAVENOUS AS NEEDED
Status: DISCONTINUED | OUTPATIENT
Start: 2023-05-11 | End: 2023-05-11 | Stop reason: SURG

## 2023-05-11 RX ORDER — EPHEDRINE SULFATE 50 MG/ML
5 INJECTION, SOLUTION INTRAVENOUS ONCE AS NEEDED
Status: DISCONTINUED | OUTPATIENT
Start: 2023-05-11 | End: 2023-05-11 | Stop reason: HOSPADM

## 2023-05-11 RX ORDER — LIDOCAINE HYDROCHLORIDE 10 MG/ML
0.5 INJECTION, SOLUTION EPIDURAL; INFILTRATION; INTRACAUDAL; PERINEURAL ONCE AS NEEDED
Status: DISCONTINUED | OUTPATIENT
Start: 2023-05-11 | End: 2023-05-11 | Stop reason: HOSPADM

## 2023-05-11 RX ORDER — FENTANYL CITRATE 50 UG/ML
50 INJECTION, SOLUTION INTRAMUSCULAR; INTRAVENOUS
Status: DISCONTINUED | OUTPATIENT
Start: 2023-05-11 | End: 2023-05-11 | Stop reason: HOSPADM

## 2023-05-11 RX ORDER — SODIUM CHLORIDE 0.9 % (FLUSH) 0.9 %
3 SYRINGE (ML) INJECTION EVERY 12 HOURS SCHEDULED
Status: DISCONTINUED | OUTPATIENT
Start: 2023-05-11 | End: 2023-05-11 | Stop reason: HOSPADM

## 2023-05-11 RX ORDER — PROPOFOL 10 MG/ML
VIAL (ML) INTRAVENOUS AS NEEDED
Status: DISCONTINUED | OUTPATIENT
Start: 2023-05-11 | End: 2023-05-11 | Stop reason: SURG

## 2023-05-11 RX ORDER — NALOXONE HCL 0.4 MG/ML
0.2 VIAL (ML) INJECTION AS NEEDED
Status: DISCONTINUED | OUTPATIENT
Start: 2023-05-11 | End: 2023-05-11 | Stop reason: HOSPADM

## 2023-05-11 RX ORDER — PROMETHAZINE HYDROCHLORIDE 25 MG/1
25 TABLET ORAL ONCE AS NEEDED
Status: DISCONTINUED | OUTPATIENT
Start: 2023-05-11 | End: 2023-05-11 | Stop reason: HOSPADM

## 2023-05-11 RX ORDER — IPRATROPIUM BROMIDE AND ALBUTEROL SULFATE 2.5; .5 MG/3ML; MG/3ML
3 SOLUTION RESPIRATORY (INHALATION) ONCE AS NEEDED
Status: DISCONTINUED | OUTPATIENT
Start: 2023-05-11 | End: 2023-05-11 | Stop reason: HOSPADM

## 2023-05-11 RX ORDER — SODIUM CHLORIDE 0.9 % (FLUSH) 0.9 %
3-10 SYRINGE (ML) INJECTION AS NEEDED
Status: DISCONTINUED | OUTPATIENT
Start: 2023-05-11 | End: 2023-05-11 | Stop reason: HOSPADM

## 2023-05-11 RX ORDER — ERYTHROMYCIN 5 MG/G
OINTMENT OPHTHALMIC 2 TIMES DAILY
Qty: 3.5 G | Refills: 0 | Status: SHIPPED | OUTPATIENT
Start: 2023-05-11

## 2023-05-11 RX ORDER — FLUMAZENIL 0.1 MG/ML
0.2 INJECTION INTRAVENOUS AS NEEDED
Status: DISCONTINUED | OUTPATIENT
Start: 2023-05-11 | End: 2023-05-11 | Stop reason: HOSPADM

## 2023-05-11 RX ORDER — MIDAZOLAM HYDROCHLORIDE 1 MG/ML
0.5 INJECTION INTRAMUSCULAR; INTRAVENOUS
Status: DISCONTINUED | OUTPATIENT
Start: 2023-05-11 | End: 2023-05-11 | Stop reason: HOSPADM

## 2023-05-11 RX ORDER — FAMOTIDINE 10 MG/ML
20 INJECTION, SOLUTION INTRAVENOUS ONCE
Status: COMPLETED | OUTPATIENT
Start: 2023-05-11 | End: 2023-05-11

## 2023-05-11 RX ORDER — ONDANSETRON 2 MG/ML
INJECTION INTRAMUSCULAR; INTRAVENOUS AS NEEDED
Status: DISCONTINUED | OUTPATIENT
Start: 2023-05-11 | End: 2023-05-11 | Stop reason: SURG

## 2023-05-11 RX ORDER — EPHEDRINE SULFATE 50 MG/ML
INJECTION INTRAVENOUS AS NEEDED
Status: DISCONTINUED | OUTPATIENT
Start: 2023-05-11 | End: 2023-05-11 | Stop reason: SURG

## 2023-05-11 RX ORDER — FENTANYL CITRATE 50 UG/ML
50 INJECTION, SOLUTION INTRAMUSCULAR; INTRAVENOUS ONCE AS NEEDED
Status: DISCONTINUED | OUTPATIENT
Start: 2023-05-11 | End: 2023-05-11 | Stop reason: HOSPADM

## 2023-05-11 RX ORDER — MAGNESIUM HYDROXIDE 1200 MG/15ML
LIQUID ORAL AS NEEDED
Status: DISCONTINUED | OUTPATIENT
Start: 2023-05-11 | End: 2023-05-11 | Stop reason: HOSPADM

## 2023-05-11 RX ORDER — OXYCODONE AND ACETAMINOPHEN 7.5; 325 MG/1; MG/1
1 TABLET ORAL EVERY 4 HOURS PRN
Status: DISCONTINUED | OUTPATIENT
Start: 2023-05-11 | End: 2023-05-11 | Stop reason: HOSPADM

## 2023-05-11 RX ORDER — LABETALOL HYDROCHLORIDE 5 MG/ML
5 INJECTION, SOLUTION INTRAVENOUS
Status: DISCONTINUED | OUTPATIENT
Start: 2023-05-11 | End: 2023-05-11 | Stop reason: HOSPADM

## 2023-05-11 RX ORDER — PROMETHAZINE HYDROCHLORIDE 25 MG/1
25 SUPPOSITORY RECTAL ONCE AS NEEDED
Status: DISCONTINUED | OUTPATIENT
Start: 2023-05-11 | End: 2023-05-11 | Stop reason: HOSPADM

## 2023-05-11 RX ORDER — HYDRALAZINE HYDROCHLORIDE 20 MG/ML
5 INJECTION INTRAMUSCULAR; INTRAVENOUS
Status: DISCONTINUED | OUTPATIENT
Start: 2023-05-11 | End: 2023-05-11 | Stop reason: HOSPADM

## 2023-05-11 RX ORDER — SODIUM CHLORIDE, SODIUM LACTATE, POTASSIUM CHLORIDE, CALCIUM CHLORIDE 600; 310; 30; 20 MG/100ML; MG/100ML; MG/100ML; MG/100ML
9 INJECTION, SOLUTION INTRAVENOUS CONTINUOUS
Status: DISCONTINUED | OUTPATIENT
Start: 2023-05-11 | End: 2023-05-11 | Stop reason: HOSPADM

## 2023-05-11 RX ORDER — DEXAMETHASONE SODIUM PHOSPHATE 4 MG/ML
INJECTION, SOLUTION INTRA-ARTICULAR; INTRALESIONAL; INTRAMUSCULAR; INTRAVENOUS; SOFT TISSUE AS NEEDED
Status: DISCONTINUED | OUTPATIENT
Start: 2023-05-11 | End: 2023-05-11 | Stop reason: SURG

## 2023-05-11 RX ORDER — ONDANSETRON 2 MG/ML
4 INJECTION INTRAMUSCULAR; INTRAVENOUS ONCE AS NEEDED
Status: COMPLETED | OUTPATIENT
Start: 2023-05-11 | End: 2023-05-11

## 2023-05-11 RX ORDER — LIDOCAINE HYDROCHLORIDE 20 MG/ML
INJECTION, SOLUTION INFILTRATION; PERINEURAL AS NEEDED
Status: DISCONTINUED | OUTPATIENT
Start: 2023-05-11 | End: 2023-05-11 | Stop reason: SURG

## 2023-05-11 RX ADMIN — ONDANSETRON 4 MG: 2 INJECTION INTRAMUSCULAR; INTRAVENOUS at 08:26

## 2023-05-11 RX ADMIN — PROPOFOL 150 MG: 10 INJECTION, EMULSION INTRAVENOUS at 07:35

## 2023-05-11 RX ADMIN — FENTANYL CITRATE 50 MCG: 50 INJECTION, SOLUTION INTRAMUSCULAR; INTRAVENOUS at 07:35

## 2023-05-11 RX ADMIN — EPHEDRINE SULFATE 7.5 MG: 50 INJECTION INTRAVENOUS at 08:24

## 2023-05-11 RX ADMIN — CEFAZOLIN SODIUM 2 G: 2 INJECTION, SOLUTION INTRAVENOUS at 07:23

## 2023-05-11 RX ADMIN — ONDANSETRON 4 MG: 2 INJECTION INTRAMUSCULAR; INTRAVENOUS at 09:03

## 2023-05-11 RX ADMIN — HYDROCODONE BITARTRATE AND ACETAMINOPHEN 1 TABLET: 7.5; 325 TABLET ORAL at 09:04

## 2023-05-11 RX ADMIN — EPHEDRINE SULFATE 7.5 MG: 50 INJECTION INTRAVENOUS at 08:11

## 2023-05-11 RX ADMIN — SODIUM CHLORIDE, POTASSIUM CHLORIDE, SODIUM LACTATE AND CALCIUM CHLORIDE 9 ML/HR: 600; 310; 30; 20 INJECTION, SOLUTION INTRAVENOUS at 06:26

## 2023-05-11 RX ADMIN — DEXAMETHASONE SODIUM PHOSPHATE 10 MG: 4 INJECTION, SOLUTION INTRAMUSCULAR; INTRAVENOUS at 07:49

## 2023-05-11 RX ADMIN — LIDOCAINE HYDROCHLORIDE 100 MG: 20 INJECTION, SOLUTION INFILTRATION; PERINEURAL at 07:35

## 2023-05-11 RX ADMIN — Medication 100 MG: at 07:36

## 2023-05-11 RX ADMIN — FAMOTIDINE 20 MG: 10 INJECTION INTRAVENOUS at 06:40

## 2023-05-11 RX ADMIN — MIDAZOLAM 0.5 MG: 1 INJECTION INTRAMUSCULAR; INTRAVENOUS at 06:40

## 2023-05-11 NOTE — H&P
[facility]   PREOPERATIVE HISTORY AND PHYSICAL    Patient Name:Alesia Resendez  : 1950  MRN: [id number]  Primary Care Physician: Epley, James, APRN  Date of admission: 2023    Subjective   Subjective     Chief Complaint: preoperative evaluation    History of Present Illness  Alesia Resendez is a 73 y.o. female who presents for preoperative evaluation. She is scheduled for BILATERAL UPPER LID BLEPHAROPLASTY (Bilateral), BILATERAL TEMPORAL DIRECT BROWLIFT (Bilateral)    Review of Systems   Eyes: Positive for visual disturbance.   All other systems reviewed and are negative.       Personal History     Past Medical History:   Diagnosis Date   • Anxiety    • Arthritis    • Breast cancer 2017    LEFT   • Colon polyp     Removed   • Dermatochalasis of both eyelids    • GERD (gastroesophageal reflux disease)    • History of radiation therapy 2006    LT BREAST   • History of skin cancer     SQUAMOUS CELL REMOVED FROM LT UPPER LIP AND RT THUMB   • Hyperlipidemia    • Hypertension    • Irritable bowel syndrome In my 40’s   • Low back pain    • Obesity    • Ptosis of both eyebrows        Past Surgical History:   Procedure Laterality Date   • ANAL FISSURECTOMY     • APPENDECTOMY     • BREAST LUMPECTOMY W/ NEEDLE LOCALIZATION Left 2006   • BREAST SURGERY  2006 and 2017    breast cancer   • CATARACT EXTRACTION Bilateral 2018   • CHOLECYSTECTOMY     • COLONOSCOPY  2014   • COLONOSCOPY Left 2021    Procedure: COLONOSCOPY;  Surgeon: Sridhar James MD;  Location: [facility] [place] MA[facility] OR;  Service: Gastroenterology;  Laterality: Left;   • COLONOSCOPY N/A 10/10/2022    Procedure: COLONOSCOPY FOR SCREENING;  Surgeon: Sridhar James MD;  Location: [facility] [place] MAIN OR;  Service: Gastroenterology;  Laterality: N/A;  NORMAL   • ENDOSCOPY N/A 10/10/2022    Procedure: ESOPHAGOGASTRODUODENOSCOPY WITH BIOPSY;  Surgeon: Sridhar James MD;  Location: [facility] [place] MAIN OR;  Service: Gastroenterology;   Laterality: N/A;  GASTRIC POLYPS, IRREGULAR Z LINE   • HYSTERECTOMY  1987   • JOINT REPLACEMENT     • KNEE SURGERY Right 2014    Knee cap replaced   • KNEE SURGERY Left 12/06/2022    joint replacement   • MASTECTOMY     • MASTECTOMY W/ SENTINEL NODE BIOPSY Bilateral 08/21/2017    Procedure: BILATEREAL BREAST MASTECTOMY WITH SENTINEL NODE BIOPSY ON THE LEFT The sentinel lymph node biopsy will only be performed on the left side;  Surgeon: Diallo Eisenberg MD;  [place] [facility] [place]:    • MEDIAL BRANCH BLOCK Bilateral 03/20/2023    Procedure: LUMBAR MEDIAL BRANCH BLOCK BILATERAL L3-L5 #1  27455, 19307;  Surgeon: Estrellita Sheppard MD;  Location: [facility];  Service: Pain Management;  Laterality: Bilateral;   • OOPHORECTOMY Bilateral 2000    BSO   • PIRIFORMUS INJECTION Left 07/19/2021    Procedure: Left piriformis injection;  Surgeon: Estrellita Sheppard MD;  Location: [facility];  Service: Pain Management;  Laterality: Left;   • PIRIFORMUS INJECTION Left 01/03/2022    Procedure: PIRIFORMIS INJECTION - Left;  Surgeon: Estrellita Sheppard MD;  Location: [facility];  Service: Pain Management;  Laterality: Left;   • PIRIFORMUS INJECTION Left 01/18/2023    Procedure: PIRIFORMIS INJECTION Left;  Surgeon: Estrellita Sheppard MD;  Location: [facility];  Service: Pain Management;  Laterality: Left;   • PIRIFORMUS INJECTION Left 05/01/2023    Procedure: PIRIFORMIS INJECTION;  Surgeon: Estrellita Sheppard MD;  Location: [facility];  Service: Pain Management;  Laterality: Left;   • POSTERIOR REPAIR  05/17/2016    with enterocele repair, midurethral sling, perineoplasty   • SACROILIAC JOINT INJECTION Left 05/01/2023    Procedure: SACROILIAC JOINT INJECTION AND PIRIFORMIS INJECTION LEFT  44076;  Surgeon: Estrellita Sheppard MD;  Location: [facility];  Service: Pain Management;  Laterality: Left;   • SKIN BIOPSY     • TONSILLECTOMY  1970   • TOTAL KNEE ARTHROPLASTY Bilateral 2000, 2001   • UPPER GASTROINTESTINAL  ENDOSCOPY  10/10/2022       Family History: Her family history includes Alcohol abuse in her father; Arthritis in her father; Birth defects in her brother; Cancer in her mother; Clotting disorder in her daughter and father; Colon polyps in her father; Crohn's disease in her daughter; Diabetes in her father; Esophageal cancer in her mother; Heart attack in her brother, father, and mother; Heart disease in her brother, father, and mother; Heart failure in her father; Hyperlipidemia in her father; Hypertension in her brother, father, and mother; Learning disabilities in her brother; Prostate cancer (age of onset: 85) in her father; Stomach cancer in her mother; Stroke in her father.     Social History: She  reports that she has never smoked. She has never used smokeless tobacco. She reports that she does not drink alcohol and does not use drugs.    Home Medications:  amLODIPine, atenolol, atorvastatin, buPROPion XL, celecoxib, escitalopram, fish oil, hydroCHLOROthiazide, losartan, meclizine, multivitamin with minerals, omeprazole, and oxybutynin    Allergies:  She is allergic to morphine.    Objective    Objective     Vitals:    Temp:  [97.6 °F (36.4 °C)] 97.6 °F (36.4 °C)  Heart Rate:  [56] 56  Resp:  [16-18] 16  BP: (103-113)/(53-64) 113/64  Flow (L/min):  [2] 2    Physical Exam  HENT:      Mouth/Throat:      Mouth: Mucous membranes are moist.   Eyes:      Comments: Bilateral brow ptosis, BUL DMC   Cardiovascular:      Rate and Rhythm: Normal rate.      Pulses: Normal pulses.   Pulmonary:      Effort: Pulmonary effort is normal.   Skin:     General: Skin is warm.   Neurological:      Mental Status: She is alert and oriented to person, place, and time.   Psychiatric:         Mood and Affect: Mood normal.         Assessment & Plan   Assessment / Plan     Brief Patient Summary:  Alesia Resendez is a 73 y.o. female who presents for preoperative evaluation.    * No Diagnosis Codes entered *    Active Hospital  Problems:  There are no active hospital problems to display for this patient.    Plan:   Procedure(s):  BILATERAL UPPER LID BLEPHAROPLASTY  BILATERAL TEMPORAL DIRECT BROWLIFT    The risks, benefits, and alternatives of the procedure including but not limited to bleeding,infection,injury to surrounding structures, loss of vision/eye and risks of the anesthesia were discussed in detail with the patient and questions were answered. No guarantees were made or implied. Informed consent was obtained.    Deanne Cruz MD

## 2023-05-11 NOTE — OP NOTE
OPERATIVE REPORT      Preoperative diagnosis: Bilateral upper eyelid dermatochalasis and bilateral brow ptosis  Postoperative diagnosis: same  Procedure: Bilateral upper eyelid blepharoplasty and bilateral temporal direct brow lift  Surgeon: Nicola Rowley MD who was present and scrubbed throughout all critical portions of the operation  Assistants: Deanne Cruz MD  Anesthesia: General  EBL: less than 50cc  Specimens:  * No orders in the log *      Description of the procedure: The patient was taken to the operating room and placed on the table in the supine position, where anesthesia was induced. 2% lidocaine with epinephrine and 0.5% marcaine in a 1:1 fashion was injected over the surgical site, and the patient was prepped and draped in the usual manner for orbitofacial surgery.     Corneal protectors were placed in both eyes.    A 15 Bard-Oh blade incision was made above the right eyebrow line, across the temporal horizontal extent of the brow. A second incision line was drawn 9 mm above the eyebrow line, and the intervening tissue was excised with a Bard Oh blade and straight iris scissors. Sharp dissection was carried down to the frontalis muscle. Bleeding was controlled with electrocautery.The incision was then closed with multiple 5-0 Vicryl sutures subcutaneously and 6-0 Prolene sutures superficially.     The exact same procedure was then performed on the contralateral brow.     Next attention was turned to the bilateral upper lid blepharoplasty.     A 15 Bard-Oh blade incision was made 7 mm from the eyelash margin along previous scar, across the entire horizontal extent of the right upper eyelid. A second incision was made 10 mm inferior to the junction of the brow and eyelid, and a pinch test was used to ensure the amount of skin excision was appropriate. The intervening tissue was excised with a 15 Bard-Oh blade and Luda scissors. Excessive orbicularis tissue was removed. The orbital  septum was opened horizontally, and excessive fatty tissue was also removed. Bleeding was controlled with electrocauterization. The skin was then closed with 5-0 fast absorbing suture in an interrupted and running fashion. The lid position and contour were examined and found to be satisfactory.     The exact same procedure was then performed over the contralateral upper lid.     The corneal protectors were removed and antibiotic ophthalmic ointment was placed over the surgical site.     The patient was then awakened and taken from the operating room in good condition, having tolerated the procedure well. There were no complications, and the estimated blood loss was less than 50 cc.

## 2023-05-11 NOTE — ANESTHESIA PROCEDURE NOTES
Airway  Urgency: elective    Date/Time: 5/11/2023 7:37 AM  Airway not difficult    General Information and Staff    Patient location during procedure: OR  CRNA/CAA: Sis Turner CRNA    Indications and Patient Condition    Preoxygenated: yes  Mask difficulty assessment: 1 - vent by mask    Final Airway Details  Final airway type: endotracheal airway      Successful airway: ETT  Cuffed: yes   Successful intubation technique: direct laryngoscopy  Endotracheal tube insertion site: oral  Blade: Jennifer  Blade size: 3  ETT size (mm): 7.0  Cormack-Lehane Classification: grade I - full view of glottis  Placement verified by: chest auscultation and capnometry   Measured from: teeth  ETT/EBT  to teeth (cm): 23  Number of attempts at approach: 1  Assessment: lips, teeth, and gum same as pre-op and atraumatic intubation    Additional Comments  Teeth, tongue, lips, and gums in preop condition. VSS throughout. Easy mask/smooth easy airway. Tube visualized through cords.

## 2023-05-11 NOTE — ANESTHESIA POSTPROCEDURE EVALUATION
Patient: Alesia Resendez    Procedure Summary     Date: 05/11/23 Room / Location: [facility] OR  / [facility] MAIN OR    Anesthesia Start: 0728 Anesthesia Stop: 0844    Procedures:       BILATERAL UPPER LID BLEPHAROPLASTY (Bilateral: Eye)      BILATERAL TEMPORAL DIRECT BROWLIFT (Bilateral: Face) Diagnosis:     Surgeons: Nicola Rowley MD Provider: Cholo Yepez MD    Anesthesia Type: general ASA Status: 3          Anesthesia Type: general    Vitals  Vitals Value Taken Time   /65 05/11/23 0906   Temp 36.6 °C (97.9 °F) 05/11/23 0840   Pulse 61 05/11/23 0919   Resp 16 05/11/23 0904   SpO2 92 % 05/11/23 0919   Vitals shown include unvalidated device data.        Post Anesthesia Care and Evaluation    Patient location during evaluation: PHASE II  Patient participation: complete - patient participated  Level of consciousness: awake and alert  Pain management: adequate    Airway patency: patent  Anesthetic complications: No anesthetic complications  PONV Status: none  Cardiovascular status: acceptable and hemodynamically stable  Respiratory status: acceptable, nonlabored ventilation and spontaneous ventilation  Hydration status: acceptable

## 2023-05-11 NOTE — ANESTHESIA PREPROCEDURE EVALUATION
Anesthesia Evaluation     Patient summary reviewed and Nursing notes reviewed   NPO Solid Status: > 8 hours  NPO Liquid Status: > 2 hours           Airway   Mallampati: II  TM distance: >3 FB  Neck ROM: full  No difficulty expected  Comment: Grade I view with [place] 2 in past  Dental - normal exam     Pulmonary - normal exam    breath sounds clear to auscultation  Cardiovascular - normal exam    Rhythm: regular  Rate: normal    (+) hypertension 2 medications or greater, hyperlipidemia,       Neuro/Psych  (+) tremors, numbness, psychiatric history Depression and Anxiety,    GI/Hepatic/Renal/Endo    (+) obesity,  GERD, GI bleeding , liver disease history of elevated LFT,     Musculoskeletal     (+) back pain,       ROS comment: Hx cervical and lumbar spinal stenosis  Abdominal   (+) obese,    Substance History      OB/GYN          Other   arthritis,    history of cancer      Other Comment: Hx left breast CA, skin CA                Anesthesia Plan    ASA 3     general     intravenous induction     Anesthetic plan, risks, benefits, and alternatives have been provided, discussed and informed consent has been obtained with: patient.    Plan discussed with CRNA.        CODE STATUS:

## 2023-05-11 NOTE — DISCHARGE INSTRUCTIONS
POST OPERATIVE INSTRUCTIONS  EYELID SURGERY      Patient Name:   Date of Birth:    Most procedures are performed with local anesthesia with intravenous sedation. While post-operative nausea is rare with this type of anesthesia, it is wise to start your diet by drinking clear liquids after surgery (e.g., 7-Up, Gatorade, ginger ale, etc.).  If clear liquids are tolerated well without nausea or vomiting, you may advance towards a regular diet.  Avoid “fatty foods” (eg, milk, pizza, hamburgers, etc.) on the day of surgery or as long as nausea persists.    Many eyelid procedures only require Extra Strength Tylenol for postoperative pain control.  For those patients with more extensive eyelid reconstruction, a prescription for a pain reliever is often given.  Patients may choose to take the prescribed pain reliever OR Extra Strength Tylenol, BUT NOT both together.  Unless specifically instructed, aspirin products such as Clay, Bufferin, Anacin, Excedrin, etc., should be avoided for at least one week after surgery.  This also includes Advil, Nuprin, Motrin and Ibuprofen.  Any other medications (except blood thinners), which you were taking prior to surgery, should be continued on their regular schedule.  Whenever lying down or sleeping, keep your head elevated on 2 or 3 pillows such that your head is always elevated above the level of your chest.    Apply cold compress to surgical site as much as possible for 2 to3 days following surgery.  A folded washcloth soaked in ice-cold water and wrung out works well for this.  A bag of frozen peas also works well as it is lightweight but molds to the eye.  Do not apply ice directly to the skin.  A small tube of eye ointment should be provided after surgery.  This is to be gently applied to the stitches twice daily.  If the eyes feel irritated, the ointment is safe to use in the eye for lubrication.  This will likely result in blurred vision but will go away once the ointment is  stopped.  EXCEPTION:  If a patch is taped over the eye, do NOT apply cold compresses or ointment.  Leave the patch undisturbed.  A physician will remove the patch at your first post-operative visit.  If your surgery was done on an outpatient basis, you should receive a phone call the day after surgery to check on your progress and to arrange for a post-operative clinic appointment.  The first post-operative visit will be one to two weeks after surgery to check the incisions and remove sutures if necessary.  A second post-operative visit six to eight weeks after surgery will assess the final surgical result.  The following problems should be reported to the office as soon as possible:  Continuous, brisk bleeding.  Please note that some oozing or drainage is common following a surgical procedure.  Do not try to clean dried blood from the surgical site.   This will likely only create more bleeding.  Temperature (fever) over 101?F.  Excessive pain at surgical site not relieved by the pain medication, especially if associated with protrusion of the eyeball.  Sudden loss of vision.  Please note that some blurriness is expected after surgery due to the ointment used around the eyes.  All patients should be able to check their vision even with eyelid swelling.  Double vision      If a problem should arise or you have a question, I can be reached at any time of the day or week by calling [phone].  I hope that your surgery experience with us is a positive one.  Please contact my office with any questions.  Sincerely,  Diallo Reddy MD, MD Nicola Sewell MD

## 2023-05-22 RX ORDER — HYDROCHLOROTHIAZIDE 25 MG/1
25 TABLET ORAL DAILY
Qty: 90 TABLET | Refills: 1 | Status: SHIPPED | OUTPATIENT
Start: 2023-05-22

## 2023-05-22 NOTE — TELEPHONE ENCOUNTER
Rx Refill Note  Requested Prescriptions     Pending Prescriptions Disp Refills   • hydroCHLOROthiazide (HYDRODIURIL) 25 MG tablet [Pharmacy Med Name: hydroCHLOROthiazide 25 MG Oral Tablet] 90 tablet 3     Sig: TAKE 1 TABLET BY MOUTH DAILY      Last office visit with prescribing clinician: 2/2/2023   Last telemedicine visit with prescribing clinician: 2/2/2023   Next office visit with prescribing clinician: 8/2/2023                         Would you like a call back once the refill request has been completed: [] Yes [] No    If the office needs to give you a call back, can they leave a voicemail: [] Yes [] No    Fred Gutierrez Rep  05/22/23, 09:00 EDT

## 2023-05-31 ENCOUNTER — OFFICE VISIT (OUTPATIENT)
Dept: PAIN MEDICINE | Facility: CLINIC | Age: 73
End: 2023-05-31

## 2023-05-31 ENCOUNTER — PREP FOR SURGERY (OUTPATIENT)
Dept: SURGERY | Facility: SURGERY CENTER | Age: 73
End: 2023-05-31

## 2023-05-31 VITALS
BODY MASS INDEX: 35.46 KG/M2 | RESPIRATION RATE: 18 BRPM | OXYGEN SATURATION: 95 % | TEMPERATURE: 96.6 F | HEART RATE: 62 BPM | DIASTOLIC BLOOD PRESSURE: 68 MMHG | WEIGHT: 234 LBS | SYSTOLIC BLOOD PRESSURE: 110 MMHG | HEIGHT: 68 IN

## 2023-05-31 DIAGNOSIS — M48.061 SPINAL STENOSIS OF LUMBAR REGION, UNSPECIFIED WHETHER NEUROGENIC CLAUDICATION PRESENT: ICD-10-CM

## 2023-05-31 DIAGNOSIS — M54.16 LUMBAR RADICULOPATHY: ICD-10-CM

## 2023-05-31 DIAGNOSIS — M46.1 SACROILIITIS: ICD-10-CM

## 2023-05-31 DIAGNOSIS — M54.16 LUMBAR RADICULOPATHY: Primary | ICD-10-CM

## 2023-05-31 DIAGNOSIS — M48.061 SPINAL STENOSIS OF LUMBAR REGION, UNSPECIFIED WHETHER NEUROGENIC CLAUDICATION PRESENT: Primary | ICD-10-CM

## 2023-05-31 DIAGNOSIS — M51.37 DEGENERATION OF LUMBAR OR LUMBOSACRAL INTERVERTEBRAL DISC: ICD-10-CM

## 2023-05-31 PROBLEM — M51.379 DEGENERATION OF LUMBAR OR LUMBOSACRAL INTERVERTEBRAL DISC: Status: ACTIVE | Noted: 2023-05-31

## 2023-05-31 RX ORDER — DIAZEPAM 5 MG/1
5 TABLET ORAL ONCE
OUTPATIENT
Start: 2023-05-31

## 2023-05-31 RX ORDER — PREGABALIN 25 MG/1
25 CAPSULE ORAL 3 TIMES DAILY
Qty: 90 CAPSULE | Refills: 0 | Status: SHIPPED | OUTPATIENT
Start: 2023-05-31

## 2023-05-31 NOTE — PROGRESS NOTES
CHIEF COMPLAINT  Follow-up for left pain.      Subjective   Alesia Resendez is a 73 y.o. female  who presents to the office for follow-up of procedure.  She completed a Left Sacroiliac Joint Injection and Left Piriformis Injection   on  5/1/2023 performed by Dr. Sheppard for management of back pain. Patient reports 50% relief from the procedure for 1 week.  Patient continues with severe pain primarily affecting the left posterior buttock which radiates into the posterior aspect of the thigh and lower extremity and also affects the lateral aspect of the left calf.  Pain continues to be aggravated by sitting for any length of time or lying on the left side.    She underwent surgical evaluation at [facility] on 5/9/2023 and per patient report they are recommending L4-5 LESI to see if this will better relieve her pain before consideration of operative intervention.  I will try to obtain the consult note however the [place] system has been down.    Patient failed previous trial of gabapentin secondary to severe nausea.    Pain today 7/10 VAS in severity.        Back Pain  This is a chronic problem. The current episode started more than 1 year ago. The problem occurs constantly. The problem is unchanged. The pain is present in the lumbar spine and sacro-iliac. The quality of the pain is described as aching, burning and shooting. The pain radiates to the left thigh (LLE). The pain is at a severity of 7/10. The pain is moderate. The pain is the same all the time. The symptoms are aggravated by sitting and position. Associated symptoms include leg pain. Pertinent negatives include no numbness or weakness.        PEG Assessment   What number best describes your pain on average in the past week?7  What number best describes how, during the past week, pain has interfered with your enjoyment of life?5  What number best describes how, during the past week, pain has interfered with your general activity?  5    Review of  Pertinent Medical Data ---    MRI EXAMINATION OF THE LUMBAR SPINE WITHOUT CONTRAST     HISTORY: Back pain, left radiculopathy. Breast cancer.     COMPARISON: MRI lumbar spine 01/30/2020.     FINDINGS: There is moderate-to-severe loss of disc height and disc  desiccation at L5-S1 and a grade 1/grade 2 anterolisthesis of L4 upon  L5, unchanged. There is mild disc desiccation and loss of disc height  from T12 to L5. The conus is at L1 and the caudal aspect of the spinal  cord appears unremarkable.     L1-L2: Mild facet degenerative disease is present bilaterally. There is  no evidence of disc bulge or herniation.     L2-L3: Mild facet degenerative disease is present bilaterally. A left  paracentral disc osteophyte complex is present which results in mild  canal stenosis centrally but contributes to moderate lateral recess  narrowing on the left similar in appearance as compared to the prior  examination. Moderate foraminal stenosis is present on the left and  there is mild foraminal stenosis on the right secondary to extension of  a disc osteophyte complex into the neural foramen. Foraminal stenosis on  the left appears similar to the prior examination.     L3-L4: Mild-to-moderate facet degenerative disease is present  bilaterally. There is moderate-to-severe central canal stenosis and  lateral recess narrowing on the right and severe lateral recess  narrowing on the left secondary to a broad-based disc osteophyte complex  which is more prominent to the left and posterior element degenerative  disease. There is 1 to 2 mm of retrolisthesis of L3 upon L4, unchanged.  The canal stenosis and lateral recess narrowing is slightly more  prominent as compared to the prior examination. Mild foraminal stenosis  is present on the left secondary to extension of a disc osteophyte  complex into the neural foramen.     L4-L5: There is severe central canal stenosis secondary to severe facet  hypertrophy (with the facets being fused  bilaterally. The fused facets  are new versus 01/30/2020.) and a broad-based disc osteophyte complex.  Spinal stenosis is accentuated by the anterolisthesis of L4 upon L5.  There was severe spinal stenosis present previously but spinal stenosis  may be even slightly more prominent as compared to the prior  examination.     L5-S1: Moderate-to-severe facet degenerative disease is present  bilaterally. There is mild-to-moderate central canal stenosis. Moderate  and moderate-to-severe lateral recess narrowing is present on the right  and left respectively. The canal stenosis and lateral recess narrowing  is slightly more prominent as compared to the prior examination.  Mild-to-moderate and mild foraminal stenosis is present on the left and  right respectively secondary to extension of a small disc osteophyte  complex into the neural foramen.     IMPRESSION:  Extensive multilevel degenerative disease involving the  lumbar spine is noted as described above. This includes severe spinal  stenosis at L4-L5 which appears similar to slightly more prominent as  compared to the prior examination secondary to posterior element  degenerative disease, broad-based disc osteophyte complex and  anterolisthesis of L4 upon L5. There is fusion of the facets bilaterally  at L4-L5 which is new versus the prior examination. Spinal stenosis and  lateral recess narrowing is also present at L5 S1 and to a slightly  lesser degree also slightly more prominent as compared to the prior  examination. There is no evidence of a focal herniation. See above.     This report was finalized on 4/26/2023 5:07 PM by Dr. Jose Velasquez M.D.    The following portions of the patient's history were reviewed and updated as appropriate: allergies, current medications, past family history, past medical history, past social history, past surgical history and problem list.    Review of Systems   Gastrointestinal: Negative for constipation and diarrhea.  "  Genitourinary: Negative for difficulty urinating.   Musculoskeletal: Negative for back pain.   Neurological: Negative for weakness and numbness.   Psychiatric/Behavioral: Negative for sleep disturbance and suicidal ideas. The patient is not nervous/anxious.      I have reviewed and confirmed the accuracy of the ROS as documented by the MA/LPN/RN DORITA Amezcua     Vitals:    05/31/23 0752   BP: 110/68   Pulse: 62   Resp: 18   Temp: 96.6 °F (35.9 °C)   SpO2: 95%   Weight: 106 kg (234 lb)   Height: 172.7 cm (68\")   PainSc:   7   PainLoc: Leg         Objective   Physical Exam  Vitals and nursing note reviewed.   Constitutional:       Appearance: Normal appearance.   HENT:      Head: Normocephalic.   Pulmonary:      Effort: Pulmonary effort is normal.   Musculoskeletal:      Lumbar back: Tenderness present. Decreased range of motion. Positive left straight leg raise test.        Back:    Skin:     General: Skin is warm and dry.   Neurological:      General: No focal deficit present.      Mental Status: She is alert and oriented to person, place, and time.      Cranial Nerves: Cranial nerves 2-12 are intact.      Sensory: Sensation is intact.      Motor: Motor function is intact.      Gait: Gait is intact.   Psychiatric:         Mood and Affect: Mood normal.         Behavior: Behavior normal.         Thought Content: Thought content normal.         Judgment: Judgment normal.         Assessment & Plan   Diagnoses and all orders for this visit:    1. Spinal stenosis of lumbar region, unspecified whether neurogenic claudication present (Primary)  -     pregabalin (Lyrica) 25 MG capsule; Take 1 capsule by mouth 3 (Three) Times a Day.  Dispense: 90 capsule; Refill: 0    2. Lumbar radiculopathy  -     pregabalin (Lyrica) 25 MG capsule; Take 1 capsule by mouth 3 (Three) Times a Day.  Dispense: 90 capsule; Refill: 0    3. Degeneration of lumbar or lumbosacral intervertebral disc    4. Sacroiliitis  -     pregabalin " (Lyrica) 25 MG capsule; Take 1 capsule by mouth 3 (Three) Times a Day.  Dispense: 90 capsule; Refill: 0        --- Based on MRI findings as well as surgical recommendation we will proceed with left L4-5 TFESI in order to treat patient's severe stenosis with left lower extremity radicular symptoms.  --- Patient was unable to tolerate gabapentin secondary to severe nausea therefore we will proceed now with a trial of pregabalin 25 mg p.o. 3 times daily if tolerated.  --- Follow-up in the office in 4 weeks for evaluation of pregabalin trial           JAN REPORT  As part of the patient's treatment plan, I am prescribing controlled substances. The patient has been made aware of appropriate use of such medications, including potential risk of somnolence, limited ability to drive and/or work safely, and the potential for dependence or overdose. It has also been made clear that these medications are for use by this patient only, without concomitant use of alcohol or other substances unless prescribed.     Patient has completed prescribing agreement detailing terms of continued prescribing of controlled substances, including monitoring JAN reports, urine drug screening, and pill counts if necessary. The patient is aware that inappropriate use will results in cessation of prescribing such medications.    As the clinician, I personally reviewed the JAN from 5/31/2023 while the patient was in the office today.    History and physical exam exhibit continued safe and appropriate use of controlled substances.       Dictated utilizing Dragon dictation.

## 2023-05-31 NOTE — PROGRESS NOTES
CHIEF COMPLAINT      Subjective   Alesia Resendez is a 73 y.o. female  who presents for follow-up.  She has a history of ***.          History of Present Illness     PEG Assessment   What number best describes your pain on average in the past week?{NUMBERS 0-10:20108}  What number best describes how, during the past week, pain has interfered with your enjoyment of life?{NUMBERS 0-10:92651}  What number best describes how, during the past week, pain has interfered with your general activity?  {NUMBERS 0-10:80301}    Review of Pertinent Medical Data ---  ***    {Common H&P Review Areas:36169}    Review of Systems  ***  There were no vitals filed for this visit.      Objective   Physical Exam        Assessment & Plan   {Assess/PlanSmartLinks:84358}    --- Follow-up ***           ***     Dictated utilizing Dragon dictation.

## 2023-06-02 ENCOUNTER — TRANSCRIBE ORDERS (OUTPATIENT)
Dept: SURGERY | Facility: SURGERY CENTER | Age: 73
End: 2023-06-02
Payer: MEDICARE

## 2023-06-02 DIAGNOSIS — Z41.9 SURGERY, ELECTIVE: Primary | ICD-10-CM

## 2023-07-26 DIAGNOSIS — I10 ESSENTIAL HYPERTENSION: ICD-10-CM

## 2023-07-26 DIAGNOSIS — R73.03 PREDIABETES: ICD-10-CM

## 2023-07-26 DIAGNOSIS — E78.2 MIXED HYPERLIPIDEMIA: ICD-10-CM

## 2023-07-27 LAB
ALBUMIN SERPL-MCNC: 4.3 G/DL (ref 3.5–5.2)
ALBUMIN/GLOB SERPL: 2.4 G/DL
ALP SERPL-CCNC: 84 U/L (ref 39–117)
ALT SERPL-CCNC: 26 U/L (ref 1–33)
APPEARANCE UR: ABNORMAL
AST SERPL-CCNC: 26 U/L (ref 1–32)
BACTERIA #/AREA URNS HPF: ABNORMAL /HPF
BASOPHILS # BLD AUTO: 0.06 10*3/MM3 (ref 0–0.2)
BASOPHILS NFR BLD AUTO: 0.9 % (ref 0–1.5)
BILIRUB SERPL-MCNC: 0.5 MG/DL (ref 0–1.2)
BILIRUB UR QL STRIP: NEGATIVE
BUN SERPL-MCNC: 15 MG/DL (ref 8–23)
BUN/CREAT SERPL: 23.1 (ref 7–25)
CALCIUM SERPL-MCNC: 9.8 MG/DL (ref 8.6–10.5)
CASTS URNS MICRO: ABNORMAL
CHLORIDE SERPL-SCNC: 104 MMOL/L (ref 98–107)
CHOLEST SERPL-MCNC: 104 MG/DL (ref 0–200)
CO2 SERPL-SCNC: 29.3 MMOL/L (ref 22–29)
COLOR UR: YELLOW
CREAT SERPL-MCNC: 0.65 MG/DL (ref 0.57–1)
CRYSTALS URNS MICRO: ABNORMAL
EGFRCR SERPLBLD CKD-EPI 2021: 93.1 ML/MIN/1.73
EOSINOPHIL # BLD AUTO: 0.15 10*3/MM3 (ref 0–0.4)
EOSINOPHIL NFR BLD AUTO: 2.3 % (ref 0.3–6.2)
EPI CELLS #/AREA URNS HPF: ABNORMAL /HPF
ERYTHROCYTE [DISTWIDTH] IN BLOOD BY AUTOMATED COUNT: 13.7 % (ref 12.3–15.4)
GLOBULIN SER CALC-MCNC: 1.8 GM/DL
GLUCOSE SERPL-MCNC: 108 MG/DL (ref 65–99)
GLUCOSE UR QL STRIP: NEGATIVE
HBA1C MFR BLD: 5.9 % (ref 4.8–5.6)
HCT VFR BLD AUTO: 42.2 % (ref 34–46.6)
HDLC SERPL-MCNC: 38 MG/DL (ref 40–60)
HGB BLD-MCNC: 14.2 G/DL (ref 12–15.9)
HGB UR QL STRIP: NEGATIVE
IMM GRANULOCYTES # BLD AUTO: 0.02 10*3/MM3 (ref 0–0.05)
IMM GRANULOCYTES NFR BLD AUTO: 0.3 % (ref 0–0.5)
KETONES UR QL STRIP: NEGATIVE
LDLC SERPL CALC-MCNC: 34 MG/DL (ref 0–100)
LDLC/HDLC SERPL: 0.67 {RATIO}
LEUKOCYTE ESTERASE UR QL STRIP: NEGATIVE
LYMPHOCYTES # BLD AUTO: 2.49 10*3/MM3 (ref 0.7–3.1)
LYMPHOCYTES NFR BLD AUTO: 37.7 % (ref 19.6–45.3)
MCH RBC QN AUTO: 29.2 PG (ref 26.6–33)
MCHC RBC AUTO-ENTMCNC: 33.6 G/DL (ref 31.5–35.7)
MCV RBC AUTO: 86.7 FL (ref 79–97)
MONOCYTES # BLD AUTO: 0.5 10*3/MM3 (ref 0.1–0.9)
MONOCYTES NFR BLD AUTO: 7.6 % (ref 5–12)
NEUTROPHILS # BLD AUTO: 3.38 10*3/MM3 (ref 1.7–7)
NEUTROPHILS NFR BLD AUTO: 51.2 % (ref 42.7–76)
NITRITE UR QL STRIP: POSITIVE
NRBC BLD AUTO-RTO: 0 /100 WBC (ref 0–0.2)
PH UR STRIP: 5.5 [PH] (ref 5–8)
PLATELET # BLD AUTO: 243 10*3/MM3 (ref 140–450)
POTASSIUM SERPL-SCNC: 3.8 MMOL/L (ref 3.5–5.2)
PROT SERPL-MCNC: 6.1 G/DL (ref 6–8.5)
PROT UR QL STRIP: NEGATIVE
RBC # BLD AUTO: 4.87 10*6/MM3 (ref 3.77–5.28)
RBC #/AREA URNS HPF: ABNORMAL /HPF
SODIUM SERPL-SCNC: 145 MMOL/L (ref 136–145)
SP GR UR STRIP: 1.02 (ref 1–1.03)
TRIGL SERPL-MCNC: 202 MG/DL (ref 0–150)
UROBILINOGEN UR STRIP-MCNC: ABNORMAL MG/DL
VLDLC SERPL CALC-MCNC: 32 MG/DL (ref 5–40)
WBC # BLD AUTO: 6.6 10*3/MM3 (ref 3.4–10.8)
WBC #/AREA URNS HPF: ABNORMAL /HPF

## 2023-08-02 ENCOUNTER — OFFICE VISIT (OUTPATIENT)
Dept: FAMILY MEDICINE CLINIC | Facility: CLINIC | Age: 73
End: 2023-08-02
Payer: MEDICARE

## 2023-08-02 VITALS
BODY MASS INDEX: 36.15 KG/M2 | OXYGEN SATURATION: 95 % | SYSTOLIC BLOOD PRESSURE: 126 MMHG | RESPIRATION RATE: 14 BRPM | WEIGHT: 238.5 LBS | TEMPERATURE: 97.6 F | HEART RATE: 58 BPM | DIASTOLIC BLOOD PRESSURE: 80 MMHG | HEIGHT: 68 IN

## 2023-08-02 DIAGNOSIS — I10 ESSENTIAL HYPERTENSION: ICD-10-CM

## 2023-08-02 DIAGNOSIS — R73.03 PREDIABETES: ICD-10-CM

## 2023-08-02 DIAGNOSIS — Z00.00 MEDICARE ANNUAL WELLNESS VISIT, SUBSEQUENT: Primary | ICD-10-CM

## 2023-08-02 DIAGNOSIS — E66.01 CLASS 2 SEVERE OBESITY DUE TO EXCESS CALORIES WITH SERIOUS COMORBIDITY AND BODY MASS INDEX (BMI) OF 36.0 TO 36.9 IN ADULT: ICD-10-CM

## 2023-08-02 DIAGNOSIS — R73.9 HYPERGLYCEMIA: ICD-10-CM

## 2023-08-02 DIAGNOSIS — E78.2 MIXED HYPERLIPIDEMIA: ICD-10-CM

## 2023-08-02 PROBLEM — E66.812 CLASS 2 SEVERE OBESITY DUE TO EXCESS CALORIES WITH SERIOUS COMORBIDITY AND BODY MASS INDEX (BMI) OF 36.0 TO 36.9 IN ADULT: Status: ACTIVE | Noted: 2023-08-02

## 2023-08-02 RX ORDER — SEMAGLUTIDE 0.68 MG/ML
0.25 INJECTION, SOLUTION SUBCUTANEOUS WEEKLY
Qty: 3 ML | Refills: 0 | Status: SHIPPED | OUTPATIENT
Start: 2023-08-02

## 2023-08-17 ENCOUNTER — OFFICE VISIT (OUTPATIENT)
Dept: ONCOLOGY | Facility: CLINIC | Age: 73
End: 2023-08-17
Payer: MEDICARE

## 2023-08-17 ENCOUNTER — LAB (OUTPATIENT)
Dept: OTHER | Facility: HOSPITAL | Age: 73
End: 2023-08-17
Payer: MEDICARE

## 2023-08-17 VITALS
BODY MASS INDEX: 36.48 KG/M2 | RESPIRATION RATE: 16 BRPM | WEIGHT: 240.7 LBS | OXYGEN SATURATION: 95 % | TEMPERATURE: 96.9 F | HEART RATE: 61 BPM | DIASTOLIC BLOOD PRESSURE: 75 MMHG | HEIGHT: 68 IN | SYSTOLIC BLOOD PRESSURE: 116 MMHG

## 2023-08-17 DIAGNOSIS — C50.912 RECURRENT MALIGNANT NEOPLASM OF LEFT BREAST: ICD-10-CM

## 2023-08-17 DIAGNOSIS — Z85.3 HISTORY OF LEFT BREAST CANCER: Primary | ICD-10-CM

## 2023-08-17 DIAGNOSIS — Z85.3 HISTORY OF LEFT BREAST CANCER: ICD-10-CM

## 2023-08-17 LAB
ALBUMIN SERPL-MCNC: 4.2 G/DL (ref 3.5–5.2)
ALBUMIN/GLOB SERPL: 1.8 G/DL
ALP SERPL-CCNC: 84 U/L (ref 39–117)
ALT SERPL W P-5'-P-CCNC: 24 U/L (ref 1–33)
ANION GAP SERPL CALCULATED.3IONS-SCNC: 10.6 MMOL/L (ref 5–15)
AST SERPL-CCNC: 26 U/L (ref 1–32)
BASOPHILS # BLD AUTO: 0.04 10*3/MM3 (ref 0–0.2)
BASOPHILS NFR BLD AUTO: 0.6 % (ref 0–1.5)
BILIRUB SERPL-MCNC: 0.5 MG/DL (ref 0–1.2)
BUN SERPL-MCNC: 19 MG/DL (ref 8–23)
BUN/CREAT SERPL: 29.7 (ref 7–25)
CALCIUM SPEC-SCNC: 9.5 MG/DL (ref 8.6–10.5)
CHLORIDE SERPL-SCNC: 104 MMOL/L (ref 98–107)
CO2 SERPL-SCNC: 27.4 MMOL/L (ref 22–29)
CREAT SERPL-MCNC: 0.64 MG/DL (ref 0.57–1)
DEPRECATED RDW RBC AUTO: 40.1 FL (ref 37–54)
EGFRCR SERPLBLD CKD-EPI 2021: 93.4 ML/MIN/1.73
EOSINOPHIL # BLD AUTO: 0.16 10*3/MM3 (ref 0–0.4)
EOSINOPHIL NFR BLD AUTO: 2.6 % (ref 0.3–6.2)
ERYTHROCYTE [DISTWIDTH] IN BLOOD BY AUTOMATED COUNT: 12.9 % (ref 12.3–15.4)
GLOBULIN UR ELPH-MCNC: 2.4 GM/DL
GLUCOSE SERPL-MCNC: 121 MG/DL (ref 65–99)
HCT VFR BLD AUTO: 40.3 % (ref 34–46.6)
HGB BLD-MCNC: 13.5 G/DL (ref 12–15.9)
IMM GRANULOCYTES # BLD AUTO: 0.01 10*3/MM3 (ref 0–0.05)
IMM GRANULOCYTES NFR BLD AUTO: 0.2 % (ref 0–0.5)
LYMPHOCYTES # BLD AUTO: 2.36 10*3/MM3 (ref 0.7–3.1)
LYMPHOCYTES NFR BLD AUTO: 38.2 % (ref 19.6–45.3)
MCH RBC QN AUTO: 28.9 PG (ref 26.6–33)
MCHC RBC AUTO-ENTMCNC: 33.5 G/DL (ref 31.5–35.7)
MCV RBC AUTO: 86.3 FL (ref 79–97)
MONOCYTES # BLD AUTO: 0.48 10*3/MM3 (ref 0.1–0.9)
MONOCYTES NFR BLD AUTO: 7.8 % (ref 5–12)
NEUTROPHILS NFR BLD AUTO: 3.13 10*3/MM3 (ref 1.7–7)
NEUTROPHILS NFR BLD AUTO: 50.6 % (ref 42.7–76)
NRBC BLD AUTO-RTO: 0 /100 WBC (ref 0–0.2)
PLATELET # BLD AUTO: 206 10*3/MM3 (ref 140–450)
PMV BLD AUTO: 9.3 FL (ref 6–12)
POTASSIUM SERPL-SCNC: 3.5 MMOL/L (ref 3.5–5.2)
PROT SERPL-MCNC: 6.6 G/DL (ref 6–8.5)
RBC # BLD AUTO: 4.67 10*6/MM3 (ref 3.77–5.28)
SODIUM SERPL-SCNC: 142 MMOL/L (ref 136–145)
WBC NRBC COR # BLD: 6.18 10*3/MM3 (ref 3.4–10.8)

## 2023-08-17 PROCEDURE — 80053 COMPREHEN METABOLIC PANEL: CPT | Performed by: INTERNAL MEDICINE

## 2023-08-17 PROCEDURE — 36415 COLL VENOUS BLD VENIPUNCTURE: CPT

## 2023-08-17 PROCEDURE — 85025 COMPLETE CBC W/AUTO DIFF WBC: CPT | Performed by: INTERNAL MEDICINE

## 2023-08-17 NOTE — PROGRESS NOTES
Subjective .     REASON FOR FOLLOWUP:     1.  Recurrent left breast cancer status post bilateral mastectomy on 8/21/2017.  The tumor is stage IA (pT1 cN0 M0), ER/IN positive, HER-2 negative.  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   2.  Oncotype DX score 16, corresponding to a 10 year disease recurrence 10%.       3.  Bone density scan on 9/14/2017 reported normal results.   4.  Patient declined adjuvant endocrine therapy in September 2017.        HISTORY OF PRESENT ILLNESS:  The patient is a 73 y.o. female, who presented today on 08/17/2023 for annual follow-up evaluation.    The patient reports she is doing well. She had multiple skin cancer including basal cell carcinoma and squamous cell carcinoma removed by her dermatologist.     The patient also reports she has a stable size right axilla subcutaneous nodule. She examines herself regularly and reports no increase in size. Denies any pain or itching associated with that.     The patient otherwise has no specific complaints. Denies headaches, vision changes, no bone pains. She continues taking multiple medications for her hypertension, depression, hyperlipidemia, etc.    Laboratory studies today on 08/17/2023 reported unremarkable CBC with Hb 15.5, platelets 206,000, WBC 6,180, and unremarkable CMP except the glucose 121 mg/dL.    Past Medical History:   Diagnosis Date    Anxiety     Arthritis     Breast cancer 2017    LEFT    Colon polyp 11/22    Removed    Dermatochalasis of both eyelids     GERD (gastroesophageal reflux disease)     History of radiation therapy 2006    LT BREAST    History of skin cancer     SQUAMOUS CELL REMOVED FROM LT UPPER LIP AND RT THUMB    Hyperlipidemia 2005    Hypertension     Irritable bowel syndrome In my 40’s    Low back pain 2015    Obesity     Ptosis of both eyebrows      Past Surgical History:   Procedure Laterality Date    ANAL FISSURECTOMY  2003     APPENDECTOMY  2014    BLEPHAROPLASTY Bilateral 5/11/2023    Procedure: BILATERAL UPPER LID BLEPHAROPLASTY;  Surgeon: Nicola Rowley MD;  Location: [facility] MAIN OR;  Service: Ophthalmology;  Laterality: Bilateral;    BREAST LUMPECTOMY W/ NEEDLE LOCALIZATION Left 2006    BREAST SURGERY  2006 and 2017    breast cancer    BROW LIFT Bilateral 5/11/2023    Procedure: BILATERAL TEMPORAL DIRECT BROWLIFT;  Surgeon: Nicola Rowley MD;  Location: [facility] MAIN OR;  Service: Ophthalmology;  Laterality: Bilateral;    CATARACT EXTRACTION Bilateral 07/2018    CHOLECYSTECTOMY  2007    COLONOSCOPY  2014    COLONOSCOPY Left 05/27/2021    Procedure: COLONOSCOPY;  Surgeon: Sridhar James MD;  Location[facility];  Service: Gastroenterology;  Laterality: Left;    COLONOSCOPY N/A 10/10/2022    Procedure: COLONOSCOPY FOR SCREENING;  Surgeon: Sridhar James MD;  [place] [facility];  Service: Gastroenterology;  Laterality: N/A;  NORMAL    ENDOSCOPY N/A 10/10/2022    Procedure: ESOPHAGOGASTRODUODENOSCOPY WITH BIOPSY;  Surgeon: Sridhar James MD;  Location: [facility];  Service: Gastroenterology;  Laterality: N/A;  GASTRIC POLYPS, IRREGULAR Z LINE    EPIDURAL Left 6/21/2023    Procedure: LUMBAR/SACRAL TRANSFORAMINAL EPIDURAL LEFT L4-5  46605;  Surgeon: Estrellita Sheppard MD;  Location: [facility];  Service: Pain Management;  Laterality: Left;    HYSTERECTOMY  1987    JOINT REPLACEMENT      KNEE SURGERY Right 2014    Knee cap replaced    KNEE SURGERY Left 12/06/2022    joint replacement    MASTECTOMY      MASTECTOMY W/ SENTINEL NODE BIOPSY Bilateral 08/21/2017    Procedure: BILATEREAL BREAST MASTECTOMY WITH SENTINEL NODE BIOPSY ON THE LEFT The sentinel lymph node biopsy will only be performed on the left side;  Surgeon: Diallo Eisenberg MD;  [facility];  Service:     MEDIAL BRANCH BLOCK Bilateral 03/20/2023    Procedure: LUMBAR MEDIAL BRANCH BLOCK BILATERAL L3-L5 #1  76244, 95633;   Surgeon: Estrellita Sheppard MD;  Location: [facility];  Service: Pain Management;  Laterality: Bilateral;    OOPHORECTOMY Bilateral     BSO    PIRIFORMUS INJECTION Left 2021    Procedure: Left piriformis injection;  Surgeon: Estrellita Sheppard MD;  Location: [facility];  Service: Pain Management;  Laterality: Left;    PIRIFORMUS INJECTION Left 2022    Procedure: PIRIFORMIS INJECTION - Left;  Surgeon: Estrellita Sheppard MD;  Location: [facility];  Service: Pain Management;  Laterality: Left;    PIRIFORMUS INJECTION Left 2023    Procedure: PIRIFORMIS INJECTION Left;  Surgeon: Estrellita Sheppard MD;  Location: [facility];  Service: Pain Management;  Laterality: Left;    PIRIFORMUS INJECTION Left 2023    Procedure: PIRIFORMIS INJECTION;  Surgeon: Estrellita Sheppard MD;  Location: [facility];  Service: Pain Management;  Laterality: Left;    POSTERIOR REPAIR  2016    with enterocele repair, midurethral sling, perineoplasty    SACROILIAC JOINT INJECTION Left 2023    Procedure: SACROILIAC JOINT INJECTION AND PIRIFORMIS INJECTION LEFT  59948;  Surgeon: Estrellita Sheppard MD;  Location: [facility];  Service: Pain Management;  Laterality: Left;    SKIN BIOPSY      TONSILLECTOMY  1970    TOTAL KNEE ARTHROPLASTY Bilateral ,     UPPER GASTROINTESTINAL ENDOSCOPY  10/10/2022      *  Bilateral cataract surgery in 2018.     *  Resection of squamous cell carcinoma from her nose bridge and left lower leg in 2018.  *  2020 skull [facility] resected 32 stitches Dr. Egan ,     OB/GYN history: .  Menarche age 12.  Was on Premarin prior to her diagnosis of first breast cancer in .     HEMATOLOGIC/ONCOLOGIC HISTORY: The patient is a 67 y.o. year old female who is here for initial evaluation on 2017with the above-mentioned history.    Ms. Resendez reports that she has healed wound, no infection.  She actually will see Dr. Eisenberg this afternoon for removing the  stitches.  She has no plan for reconstruction surgery.  She reports she had her 1st episode of breast cancer back in 2006, at that time she was seen at the [facility].  She had a lumpectomy with a 6 mm DCIS and had radiation therapy followed by 5 years of tamoxifen.     The patient had an abnormal mammogram study in early 07/2017.  She subsequently had ultrasound-guided left breast biopsy on 07/05/2017.  This study reported invasive ductal carcinoma with the largest dimension 11 mm; it was a grade 2 tumor.  Further biomarker study at the [facility] reported      It was reported strongly positive for ER 98% and strongly for MI 61%, HER-2 was negative, IHC 0 and Ki-67 at 13.7%.      The patient was referred to see Dr. Eisenberg who performed bilateral mastectomy on 08/21/2017.  Pathology evaluation from the mastectomy sample reported invasive ductal carcinoma with the largest dimension 8 mm, grade 2 Francesco score 6/10 with a single focus of invasive carcinoma.  There is also DCIS component.  All margins were clear.  All 3 sentinel lymph nodes were negative for malignancy.  There were also 2 lymph nodes in the mastectomy sample, which were also negative.      We obtained Oncotype DX study which comes back with a low risk score of 16, corresponding to 10 year disease recurrence risk 10%. She also had normal bone density scan.     Discussed with patient for endocrine therapy using aromatase inhibitor and its side effects. The patient reported that she had tremendous hot flashes, sweating when she was taking tamoxifen previously after she was diagnosed with DCIS. She also cited that she already had arthritis with joint pains involving her hands, she does not want to have risk to have worsening arthralgia from the aromatase inhibitors because she is physically active she does not want to have any limitation because of side effects from the aromatase inhibitor. I  also presented data from the [facility] web site, and this looked at 5 year and 10 year survival benefit. Endocrine therapy using tamoxifen carries very small benefit, in 5 years it saves 1 out of 10 patients who would die from the disease and in 20 years it saves 2 out of 20 patients from dying of breast cancer. After consideration, the patient reports she does not want to have endocrine therapy at all. She would rather take the risk.    Laboratory study 03/12/2019 reported marginal neutrophil 1750 which is at her baseline level in the past 12-month, which is certainly lower than her counts in 2015 and 2017 above 2400.  She has normal lymphocytes 3420 and monocytes 430 and a total WBC 5940.  She has baseline normal hemoglobin 14.9 and platelets 279,000.    Laboratory study on 9/10/2020 reported complete normal CBC including Hb 15.2, platelets 267,000, WBC 9100 including ANC 4570 lymphocytes 3450.  CMP was unremarkable.    Laboratory study today on 8/18/2022 reported normal CBC with Hb 14.5, platelets 215,000 and WBC 6760 including ANC 2950 lymphocytes 2940 and monocytes 580.  Chemistry lab reported normal creatinine 0.69 normal electrolytes normal liver function panel, and marginally elevated glucose 122.        MEDICATIONS    Current Outpatient Medications:     amLODIPine (NORVASC) 10 MG tablet, TAKE 1 TABLET BY MOUTH  DAILY, Disp: 90 tablet, Rfl: 3    atenolol (TENORMIN) 25 MG tablet, Take 1 tablet by mouth Daily. (Patient taking differently: Take 1 tablet by mouth Every Night.), Disp: 90 tablet, Rfl: 1    atorvastatin (LIPITOR) 20 MG tablet, TAKE 1 TABLET BY MOUTH DAILY, Disp: 90 tablet, Rfl: 3    buPROPion XL (WELLBUTRIN XL) 150 MG 24 hr tablet, TAKE 1 TABLET BY MOUTH  DAILY, Disp: 90 tablet, Rfl: 3    escitalopram (LEXAPRO) 5 MG tablet, TAKE 1 TABLET BY MOUTH  DAILY (Patient taking differently: Take 1 tablet by mouth Every Night.), Disp: 90 tablet, Rfl: 3    hydroCHLOROthiazide (HYDRODIURIL) 25 MG tablet,  TAKE 1 TABLET BY MOUTH DAILY, Disp: 90 tablet, Rfl: 1    losartan (COZAAR) 25 MG tablet, TAKE 1 TABLET BY MOUTH  DAILY, Disp: 90 tablet, Rfl: 3    omeprazole (priLOSEC) 20 MG capsule, TAKE 1 CAPSULE BY MOUTH  DAILY, Disp: 90 capsule, Rfl: 3    oxybutynin (DITROPAN) 5 MG tablet, TAKE 1 TABLET BY MOUTH  TWICE DAILY (Patient taking differently: Daily.), Disp: 180 tablet, Rfl: 3    pregabalin (Lyrica) 25 MG capsule, Take 1 capsule by mouth 3 (Three) Times a Day., Disp: 90 capsule, Rfl: 0    Semaglutide,0.25 or 0.5MG/DOS, (Ozempic, 0.25 or 0.5 MG/DOSE,) 2 MG/3ML solution pen-injector, Inject 0.25 mg under the skin into the appropriate area as directed 1 (One) Time Per Week., Disp: 3 mL, Rfl: 0    meclizine (ANTIVERT) 12.5 MG tablet, 1 or 2 tablets 3 times a day as needed for vertigo, caution sedation, if severe vertigo or confusion, go to emergency room, Disp: 30 tablet, Rfl: 1    ALLERGIES:     Allergies   Allergen Reactions    Morphine Itching       SOCIAL HISTORY:       Social History     Social History    Marital status:      Spouse name: N/A    Number of children: 3    Years of education: College education      Occupational History    From [employer]  Retired     Social History Main Topics    Smoking status: Never Smoker    Smokeless tobacco: Never Used    Alcohol use 2 drinks per month     Drug use: No    Sexual activity: Defer      Comment:          FAMILY HISTORY:  Family History   Problem Relation Age of Onset    Heart attack and hypertension  Mother     Esophageal cancer Mother, diagnosed at age 74, and the past week at age 76 due to cancer.             Alcohol abuse Father     Diabetes Father      type 2    Heart attack Father     Heart failure Father     Hyperlipidemia Father     Hypertension Father     Prostate cancerDiagnosed at age 85.   Father,  at age 87 due to multiple medical problems     Clotting disorder Father     Birth defects Brother     Heart attack Brother   "   Clotting disorder Daughter     breast cancer  Paternal great aunt all           Objective    Vitals:    08/17/23 0825   BP: 116/75   Pulse: 61   Resp: 16   Temp: 96.9 °F (36.1 °C)   TempSrc: Temporal   SpO2: 95%   Weight: 109 kg (240 lb 11.2 oz)   Height: 172.7 cm (67.99\")   PainSc: 0-No pain         8/17/2023     8:26 AM   Current Status   ECOG score 0      PHYSICAL EXAM:    GENERAL:  Well-developed, well-nourished in no acute distress.  Orientated to time place and the people.  SKIN:  Warm, dry without rashes, purpura or petechiae.  HEENT:  Normocephalic.   LYMPHATICS:  No cervical, supraclavicular adenopathy.  Right axilla has a subcutaneous nodule, about 8 mm x 10 mm.  No skin changes.  CHEST: Normal respiratory effort.  Lungs clear to auscultation. Good airflow.  Post bilateral mastectomy.  CARDIAC:  Regular rate and rhythm without murmurs. Normal S1,S2.  ABDOMEN:  Soft, nontender with no organomegaly or masses.  Bowel sounds normal.  EXTREMITIES:  No lower extremity edema.      RECENT LABS:  Lab Results   Component Value Date    WBC 6.18 08/17/2023    HGB 13.5 08/17/2023    HCT 40.3 08/17/2023    MCV 86.3 08/17/2023     08/17/2023     Lab Results   Component Value Date    NEUTROABS 3.13 08/17/2023     Lab Results   Component Value Date    GLUCOSE 121 (H) 08/17/2023    BUN 19 08/17/2023    CREATININE 0.64 08/17/2023    EGFRIFNONA 78 10/28/2021    EGFRIFAFRI 90 10/28/2021    BCR 29.7 (H) 08/17/2023    K 3.5 08/17/2023    CO2 27.4 08/17/2023    CALCIUM 9.5 08/17/2023    PROTENTOTREF 6.1 07/26/2023    ALBUMIN 4.2 08/17/2023    LABIL2 2.4 07/26/2023    AST 26 08/17/2023    ALT 24 08/17/2023       Assessment & Plan      1. History of recurrent left breast cancer, post bilateral mastectomy in August 2017. Stage 1A, strongly ER/MA positive and negative for HER-2.  Has low Oncotype DX score 16, corresponding to a 10-year disease recurrence risk 10%.  Certainly she is not a candidate for adjuvant chemotherapy. "   Patient declined adjuvant endocrine therapy in September 2017.  She prefers follow-up with us in the clinic for monitoring purposes.     Patient does routine self examination and reports no abnormalities.  On 9/10/2020 patient had negative review of system.  Physical examination showed no suspicion lesions in axillary or neck area.  There is no evidence for disease recurrence.    Patient presented for reevaluation on 9/2/2021, negative review of system.  Physical examination has no evidence of disease recurrence.  Laboratory study shows complete normal CBC, and CMP.  On 8/18/2022 patient presented for annual follow-up.  Negative review of system.  No palpable lymphadenopathy in neck supraclavicular or axilla.  Chest wall examination was negative for local disease recurrence.  Unremarkable CBC and CMP.    On 08/17/2023, the patient presented for annual follow-up. Negative review of system. Stable small right axillary subcutaneous nodule 8 mm x 10 mm. No pain or associated skin changes. Lab study also reported stable CBC and CMP.    2.  COVID-19 vaccination.  Patient reports she is fully vaccinated against COVID-19.  She actually received a third booster dose yesterday on 9/1/2021.  Patient reports no apparent side effects.    3. Recurrent skin cancer with both basal cell carcinoma and squamous cell carcinoma. The patient is followed by her dermatologist. She will continue to do so.      PLAN:   We will arrange the patient for follow-up with us in 12 months, we will check CBC and CMP.    The patient will continue to follow up with the dermatologist for ongoing care.      I discussed with the patient about laboratory results and further management plan.  Patient voiced understanding and agreeable.      Peyton Keyes MD PhD      CC:  William P. Hoagland, MD Epley, James, APRN      Transcribed from ambient dictation for Peyton Keyes MD PhD by Zuri Fofana.  08/17/23   15:11 EDT    Patient or patient  representative verbalized consent to the visit recording.  I have personally performed the services described in this document as transcribed by the above individual, and it is both accurate and complete.    Peyton Keyes MD PhD

## 2023-08-28 ENCOUNTER — TELEPHONE (OUTPATIENT)
Dept: FAMILY MEDICINE CLINIC | Facility: CLINIC | Age: 73
End: 2023-08-28

## 2023-08-28 NOTE — TELEPHONE ENCOUNTER
Caller: Alesia Resendez    Relationship to patient: Self    Best call back number: [phone]     Date of positive COVID19 test: 8/27/23    COVID19 symptoms: CHILLS, BODY ACHES, HEADACHE, SORE THROAT    Additional information or concerns: PATIENT CALLED STATING SHE TESTED POSITIVE FOR COVID YESTERDAY AND WANTS TO KNOW IF SHE CAN HAVE MEDICATION CALLED IN TO TREAT SYMPTOMS. HER SYMPTOMS STARTED 8/26/23    What is the patients preferred pharmacy: [facility] [phone] - [facility] [id number] AT [facility] [address] [facility] [phone] [facility] [phone]

## 2023-08-29 ENCOUNTER — TELEMEDICINE (OUTPATIENT)
Dept: FAMILY MEDICINE CLINIC | Facility: CLINIC | Age: 73
End: 2023-08-29
Payer: MEDICARE

## 2023-08-29 DIAGNOSIS — U07.1 COVID: Primary | ICD-10-CM

## 2023-08-29 PROCEDURE — 99214 OFFICE O/P EST MOD 30 MIN: CPT | Performed by: NURSE PRACTITIONER

## 2023-08-29 NOTE — PATIENT INSTRUCTIONS
Discharge instructions    Fluids fluids fluids  Saline nasal spray menthol cough drops as needed for sinus pressure congestion.  Paxlovid x5 days  Hold statin atorvastatin for 10  Robitussin-DM as needed for cough congestion    Ibuprofen 600 mg or 800 mg  3 times a day next 2 to 3 days for fever body aches equivalent  Update me Friday please condition  If high fever chest pain shortness of breath weakness lethargy worsening symptoms emergency room promptly

## 2023-08-29 NOTE — PROGRESS NOTES
"Chief Complaint  covid positive    Subjective        Alesia Resendez presents to [facility]  History of Present Illness  Patient complains of sinus drainage congestion sore throat cough fever body aches started Saturday and Sunday,  Feels pretty lousy but no chest pain or increased shortness of breath no lethargy weakness keeping fluids down quite a bit of body aches    Interested in the antivirals she has had several COVID vaccines previously has not had COVID    Objective   Vital Signs:  There were no vitals taken for this visit.  Estimated body mass index is 36.61 kg/mý as calculated from the following:    Height as of 8/17/23: 172.7 cm (67.99\").    Weight as of 8/17/23: 109 kg (240 lb 11.2 oz).            Physical Exam  Vitals reviewed.   Constitutional:       General: She is not in acute distress.     Appearance: Normal appearance. She is not ill-appearing, toxic-appearing or diaphoretic.      Comments: No distress pleasant   Eyes:      Conjunctiva/sclera: Conjunctivae normal.      Pupils: Pupils are equal, round, and reactive to light.   Pulmonary:      Effort: Pulmonary effort is normal. No respiratory distress.      Breath sounds: No stridor.      Comments: Breathing comfortably able to speak full sentences without dyspnea, occasional cough.  Skin:     General: Skin is warm and dry.   Neurological:      General: No focal deficit present.      Mental Status: She is alert. Mental status is at baseline.   Psychiatric:         Mood and Affect: Mood normal.         Behavior: Behavior normal.         Thought Content: Thought content normal.         Judgment: Judgment normal.      Result Review :                Assessment and Plan   Diagnoses and all orders for this visit:    1. COVID (Primary)    Other orders  -     Nirmatrelvir&Ritonavir 300/100 (PAXLOVID) 20 x 150 MG & 10 x 100MG tablet therapy pack tablet; Take 3 tablets by mouth 2 (Two) Times a Day for 5 days. Follow pkt/pharm " instructions  Dispense: 30 each; Refill: 0             Follow Up   No follow-ups on file.  Patient was given instructions and counseling regarding her condition or for health maintenance advice. Please see specific information pulled into the AVS if appropriate.   Telehealth epic Zoom patient sent home I am in the office with patient permission 15 minutes    Patient Instructions   Discharge instructions    Fluids fluids fluids  Saline nasal spray menthol cough drops as needed for sinus pressure congestion.  Paxlovid x5 days  Hold statin atorvastatin for 10  Robitussin-DM as needed for cough congestion    Ibuprofen 600 mg or 800 mg  3 times a day next 2 to 3 days for fever body aches equivalent  Update me Friday please condition  If high fever chest pain shortness of breath weakness lethargy worsening symptoms emergency room promptly     Discussed emergency use authorization Paxlovid  Risk mitigation  Hold atorvastatin  Push fluids  See patient instructions

## 2023-09-12 DIAGNOSIS — M54.16 LUMBAR RADICULOPATHY: ICD-10-CM

## 2023-09-12 DIAGNOSIS — M48.061 SPINAL STENOSIS OF LUMBAR REGION, UNSPECIFIED WHETHER NEUROGENIC CLAUDICATION PRESENT: ICD-10-CM

## 2023-09-12 DIAGNOSIS — M46.1 SACROILIITIS: ICD-10-CM

## 2023-09-12 RX ORDER — PREGABALIN 25 MG/1
CAPSULE ORAL
Qty: 90 CAPSULE | Refills: 0 | Status: SHIPPED | OUTPATIENT
Start: 2023-09-12

## 2023-09-19 ENCOUNTER — OFFICE VISIT (OUTPATIENT)
Dept: PAIN MEDICINE | Facility: CLINIC | Age: 73
End: 2023-09-19
Payer: MEDICARE

## 2023-09-19 VITALS
OXYGEN SATURATION: 93 % | HEART RATE: 66 BPM | SYSTOLIC BLOOD PRESSURE: 130 MMHG | DIASTOLIC BLOOD PRESSURE: 76 MMHG | BODY MASS INDEX: 36.59 KG/M2 | HEIGHT: 68 IN | TEMPERATURE: 97.1 F | WEIGHT: 241.4 LBS

## 2023-09-19 DIAGNOSIS — M51.37 DEGENERATION OF LUMBAR OR LUMBOSACRAL INTERVERTEBRAL DISC: ICD-10-CM

## 2023-09-19 DIAGNOSIS — M48.061 SPINAL STENOSIS OF LUMBAR REGION, UNSPECIFIED WHETHER NEUROGENIC CLAUDICATION PRESENT: ICD-10-CM

## 2023-09-19 DIAGNOSIS — M46.1 SACROILIITIS: ICD-10-CM

## 2023-09-19 DIAGNOSIS — M54.16 LUMBAR RADICULOPATHY: Primary | ICD-10-CM

## 2023-09-19 PROCEDURE — 3078F DIAST BP <80 MM HG: CPT | Performed by: PHYSICIAN ASSISTANT

## 2023-09-19 PROCEDURE — 99213 OFFICE O/P EST LOW 20 MIN: CPT | Performed by: PHYSICIAN ASSISTANT

## 2023-09-19 PROCEDURE — 1125F AMNT PAIN NOTED PAIN PRSNT: CPT | Performed by: PHYSICIAN ASSISTANT

## 2023-09-19 PROCEDURE — 1160F RVW MEDS BY RX/DR IN RCRD: CPT | Performed by: PHYSICIAN ASSISTANT

## 2023-09-19 PROCEDURE — 1159F MED LIST DOCD IN RCRD: CPT | Performed by: PHYSICIAN ASSISTANT

## 2023-09-19 PROCEDURE — 3075F SYST BP GE 130 - 139MM HG: CPT | Performed by: PHYSICIAN ASSISTANT

## 2023-09-19 NOTE — PROGRESS NOTES
CHIEF COMPLAINT  F/U back pain- LUMBAR/SACRAL TRANSFORAMINAL EPIDURAL LEFT L4-5- patient states that she has had 75% Improvement since the procedure.        Subjective   Alesia Resendez is a 73 y.o. female  who presents to the office for follow-up of procedure.  She completed a #1 diagnostic left L4-5 lumbar TFESI on 6/21/2023 performed by Dr. Sheppard for management of low back and left lower extremity pain. Patient reports 75% ongoing relief from the procedure.  The patient states that she has had significant improvement of left-sided lumbosacral and left buttock pain and complete resolution of left lower extremity pain since having the injection as well as addition of pregabalin.  Patient is pleased to report significant improvement as compared to her last office visit.    The patient states that she was advised to only return to the [facility] if she did not obtain relief with the epidural injection.    Current medication regimen consists of pregabalin 25 mg p.o. 3 times daily however she states that she is able to take only 1 a day which has been extremely helpful.  Denies adverse effects.    Pain today 3/10 VAS in severity.    Back Pain  This is a chronic problem. The current episode started more than 1 year ago. The problem occurs constantly. The problem has been gradually improving since onset. The pain is present in the lumbar spine. The quality of the pain is described as aching. Radiates to: Radiating pain into the left lower extremity has alleviated. The pain is at a severity of 3/10. The pain is mild. The pain is Worse during the day. The symptoms are aggravated by position and standing. Associated symptoms include numbness (right hand). Pertinent negatives include no abdominal pain, chest pain, dysuria, fever, headaches or weakness.      PEG Assessment   What number best describes your pain on average in the past week?4  What number best describes how, during the past week, pain has interfered with  your enjoyment of life?2  What number best describes how, during the past week, pain has interfered with your general activity?  2    Review of Pertinent Medical Data ---  MRI EXAMINATION OF THE LUMBAR SPINE WITHOUT CONTRAST     HISTORY: Back pain, left radiculopathy. Breast cancer.     COMPARISON: MRI lumbar spine 01/30/2020.     FINDINGS: There is moderate-to-severe loss of disc height and disc  desiccation at L5-S1 and a grade 1/grade 2 anterolisthesis of L4 upon  L5, unchanged. There is mild disc desiccation and loss of disc height  from T12 to L5. The conus is at L1 and the caudal aspect of the spinal  cord appears unremarkable.     L1-L2: Mild facet degenerative disease is present bilaterally. There is  no evidence of disc bulge or herniation.     L2-L3: Mild facet degenerative disease is present bilaterally. A left  paracentral disc osteophyte complex is present which results in mild  canal stenosis centrally but contributes to moderate lateral recess  narrowing on the left similar in appearance as compared to the prior  examination. Moderate foraminal stenosis is present on the left and  there is mild foraminal stenosis on the right secondary to extension of  a disc osteophyte complex into the neural foramen. Foraminal stenosis on  the left appears similar to the prior examination.     L3-L4: Mild-to-moderate facet degenerative disease is present  bilaterally. There is moderate-to-severe central canal stenosis and  lateral recess narrowing on the right and severe lateral recess  narrowing on the left secondary to a broad-based disc osteophyte complex  which is more prominent to the left and posterior element degenerative  disease. There is 1 to 2 mm of retrolisthesis of L3 upon L4, unchanged.  The canal stenosis and lateral recess narrowing is slightly more  prominent as compared to the prior examination. Mild foraminal stenosis  is present on the left secondary to extension of a disc osteophyte  complex  into the neural foramen.     L4-L5: There is severe central canal stenosis secondary to severe facet  hypertrophy (with the facets being fused bilaterally. The fused facets  are new versus 01/30/2020.) and a broad-based disc osteophyte complex.  Spinal stenosis is accentuated by the anterolisthesis of L4 upon L5.  There was severe spinal stenosis present previously but spinal stenosis  may be even slightly more prominent as compared to the prior  examination.     L5-S1: Moderate-to-severe facet degenerative disease is present  bilaterally. There is mild-to-moderate central canal stenosis. Moderate  and moderate-to-severe lateral recess narrowing is present on the right  and left respectively. The canal stenosis and lateral recess narrowing  is slightly more prominent as compared to the prior examination.  Mild-to-moderate and mild foraminal stenosis is present on the left and  right respectively secondary to extension of a small disc osteophyte  complex into the neural foramen.     IMPRESSION:  Extensive multilevel degenerative disease involving the  lumbar spine is noted as described above. This includes severe spinal  stenosis at L4-L5 which appears similar to slightly more prominent as  compared to the prior examination secondary to posterior element  degenerative disease, broad-based disc osteophyte complex and  anterolisthesis of L4 upon L5. There is fusion of the facets bilaterally  at L4-L5 which is new versus the prior examination. Spinal stenosis and  lateral recess narrowing is also present at L5 S1 and to a slightly  lesser degree also slightly more prominent as compared to the prior  examination. There is no evidence of a focal herniation. See above.     This report was finalized on 4/26/2023 5:07 PM by Dr. Jose Velasquez M.D.    The following portions of the patient's history were reviewed and updated as appropriate: allergies, current medications, past family history, past medical history, past social  "history, past surgical history, and problem list.    Review of Systems   Constitutional:  Positive for fatigue. Negative for activity change, chills and fever.   HENT:  Negative for congestion.    Eyes:  Negative for visual disturbance.   Respiratory:  Negative for chest tightness and shortness of breath.    Cardiovascular:  Negative for chest pain.   Gastrointestinal:  Negative for abdominal pain, constipation and diarrhea.   Genitourinary:  Negative for difficulty urinating, dyspareunia and dysuria.   Musculoskeletal:  Positive for back pain.   Neurological:  Positive for numbness (right hand). Negative for dizziness, weakness, light-headedness and headaches.   Psychiatric/Behavioral:  Negative for agitation and sleep disturbance. The patient is not nervous/anxious.    I have reviewed and confirmed the accuracy of the ROS as documented by the MA/LPN/RN DORITA Amezcua   Vitals:    09/19/23 0746   BP: 130/76   Pulse: 66   Temp: 97.1 °F (36.2 °C)   SpO2: 93%   Weight: 109 kg (241 lb 6.4 oz)   Height: 172.7 cm (68\")   PainSc:   3   PainLoc: Back         Objective   Physical Exam  Vitals and nursing note reviewed.   Constitutional:       Appearance: Normal appearance. She is obese.   HENT:      Head: Normocephalic.   Pulmonary:      Effort: Pulmonary effort is normal.   Musculoskeletal:      Lumbar back: Tenderness present. Decreased range of motion.   Skin:     General: Skin is warm and dry.   Neurological:      General: No focal deficit present.      Mental Status: She is alert and oriented to person, place, and time.      Cranial Nerves: Cranial nerves 2-12 are intact.      Sensory: Sensation is intact.      Motor: Motor function is intact.      Gait: Gait is intact.   Psychiatric:         Mood and Affect: Mood normal.         Behavior: Behavior normal.         Thought Content: Thought content normal.         Judgment: Judgment normal.           Assessment & Plan   Diagnoses and all orders for this visit:    1. " Lumbar radiculopathy (Primary)    2. Degeneration of lumbar or lumbosacral intervertebral disc    3. Spinal stenosis of lumbar region, unspecified whether neurogenic claudication present    4. Sacroiliitis        Alesia Resendez reports a pain score of 3.  Given her pain assessment as noted, treatment options were discussed and the following options were decided upon as a follow-up plan to address the patient's pain: continuation of current treatment plan for pain and use of non-medical modalities (ice, heat, stretching and/or behavior modifications).      --- Follow-up in 2 months or sooner as needed  --- Patient will contact the office when ready to proceed with her next therapeutic injection.  --- Continue pregabalin 25 mg.  She does not require refill on today.           JAN REPORT  As part of the patient's treatment plan, I am prescribing controlled substances. The patient has been made aware of appropriate use of such medications, including potential risk of somnolence, limited ability to drive and/or work safely, and the potential for dependence or overdose. It has also been made clear that these medications are for use by this patient only, without concomitant use of alcohol or other substances unless prescribed.     Patient has completed prescribing agreement detailing terms of continued prescribing of controlled substances, including monitoring JAN reports, urine drug screening, and pill counts if necessary. The patient is aware that inappropriate use will results in cessation of prescribing such medications.    As the clinician, I personally reviewed the JAN from 9/19/2023 while the patient was in the office today.    History and physical exam exhibit continued safe and appropriate use of controlled substances.       Dictated utilizing Dragon dictation.

## 2023-10-19 RX ORDER — CELECOXIB 200 MG/1
200 CAPSULE ORAL DAILY
Qty: 90 CAPSULE | Refills: 1 | Status: SHIPPED | OUTPATIENT
Start: 2023-10-19

## 2023-10-19 NOTE — TELEPHONE ENCOUNTER
Rx Refill Note  Requested Prescriptions     Pending Prescriptions Disp Refills    celecoxib (CeleBREX) 200 MG capsule [Pharmacy Med Name: CELECOXIB 200 MG CAPSULE] 90 capsule 1     Sig: TAKE ONE CAPSULE BY MOUTH DAILY      Last office visit with prescribing clinician: 8/2/2023   Last telemedicine visit with prescribing clinician: 8/29/2023   Next office visit with prescribing clinician: 2/2/2024                         Would you like a call back once the refill request has been completed: [] Yes [] No    If the office needs to give you a call back, can they leave a voicemail: [] Yes [] No    Fred Gutierrez Rep  10/19/23, 16:13 EDT

## 2023-10-19 NOTE — TELEPHONE ENCOUNTER
Pharmacy Name: [facility] [id number] - [facility] [address] AT [facility] - [phone]  - [phone]      Pharmacy representative phone number: [phone]     What question does the pharmacy have: PHARMACY CALLED TO REQUEST THIS MEDICATION AGAIN, PLEASE SEND IN AS SOON AS POSSIBLE.

## 2023-10-23 RX ORDER — ATENOLOL 25 MG/1
25 TABLET ORAL DAILY
Qty: 90 TABLET | Refills: 1 | Status: SHIPPED | OUTPATIENT
Start: 2023-10-23

## 2023-10-23 NOTE — TELEPHONE ENCOUNTER
Rx Refill Note  Requested Prescriptions     Pending Prescriptions Disp Refills    atenolol (TENORMIN) 25 MG tablet 90 tablet 1     Sig: Take 1 tablet by mouth Daily.      Last office visit with prescribing clinician: 8/2/2023   Last telemedicine visit with prescribing clinician: 8/29/2023   Next office visit with prescribing clinician: 2/2/2024                         Would you like a call back once the refill request has been completed: [] Yes [] No    If the office needs to give you a call back, can they leave a voicemail: [] Yes [] No    Fred Gutierrez Rep  10/23/23, 12:08 EDT

## 2023-10-26 DIAGNOSIS — M48.061 SPINAL STENOSIS OF LUMBAR REGION, UNSPECIFIED WHETHER NEUROGENIC CLAUDICATION PRESENT: ICD-10-CM

## 2023-10-26 DIAGNOSIS — M54.16 LUMBAR RADICULOPATHY: ICD-10-CM

## 2023-10-26 DIAGNOSIS — M46.1 SACROILIITIS: ICD-10-CM

## 2023-10-26 RX ORDER — PREGABALIN 25 MG/1
CAPSULE ORAL
Qty: 90 CAPSULE | Refills: 0 | Status: SHIPPED | OUTPATIENT
Start: 2023-10-26

## 2023-11-20 ENCOUNTER — OFFICE VISIT (OUTPATIENT)
Dept: PAIN MEDICINE | Facility: CLINIC | Age: 73
End: 2023-11-20
Payer: MEDICARE

## 2023-11-20 VITALS
TEMPERATURE: 97.1 F | OXYGEN SATURATION: 96 % | HEIGHT: 68 IN | WEIGHT: 235.4 LBS | HEART RATE: 59 BPM | BODY MASS INDEX: 35.68 KG/M2 | SYSTOLIC BLOOD PRESSURE: 136 MMHG | DIASTOLIC BLOOD PRESSURE: 92 MMHG

## 2023-11-20 DIAGNOSIS — M48.02 CERVICAL STENOSIS OF SPINAL CANAL: ICD-10-CM

## 2023-11-20 DIAGNOSIS — M47.816 SPONDYLOSIS OF LUMBAR REGION WITHOUT MYELOPATHY OR RADICULOPATHY: ICD-10-CM

## 2023-11-20 DIAGNOSIS — M51.37 DEGENERATION OF LUMBAR OR LUMBOSACRAL INTERVERTEBRAL DISC: ICD-10-CM

## 2023-11-20 DIAGNOSIS — M54.16 LUMBAR RADICULOPATHY: Primary | ICD-10-CM

## 2023-11-20 PROCEDURE — 3075F SYST BP GE 130 - 139MM HG: CPT | Performed by: PHYSICIAN ASSISTANT

## 2023-11-20 PROCEDURE — 1159F MED LIST DOCD IN RCRD: CPT | Performed by: PHYSICIAN ASSISTANT

## 2023-11-20 PROCEDURE — 99213 OFFICE O/P EST LOW 20 MIN: CPT | Performed by: PHYSICIAN ASSISTANT

## 2023-11-20 PROCEDURE — 1125F AMNT PAIN NOTED PAIN PRSNT: CPT | Performed by: PHYSICIAN ASSISTANT

## 2023-11-20 PROCEDURE — 3080F DIAST BP >= 90 MM HG: CPT | Performed by: PHYSICIAN ASSISTANT

## 2023-11-20 PROCEDURE — 1160F RVW MEDS BY RX/DR IN RCRD: CPT | Performed by: PHYSICIAN ASSISTANT

## 2023-11-20 NOTE — PROGRESS NOTES
"CHIEF COMPLAINT    Follow up back pain. The patient states she thinks the last injection \"really worked\" but does have pain in a different area of her back.    Subjective   Alesia Resendez is a 73 y.o. female  who presents for follow-up.  She has a history of back and left lower extremity pain.  This patient is pleased to report greater than 75% ongoing pain relief from her last left L4-5 lumbar TFESI which was completed on 6/21/2023.  She has noted increased pain over the ischial tuberosity bilaterally, right greater than left, which she feels may have been attributed to her water exercises.  She also notes significant improvement of the neuropathic and arthritic pain with ongoing use of pregabalin.    Current medication regimen consists of pregabalin 25 mg p.o. nightly which she tolerates without adverse effects.    Pain today 5/10 VAS in severity.      Back Pain  This is a chronic problem. The current episode started more than 1 year ago. The problem occurs constantly. The problem has been gradually improving since onset. The pain is present in the lumbar spine. The quality of the pain is described as aching. Radiates to: Radiating pain into the left lower extremity has alleviated. The pain is at a severity of 5/10. The pain is moderate. The pain is Worse during the day. The symptoms are aggravated by position and standing. Associated symptoms include numbness (right hand). Pertinent negatives include no abdominal pain, chest pain, dysuria, fever, headaches or weakness.        PEG Assessment   What number best describes your pain on average in the past week?5  What number best describes how, during the past week, pain has interfered with your enjoyment of life?6  What number best describes how, during the past week, pain has interfered with your general activity?  6    Review of Pertinent Medical Data ---  MRI EXAMINATION OF THE LUMBAR SPINE WITHOUT CONTRAST     HISTORY: Back pain, left radiculopathy. Breast " cancer.     COMPARISON: MRI lumbar spine 01/30/2020.     FINDINGS: There is moderate-to-severe loss of disc height and disc  desiccation at L5-S1 and a grade 1/grade 2 anterolisthesis of L4 upon  L5, unchanged. There is mild disc desiccation and loss of disc height  from T12 to L5. The conus is at L1 and the caudal aspect of the spinal  cord appears unremarkable.     L1-L2: Mild facet degenerative disease is present bilaterally. There is  no evidence of disc bulge or herniation.     L2-L3: Mild facet degenerative disease is present bilaterally. A left  paracentral disc osteophyte complex is present which results in mild  canal stenosis centrally but contributes to moderate lateral recess  narrowing on the left similar in appearance as compared to the prior  examination. Moderate foraminal stenosis is present on the left and  there is mild foraminal stenosis on the right secondary to extension of  a disc osteophyte complex into the neural foramen. Foraminal stenosis on  the left appears similar to the prior examination.     L3-L4: Mild-to-moderate facet degenerative disease is present  bilaterally. There is moderate-to-severe central canal stenosis and  lateral recess narrowing on the right and severe lateral recess  narrowing on the left secondary to a broad-based disc osteophyte complex  which is more prominent to the left and posterior element degenerative  disease. There is 1 to 2 mm of retrolisthesis of L3 upon L4, unchanged.  The canal stenosis and lateral recess narrowing is slightly more  prominent as compared to the prior examination. Mild foraminal stenosis  is present on the left secondary to extension of a disc osteophyte  complex into the neural foramen.     L4-L5: There is severe central canal stenosis secondary to severe facet  hypertrophy (with the facets being fused bilaterally. The fused facets  are new versus 01/30/2020.) and a broad-based disc osteophyte complex.  Spinal stenosis is accentuated by  the anterolisthesis of L4 upon L5.  There was severe spinal stenosis present previously but spinal stenosis  may be even slightly more prominent as compared to the prior  examination.     L5-S1: Moderate-to-severe facet degenerative disease is present  bilaterally. There is mild-to-moderate central canal stenosis. Moderate  and moderate-to-severe lateral recess narrowing is present on the right  and left respectively. The canal stenosis and lateral recess narrowing  is slightly more prominent as compared to the prior examination.  Mild-to-moderate and mild foraminal stenosis is present on the left and  right respectively secondary to extension of a small disc osteophyte  complex into the neural foramen.     IMPRESSION:  Extensive multilevel degenerative disease involving the  lumbar spine is noted as described above. This includes severe spinal  stenosis at L4-L5 which appears similar to slightly more prominent as  compared to the prior examination secondary to posterior element  degenerative disease, broad-based disc osteophyte complex and  anterolisthesis of L4 upon L5. There is fusion of the facets bilaterally  at L4-L5 which is new versus the prior examination. Spinal stenosis and  lateral recess narrowing is also present at L5 S1 and to a slightly  lesser degree also slightly more prominent as compared to the prior  examination. There is no evidence of a focal herniation. See above.     This report was finalized on 4/26/2023 5:07 PM by Dr. Jose Velasquez M.D.    The following portions of the patient's history were reviewed and updated as appropriate: allergies, current medications, past family history, past medical history, past social history, past surgical history, and problem list.    Review of Systems   Constitutional:  Negative for chills, fatigue and fever.   Respiratory:  Negative for cough and shortness of breath.    Cardiovascular:  Negative for chest pain.   Gastrointestinal:  Negative for abdominal  "pain, constipation and diarrhea.   Genitourinary:  Negative for difficulty urinating and dysuria.   Musculoskeletal:  Positive for back pain.   Neurological:  Positive for numbness (right hand). Negative for dizziness, weakness, light-headedness and headaches.   Psychiatric/Behavioral:  Negative for sleep disturbance.      I have reviewed and confirmed the accuracy of the ROS as documented by the MA/LPN/RN DORITA Amezcua  Vitals:    11/20/23 0833   BP: 136/92   BP Location: Left arm   Patient Position: Sitting   Pulse: 59   Temp: 97.1 °F (36.2 °C)   TempSrc: Temporal   SpO2: 96%   Weight: 107 kg (235 lb 6.4 oz)   Height: 172.7 cm (68\")   PainSc:   5   PainLoc: Back         Objective   Physical Exam  Vitals and nursing note reviewed.   Constitutional:       Appearance: Normal appearance. She is obese.   HENT:      Head: Normocephalic.   Pulmonary:      Effort: Pulmonary effort is normal.   Musculoskeletal:      Lumbar back: Tenderness (Tenderness to palpation within the overlying bilateral ischial tuberosities, right greater than left) present. Decreased range of motion.        Back:    Skin:     General: Skin is warm and dry.   Neurological:      General: No focal deficit present.      Mental Status: She is alert and oriented to person, place, and time.      Cranial Nerves: Cranial nerves 2-12 are intact.      Sensory: Sensation is intact.      Motor: Motor function is intact.      Gait: Gait is intact.   Psychiatric:         Mood and Affect: Mood normal.         Behavior: Behavior normal.         Thought Content: Thought content normal.         Judgment: Judgment normal.             Assessment & Plan   Diagnoses and all orders for this visit:    1. Lumbar radiculopathy (Primary)    2. Degeneration of lumbar or lumbosacral intervertebral disc    3. Cervical stenosis of spinal canal    4. Spondylosis of lumbar region without myelopathy or radiculopathy        Alesiakarlene Resendez reports a pain score of 5.  Given her " pain assessment as noted, treatment options were discussed and the following options were decided upon as a follow-up plan to address the patient's pain: continuation of current treatment plan for pain, prescription for non-opiod analgesics, and use of non-medical modalities (ice, heat, stretching and/or behavior modifications).      --- Follow-up in 3 months or sooner as needed  --- Continue pregabalin 25 mg p.o. nightly.  She does not require prescription refill on today.           JAN REPORT  As part of the patient's treatment plan, I am prescribing controlled substances. The patient has been made aware of appropriate use of such medications, including potential risk of somnolence, limited ability to drive and/or work safely, and the potential for dependence or overdose. It has also been made clear that these medications are for use by this patient only, without concomitant use of alcohol or other substances unless prescribed.     Patient has completed prescribing agreement detailing terms of continued prescribing of controlled substances, including monitoring JAN reports, urine drug screening, and pill counts if necessary. The patient is aware that inappropriate use will results in cessation of prescribing such medications.    As the clinician, I personally reviewed the JAN from 11/20/2023 while the patient was in the office today.    History and physical exam exhibit continued safe and appropriate use of controlled substances.     Dictated utilizing Dragon dictation.

## 2023-11-28 RX ORDER — HYDROCHLOROTHIAZIDE 25 MG/1
25 TABLET ORAL DAILY
Qty: 90 TABLET | Refills: 3 | Status: SHIPPED | OUTPATIENT
Start: 2023-11-28

## 2023-12-21 RX ORDER — AMLODIPINE BESYLATE 10 MG/1
10 TABLET ORAL DAILY
Qty: 90 TABLET | Refills: 3 | Status: SHIPPED | OUTPATIENT
Start: 2023-12-21

## 2024-02-12 RX ORDER — BUPROPION HYDROCHLORIDE 150 MG/1
150 TABLET ORAL DAILY
Qty: 90 TABLET | Refills: 3 | Status: SHIPPED | OUTPATIENT
Start: 2024-02-12

## 2024-02-12 RX ORDER — LOSARTAN POTASSIUM 25 MG/1
25 TABLET ORAL DAILY
Qty: 90 TABLET | Refills: 3 | Status: SHIPPED | OUTPATIENT
Start: 2024-02-12

## 2024-02-21 ENCOUNTER — OFFICE VISIT (OUTPATIENT)
Dept: FAMILY MEDICINE CLINIC | Facility: CLINIC | Age: 74
End: 2024-02-21
Payer: MEDICARE

## 2024-02-21 VITALS
DIASTOLIC BLOOD PRESSURE: 88 MMHG | RESPIRATION RATE: 16 BRPM | WEIGHT: 242.7 LBS | HEART RATE: 59 BPM | SYSTOLIC BLOOD PRESSURE: 143 MMHG | HEIGHT: 68 IN | OXYGEN SATURATION: 97 % | BODY MASS INDEX: 36.78 KG/M2

## 2024-02-21 DIAGNOSIS — H91.91 HEARING DIFFICULTY, RIGHT: ICD-10-CM

## 2024-02-21 DIAGNOSIS — H69.93 EUSTACHIAN TUBE DYSFUNCTION, BILATERAL: ICD-10-CM

## 2024-02-21 DIAGNOSIS — E78.2 MIXED HYPERLIPIDEMIA: ICD-10-CM

## 2024-02-21 DIAGNOSIS — I10 ESSENTIAL HYPERTENSION: Primary | ICD-10-CM

## 2024-02-21 PROCEDURE — 3079F DIAST BP 80-89 MM HG: CPT | Performed by: NURSE PRACTITIONER

## 2024-02-21 PROCEDURE — 99214 OFFICE O/P EST MOD 30 MIN: CPT | Performed by: NURSE PRACTITIONER

## 2024-02-21 PROCEDURE — 3077F SYST BP >= 140 MM HG: CPT | Performed by: NURSE PRACTITIONER

## 2024-02-21 RX ORDER — FLUTICASONE PROPIONATE 50 MCG
2 SPRAY, SUSPENSION (ML) NASAL DAILY
Qty: 48 ML | Refills: 3 | Status: SHIPPED | OUTPATIENT
Start: 2024-02-21

## 2024-02-21 NOTE — PATIENT INSTRUCTIONS
Flonase 2 sprays each nostril daily try this for 2 to 3 months follow-up ENT for better evaluation of your ears and hearing    Continue check blood pressure weekly continue healthy diet, continue good communication good companionship with your granddaughter,  As long as you are doing well follow-up in 6 months with labs prior to your next appointment

## 2024-02-21 NOTE — PROGRESS NOTES
"Chief Complaint  Follow-up (6 month check up )    Subjective        Alesia Resendez presents to [facility]  History of Present Illness  Pleasant patient here today follow-up essential hypertension checks it at home controlled at home less than 130  Mixed hyperlipidemia takes a statin appropriately  Depression, mild major stable, low-dose Lexapro bupropion    Recently her granddaughter moved in with her,  For better support, a lot of changes at home but she is doing well, recently went to urgent care for ear  Hearing problem right ear ear was irrigated still has not equalized  And just chronically noticed she can hear is good on the right ear, she has no new headaches dizziness no chest pain shortness of breath overall she is doing well.          Objective   Vital Signs:  /88   Pulse 59   Resp 16   Ht 172.7 cm (67.99\")   Wt 110 kg (242 lb 11.2 oz)   SpO2 97%   BMI 36.91 kg/m²   Estimated body mass index is 36.91 kg/m² as calculated from the following:    Height as of this encounter: 172.7 cm (67.99\").    Weight as of this encounter: 110 kg (242 lb 11.2 oz).            Physical Exam  Vitals reviewed.   Constitutional:       Appearance: Normal appearance. She is well-developed. She is not ill-appearing or diaphoretic.   HENT:      Head: Normocephalic.      Nose: Nose normal.   Eyes:      General: No scleral icterus.     Conjunctiva/sclera: Conjunctivae normal.      Pupils: Pupils are equal, round, and reactive to light.   Neck:      Thyroid: No thyromegaly.      Vascular: No JVD.   Cardiovascular:      Rate and Rhythm: Normal rate and regular rhythm.      Heart sounds: Normal heart sounds. No murmur heard.     No friction rub. No gallop.   Pulmonary:      Effort: Pulmonary effort is normal. No respiratory distress.      Breath sounds: Normal breath sounds. No stridor. No wheezing or rales.   Abdominal:      General: Bowel sounds are normal. There is no distension.      " Palpations: Abdomen is soft.      Tenderness: There is no abdominal tenderness.      Comments: No hepatosplenomegaly, no ascites,   Musculoskeletal:         General: No tenderness.      Cervical back: Neck supple.   Lymphadenopathy:      Cervical: No cervical adenopathy.   Skin:     General: Skin is warm and dry.      Findings: No erythema or rash.   Neurological:      General: No focal deficit present.      Mental Status: She is alert and oriented to person, place, and time. Mental status is at baseline.      Deep Tendon Reflexes: Reflexes are normal and symmetric.   Psychiatric:         Mood and Affect: Mood normal.         Behavior: Behavior normal.         Thought Content: Thought content normal.         Judgment: Judgment normal.        Result Review :                Assessment and Plan   Diagnoses and all orders for this visit:    1. Essential hypertension (Primary)    2. Mixed hyperlipidemia    3. Eustachian tube dysfunction, bilateral  -     Ambulatory Referral to ENT (Otolaryngology)    4. Hearing difficulty, right  -     Ambulatory Referral to ENT (Otolaryngology)    Other orders  -     fluticasone (FLONASE) 50 MCG/ACT nasal spray; 2 sprays into the nostril(s) as directed by provider Daily.  Dispense: 48 mL; Refill: 3             Follow Up   Return in about 6 months (around 8/21/2024) for Labs before next visit, Medicare Wellness.  Patient was given instructions and counseling regarding her condition or for health maintenance advice. Please see specific information pulled into the AVS if appropriate.   Continue same antihypertensive no change add Flonase for eustachian tube dysfunction,  Follow-up 6 months Medicare wellness  Patient Instructions   Flonase 2 sprays each nostril daily try this for 2 to 3 months follow-up ENT for better evaluation of your ears and hearing    Continue check blood pressure weekly continue healthy diet, continue good communication good companionship with your granddaughter,  As  long as you are doing well follow-up in 6 months with labs prior to your next appointment

## 2024-02-27 ENCOUNTER — PREP FOR SURGERY (OUTPATIENT)
Dept: SURGERY | Facility: SURGERY CENTER | Age: 74
End: 2024-02-27
Payer: MEDICARE

## 2024-02-27 ENCOUNTER — OFFICE VISIT (OUTPATIENT)
Dept: PAIN MEDICINE | Facility: CLINIC | Age: 74
End: 2024-02-27
Payer: MEDICARE

## 2024-02-27 ENCOUNTER — TRANSCRIBE ORDERS (OUTPATIENT)
Dept: SURGERY | Facility: SURGERY CENTER | Age: 74
End: 2024-02-27
Payer: MEDICARE

## 2024-02-27 VITALS
HEART RATE: 59 BPM | SYSTOLIC BLOOD PRESSURE: 131 MMHG | TEMPERATURE: 97.1 F | WEIGHT: 245.8 LBS | OXYGEN SATURATION: 96 % | HEIGHT: 68 IN | BODY MASS INDEX: 37.25 KG/M2 | DIASTOLIC BLOOD PRESSURE: 81 MMHG | RESPIRATION RATE: 18 BRPM

## 2024-02-27 DIAGNOSIS — M51.37 DEGENERATION OF LUMBAR OR LUMBOSACRAL INTERVERTEBRAL DISC: ICD-10-CM

## 2024-02-27 DIAGNOSIS — M54.16 LUMBAR RADICULOPATHY: Primary | ICD-10-CM

## 2024-02-27 DIAGNOSIS — Z41.9 SURGERY, ELECTIVE: Primary | ICD-10-CM

## 2024-02-27 DIAGNOSIS — M54.16 LUMBAR RADICULOPATHY: ICD-10-CM

## 2024-02-27 DIAGNOSIS — M48.061 SPINAL STENOSIS OF LUMBAR REGION, UNSPECIFIED WHETHER NEUROGENIC CLAUDICATION PRESENT: Primary | ICD-10-CM

## 2024-02-27 PROCEDURE — 1125F AMNT PAIN NOTED PAIN PRSNT: CPT | Performed by: PHYSICIAN ASSISTANT

## 2024-02-27 PROCEDURE — 1160F RVW MEDS BY RX/DR IN RCRD: CPT | Performed by: PHYSICIAN ASSISTANT

## 2024-02-27 PROCEDURE — 3079F DIAST BP 80-89 MM HG: CPT | Performed by: PHYSICIAN ASSISTANT

## 2024-02-27 PROCEDURE — 3075F SYST BP GE 130 - 139MM HG: CPT | Performed by: PHYSICIAN ASSISTANT

## 2024-02-27 PROCEDURE — 99214 OFFICE O/P EST MOD 30 MIN: CPT | Performed by: PHYSICIAN ASSISTANT

## 2024-02-27 PROCEDURE — 1159F MED LIST DOCD IN RCRD: CPT | Performed by: PHYSICIAN ASSISTANT

## 2024-02-27 RX ORDER — DIAZEPAM 5 MG/1
10 TABLET ORAL ONCE
OUTPATIENT
Start: 2024-02-27 | End: 2024-02-27

## 2024-02-27 NOTE — H&P (VIEW-ONLY)
CHIEF COMPLAINT  Follow-up for back pain.    Subjective   Alesia Resendez is a 74 y.o. female  who presents for follow-up.  She has a history of chronic low back and leg pain.  The patient obtained over 75% reduction of pain following left L4-5 lumbar TFESI for over 8 months before now noticing gradual recrudescence of symptoms.  She is not experiencing pain in a transverse distribution across the lumbosacral spine which is not affecting the right side and radiates into the ischial tuberosity bilaterally now right greater than left.  She is beginning to note some pain radiating to the buttocks and upper thighs.  Denies lower extremity weakness, bladder or bowel incontinence.    She is currently utilizing pregabalin 25 mg at night however she is able to take it up to 3 times daily dosing if necessary.  Denies any adverse effects.    Pain today 6/10 VAS in severity.      Back Pain  This is a chronic problem. The current episode started more than 1 year ago. The problem occurs constantly. The problem has been gradually improving since onset. The pain is present in the lumbar spine. The quality of the pain is described as aching and burning. The pain radiates to the left thigh and right thigh. The pain is at a severity of 6/10. The pain is moderate. The pain is Worse during the day. The symptoms are aggravated by position and standing. Associated symptoms include numbness (right hand). Pertinent negatives include no abdominal pain, chest pain, dysuria, fever, headaches or weakness.        PEG Assessment   What number best describes your pain on average in the past week?6  What number best describes how, during the past week, pain has interfered with your enjoyment of life?5  What number best describes how, during the past week, pain has interfered with your general activity?  3    Review of Pertinent Medical Data ---  MRI EXAMINATION OF THE LUMBAR SPINE WITHOUT CONTRAST     HISTORY: Back pain, left radiculopathy. Breast  cancer.     COMPARISON: MRI lumbar spine 01/30/2020.     FINDINGS: There is moderate-to-severe loss of disc height and disc  desiccation at L5-S1 and a grade 1/grade 2 anterolisthesis of L4 upon  L5, unchanged. There is mild disc desiccation and loss of disc height  from T12 to L5. The conus is at L1 and the caudal aspect of the spinal  cord appears unremarkable.     L1-L2: Mild facet degenerative disease is present bilaterally. There is  no evidence of disc bulge or herniation.     L2-L3: Mild facet degenerative disease is present bilaterally. A left  paracentral disc osteophyte complex is present which results in mild  canal stenosis centrally but contributes to moderate lateral recess  narrowing on the left similar in appearance as compared to the prior  examination. Moderate foraminal stenosis is present on the left and  there is mild foraminal stenosis on the right secondary to extension of  a disc osteophyte complex into the neural foramen. Foraminal stenosis on  the left appears similar to the prior examination.     L3-L4: Mild-to-moderate facet degenerative disease is present  bilaterally. There is moderate-to-severe central canal stenosis and  lateral recess narrowing on the right and severe lateral recess  narrowing on the left secondary to a broad-based disc osteophyte complex  which is more prominent to the left and posterior element degenerative  disease. There is 1 to 2 mm of retrolisthesis of L3 upon L4, unchanged.  The canal stenosis and lateral recess narrowing is slightly more  prominent as compared to the prior examination. Mild foraminal stenosis  is present on the left secondary to extension of a disc osteophyte  complex into the neural foramen.     L4-L5: There is severe central canal stenosis secondary to severe facet  hypertrophy (with the facets being fused bilaterally. The fused facets  are new versus 01/30/2020.) and a broad-based disc osteophyte complex.  Spinal stenosis is accentuated by  the anterolisthesis of L4 upon L5.  There was severe spinal stenosis present previously but spinal stenosis  may be even slightly more prominent as compared to the prior  examination.     L5-S1: Moderate-to-severe facet degenerative disease is present  bilaterally. There is mild-to-moderate central canal stenosis. Moderate  and moderate-to-severe lateral recess narrowing is present on the right  and left respectively. The canal stenosis and lateral recess narrowing  is slightly more prominent as compared to the prior examination.  Mild-to-moderate and mild foraminal stenosis is present on the left and  right respectively secondary to extension of a small disc osteophyte  complex into the neural foramen.     IMPRESSION:  Extensive multilevel degenerative disease involving the  lumbar spine is noted as described above. This includes severe spinal  stenosis at L4-L5 which appears similar to slightly more prominent as  compared to the prior examination secondary to posterior element  degenerative disease, broad-based disc osteophyte complex and  anterolisthesis of L4 upon L5. There is fusion of the facets bilaterally  at L4-L5 which is new versus the prior examination. Spinal stenosis and  lateral recess narrowing is also present at L5 S1 and to a slightly  lesser degree also slightly more prominent as compared to the prior  examination. There is no evidence of a focal herniation. See above.     This report was finalized on 4/26/2023 5:07 PM by Dr. Jose Velasquez M.D.    The following portions of the patient's history were reviewed and updated as appropriate: allergies, current medications, past family history, past medical history, past social history, past surgical history, and problem list.    Review of Systems   Constitutional:  Negative for fever.   Cardiovascular:  Negative for chest pain.   Gastrointestinal:  Positive for diarrhea. Negative for abdominal pain and constipation.   Genitourinary:  Negative for  "difficulty urinating and dysuria.   Musculoskeletal:  Positive for back pain.   Neurological:  Positive for numbness (right hand). Negative for weakness and headaches.   Psychiatric/Behavioral:  Negative for sleep disturbance and suicidal ideas. The patient is nervous/anxious.      I have reviewed and confirmed the accuracy of the ROS as documented by the MA/LPN/RN DORITA Amezcua  Vitals:    02/27/24 0755   BP: 131/81   Pulse: 59   Resp: 18   Temp: 97.1 °F (36.2 °C)   SpO2: 96%   Weight: 111 kg (245 lb 12.8 oz)   Height: 172.7 cm (67.99\")   PainSc:   6   PainLoc: Back         Objective   Physical Exam  Vitals and nursing note reviewed.   Constitutional:       Appearance: Normal appearance. She is obese.   HENT:      Head: Normocephalic.   Pulmonary:      Effort: Pulmonary effort is normal.   Musculoskeletal:      Lumbar back: Tenderness (Tenderness to palpation within the overlying bilateral ischial tuberosities, right greater than left) present. Decreased range of motion.        Back:    Skin:     General: Skin is warm and dry.   Neurological:      General: No focal deficit present.      Mental Status: She is alert and oriented to person, place, and time.      Cranial Nerves: Cranial nerves 2-12 are intact.      Sensory: Sensation is intact.      Motor: Motor function is intact.      Gait: Gait is intact.   Psychiatric:         Mood and Affect: Mood normal.         Behavior: Behavior normal.         Thought Content: Thought content normal.         Judgment: Judgment normal.             Assessment & Plan   Diagnoses and all orders for this visit:    1. Spinal stenosis of lumbar region, unspecified whether neurogenic claudication present (Primary)    2. Lumbar radiculopathy    3. Degeneration of lumbar or lumbosacral intervertebral disc        Alesia Resendez reports a pain score of 6.  Given her pain assessment as noted, treatment options were discussed and the following options were decided upon as a follow-up " plan to address the patient's pain: continuation of current treatment plan for pain, steroid injections, and use of non-medical modalities (ice, heat, stretching and/or behavior modifications).      --- Due to patient now having symptoms affecting the right side as well we will proceed with therapeutic bilateral L4-5 lumbar TFESI.  The risk and benefits have been reviewed and the patient does not have any questions to address.  --- Continue with pregabalin 25 mg.  She does not require refill on today.  --- The patient has informed me of an upcoming trip in May to [facility] with the group for high school girlfriends and is fearful of the amount of walking that will be required.  I have discussed with the patient that I would like for her to begin down trying to see how she responds to the pregabalin at 3 times daily dosing and I am more than willing to provide an acute supply of Norco for her to take on her trip to [facility].         JAN REPORT  As part of the patient's treatment plan, I am prescribing controlled substances. The patient has been made aware of appropriate use of such medications, including potential risk of somnolence, limited ability to drive and/or work safely, and the potential for dependence or overdose. It has also been made clear that these medications are for use by this patient only, without concomitant use of alcohol or other substances unless prescribed.     Patient has completed prescribing agreement detailing terms of continued prescribing of controlled substances, including monitoring JAN reports, urine drug screening, and pill counts if necessary. The patient is aware that inappropriate use will results in cessation of prescribing such medications.    As the clinician, I personally reviewed the JAN from 2/27/2024 while the patient was in the office today.    History and physical exam exhibit continued safe and appropriate use of controlled substances.     Dictated utilizing Dragon  dictation.

## 2024-02-29 RX ORDER — OMEPRAZOLE 20 MG/1
20 CAPSULE, DELAYED RELEASE ORAL DAILY
Qty: 90 CAPSULE | Refills: 3 | Status: SHIPPED | OUTPATIENT
Start: 2024-02-29

## 2024-02-29 RX ORDER — ESCITALOPRAM OXALATE 5 MG/1
5 TABLET ORAL DAILY
Qty: 90 TABLET | Refills: 3 | Status: SHIPPED | OUTPATIENT
Start: 2024-02-29

## 2024-02-29 NOTE — TELEPHONE ENCOUNTER
Rx Refill Note  Requested Prescriptions     Pending Prescriptions Disp Refills    escitalopram (LEXAPRO) 5 MG tablet [Pharmacy Med Name: Escitalopram Oxalate 5 MG Oral Tablet] 90 tablet 3     Sig: TAKE 1 TABLET BY MOUTH DAILY    omeprazole (priLOSEC) 20 MG capsule [Pharmacy Med Name: Omeprazole 20 MG Oral Capsule Delayed Release] 90 capsule 3     Sig: TAKE 1 CAPSULE BY MOUTH DAILY      Last office visit with prescribing clinician: 2/21/2024   Last telemedicine visit with prescribing clinician: Visit date not found   Next office visit with prescribing clinician: 8/21/2024       {TIP  Please add Last Relevant Lab Date if appropriate: 08/17/23                 Would you like a call back once the refill request has been completed: [] Yes [] No    If the office needs to give you a call back, can they leave a voicemail: [] Yes [] No    Flori Artis MA  02/29/24, 11:45 EST

## 2024-03-12 NOTE — DISCHARGE INSTRUCTIONS
[facility] - Post-procedure Instructions          --  While there are no absolute restrictions, it is recommended that you do not perform strenuous activity today. In the morning, you may resume your level of activity as before your block.    --  If you have a band-aid at your injection site, please remove it later today. Observe the area for any redness, swelling, pus-like drainage, or a temperature over 101°. If any of these symptoms occur, please call your doctor at [phone]. If after office hours, leave a message and the on-call provider will return your call.    --  Ice may be applied to your injection site. It is recommended you avoid direct heat (heating pad; hot tub) for 1-2 days.    --  Call [facility] at [phone] if you experience persistent headache, persistent bleeding from the injection site, or severe pain not relieved by heat or oral medication.    --  Do not make important decisions today.    --  Due to the effects of the block and/or the I.V. Sedation, DO NOT drive or operate hazardous machinery for 12 hours.  Local anesthetics may cause numbness after procedure and precautions must be taken with regards to operating equipment as well as with walking, even if ambulating with assistance of another person or with an assistive device.    --  Do not drink alcohol for 12 hours.    -- You may return to work tomorrow, or as directed by your referring doctor.    --  Occasionally you may notice a slight increase in your pain after the procedure. This should start to improve within the next 24-48 hours. Radiofrequency ablation procedure pain may last 3-4 weeks.    --  It may take as long as 3-4 days before you notice a gradual improvement in your pain and/or other symptoms.    -- You may continue to take your prescribed pain medication as needed.    --  Some normal possible side effects of steroid use could include fluid retention, increased blood sugar, dull headache,  increased sweating, increased appetite, mood swings and flushing.    --  Diabetics are recommended to watch their blood glucose level closely for 24-48 hours after the injection.    --  Must stay in PACU for 20 min upon arrival and prove no leg weakness before being discharged.    --  IN THE EVENT OF A LIFE THREATENING EMERGENCY, (CHEST PAIN, BREATHING DIFFICULTIES, PARALYSIS…) YOU SHOULD GO TO YOUR NEAREST EMERGENCY ROOM.    --  You should be contacted by our office within 2-3 days to schedule follow up or next appointment date.  If not contacted within 7 days, please call the office at [phone]

## 2024-03-18 ENCOUNTER — HOSPITAL ENCOUNTER (OUTPATIENT)
Facility: SURGERY CENTER | Age: 74
Setting detail: HOSPITAL OUTPATIENT SURGERY
Discharge: HOME | End: 2024-03-18
Attending: ANESTHESIOLOGY | Admitting: ANESTHESIOLOGY
Payer: MEDICARE

## 2024-03-18 ENCOUNTER — HOSPITAL ENCOUNTER (OUTPATIENT)
Dept: GENERAL RADIOLOGY | Facility: SURGERY CENTER | Age: 74
Setting detail: HOSPITAL OUTPATIENT SURGERY
End: 2024-03-18
Payer: MEDICARE

## 2024-03-18 VITALS
HEIGHT: 67 IN | RESPIRATION RATE: 16 BRPM | WEIGHT: 245 LBS | HEART RATE: 58 BPM | TEMPERATURE: 97.5 F | DIASTOLIC BLOOD PRESSURE: 100 MMHG | OXYGEN SATURATION: 98 % | SYSTOLIC BLOOD PRESSURE: 150 MMHG | BODY MASS INDEX: 38.45 KG/M2

## 2024-03-18 DIAGNOSIS — Z41.9 SURGERY, ELECTIVE: ICD-10-CM

## 2024-03-18 DIAGNOSIS — M54.16 LUMBAR RADICULOPATHY: ICD-10-CM

## 2024-03-18 PROCEDURE — 25510000001 IOPAMIDOL 61 % SOLUTION 30 ML VIAL: Performed by: ANESTHESIOLOGY

## 2024-03-18 PROCEDURE — 25010000002 DEXAMETHASONE SODIUM PHOSPHATE 100 MG/10ML SOLUTION 10 ML VIAL: Performed by: ANESTHESIOLOGY

## 2024-03-18 PROCEDURE — 64483 NJX AA&/STRD TFRM EPI L/S 1: CPT | Performed by: ANESTHESIOLOGY

## 2024-03-18 PROCEDURE — 77002 NEEDLE LOCALIZATION BY XRAY: CPT

## 2024-03-18 PROCEDURE — 76000 FLUOROSCOPY <1 HR PHYS/QHP: CPT

## 2024-03-18 PROCEDURE — 25010000002 BUPIVACAINE (PF) 0.25 % SOLUTION 10 ML VIAL: Performed by: ANESTHESIOLOGY

## 2024-03-18 RX ORDER — DIAZEPAM 5 MG/1
10 TABLET ORAL ONCE
Status: COMPLETED | OUTPATIENT
Start: 2024-03-18 | End: 2024-03-18

## 2024-03-18 RX ADMIN — DIAZEPAM 10 MG: 5 TABLET ORAL at 13:14

## 2024-03-18 NOTE — OP NOTE
Lumbar Transforaminal Epidural Steroid Injection Bilateral L4-5 Levels (Counting from bottom rib)  [facility]    PREOPERATIVE DIAGNOSIS:  Lumbar radicular pain, Lumbar Spinal Stenosis unspecified regarding Neurogenic Claudication and Lumbar Radiculopathy    POSTOPERATIVE DIAGNOSIS:  Same as preop diagnosis    PROCEDURE:    1. CPT 66484 --  Diagnostic & Therapeutic Transforaminal Epidural Steroid Injection at the L4 level, on the bilateral     PRE-PROCEDURE DISCUSSION WITH PATIENT:    Risks and complications were discussed with the patient prior to starting the procedure and informed consent was obtained.  We discussed various topics including but not limited to bleeding, infection, injury, nerve injury, paralysis, coma, death, postprocedural painful flare-up, postprocedural site soreness, and a lack of pain relief.  We discussed the diagnostic aspect of transforaminal epidural / selective nerve root blockade.    SURGEON:  Estrellita Sheppard MD    SEDATION:  Anxiolysis was used for this procedure which included PO Valium 10mg  ANESTHETIC:  0.25% bupivacaine  STEROID:  10mg dexamethasone    DESCRIPTON OF PROCEDURE:  After obtaining informed consent, an I.V. was not started in the preoperative area. The patient taken to the operating room and was placed in the prone position with a pillow under the abdomen.  All pressure points were well padded.  EKG, blood pressure, and pulse oximeter were monitored.  The lumbar area was prepped with Chloraprep and draped in a sterile fashion.     The AP fluoroscopic image was used to visualize the L4 vertebral body.  The superior endplate was squared off radiographically.  The image was then obliqued towards the patient's right side to maximize visualization of the pedicle. The skin and subcutaneous tissue at the 6 o'clock position of the pedicle was anesthetized with 1% lidocaine. A 22-gauge spinal needle was then advanced percutaneously through the anesthetized  skin tract under fluoroscopic guidance in AP and lateral views until the needle tip lie in the mayito-lateral aspect of the epidural space.  After negative aspiration of the needle for blood or CSF, a volume of 1 mL of Isovue was injected producing good epidural spread with no evidence of loculation, vascular run-off, or intrathecal spread. Subsequently, a volume of 3 mL of injectate was administered without resistance.  The needle was removed intact.  Vital signs were stable throughout.      The same procedure was then performed on the contralateral side in the exact same fashion.      The total volume injected consisted of 10 mg of dexamethasone with 1 mL of 0.25% bupivacaine and preservative-free normal saline.       ESTIMATED BLOOD LOSS:  <5 mL  SPECIMENS:  none    COMPLICATIONS:   No complications were noted., There was no indication of vascular uptake on live injection of contrast dye., and There was no indication of intrathecal uptake on live injection of contrast dye.    TOLERANCE & DISCHARGE CONDITION:    The patient tolerated the procedure well.  The patient was transported to the recovery area without difficulties.  The patient was discharged to home under the care of family in stable and satisfactory condition.    PLAN OF CARE:  The patient was given our standard instruction sheet.  The patient will Return to clinic 4-6 wks.  The patient will resume all medications as per the medication reconciliation sheet.

## 2024-03-25 DIAGNOSIS — M48.061 SPINAL STENOSIS OF LUMBAR REGION, UNSPECIFIED WHETHER NEUROGENIC CLAUDICATION PRESENT: ICD-10-CM

## 2024-03-25 DIAGNOSIS — M54.16 LUMBAR RADICULOPATHY: ICD-10-CM

## 2024-03-25 DIAGNOSIS — M46.1 SACROILIITIS: ICD-10-CM

## 2024-03-25 RX ORDER — PREGABALIN 25 MG/1
CAPSULE ORAL
Qty: 90 CAPSULE | Refills: 0 | Status: SHIPPED | OUTPATIENT
Start: 2024-03-25

## 2024-03-26 RX ORDER — ATENOLOL 25 MG/1
25 TABLET ORAL DAILY
Qty: 90 TABLET | Refills: 3 | Status: SHIPPED | OUTPATIENT
Start: 2024-03-26

## 2024-03-26 RX ORDER — ATORVASTATIN CALCIUM 20 MG/1
20 TABLET, FILM COATED ORAL DAILY
Qty: 90 TABLET | Refills: 3 | Status: SHIPPED | OUTPATIENT
Start: 2024-03-26

## 2024-03-26 NOTE — TELEPHONE ENCOUNTER
Rx Refill Note  Requested Prescriptions     Pending Prescriptions Disp Refills    atorvastatin (LIPITOR) 20 MG tablet [Pharmacy Med Name: Atorvastatin Calcium 20 MG Oral Tablet] 90 tablet 3     Sig: TAKE 1 TABLET BY MOUTH ONCE  DAILY    atenolol (TENORMIN) 25 MG tablet [Pharmacy Med Name: Atenolol 25 MG Oral Tablet] 90 tablet 1     Sig: TAKE 1 TABLET BY MOUTH DAILY      Last office visit with prescribing clinician: 2/21/2024   Last telemedicine visit with prescribing clinician: Visit date not found   Next office visit with prescribing clinician: 8/21/2024                         Would you like a call back once the refill request has been completed: [] Yes [] No    If the office needs to give you a call back, can they leave a voicemail: [] Yes [] No    Danyelle Conroy MA  03/26/24, 08:27 EDT

## 2024-04-19 ENCOUNTER — TELEPHONE (OUTPATIENT)
Dept: PAIN MEDICINE | Facility: CLINIC | Age: 74
End: 2024-04-19
Payer: MEDICARE

## 2024-04-19 RX ORDER — METHYLPREDNISOLONE 4 MG/1
TABLET ORAL
Qty: 21 TABLET | Refills: 0 | Status: ON HOLD | OUTPATIENT
Start: 2024-04-19

## 2024-04-19 RX ORDER — CELECOXIB 200 MG/1
200 CAPSULE ORAL DAILY
Qty: 90 CAPSULE | Refills: 1 | Status: ON HOLD | OUTPATIENT
Start: 2024-04-19

## 2024-04-19 NOTE — TELEPHONE ENCOUNTER
Patient states that she took a step and her leg gave out and now she is unable to walk due to the pain. She wanted to know if you would prescribe her a medrol dose pack to help with her pain?

## 2024-04-19 NOTE — TELEPHONE ENCOUNTER
Rx Refill Note  Requested Prescriptions     Pending Prescriptions Disp Refills    celecoxib (CeleBREX) 200 MG capsule [Pharmacy Med Name: CELECOXIB 200 MG CAPSULE] 90 capsule 1     Sig: TAKE 1 CAPSULE BY MOUTH DAILY      Last office visit with prescribing clinician: 2/21/2024   Last telemedicine visit with prescribing clinician: Visit date not found   Next office visit with prescribing clinician: 8/21/2024                         Would you like a call back once the refill request has been completed: [] Yes [] No    If the office needs to give you a call back, can they leave a voicemail: [] Yes [] No    Danyelle Conroy MA  04/19/24, 08:56 EDT

## 2024-04-22 ENCOUNTER — APPOINTMENT (OUTPATIENT)
Dept: MRI IMAGING | Facility: HOSPITAL | Age: 74
End: 2024-04-22
Payer: MEDICARE

## 2024-04-22 ENCOUNTER — APPOINTMENT (OUTPATIENT)
Dept: CT IMAGING | Facility: HOSPITAL | Age: 74
End: 2024-04-22
Payer: MEDICARE

## 2024-04-22 ENCOUNTER — HOSPITAL ENCOUNTER (INPATIENT)
Facility: HOSPITAL | Age: 74
LOS: 7 days | Discharge: HOME HEALTH | End: 2024-05-01
Attending: EMERGENCY MEDICINE | Admitting: INTERNAL MEDICINE
Payer: MEDICARE

## 2024-04-22 DIAGNOSIS — M51.16 HERNIATION OF LUMBAR INTERVERTEBRAL DISC WITH RADICULOPATHY: Primary | ICD-10-CM

## 2024-04-22 DIAGNOSIS — R73.03 PREDIABETES: ICD-10-CM

## 2024-04-22 DIAGNOSIS — M54.16 LUMBAR RADICULOPATHY: ICD-10-CM

## 2024-04-22 DIAGNOSIS — M54.50 ACUTE LEFT-SIDED LOW BACK PAIN, UNSPECIFIED WHETHER SCIATICA PRESENT: ICD-10-CM

## 2024-04-22 DIAGNOSIS — Z98.890 STATUS POST LAMINECTOMY: ICD-10-CM

## 2024-04-22 DIAGNOSIS — M54.16 LEFT LUMBAR RADICULOPATHY: ICD-10-CM

## 2024-04-22 PROBLEM — R29.898 WEAKNESS OF LEFT LOWER EXTREMITY: Status: ACTIVE | Noted: 2024-04-22

## 2024-04-22 LAB
ALBUMIN SERPL-MCNC: 4.1 G/DL (ref 3.5–5.2)
ALBUMIN/GLOB SERPL: 1.7 G/DL
ALP SERPL-CCNC: 91 U/L (ref 39–117)
ALT SERPL W P-5'-P-CCNC: 20 U/L (ref 1–33)
ANION GAP SERPL CALCULATED.3IONS-SCNC: 14.1 MMOL/L (ref 5–15)
AST SERPL-CCNC: 23 U/L (ref 1–32)
BASOPHILS # BLD AUTO: 0.06 10*3/MM3 (ref 0–0.2)
BASOPHILS NFR BLD AUTO: 0.7 % (ref 0–1.5)
BILIRUB SERPL-MCNC: 0.4 MG/DL (ref 0–1.2)
BUN SERPL-MCNC: 17 MG/DL (ref 8–23)
BUN/CREAT SERPL: 27.9 (ref 7–25)
CALCIUM SPEC-SCNC: 9.4 MG/DL (ref 8.6–10.5)
CHLORIDE SERPL-SCNC: 100 MMOL/L (ref 98–107)
CO2 SERPL-SCNC: 24.9 MMOL/L (ref 22–29)
CREAT SERPL-MCNC: 0.61 MG/DL (ref 0.57–1)
DEPRECATED RDW RBC AUTO: 42.7 FL (ref 37–54)
EGFRCR SERPLBLD CKD-EPI 2021: 93.9 ML/MIN/1.73
EOSINOPHIL # BLD AUTO: 0.07 10*3/MM3 (ref 0–0.4)
EOSINOPHIL NFR BLD AUTO: 0.8 % (ref 0.3–6.2)
ERYTHROCYTE [DISTWIDTH] IN BLOOD BY AUTOMATED COUNT: 13.7 % (ref 12.3–15.4)
GLOBULIN UR ELPH-MCNC: 2.4 GM/DL
GLUCOSE SERPL-MCNC: 139 MG/DL (ref 65–99)
HCT VFR BLD AUTO: 44.9 % (ref 34–46.6)
HGB BLD-MCNC: 14.9 G/DL (ref 12–15.9)
IMM GRANULOCYTES # BLD AUTO: 0.02 10*3/MM3 (ref 0–0.05)
IMM GRANULOCYTES NFR BLD AUTO: 0.2 % (ref 0–0.5)
LYMPHOCYTES # BLD AUTO: 2.79 10*3/MM3 (ref 0.7–3.1)
LYMPHOCYTES NFR BLD AUTO: 32.2 % (ref 19.6–45.3)
MCH RBC QN AUTO: 28.6 PG (ref 26.6–33)
MCHC RBC AUTO-ENTMCNC: 33.2 G/DL (ref 31.5–35.7)
MCV RBC AUTO: 86.2 FL (ref 79–97)
MONOCYTES # BLD AUTO: 0.56 10*3/MM3 (ref 0.1–0.9)
MONOCYTES NFR BLD AUTO: 6.5 % (ref 5–12)
NEUTROPHILS NFR BLD AUTO: 5.17 10*3/MM3 (ref 1.7–7)
NEUTROPHILS NFR BLD AUTO: 59.6 % (ref 42.7–76)
NRBC BLD AUTO-RTO: 0 /100 WBC (ref 0–0.2)
PLATELET # BLD AUTO: 266 10*3/MM3 (ref 140–450)
PMV BLD AUTO: 8.8 FL (ref 6–12)
POTASSIUM SERPL-SCNC: 3.6 MMOL/L (ref 3.5–5.2)
PROT SERPL-MCNC: 6.5 G/DL (ref 6–8.5)
RBC # BLD AUTO: 5.21 10*6/MM3 (ref 3.77–5.28)
SODIUM SERPL-SCNC: 139 MMOL/L (ref 136–145)
WBC NRBC COR # BLD AUTO: 8.67 10*3/MM3 (ref 3.4–10.8)

## 2024-04-22 PROCEDURE — A9577 INJ MULTIHANCE: HCPCS | Performed by: EMERGENCY MEDICINE

## 2024-04-22 PROCEDURE — 25010000002 METHYLPREDNISOLONE PER 125 MG: Performed by: EMERGENCY MEDICINE

## 2024-04-22 PROCEDURE — G0378 HOSPITAL OBSERVATION PER HR: HCPCS

## 2024-04-22 PROCEDURE — 85025 COMPLETE CBC W/AUTO DIFF WBC: CPT | Performed by: NURSE PRACTITIONER

## 2024-04-22 PROCEDURE — 80053 COMPREHEN METABOLIC PANEL: CPT | Performed by: NURSE PRACTITIONER

## 2024-04-22 PROCEDURE — 25010000002 METHYLPREDNISOLONE PER 125 MG: Performed by: STUDENT IN AN ORGANIZED HEALTH CARE EDUCATION/TRAINING PROGRAM

## 2024-04-22 PROCEDURE — 99285 EMERGENCY DEPT VISIT HI MDM: CPT

## 2024-04-22 PROCEDURE — 25010000002 HYDROMORPHONE PER 4 MG: Performed by: NURSE PRACTITIONER

## 2024-04-22 PROCEDURE — 0 GADOBENATE DIMEGLUMINE 529 MG/ML SOLUTION: Performed by: EMERGENCY MEDICINE

## 2024-04-22 PROCEDURE — 25010000002 ONDANSETRON PER 1 MG: Performed by: EMERGENCY MEDICINE

## 2024-04-22 PROCEDURE — 25010000002 HYDROMORPHONE 1 MG/ML SOLUTION: Performed by: EMERGENCY MEDICINE

## 2024-04-22 PROCEDURE — 97530 THERAPEUTIC ACTIVITIES: CPT

## 2024-04-22 PROCEDURE — 25010000002 ONDANSETRON PER 1 MG: Performed by: NURSE PRACTITIONER

## 2024-04-22 PROCEDURE — 99214 OFFICE O/P EST MOD 30 MIN: CPT

## 2024-04-22 PROCEDURE — 25810000003 SODIUM CHLORIDE 0.9 % SOLUTION: Performed by: EMERGENCY MEDICINE

## 2024-04-22 PROCEDURE — 97162 PT EVAL MOD COMPLEX 30 MIN: CPT

## 2024-04-22 PROCEDURE — 72131 CT LUMBAR SPINE W/O DYE: CPT

## 2024-04-22 PROCEDURE — 72158 MRI LUMBAR SPINE W/O & W/DYE: CPT

## 2024-04-22 RX ORDER — ACETAMINOPHEN 325 MG/1
650 TABLET ORAL EVERY 4 HOURS PRN
Status: DISCONTINUED | OUTPATIENT
Start: 2024-04-22 | End: 2024-05-01 | Stop reason: HOSPADM

## 2024-04-22 RX ORDER — SODIUM CHLORIDE 0.9 % (FLUSH) 0.9 %
10 SYRINGE (ML) INJECTION AS NEEDED
Status: DISCONTINUED | OUTPATIENT
Start: 2024-04-22 | End: 2024-05-01 | Stop reason: HOSPADM

## 2024-04-22 RX ORDER — HYDROCHLOROTHIAZIDE 25 MG/1
25 TABLET ORAL DAILY
Status: DISCONTINUED | OUTPATIENT
Start: 2024-04-22 | End: 2024-05-01 | Stop reason: HOSPADM

## 2024-04-22 RX ORDER — SODIUM CHLORIDE 0.9 % (FLUSH) 0.9 %
10 SYRINGE (ML) INJECTION EVERY 12 HOURS SCHEDULED
Status: DISCONTINUED | OUTPATIENT
Start: 2024-04-22 | End: 2024-05-01 | Stop reason: HOSPADM

## 2024-04-22 RX ORDER — ONDANSETRON 4 MG/1
4 TABLET, ORALLY DISINTEGRATING ORAL EVERY 6 HOURS PRN
Status: DISCONTINUED | OUTPATIENT
Start: 2024-04-22 | End: 2024-05-01 | Stop reason: HOSPADM

## 2024-04-22 RX ORDER — OXYCODONE HYDROCHLORIDE AND ACETAMINOPHEN 5; 325 MG/1; MG/1
1 TABLET ORAL EVERY 4 HOURS PRN
Status: DISCONTINUED | OUTPATIENT
Start: 2024-04-22 | End: 2024-04-24

## 2024-04-22 RX ORDER — AMLODIPINE BESYLATE 10 MG/1
10 TABLET ORAL DAILY
Status: DISCONTINUED | OUTPATIENT
Start: 2024-04-22 | End: 2024-05-01 | Stop reason: HOSPADM

## 2024-04-22 RX ORDER — METHOCARBAMOL 750 MG/1
750 TABLET, FILM COATED ORAL 4 TIMES DAILY
Status: DISCONTINUED | OUTPATIENT
Start: 2024-04-22 | End: 2024-05-01 | Stop reason: HOSPADM

## 2024-04-22 RX ORDER — PANTOPRAZOLE SODIUM 40 MG/1
40 TABLET, DELAYED RELEASE ORAL
Status: DISCONTINUED | OUTPATIENT
Start: 2024-04-22 | End: 2024-05-01 | Stop reason: HOSPADM

## 2024-04-22 RX ORDER — ONDANSETRON 2 MG/ML
4 INJECTION INTRAMUSCULAR; INTRAVENOUS EVERY 6 HOURS PRN
Status: DISCONTINUED | OUTPATIENT
Start: 2024-04-22 | End: 2024-05-01 | Stop reason: HOSPADM

## 2024-04-22 RX ORDER — METHYLPREDNISOLONE SODIUM SUCCINATE 125 MG/2ML
60 INJECTION, POWDER, LYOPHILIZED, FOR SOLUTION INTRAMUSCULAR; INTRAVENOUS EVERY 8 HOURS
Status: DISCONTINUED | OUTPATIENT
Start: 2024-04-22 | End: 2024-05-01 | Stop reason: HOSPADM

## 2024-04-22 RX ORDER — ONDANSETRON 2 MG/ML
4 INJECTION INTRAMUSCULAR; INTRAVENOUS ONCE
Status: COMPLETED | OUTPATIENT
Start: 2024-04-22 | End: 2024-04-22

## 2024-04-22 RX ORDER — BUPROPION HYDROCHLORIDE 150 MG/1
150 TABLET ORAL DAILY
Status: DISCONTINUED | OUTPATIENT
Start: 2024-04-22 | End: 2024-05-01 | Stop reason: HOSPADM

## 2024-04-22 RX ORDER — PREGABALIN 25 MG/1
25 CAPSULE ORAL EVERY 8 HOURS SCHEDULED
Status: DISCONTINUED | OUTPATIENT
Start: 2024-04-22 | End: 2024-05-01 | Stop reason: HOSPADM

## 2024-04-22 RX ORDER — ATENOLOL 25 MG/1
25 TABLET ORAL DAILY
Status: DISCONTINUED | OUTPATIENT
Start: 2024-04-22 | End: 2024-05-01 | Stop reason: HOSPADM

## 2024-04-22 RX ORDER — METHYLPREDNISOLONE SODIUM SUCCINATE 125 MG/2ML
125 INJECTION, POWDER, LYOPHILIZED, FOR SOLUTION INTRAMUSCULAR; INTRAVENOUS ONCE
Status: COMPLETED | OUTPATIENT
Start: 2024-04-22 | End: 2024-04-22

## 2024-04-22 RX ORDER — HYDROMORPHONE HYDROCHLORIDE 1 MG/ML
0.5 INJECTION, SOLUTION INTRAMUSCULAR; INTRAVENOUS; SUBCUTANEOUS ONCE
Status: COMPLETED | OUTPATIENT
Start: 2024-04-22 | End: 2024-04-22

## 2024-04-22 RX ORDER — SODIUM CHLORIDE 9 MG/ML
40 INJECTION, SOLUTION INTRAVENOUS AS NEEDED
Status: DISCONTINUED | OUTPATIENT
Start: 2024-04-22 | End: 2024-05-01 | Stop reason: HOSPADM

## 2024-04-22 RX ORDER — CHOLECALCIFEROL (VITAMIN D3) 125 MCG
5 CAPSULE ORAL NIGHTLY PRN
Status: DISCONTINUED | OUTPATIENT
Start: 2024-04-22 | End: 2024-05-01 | Stop reason: HOSPADM

## 2024-04-22 RX ORDER — SODIUM CHLORIDE 9 MG/ML
75 INJECTION, SOLUTION INTRAVENOUS CONTINUOUS
Status: DISCONTINUED | OUTPATIENT
Start: 2024-04-22 | End: 2024-04-26

## 2024-04-22 RX ORDER — LOSARTAN POTASSIUM 25 MG/1
25 TABLET ORAL DAILY
Status: DISCONTINUED | OUTPATIENT
Start: 2024-04-22 | End: 2024-05-01 | Stop reason: HOSPADM

## 2024-04-22 RX ORDER — LIDOCAINE 4 G/G
2 PATCH TOPICAL
Status: DISCONTINUED | OUTPATIENT
Start: 2024-04-22 | End: 2024-04-29

## 2024-04-22 RX ORDER — HYDROCODONE BITARTRATE AND ACETAMINOPHEN 5; 325 MG/1; MG/1
1 TABLET ORAL EVERY 6 HOURS PRN
COMMUNITY
Start: 2024-04-16 | End: 2024-05-01 | Stop reason: HOSPADM

## 2024-04-22 RX ORDER — ESCITALOPRAM OXALATE 5 MG/1
5 TABLET ORAL DAILY
Status: DISCONTINUED | OUTPATIENT
Start: 2024-04-22 | End: 2024-05-01 | Stop reason: HOSPADM

## 2024-04-22 RX ORDER — ATORVASTATIN CALCIUM 20 MG/1
20 TABLET, FILM COATED ORAL DAILY
Status: DISCONTINUED | OUTPATIENT
Start: 2024-04-22 | End: 2024-05-01 | Stop reason: HOSPADM

## 2024-04-22 RX ADMIN — HYDROMORPHONE HYDROCHLORIDE 1 MG: 1 INJECTION, SOLUTION INTRAMUSCULAR; INTRAVENOUS; SUBCUTANEOUS at 06:22

## 2024-04-22 RX ADMIN — HYDROCHLOROTHIAZIDE 25 MG: 25 TABLET ORAL at 11:14

## 2024-04-22 RX ADMIN — ATENOLOL 25 MG: 25 TABLET ORAL at 11:14

## 2024-04-22 RX ADMIN — BUPROPION HYDROCHLORIDE 150 MG: 150 TABLET, EXTENDED RELEASE ORAL at 11:14

## 2024-04-22 RX ADMIN — AMLODIPINE BESYLATE 10 MG: 10 TABLET ORAL at 11:14

## 2024-04-22 RX ADMIN — ESCITALOPRAM 5 MG: 5 TABLET, FILM COATED ORAL at 11:14

## 2024-04-22 RX ADMIN — METHOCARBAMOL TABLETS 750 MG: 750 TABLET, COATED ORAL at 13:23

## 2024-04-22 RX ADMIN — PREGABALIN 25 MG: 25 CAPSULE ORAL at 14:17

## 2024-04-22 RX ADMIN — LIDOCAINE 1 PATCH: 4 PATCH TOPICAL at 11:15

## 2024-04-22 RX ADMIN — HYDROMORPHONE HYDROCHLORIDE 1 MG: 1 INJECTION, SOLUTION INTRAMUSCULAR; INTRAVENOUS; SUBCUTANEOUS at 11:15

## 2024-04-22 RX ADMIN — HYDROMORPHONE HYDROCHLORIDE 0.5 MG: 1 INJECTION, SOLUTION INTRAMUSCULAR; INTRAVENOUS; SUBCUTANEOUS at 04:20

## 2024-04-22 RX ADMIN — METHYLPREDNISOLONE SODIUM SUCCINATE 60 MG: 125 INJECTION INTRAMUSCULAR; INTRAVENOUS at 21:00

## 2024-04-22 RX ADMIN — OXYCODONE AND ACETAMINOPHEN 1 TABLET: 5; 325 TABLET ORAL at 16:14

## 2024-04-22 RX ADMIN — GADOBENATE DIMEGLUMINE 20 ML: 529 INJECTION, SOLUTION INTRAVENOUS at 09:40

## 2024-04-22 RX ADMIN — METHOCARBAMOL TABLETS 750 MG: 750 TABLET, COATED ORAL at 17:58

## 2024-04-22 RX ADMIN — OXYCODONE AND ACETAMINOPHEN 1 TABLET: 5; 325 TABLET ORAL at 20:55

## 2024-04-22 RX ADMIN — ONDANSETRON 4 MG: 2 INJECTION INTRAMUSCULAR; INTRAVENOUS at 23:23

## 2024-04-22 RX ADMIN — Medication 10 ML: at 08:36

## 2024-04-22 RX ADMIN — Medication 10 ML: at 20:55

## 2024-04-22 RX ADMIN — METHOCARBAMOL TABLETS 750 MG: 750 TABLET, COATED ORAL at 20:55

## 2024-04-22 RX ADMIN — PANTOPRAZOLE SODIUM 40 MG: 40 TABLET, DELAYED RELEASE ORAL at 11:15

## 2024-04-22 RX ADMIN — OXYCODONE AND ACETAMINOPHEN 1 TABLET: 5; 325 TABLET ORAL at 11:15

## 2024-04-22 RX ADMIN — METHYLPREDNISOLONE SODIUM SUCCINATE 125 MG: 125 INJECTION, POWDER, FOR SOLUTION INTRAMUSCULAR; INTRAVENOUS at 06:22

## 2024-04-22 RX ADMIN — METHOCARBAMOL TABLETS 750 MG: 750 TABLET, COATED ORAL at 08:36

## 2024-04-22 RX ADMIN — LOSARTAN POTASSIUM 25 MG: 25 TABLET, FILM COATED ORAL at 11:14

## 2024-04-22 RX ADMIN — HYDROMORPHONE HYDROCHLORIDE 1 MG: 1 INJECTION, SOLUTION INTRAMUSCULAR; INTRAVENOUS; SUBCUTANEOUS at 23:23

## 2024-04-22 RX ADMIN — METHYLPREDNISOLONE SODIUM SUCCINATE 60 MG: 125 INJECTION INTRAMUSCULAR; INTRAVENOUS at 14:17

## 2024-04-22 RX ADMIN — ONDANSETRON 4 MG: 2 INJECTION INTRAMUSCULAR; INTRAVENOUS at 04:20

## 2024-04-22 RX ADMIN — SODIUM CHLORIDE 75 ML/HR: 9 INJECTION, SOLUTION INTRAVENOUS at 12:20

## 2024-04-22 RX ADMIN — PREGABALIN 25 MG: 25 CAPSULE ORAL at 21:00

## 2024-04-22 RX ADMIN — ATORVASTATIN CALCIUM 20 MG: 20 TABLET, FILM COATED ORAL at 11:14

## 2024-04-22 RX ADMIN — Medication 5 MG: at 23:24

## 2024-04-22 NOTE — H&P
[facility]   HISTORY AND PHYSICAL    Patient Name: Alesia Resnedez  : 1950  MRN: [id number]  Primary Care Physician:  Epley, James, APRN  Date of admission: 2024    Subjective   Subjective     Chief Complaint:   Chief Complaint   Patient presents with    Back Pain         HPI:    Alesia Resendez is a 74 y.o. female with a history of chronic back pain, hypertension, hyperlipidemia, breast cancer status post lobectomy who presents to [facility] ER with acute back pain radiates down the left leg.  Patient states that started 4 days ago when she had just stood up from a chair.  She describes the pain starts in her low back wraps around the front of her left thigh and into the groin down to the knee.  Denies any numbness or tingling.  She denies any weakness but does states has been difficult to walk around due to the pain.  She describes it as a constant sharp pain.  Denies any urinary or fecal incontinence.  Denies urinary retention.  Denies pain with urination.  Denies fevers and chills.  Denies lightheadedness or dizziness.  Denies chest pain shortness of breath.  She states that she did have a epidural injection last month done for her chronic low back pain and had previously been improving until 4 days ago.    Review of Systems   All systems were reviewed and negative except for: what is mentioned above in the HPI.    Personal History     Past Medical History:   Diagnosis Date    Anxiety     Arthritis     Breast cancer 2017    LEFT    Carpal tunnel syndrome of right wrist     Chronic pain disorder     Colon polyp     Removed    Depression 2017    Dermatochalasis of both eyelids     Fibromyalgia, primary     GERD (gastroesophageal reflux disease)     History of radiation therapy 2006    LT BREAST    History of skin cancer     SQUAMOUS CELL REMOVED FROM LT UPPER LIP AND RT THUMB    Hyperlipidemia 2005    Hypertension     Irritable bowel syndrome In my 40’s    Low back  pain 2015    Lumbosacral disc disease 2012    Obesity     Osteoarthritis 1990    Ptosis of both eyebrows     Spinal stenosis 2012    Viral syndrome 02/26/2018       Past Surgical History:   Procedure Laterality Date    ANAL FISSURECTOMY  2003    APPENDECTOMY  2014    BLEPHAROPLASTY Bilateral 05/11/2023    Procedure: BILATERAL UPPER LID BLEPHAROPLASTY;  Surgeon: Nicola Rowley MD;  Location:  [facility];  Service: Ophthalmology;  Laterality: Bilateral;    BREAST LUMPECTOMY W/ NEEDLE LOCALIZATION Left 2006    BREAST SURGERY  2006 and 2017    breast cancer    BROW LIFT Bilateral 05/11/2023    Procedure: BILATERAL TEMPORAL DIRECT BROWLIFT;  Surgeon: Nicola Rowley MD;  Location:  [facility];  Service: Ophthalmology;  Laterality: Bilateral;    CATARACT EXTRACTION Bilateral 07/2018    CHOLECYSTECTOMY  2007    COLONOSCOPY  2014    COLONOSCOPY Left 05/27/2021    Procedure: COLONOSCOPY;  Surgeon: Sridhar James MD;  Location: [facility];  Service: Gastroenterology;  Laterality: Left;    COLONOSCOPY N/A 10/10/2022    Procedure: COLONOSCOPY FOR SCREENING;  Surgeon: Sridhar James MD;  Location: [facility];  Service: Gastroenterology;  Laterality: N/A;  NORMAL    ENDOSCOPY N/A 10/10/2022    Procedure: ESOPHAGOGASTRODUODENOSCOPY WITH BIOPSY;  Surgeon: Sridhar James MD;  Location: [facility];  Service: Gastroenterology;  Laterality: N/A;  GASTRIC POLYPS, IRREGULAR Z LINE    EPIDURAL Left 06/21/2023    Procedure: LUMBAR/SACRAL TRANSFORAMINAL EPIDURAL LEFT L4-5  29063;  Surgeon: Estrellita Sheppard MD;  Location: [facility];  Service: Pain Management;  Laterality: Left;    EPIDURAL Bilateral 3/18/2024    Procedure: LUMBAR/SACRAL TRANSFORAMINAL EPIDURAL BILATERAL L4-5 50334-54;  Surgeon: Estrellita Sheppard MD;  [facility]: [facility];  Service: Pain Management;  Laterality: Bilateral;    EPIDURAL BLOCK  2020    HYSTERECTOMY  1987    JOINT REPLACEMENT      KNEE SURGERY Right 2014    Knee  cap replaced    KNEE SURGERY Left 12/06/2022    joint replacement    MASTECTOMY      MASTECTOMY W/ SENTINEL NODE BIOPSY Bilateral 08/21/2017    Procedure: BILATEREAL BREAST MASTECTOMY WITH SENTINEL NODE BIOPSY ON THE LEFT The sentinel lymph node biopsy will only be performed on the left side;  Surgeon: Diallo Eisenberg MD;  [place]  [facility];  Service:     MEDIAL BRANCH BLOCK Bilateral 03/20/2023    Procedure: LUMBAR MEDIAL BRANCH BLOCK BILATERAL L3-L5 #1  56238, 32493;  Surgeon: Estrellita Sheppard MD;  [facility];  Service: Pain Management;  Laterality: Bilateral;    OOPHORECTOMY Bilateral 2000    BSO    ORTHOPEDIC SURGERY  2000 and 2001    Knee replacements    PIRIFORMUS INJECTION Left 07/19/2021    Procedure: Left piriformis injection;  Surgeon: Estrellita Sheppard MD;  [facility];  Service: Pain Management;  Laterality: Left;    PIRIFORMUS INJECTION Left 01/03/2022    Procedure: PIRIFORMIS INJECTION - Left;  Surgeon: Estrellita Sheppard MD;  [facility];  Service: Pain Management;  Laterality: Left;    PIRIFORMUS INJECTION Left 01/18/2023    Procedure: PIRIFORMIS INJECTION Left;  Surgeon: Estrellita Sheppard MD;  [facility];  Service: Pain Management;  Laterality: Left;    PIRIFORMUS INJECTION Left 05/01/2023    Procedure: PIRIFORMIS INJECTION;  Surgeon: Estrellita Sheppard MD;  [facility];  Service: Pain Management;  Laterality: Left;    POSTERIOR REPAIR  05/17/2016    with enterocele repair, midurethral sling, perineoplasty    SACROILIAC JOINT INJECTION Left 05/01/2023    Procedure: SACROILIAC JOINT INJECTION AND PIRIFORMIS INJECTION LEFT  [id number];  Surgeon: Estrelilta Sheppard MD;  [facility];  Service: Pain Management;  Laterality: Left;    SKIN BIOPSY      TONSILLECTOMY  1970    TOTAL KNEE ARTHROPLASTY Bilateral 2000, 2001    UPPER GASTROINTESTINAL ENDOSCOPY  10/10/2022       Family History: family history includes Alcohol abuse in her  father; Arthritis in her father; Birth defects in her brother; Cancer in her mother; Clotting disorder in her daughter and father; Colon polyps in her father; Coronary artery disease in her father and mother; Crohn's disease in her daughter; Diabetes in her father; Esophageal cancer (age of onset: 74) in her mother; Heart attack in her brother, father, and mother; Heart disease in her brother, father, and mother; Heart failure in her father; Hyperlipidemia in her father; Hypertension in her brother, father, and mother; Learning disabilities in her brother; Prostate cancer (age of onset: 85) in her father; Stomach cancer in her mother; Stroke in her father. Otherwise pertinent FHx was reviewed and not pertinent to current issue.    Social History:  reports that she has never smoked. She has never used smokeless tobacco. She reports that she does not drink alcohol and does not use drugs.    Home Medications:  amLODIPine, atenolol, atorvastatin, buPROPion XL, celecoxib, escitalopram, fluticasone, hydroCHLOROthiazide, losartan, methylPREDNISolone, omeprazole, oxybutynin, and pregabalin    Allergies:  Allergies   Allergen Reactions    Morphine Itching       Objective   Objective     Vitals:   Temp:  [98.8 °F (37.1 °C)] 98.8 °F (37.1 °C)  Heart Rate:  [64-80] 64  Resp:  [16-20] 16  BP: (130-146)/(85-94) 130/85  Physical Exam    Constitutional: 74-year-old female who appears uncomfortable in no acute distress on room air   Eyes: PERRLA, sclerae anicteric, no conjunctival injection   HENT: NCAT, mucous membranes moist   Neck: Supple, no thyromegaly, no lymphadenopathy, trachea midline   Respiratory: Clear to auscultation bilaterally, nonlabored respirations    Cardiovascular: RRR, no murmurs, rubs, or gallops, palpable pedal pulses bilaterally   Gastrointestinal: Positive bowel sounds, soft, nontender, nondistended   Musculoskeletal: No bilateral ankle edema, no clubbing or cyanosis to extremities, positive straight leg  raise test bilaterally, diffuse tenderness to the left lumbar region, sensation intact   Psychiatric: Appropriate affect, cooperative   Neurologic: Oriented x 3, strength symmetric in all extremities, Cranial Nerves grossly intact to confrontation, speech clear   Skin: No rashes     Result Review    Result Review:  I have personally reviewed the results from the time of this admission to 4/22/2024 06:10 EDT and agree with these findings:  [x]  Laboratory list / accordion  []  Microbiology  [x]  Radiology  []  EKG/Telemetry   []  Cardiology/Vascular   []  Pathology  []  Old records  []  Other:  Most notable findings include:     CT Lumbar Spine Without Contrast    Result Date: 4/22/2024  Patient: ELIZABETH MCFARLAND  Time Out: 06:13 Exam(s): CT L SPINE EXAM:   CT Lumbar Spine Without Intravenous Contrast CLINICAL HISTORY:    Reason for exam: low back pain, left leg pain. TECHNIQUE:   Axial computed tomography images of the lumbar spine without intravenous contrast.  This CT exam was performed according to the principle of ALARA (As Low As Reasonably Achievable) by using one or more of the following dose reduction techniques: automated exposure control, adjustment of the mA and or kV according to patient size, and or use of iterative reconstruction technique. COMPARISON:   No relevant prior studies available. FINDINGS:   Vertebrae:   Severe facet hypertrophy most extreme from L3-S1.  No acute fracture.  Grade 1 anterolisthesis L4-5.   Discs spinal canal neural foramina:  L2-3 moderate sized posterior disc protrusion and facet hypertrophy causing moderate severe left neural foraminal stenosis and mild moderate right neural foraminal stenosis. Large left eccentric posterior disc protrusion at L3-4 and severe facet hypertrophy causing severe left neural foramen and lateral recess stenosis. Grade 1 anterolisthesis at L4-5, small posterior disc protrusion and severe facet hypertrophy causing severe central canal and bilateral  neural foraminal stenosis.  Moderate large posterior disc protrusion at L5-S1 as well as severe facet hypertrophy causing severe central canal and bilateral neural foraminal stenosis.    Soft tissues: Aortic atherosclerosis.  Cholecystectomy.. IMPRESSION:     1.  No acute fracture.  Severe lumbar spondylosis. 2.  Multilevel severe central canal and neural foraminal stenosis as detailed above.     Electronically signed by Ankit Courtney MD on 04-22-24 at 0613         Assessment & Plan   Assessment / Plan     Brief Patient Summary:  Alesia Resendez is a 74 y.o. female with a history of chronic low back pain who is being admitted to the observation unit for further evaluation of intractable low back pain with left radiculopathy.  CT of the lumbar spine without shows grade 1 anterior listhesis at L4-5 with a small posterior disc protrusion causing severe central canal and bilateral neuroforaminal stenosis, moderate large posterior disc protrusion at L5-S1 as well as severe facet hypertrophy causing severe canal and bilateral neuroforaminal stenosis.  Lab work fairly unremarkable does note a mildly elevated glucose at 139.  Patient started on IV steroids and multimodal pain control.  We will obtain an MRI of the lumbar spine and consult neurosurgery.    Active Hospital Problems:  Active Hospital Problems    Diagnosis     **Low back pain     Lumbar back pain with radiculopathy affecting left lower extremity      Plan:     Intractable low back pain with left radiculopathy  Chronic back pain  -CT of the lumbar spine shows disc protrusions at L4-5 L5-S1 causing severe central canal and bilateral neuroforaminal stenosis  -MRI of the lumbar spine with and without ordered  -IV steroids  -Multimodal pain control  -Neurosurgery consulted  -PT consulted  -N.p.o.    Hypertension  -Continue home antihypertensives once reconciled  -Monitor with vital signs every 4 hours        DVT prophylaxis:  Mechanical DVT prophylaxis orders are  present.        CODE STATUS:    Code Status (Patient has no pulse and is not breathing): CPR (Attempt to Resuscitate)  Medical Interventions (Patient has pulse or is breathing): Full Support    Admission Status:  I believe this patient meets observation status.    76 minutes have been spent by [facility] providers in the care of this patient while under observation status.      Appropriate PPE worn during patient encounter.  Hand hygeine performed before and after seeing the patient.      Electronically signed by Sherrie Roach PA-C, 04/22/24, 6:10 AM EDT.

## 2024-04-22 NOTE — ED PROVIDER NOTES
MD ATTESTATION NOTE    SHARED VISIT: This visit was performed by BOTH a physician and an APC. The substantive portion of the medical decision making was performed by this attesting physician who made or approved the management plan and takes responsibility for patient management. All studies documented in the APC note (if performed) were independently interpreted by me.    The BRYNN and I have discussed this patient's history, physical exam, MDM, and treatment plan.  I have reviewed the documentation and personally had a face to face interaction with the patient. The attached note describes my personal findings.      Alesia Resendez is a 74 y.o. female who presents to the ED c/o acute left-sided back pain.  This been going on for 4 days.  States that it radiates down her left side.  Patient has had similar back pain in the past.  Took her pain medicine at home without relief.  No tingling no numbness no weakness.  No difficulty with bowel or bladder.    On exam:  GENERAL: not distressed  HENT: nares patent  EYES: no scleral icterus  CV: regular rhythm, regular rate  RESPIRATORY: normal effort  ABDOMEN: soft  MUSCULOSKELETAL: no deformity  NEURO: alert, moves all extremities, follows commands  SKIN: warm, dry    Labs  Recent Results (from the past 24 hour(s))   Comprehensive Metabolic Panel    Collection Time: 04/22/24  4:06 AM    Specimen: Blood   Result Value Ref Range    Glucose 139 (H) 65 - 99 mg/dL    BUN 17 8 - 23 mg/dL    Creatinine 0.61 0.57 - 1.00 mg/dL    Sodium 139 136 - 145 mmol/L    Potassium 3.6 3.5 - 5.2 mmol/L    Chloride 100 98 - 107 mmol/L    CO2 24.9 22.0 - 29.0 mmol/L    Calcium 9.4 8.6 - 10.5 mg/dL    Total Protein 6.5 6.0 - 8.5 g/dL    Albumin 4.1 3.5 - 5.2 g/dL    ALT (SGPT) 20 1 - 33 U/L    AST (SGOT) 23 1 - 32 U/L    Alkaline Phosphatase 91 39 - 117 U/L    Total Bilirubin 0.4 0.0 - 1.2 mg/dL    Globulin 2.4 gm/dL    A/G Ratio 1.7 g/dL    BUN/Creatinine Ratio 27.9 (H) 7.0 - 25.0    Anion Gap  14.1 5.0 - 15.0 mmol/L    eGFR 93.9 >60.0 mL/min/1.73   CBC Auto Differential    Collection Time: 04/22/24  4:06 AM    Specimen: Blood   Result Value Ref Range    WBC 8.67 3.40 - 10.80 10*3/mm3    RBC 5.21 3.77 - 5.28 10*6/mm3    Hemoglobin 14.9 12.0 - 15.9 g/dL    Hematocrit 44.9 34.0 - 46.6 %    MCV 86.2 79.0 - 97.0 fL    MCH 28.6 26.6 - 33.0 pg    MCHC 33.2 31.5 - 35.7 g/dL    RDW 13.7 12.3 - 15.4 %    RDW-SD 42.7 37.0 - 54.0 fl    MPV 8.8 6.0 - 12.0 fL    Platelets 266 140 - 450 10*3/mm3    Neutrophil % 59.6 42.7 - 76.0 %    Lymphocyte % 32.2 19.6 - 45.3 %    Monocyte % 6.5 5.0 - 12.0 %    Eosinophil % 0.8 0.3 - 6.2 %    Basophil % 0.7 0.0 - 1.5 %    Immature Grans % 0.2 0.0 - 0.5 %    Neutrophils, Absolute 5.17 1.70 - 7.00 10*3/mm3    Lymphocytes, Absolute 2.79 0.70 - 3.10 10*3/mm3    Monocytes, Absolute 0.56 0.10 - 0.90 10*3/mm3    Eosinophils, Absolute 0.07 0.00 - 0.40 10*3/mm3    Basophils, Absolute 0.06 0.00 - 0.20 10*3/mm3    Immature Grans, Absolute 0.02 0.00 - 0.05 10*3/mm3    nRBC 0.0 0.0 - 0.2 /100 WBC       Radiology  CT Lumbar Spine Without Contrast    Result Date: 4/22/2024  Patient: ELIZABETH MCFARLAND  Time Out: 06:13 Exam(s): CT L SPINE EXAM:   CT Lumbar Spine Without Intravenous Contrast CLINICAL HISTORY:    Reason for exam: low back pain, left leg pain. TECHNIQUE:   Axial computed tomography images of the lumbar spine without intravenous contrast.  This CT exam was performed according to the principle of ALARA (As Low As Reasonably Achievable) by using one or more of the following dose reduction techniques: automated exposure control, adjustment of the mA and or kV according to patient size, and or use of iterative reconstruction technique. COMPARISON:   No relevant prior studies available. FINDINGS:   Vertebrae:   Severe facet hypertrophy most extreme from L3-S1.  No acute fracture.  Grade 1 anterolisthesis L4-5.   Discs spinal canal neural foramina:  L2-3 moderate sized posterior disc protrusion  and facet hypertrophy causing moderate severe left neural foraminal stenosis and mild moderate right neural foraminal stenosis. Large left eccentric posterior disc protrusion at L3-4 and severe facet hypertrophy causing severe left neural foramen and lateral recess stenosis. Grade 1 anterolisthesis at L4-5, small posterior disc protrusion and severe facet hypertrophy causing severe central canal and bilateral neural foraminal stenosis.  Moderate large posterior disc protrusion at L5-S1 as well as severe facet hypertrophy causing severe central canal and bilateral neural foraminal stenosis.    Soft tissues: Aortic atherosclerosis.  Cholecystectomy.. IMPRESSION:     1.  No acute fracture.  Severe lumbar spondylosis. 2.  Multilevel severe central canal and neural foraminal stenosis as detailed above.     Electronically signed by Ankit Courtney MD on 04-22-24 at 0613     Medications given in the ED:  Medications   sodium chloride 0.9 % flush 10 mL (has no administration in time range)   HYDROmorphone (DILAUDID) injection 1 mg (1 mg Intravenous Given 4/22/24 0622)   sodium chloride 0.9 % flush 10 mL (has no administration in time range)   sodium chloride 0.9 % flush 10 mL (has no administration in time range)   sodium chloride 0.9 % infusion 40 mL (has no administration in time range)   ondansetron ODT (ZOFRAN-ODT) disintegrating tablet 4 mg (has no administration in time range)     Or   ondansetron (ZOFRAN) injection 4 mg (has no administration in time range)   sodium chloride 0.9 % infusion (has no administration in time range)   acetaminophen (TYLENOL) tablet 650 mg (has no administration in time range)   oxyCODONE-acetaminophen (PERCOCET) 5-325 MG per tablet 1 tablet (has no administration in time range)   melatonin tablet 5 mg (has no administration in time range)   methocarbamol (ROBAXIN) tablet 750 mg (has no administration in time range)   Lidocaine 4 % 2 patch (has no administration in time range)    methylPREDNISolone sodium succinate (SOLU-Medrol) injection 60 mg (has no administration in time range)   HYDROmorphone (DILAUDID) injection 0.5 mg (0.5 mg Intravenous Given 4/22/24 0420)   ondansetron (ZOFRAN) injection 4 mg (4 mg Intravenous Given 4/22/24 0420)   methylPREDNISolone sodium succinate (SOLU-Medrol) injection 125 mg (125 mg Intravenous Given 4/22/24 0622)       Orders placed during this visit:  Orders Placed This Encounter   Procedures    CT Lumbar Spine Without Contrast    MRI Lumbar Spine With & Without Contrast    Comprehensive Metabolic Panel    Urinalysis With Microscopic If Indicated (No Culture) - Urine, Clean Catch    CBC Auto Differential    CBC (No Diff)    Basic Metabolic Panel    NPO Diet NPO Type: Sips with Meds    Monitor Blood Pressure    Discontinue Cardiac Monitoring    Intake & Output    Weigh Patient    Oral Care    Saline Lock & Maintain IV Access    Place Sequential Compression Device    Maintain Sequential Compression Device    Up With Assistance    Vital Signs    Continuous Pulse Oximetry    Notify Provider (With Default Parameters)    Code Status and Medical Interventions:    Inpatient Neurosurgery Consult    PT Consult: Eval & Treat Functional Mobility Below Baseline    Insert Peripheral IV    Insert Peripheral IV    Initiate Observation Status    CBC & Differential       Medical Decision Making:  ED Course as of 04/22/24 0703   Mon Apr 22, 2024   0531 I discussed the case with Dr. Gomez and they agree to evaluate the patient at the bedside.    [AR]   0550 Phone call with YESI Fuentes with [facility].  Discussed the patient, relevant history, exam, diagnostics, ED findings/progress, and concerns. They agree to admit the patient to Dr. Godoy, obs. Care assumed by the admitting physician at this time.     [AR]      ED Course User Index  [AR] Diamond Basurto, APRN       Differential diagnosis:  My differential diagnosis for back pain includes but is not limited to:  Musculoskeletal  strain, contusion, retroperitoneal hematoma, disc protrusion, vertebral fracture, transverse process fracture, rib fracture, facet syndrome, sacroiliac joint strain, sciatica, renal injury, splenic injury, pancreatic injury, osteoarthritis, lumbar spondylosis, spinal stenosis, ankylosing spondylitis, sacroiliac joint inflammation, pancreatitis, perforated peptic ulcer, diverticulitis, endometriosis, chronic PID, epidural abscess, osteomyelitis, retroperitoneal abscess, pyelonephritis, pneumonia, subphrenic abscess, tuberculosis, neurofibroma, meningioma, multiple myeloma, lymphoma, metastatic cancer, primary cancer, AAA, aortic dissection, spinal ischemia, referred pain, ureterolithiasis      Diagnosis  Final diagnoses:   Acute left-sided low back pain, unspecified whether sciatica present          Geremias Gomez MD  04/22/24 0555       Geremias Gomez MD  04/22/24 0773

## 2024-04-22 NOTE — Clinical Note
Level of Care: Med/Surg [1]   Diagnosis: Low back pain [184610]   Admitting Physician: BRIANNE MCKEON [[id number]]

## 2024-04-22 NOTE — PROGRESS NOTES
[facility] ESTEBAN Attestation Note    I supervised care provided by the midlevel provider.    The BRYNN and I have discussed this patient's history, physical exam, and treatment plan. I have reviewed the documentation and personally had a face to face interaction with the patient  I affirm the documentation and agree with the treatment and plan. I provided a substantive portion of the care of this patient.  I personally performed the physical exam, in its entirety.  My personal findings are documented in below:    History:  Patient with history of hypertension, hyperlipidemia, breast cancer and previous back pain presents for worsening acute back pain in the left lower back that radiates into the left thigh for the past 4 days.  The pain began abruptly after standing up from a chair.  She denies any bowel or bladder incontinence.  The pain has made it incredibly difficult for her to bear weight or ambulate as normal.  Denies any chest pain or abdominal pain or difficulties breathing.  Has had previous epidural injections at pain management for low back pain.  No previous spinal surgery.    Physical Exam:  General: Appears rather uncomfortable, after ambulating from bed to the recliner chair  HENT: NCAT, PERRL, Nares patent.  Eyes: no scleral icterus.  Neck: trachea midline, no ROM limitations.  CV: Pink warm and well-perfused throughout  Respiratory: No distress or increased work of breathing  Abdomen: soft, no focal tenderness or rigidity  Musculoskeletal: no deformity.  Moderate tenderness in the left lumbosacral back region.  No asymmetry to the lower extremities  Neuro: alert, moves all extremities, follows commands.  Skin: warm, dry.    Assessment and Plan:  Acute on chronic low back pain with left sciatica: CT lumbar spine indicates disc protrusions at L4-L5, L5-S1.  MRI lumbar spine completed this morning.  Report pending.  Neurosurgery consulted.  IV steroid regimen initiated.  Additional pain medications as needed.  PT  consulted.    Hypertension: Continue home medications

## 2024-04-22 NOTE — ED PROVIDER NOTES
" EMERGENCY DEPARTMENT ENCOUNTER  Room Number:  06/06  PCP: Epley, James, APRN  Independent Historians: Patient      HPI:  Chief Complaint: had concerns including Back Pain.     A complete HPI/ROS/PMH/PSH/SH/FH are unobtainable due to: None    Chronic or social conditions impacting patient care (Social Determinants of Health): None      Context: Alesia Resendez is a 74 y.o. female with a medical history of depression, GERD, hyperlipidemia, hypertension, breast cancer, chronic fatigue, spondylosis of lumbar region, cervical stenosis, lumbar radiculopathy, obesity who presents to the emergency department with complaints of l left-sided ow back pain.  Patient reports chronic low back pain however noted increase in pain over the last 4 days.  Pain is described as sharp/stabbing and radiates into and down left leg.  Patient tried taking narcotic pain medicine at home without relief.  She advises she has been seen by a spine doctor with [facility] however is not currently followed by neurosurgery.  Patient informs when she stands up and attempts to take a step she does feel weakness in her left leg.  Patient admits to previous epidural injection, states the injection was \"a couple months ago\".  Patient denies fever, chills, headache, lightheadedness, dizziness, numbness, tingling, unilateral extremity weakness, syncope, shortness of breath, chest pain, abdominal pain, nausea, vomiting, diarrhea, dysuria, incontinence of bowel/bladder, saddle anesthesia, or other complaints.        Review of prior external notes (non-ED) -and- Review of prior external test results outside of this encounter:   March 18, 2024: Pain management.  Patient seen for lumbar radiculopathy.  Epidural received as outpatient.      PAST MEDICAL HISTORY  Active Ambulatory Problems     Diagnosis Date Noted    Arthritis 10/26/2016    Atrophic vaginitis 08/27/2013    Depression 10/26/2016    Gastroesophageal reflux disease without esophagitis " 10/26/2016    Mixed hyperlipidemia 10/26/2016    Essential hypertension 10/26/2016    Menopause present 08/27/2013    Overactive bladder 03/07/2016    History of colonic polyps 11/08/2013    Rectal hemorrhage 11/08/2013    Proctocele 03/07/2016    Encounter for screening for malignant neoplasm of colon 11/08/2013    Stress incontinence in female 05/17/2016    Recurrent breast cancer 07/25/2017    Viral syndrome 02/26/2018    Chronic fatigue 05/18/2018    Snoring 05/18/2018    Tremor 05/18/2018    Community acquired pneumonia 04/29/2019    Chronic right-sided low back pain with sciatica 01/14/2020    Elevated liver function tests 01/14/2020    Piriformis syndrome of left side 07/06/2021    Myofasciitis 07/06/2021    History of left breast cancer 09/02/2021    Diarrhea 08/24/2022    Family history of esophageal cancer 08/24/2022    Spondylosis of lumbar region without myelopathy or radiculopathy 11/30/2022    Cervical stenosis of spinal canal 11/30/2022    Cervical spondylosis without myelopathy 11/30/2022    Prediabetes 02/02/2023    Sacroiliitis 04/27/2023    Spinal stenosis of lumbar region 04/27/2023    Lumbar radiculopathy 05/31/2023    Degeneration of lumbar or lumbosacral intervertebral disc 05/31/2023    Class 2 severe obesity due to excess calories with serious comorbidity and body mass index (BMI) of 36.0 to 36.9 in adult 08/02/2023     Resolved Ambulatory Problems     Diagnosis Date Noted    Acute conjunctivitis 10/26/2016    Appendicitis 10/23/2013     Past Medical History:   Diagnosis Date    Anxiety     Breast cancer 2017    Carpal tunnel syndrome of right wrist     Chronic pain disorder 1990’s    Colon polyp 11/22    Dermatochalasis of both eyelids     Fibromyalgia, primary 2023    GERD (gastroesophageal reflux disease)     History of radiation therapy 2006    History of skin cancer     Hyperlipidemia 2005    Hypertension     Irritable bowel syndrome In my 40’s    Low back pain 2015    Lumbosacral disc  disease 2012    Obesity     Osteoarthritis 1990    Ptosis of both eyebrows     Spinal stenosis 2012         PAST SURGICAL HISTORY  Past Surgical History:   Procedure Laterality Date    ANAL FISSURECTOMY  2003    APPENDECTOMY  2014    BLEPHAROPLASTY Bilateral 05/11/2023    Procedure: BILATERAL UPPER LID BLEPHAROPLASTY;  Surgeon: Nicola Rowley MD;  Location:  [facility];  Service: Ophthalmology;  Laterality: Bilateral;    BREAST LUMPECTOMY W/ NEEDLE LOCALIZATION Left 2006    BREAST SURGERY  2006 and 2017    breast cancer    BROW LIFT Bilateral 05/11/2023    Procedure: BILATERAL TEMPORAL DIRECT BROWLIFT;  Surgeon: Nicola Rowley MD;  Location:  [facility];  Service: Ophthalmology;  Laterality: Bilateral;    CATARACT EXTRACTION Bilateral 07/2018    CHOLECYSTECTOMY  2007    COLONOSCOPY  2014    COLONOSCOPY Left 05/27/2021    Procedure: COLONOSCOPY;  Surgeon: Sridhar James MD;  Location: [facility];  Service: Gastroenterology;  Laterality: Left;    COLONOSCOPY N/A 10/10/2022    Procedure: COLONOSCOPY FOR SCREENING;  Surgeon: Sridhar James MD;  [facility];  Service: Gastroenterology;  Laterality: N/A;  NORMAL    ENDOSCOPY N/A 10/10/2022    Procedure: ESOPHAGOGASTRODUODENOSCOPY WITH BIOPSY;  Surgeon: Sridhar James MD;  Location: [facility];  Service: Gastroenterology;  Laterality: N/A;  GASTRIC POLYPS, IRREGULAR Z LINE    EPIDURAL Left 06/21/2023    Procedure: LUMBAR/SACRAL TRANSFORAMINAL EPIDURAL LEFT L4-5  18996;  Surgeon: Estrellita Sheppard MD;  Location: [facility];  Service: Pain Management;  Laterality: Left;    EPIDURAL Bilateral 3/18/2024    Procedure: LUMBAR/SACRAL TRANSFORAMINAL EPIDURAL BILATERAL L4-5 25456-27;  Surgeon: Estrellita Sheppard MD;  Location: [facility];  Service: Pain Management;  Laterality: Bilateral;    EPIDURAL BLOCK  2020    HYSTERECTOMY  1987    JOINT REPLACEMENT      KNEE SURGERY Right 2014    Knee cap replaced    KNEE SURGERY Left  12/06/2022    joint replacement    MASTECTOMY      MASTECTOMY W/ SENTINEL NODE BIOPSY Bilateral 08/21/2017    Procedure: BILATEREAL BREAST MASTECTOMY WITH SENTINEL NODE BIOPSY ON THE LEFT The sentinel lymph node biopsy will only be performed on the left side;  Surgeon: Diallo Eisenberg MD;  Location:  [facility];  Service:     MEDIAL BRANCH BLOCK Bilateral 03/20/2023    Procedure: LUMBAR MEDIAL BRANCH BLOCK BILATERAL L3-L5 #1  07386, 37407;  Surgeon: Estrellita Sheppard MD;  [facility];  Service: Pain Management;  Laterality: Bilateral;    OOPHORECTOMY Bilateral 2000    BSO    ORTHOPEDIC SURGERY  2000 and 2001    Knee replacements    PIRIFORMUS INJECTION Left 07/19/2021    Procedure: Left piriformis injection;  Surgeon: Estrellita Sheppard MD;  [facility];  Service: Pain Management;  Laterality: Left;    PIRIFORMUS INJECTION Left 01/03/2022    Procedure: PIRIFORMIS INJECTION - Left;  Surgeon: Estrellita Sheppard MD;  [facility];  Service: Pain Management;  Laterality: Left;    PIRIFORMUS INJECTION Left 01/18/2023    Procedure: PIRIFORMIS INJECTION Left;  Surgeon: Estrellita Sheppard MD;  [facility];  Service: Pain Management;  Laterality: Left;    PIRIFORMUS INJECTION Left 05/01/2023    Procedure: PIRIFORMIS INJECTION;  Surgeon: Estrellita Sheppard MD;  [facility];  Service: Pain Management;  Laterality: Left;    POSTERIOR REPAIR  05/17/2016    with enterocele repair, midurethral sling, perineoplasty    SACROILIAC JOINT INJECTION Left 05/01/2023    Procedure: SACROILIAC JOINT INJECTION AND PIRIFORMIS INJECTION LEFT  11600;  Surgeon: Estrellita Sheppard MD;  [facility];  Service: Pain Management;  Laterality: Left;    SKIN BIOPSY      TONSILLECTOMY  1970    TOTAL KNEE ARTHROPLASTY Bilateral 2000, 2001    UPPER GASTROINTESTINAL ENDOSCOPY  10/10/2022         FAMILY HISTORY  Family History   Problem Relation Age of Onset    Heart attack Mother      Esophageal cancer Mother 74    Stomach cancer Mother     Heart disease Mother         triple bypass    Hypertension Mother     Cancer Mother             Coronary artery disease Mother     Colon polyps Father     Alcohol abuse Father             Diabetes Father         type 2    Heart attack Father     Heart failure Father     Hyperlipidemia Father     Hypertension Father     Prostate cancer Father 85    Clotting disorder Father     Heart disease Father         quadruple bypass    Arthritis Father     Stroke Father     Coronary artery disease Father     Birth defects Brother     Heart attack Brother     Heart disease Brother     Hypertension Brother     Crohn's disease Daughter     Clotting disorder Daughter     Learning disabilities Brother         Mentally retarded    Malig Hyperthermia Neg Hx     Colon cancer Neg Hx     Inflammatory bowel disease Neg Hx     Ulcerative colitis Neg Hx          SOCIAL HISTORY  Social History     Socioeconomic History    Marital status:     Number of children: 3    Years of education: College   Tobacco Use    Smoking status: Never    Smokeless tobacco: Never   Vaping Use    Vaping status: Never Used   Substance and Sexual Activity    Alcohol use: No    Drug use: No    Sexual activity: Not Currently     Partners: Male     Birth control/protection: Post-menopausal, Hysterectomy     Comment:          ALLERGIES  Morphine      REVIEW OF SYSTEMS  Included in HPI  All systems reviewed and negative except for those discussed in HPI.      PHYSICAL EXAM    I have reviewed the triage vital signs and nursing notes.    ED Triage Vitals [24 0339]   Temp Heart Rate Resp BP SpO2   98.8 °F (37.1 °C) 80 20 146/94 96 %      Temp src Heart Rate Source Patient Position BP Location FiO2 (%)   Oral Monitor Lying Right arm --       GENERAL: not distressed  HENT: nares patent  Head/neck/ face are symmetric without gross deformity or swelling  EYES: no scleral icterus  CV:  regular rhythm, regular rate with intact distal pulses  RESPIRATORY: normal effort and no respiratory distress  ABDOMEN: soft and non-tender  MUSCULOSKELETAL: no deformity  Lumbar spine: No step-offs or deformities, mild midline tenderness to palpation.  Left-sided paraspinal tenderness to palpation.  Bilateral lower extremities: Positive bilateral straight leg raise.  5 out of 5 strength.  Sensation intact light touch.  NEURO: alert and appropriate, moves all extremities, follows commands  SKIN: warm, dry    Vital signs and nursing notes reviewed.      LAB RESULTS  Recent Results (from the past 24 hour(s))   Comprehensive Metabolic Panel    Collection Time: 04/22/24  4:06 AM    Specimen: Blood   Result Value Ref Range    Glucose 139 (H) 65 - 99 mg/dL    BUN 17 8 - 23 mg/dL    Creatinine 0.61 0.57 - 1.00 mg/dL    Sodium 139 136 - 145 mmol/L    Potassium 3.6 3.5 - 5.2 mmol/L    Chloride 100 98 - 107 mmol/L    CO2 24.9 22.0 - 29.0 mmol/L    Calcium 9.4 8.6 - 10.5 mg/dL    Total Protein 6.5 6.0 - 8.5 g/dL    Albumin 4.1 3.5 - 5.2 g/dL    ALT (SGPT) 20 1 - 33 U/L    AST (SGOT) 23 1 - 32 U/L    Alkaline Phosphatase 91 39 - 117 U/L    Total Bilirubin 0.4 0.0 - 1.2 mg/dL    Globulin 2.4 gm/dL    A/G Ratio 1.7 g/dL    BUN/Creatinine Ratio 27.9 (H) 7.0 - 25.0    Anion Gap 14.1 5.0 - 15.0 mmol/L    eGFR 93.9 >60.0 mL/min/1.73   CBC Auto Differential    Collection Time: 04/22/24  4:06 AM    Specimen: Blood   Result Value Ref Range    WBC 8.67 3.40 - 10.80 10*3/mm3    RBC 5.21 3.77 - 5.28 10*6/mm3    Hemoglobin 14.9 12.0 - 15.9 g/dL    Hematocrit 44.9 34.0 - 46.6 %    MCV 86.2 79.0 - 97.0 fL    MCH 28.6 26.6 - 33.0 pg    MCHC 33.2 31.5 - 35.7 g/dL    RDW 13.7 12.3 - 15.4 %    RDW-SD 42.7 37.0 - 54.0 fl    MPV 8.8 6.0 - 12.0 fL    Platelets 266 140 - 450 10*3/mm3    Neutrophil % 59.6 42.7 - 76.0 %    Lymphocyte % 32.2 19.6 - 45.3 %    Monocyte % 6.5 5.0 - 12.0 %    Eosinophil % 0.8 0.3 - 6.2 %    Basophil % 0.7 0.0 - 1.5 %     Immature Grans % 0.2 0.0 - 0.5 %    Neutrophils, Absolute 5.17 1.70 - 7.00 10*3/mm3    Lymphocytes, Absolute 2.79 0.70 - 3.10 10*3/mm3    Monocytes, Absolute 0.56 0.10 - 0.90 10*3/mm3    Eosinophils, Absolute 0.07 0.00 - 0.40 10*3/mm3    Basophils, Absolute 0.06 0.00 - 0.20 10*3/mm3    Immature Grans, Absolute 0.02 0.00 - 0.05 10*3/mm3    nRBC 0.0 0.0 - 0.2 /100 WBC         RADIOLOGY  CT Lumbar Spine Without Contrast    Result Date: 4/22/2024  Patient: ELIZABETH MCFARLAND  Time Out: 06:13 Exam(s): CT L SPINE EXAM:   CT Lumbar Spine Without Intravenous Contrast CLINICAL HISTORY:    Reason for exam: low back pain, left leg pain. TECHNIQUE:   Axial computed tomography images of the lumbar spine without intravenous contrast.  This CT exam was performed according to the principle of ALARA (As Low As Reasonably Achievable) by using one or more of the following dose reduction techniques: automated exposure control, adjustment of the mA and or kV according to patient size, and or use of iterative reconstruction technique. COMPARISON:   No relevant prior studies available. FINDINGS:   Vertebrae:   Severe facet hypertrophy most extreme from L3-S1.  No acute fracture.  Grade 1 anterolisthesis L4-5.   Discs spinal canal neural foramina:  L2-3 moderate sized posterior disc protrusion and facet hypertrophy causing moderate severe left neural foraminal stenosis and mild moderate right neural foraminal stenosis. Large left eccentric posterior disc protrusion at L3-4 and severe facet hypertrophy causing severe left neural foramen and lateral recess stenosis. Grade 1 anterolisthesis at L4-5, small posterior disc protrusion and severe facet hypertrophy causing severe central canal and bilateral neural foraminal stenosis.  Moderate large posterior disc protrusion at L5-S1 as well as severe facet hypertrophy causing severe central canal and bilateral neural foraminal stenosis.    Soft tissues: Aortic atherosclerosis.  Cholecystectomy..  IMPRESSION:     1.  No acute fracture.  Severe lumbar spondylosis. 2.  Multilevel severe central canal and neural foraminal stenosis as detailed above.     Electronically signed by Ankit Courtney MD on 04-22-24 at 0613       MEDICATIONS GIVEN IN ER  Medications   sodium chloride 0.9 % flush 10 mL (has no administration in time range)   HYDROmorphone (DILAUDID) injection 1 mg (1 mg Intravenous Given 4/22/24 0622)   sodium chloride 0.9 % flush 10 mL (has no administration in time range)   sodium chloride 0.9 % flush 10 mL (has no administration in time range)   sodium chloride 0.9 % infusion 40 mL (has no administration in time range)   ondansetron ODT (ZOFRAN-ODT) disintegrating tablet 4 mg (has no administration in time range)     Or   ondansetron (ZOFRAN) injection 4 mg (has no administration in time range)   sodium chloride 0.9 % infusion (has no administration in time range)   acetaminophen (TYLENOL) tablet 650 mg (has no administration in time range)   oxyCODONE-acetaminophen (PERCOCET) 5-325 MG per tablet 1 tablet (has no administration in time range)   melatonin tablet 5 mg (has no administration in time range)   methocarbamol (ROBAXIN) tablet 750 mg (has no administration in time range)   Lidocaine 4 % 2 patch (has no administration in time range)   methylPREDNISolone sodium succinate (SOLU-Medrol) injection 60 mg (has no administration in time range)   HYDROmorphone (DILAUDID) injection 0.5 mg (0.5 mg Intravenous Given 4/22/24 0420)   ondansetron (ZOFRAN) injection 4 mg (4 mg Intravenous Given 4/22/24 0420)   methylPREDNISolone sodium succinate (SOLU-Medrol) injection 125 mg (125 mg Intravenous Given 4/22/24 0622)           OUTPATIENT MEDICATION MANAGEMENT:  Current Facility-Administered Medications Ordered in Epic   Medication Dose Route Frequency Provider Last Rate Last Admin    acetaminophen (TYLENOL) tablet 650 mg  650 mg Oral Q4H PRN Tano Godoy MD        HYDROmorphone (DILAUDID) injection 1 mg   1 mg Intravenous Q3H PRN Tano Godoy MD   1 mg at 04/22/24 0622    Lidocaine 4 % 2 patch  2 patch Transdermal Q24H Tano Godoy MD        melatonin tablet 5 mg  5 mg Oral Nightly PRN Tano Gdooy MD        methocarbamol (ROBAXIN) tablet 750 mg  750 mg Oral 4x Daily Tano Godoy MD        methylPREDNISolone sodium succinate (SOLU-Medrol) injection 60 mg  60 mg Intravenous Q8H Sherrie Roach PA-C        ondansetron ODT (ZOFRAN-ODT) disintegrating tablet 4 mg  4 mg Oral Q6H PRN Tano Godoy MD        Or    ondansetron (ZOFRAN) injection 4 mg  4 mg Intravenous Q6H PRN Tano Godoy MD        oxyCODONE-acetaminophen (PERCOCET) 5-325 MG per tablet 1 tablet  1 tablet Oral Q4H PRN Tano Godoy MD        sodium chloride 0.9 % flush 10 mL  10 mL Intravenous PRN Diamond Basurto, KYLIE        sodium chloride 0.9 % flush 10 mL  10 mL Intravenous Q12H Tano Godoy MD        sodium chloride 0.9 % flush 10 mL  10 mL Intravenous PRN Tano Godoy MD        sodium chloride 0.9 % infusion 40 mL  40 mL Intravenous PRN Tano Godoy MD        sodium chloride 0.9 % infusion  75 mL/hr Intravenous Continuous Tano Godoy MD         Current Outpatient Medications Ordered in Epic   Medication Sig Dispense Refill    amLODIPine (NORVASC) 10 MG tablet TAKE 1 TABLET BY MOUTH DAILY 90 tablet 3    atenolol (TENORMIN) 25 MG tablet TAKE 1 TABLET BY MOUTH DAILY 90 tablet 3    atorvastatin (LIPITOR) 20 MG tablet TAKE 1 TABLET BY MOUTH ONCE  DAILY 90 tablet 3    buPROPion XL (WELLBUTRIN XL) 150 MG 24 hr tablet TAKE 1 TABLET BY MOUTH DAILY 90 tablet 3    celecoxib (CeleBREX) 200 MG capsule TAKE 1 CAPSULE BY MOUTH DAILY 90 capsule 1    escitalopram (LEXAPRO) 5 MG tablet TAKE 1 TABLET BY MOUTH DAILY 90 tablet 3    hydroCHLOROthiazide (HYDRODIURIL) 25 MG tablet TAKE 1 TABLET BY MOUTH DAILY 90 tablet 3    losartan (COZAAR) 25 MG tablet TAKE 1 TABLET BY MOUTH DAILY 90 tablet 3    methylPREDNISolone (MEDROL) 4 MG  dose pack Take as directed on package instructions. 21 tablet 0    omeprazole (priLOSEC) 20 MG capsule TAKE 1 CAPSULE BY MOUTH DAILY 90 capsule 3    oxybutynin (DITROPAN) 5 MG tablet TAKE 1 TABLET BY MOUTH  TWICE DAILY (Patient taking differently: Daily.) 180 tablet 3    pregabalin (LYRICA) 25 MG capsule TAKE 1 CAPSULE BY MOUTH 3 TIMES  DAILY 90 capsule 0    fluticasone (FLONASE) 50 MCG/ACT nasal spray 2 sprays into the nostril(s) as directed by provider Daily. 48 mL 3           PROGRESS, DATA ANALYSIS, CONSULTS, AND MEDICAL DECISION MAKING  ORDERS PLACED DURING THIS VISIT:  Orders Placed This Encounter   Procedures    CT Lumbar Spine Without Contrast    MRI Lumbar Spine With & Without Contrast    Comprehensive Metabolic Panel    Urinalysis With Microscopic If Indicated (No Culture) - Urine, Clean Catch    CBC Auto Differential    CBC (No Diff)    Basic Metabolic Panel    NPO Diet NPO Type: Sips with Meds    Monitor Blood Pressure    Discontinue Cardiac Monitoring    Intake & Output    Weigh Patient    Oral Care    Saline Lock & Maintain IV Access    Place Sequential Compression Device    Maintain Sequential Compression Device    Up With Assistance    Vital Signs    Continuous Pulse Oximetry    Notify Provider (With Default Parameters)    Code Status and Medical Interventions:    Inpatient Neurosurgery Consult    PT Consult: Eval & Treat Functional Mobility Below Baseline    Insert Peripheral IV    Insert Peripheral IV    Initiate Observation Status    CBC & Differential       All labs have been independently interpreted by me.  All radiology studies have been reviewed by me. All EKG's have been independently viewed by me.  Discussion below represents my analysis of pertinent findings related to patient's condition, differential diagnosis, treatment plan and final disposition.    Differential diagnosis includes but is not limited to:   Cauda equina, spinal stenosis, herniated disc, epidural abscess, etc.    ED  Course/Progress Notes/MDM:  ED Course as of 04/22/24 0618   Mon Apr 22, 2024   0531 I discussed the case with Dr. Gomez and they agree to evaluate the patient at the bedside.    [AR]   0558 Phone call with YESI Fuentes with [facility].  Discussed the patient, relevant history, exam, diagnostics, ED findings/progress, and concerns. They agree to admit the patient to Dr. Godoy obs. Care assumed by the admitting physician at this time.     [AR]      ED Course User Index  [AR] Diamond Basurto, KYLIE           AS OF 06:24 EDT VITALS:    BP - 130/85  HR - 64  TEMP - 98.8 °F (37.1 °C) (Oral)  O2 SATS - 91%      MDM:  Patient is a 74-year-old female presents emergency department with complaints of low back pain that has increased over the last 4 days.  She did not report left leg weakness when attempting to take a step.  Overall physical exam is benign except for mild midline and left paraspinal tenderness to palpation.  Labs unremarkable.  CT lumbar shows a grade 1 anterior listhesis at L4-L5 with disc protrusion, there is severe levels of stenosis.  Patient will be admitted for pain control and MRI of the lumbar spine.      COMPLEXITY OF CARE  The patient requires admission.        DIAGNOSIS  Final diagnoses:   Acute left-sided low back pain, unspecified whether sciatica present         DISPOSITION  ED Disposition       ED Disposition   Decision to Admit    Condition   --    Comment   Level of Care: Observation Unit [28]   Diagnosis: Lumbar back pain with radiculopathy affecting left lower extremity [[id number]]   Admitting Physician: BRIANNE GODOY [[id number]]   Attending Physician: BRIANNE GODOY [[id number]]                    Please note that portions of this document were completed with a voice recognition program.    Note Disclaimer: At [facility], we believe that sharing information builds trust and better relationships. You are receiving this note because you recently visited [facility]. It is possible you will see  health information before a provider has talked with you about it. This kind of information can be easy to misunderstand. To help you fully understand what it means for your health, we urge you to discuss this note with your provider.     Diamond Basurto, KYLIE  04/22/24 0626

## 2024-04-22 NOTE — PLAN OF CARE
Goal Outcome Evaluation:  Plan of Care Reviewed With: patient              Patient is a 74 y.o. female admitted to [facility] for low back pain on 4/22/2024. Imaging revealed moderate canal stenosis of L2-5 along with moderate to severe lateral recess stenosis. Plan for epidural tomorrow AM. PMHx includes GERD, HTN, breast CA, depression. Patient is ind at baseline without AD and grandchild lives with her. She recently has carpal tunnel surgery on RUE. Today, patient required CGA for STS and ambulated 10ftx2 using HHA requiring CGA-Shasha. Antalgic gait and increased pain with ambulation. Sitting rest break  between trials. Strength, pain, activity tolerance, balance deficits noted. Patient may benefit from skilled PT services to address functional deficits and improve level of independence prior to discharge. Anticipate home with assist and OP PT vs HHPT upon DC.    Anticipated Discharge Disposition (PT): home with home health, home with outpatient therapy services, home with assist

## 2024-04-22 NOTE — CONSULTS
[facility] NEUROSURGERY CONSULT NOTE    Patient name: Alesia Resendez  Referring Provider: Sherrie Roach PA-C   Reason for Consultation: Intractable left lumbar radiculopathy    Patient Care Team:  Epley, James, APRN as PCP - General (Family Medicine)  Virgil Moeller II, MD as Consulting Physician (Radiology)  Diallo Eisenberg MD as Referring Physician (Breast Surgery)  Peyton Keyes MD PhD as Consulting Physician (Hematology and Oncology)  Rimma Keller APRN as Nurse Practitioner (Nurse Practitioner)    Chief complaint: Low back pain with left lower extremity radiculopathy and weakness    Subjective .     History of present illness:    Patient is a 74 y.o.  female with chronic back pain, hypertension, hyperlipidemia breast cancer status post lobectomy who presents to [facility] ED with acute back pain which radiates down the anterior left leg and into the left groin.  She notes that this began approximately 4 days ago after standing up from a chair.  She denies any significant injury but states she has been more active lately.  She currently reports weakness in the left lower extremity as well as numbness in the left thigh.  She denies saddle anesthesia as well as bowel or bladder incontinence/retention.    Review of Systems  Review of Systems   Gastrointestinal:         Denies bowel issues   Genitourinary:  Negative for enuresis.   Musculoskeletal:  Positive for back pain and gait problem (Due to pain and weakness).   Neurological:  Positive for weakness and numbness.       History  PAST MEDICAL HISTORY  Past Medical History:   Diagnosis Date    Anxiety     Arthritis     Breast cancer 2017    LEFT    Carpal tunnel syndrome of right wrist     Chronic pain disorder 1990’s    Colon polyp 11/22    Removed    Depression 2017    Dermatochalasis of both eyelids     Fibromyalgia, primary 2023    GERD (gastroesophageal reflux disease)     History of radiation therapy 2006    LT BREAST    History of skin  cancer     SQUAMOUS CELL REMOVED FROM LT UPPER LIP AND RT THUMB    Hyperlipidemia 2005    Hypertension     Irritable bowel syndrome In my 40’s    Low back pain 2015    Lumbosacral disc disease 2012    Obesity     Osteoarthritis 1990    Ptosis of both eyebrows     Spinal stenosis 2012    Viral syndrome 02/26/2018       PAST SURGICAL HISTORY  Past Surgical History:   Procedure Laterality Date    ANAL FISSURECTOMY  2003    APPENDECTOMY  2014    BLEPHAROPLASTY Bilateral 05/11/2023    Procedure: BILATERAL UPPER LID BLEPHAROPLASTY;  Surgeon: Nicola Rowley MD;  Location: [facility] MAIN OR;  Service: Ophthalmology;  Laterality: Bilateral;    BREAST LUMPECTOMY W/ NEEDLE LOCALIZATION Left 2006    BREAST SURGERY  2006 and 2017    breast cancer    BROW LIFT Bilateral 05/11/2023    Procedure: BILATERAL TEMPORAL DIRECT BROWLIFT;  Surgeon: Nicola Rowley MD;  Location: [facility] MAIN OR;  Service: Ophthalmology;  Laterality: Bilateral;    CATARACT EXTRACTION Bilateral 07/2018    CHOLECYSTECTOMY  2007    COLONOSCOPY  2014    COLONOSCOPY Left 05/27/2021    Procedure: COLONOSCOPY;  Surgeon: Sridhar James MD;  Location: [facility] MAIN OR;  Service: Gastroenterology;  Laterality: Left;    COLONOSCOPY N/A 10/10/2022    Procedure: COLONOSCOPY FOR SCREENING;  Surgeon: Sridhar James MD;  Location: [facility] MA[facility];  Service: Gastroenterology;  Laterality: N/A;  NORMAL    ENDOSCOPY N/A 10/10/2022    Procedure: ESOPHAGOGASTRODUODENOSCOPY WITH BIOPSY;  Surgeon: Sridhar James MD;  Location: [facility];  Service: Gastroenterology;  Laterality: N/A;  GASTRIC POLYPS, IRREGULAR Z LINE    EPIDURAL Left 06/21/2023    Procedure: LUMBAR/SACRAL TRANSFORAMINAL EPIDURAL LEFT L4-5  09606;  Surgeon: Estrellita Sheppard MD;  Location: [facility] MAIN OR;  Service: Pain Management;  Laterality: Left;    EPIDURAL Bilateral 3/18/2024    Procedure: LUMBAR/SACRAL TRANSFORAMINAL EPIDURAL BILATERAL L4-5 02101-89;  Surgeon: Estrellita Sheppard MD;   Location: [facility];  Service: Pain Management;  Laterality: Bilateral;    EPIDURAL BLOCK  2020    HYSTERECTOMY  1987    JOINT REPLACEMENT      KNEE SURGERY Right 2014    Knee cap replaced    KNEE SURGERY Left 12/06/2022    joint replacement    MASTECTOMY      MASTECTOMY W/ SENTINEL NODE BIOPSY Bilateral 08/21/2017    Procedure: BILATEREAL BREAST MASTECTOMY WITH SENTINEL NODE BIOPSY ON THE LEFT The sentinel lymph node biopsy will only be performed on the left side;  Surgeon: Diallo Eisenberg MD;  [place] [facility] [place]  Service:     MEDIAL BRANCH BLOCK Bilateral 03/20/2023    Procedure: LUMBAR MEDIAL BRANCH BLOCK BILATERAL L3-L5 #1  85129, 55351;  Surgeon: Estrellita Sheppard MD;  Location: [facility];  Service: Pain Management;  Laterality: Bilateral;    OOPHORECTOMY Bilateral 2000    BSO    ORTHOPEDIC SURGERY  2000 and 2001    Knee replacements    PIRIFORMUS INJECTION Left 07/19/2021    Procedure: Left piriformis injection;  Surgeon: Estrellita Sheppard MD;  Location: [facility];  Service: Pain Management;  Laterality: Left;    PIRIFORMUS INJECTION Left 01/03/2022    Procedure: PIRIFORMIS INJECTION - Left;  Surgeon: Estrellita Sheppard MD;  Location: [facility];  Service: Pain Management;  Laterality: Left;    PIRIFORMUS INJECTION Left 01/18/2023    Procedure: PIRIFORMIS INJECTION Left;  Surgeon: Estrellita Sheppard MD;  Location: [facility];  Service: Pain Management;  Laterality: Left;    PIRIFORMUS INJECTION Left 05/01/2023    Procedure: PIRIFORMIS INJECTION;  Surgeon: Estrellita Sheppard MD;  Location: [facility];  Service: Pain Management;  Laterality: Left;    POSTERIOR REPAIR  05/17/2016    with enterocele repair, midurethral sling, perineoplasty    SACROILIAC JOINT INJECTION Left 05/01/2023    Procedure: SACROILIAC JOINT INJECTION AND PIRIFORMIS INJECTION LEFT  10795;  Surgeon: Estrellita Sheppard MD;  Location: [facility];  Service: Pain Management;  Laterality: Left;    SKIN BIOPSY       TONSILLECTOMY  1970    TOTAL KNEE ARTHROPLASTY Bilateral ,     UPPER GASTROINTESTINAL ENDOSCOPY  10/10/2022       FAMILY HISTORY  Family History   Problem Relation Age of Onset    Heart attack Mother     Esophageal cancer Mother 74    Stomach cancer Mother     Heart disease Mother         triple bypass    Hypertension Mother     Cancer Mother             Coronary artery disease Mother     Colon polyps Father     Alcohol abuse Father             Diabetes Father         type 2    Heart attack Father     Heart failure Father     Hyperlipidemia Father     Hypertension Father     Prostate cancer Father 85    Clotting disorder Father     Heart disease Father         quadruple bypass    Arthritis Father     Stroke Father     Coronary artery disease Father     Birth defects Brother     Heart attack Brother     Heart disease Brother     Hypertension Brother     Crohn's disease Daughter     Clotting disorder Daughter     Learning disabilities Brother         Mentally retarded    Malig Hyperthermia Neg Hx     Colon cancer Neg Hx     Inflammatory bowel disease Neg Hx     Ulcerative colitis Neg Hx        SOCIAL HISTORY  Social History     Tobacco Use    Smoking status: Never    Smokeless tobacco: Never   Vaping Use    Vaping status: Never Used   Substance Use Topics    Alcohol use: No    Drug use: No         Allergies:  Morphine    MEDICATIONS:  Medications Prior to Admission   Medication Sig Dispense Refill Last Dose    amLODIPine (NORVASC) 10 MG tablet TAKE 1 TABLET BY MOUTH DAILY 90 tablet 3 2024    atenolol (TENORMIN) 25 MG tablet TAKE 1 TABLET BY MOUTH DAILY 90 tablet 3 2024    atorvastatin (LIPITOR) 20 MG tablet TAKE 1 TABLET BY MOUTH ONCE  DAILY 90 tablet 3 2024    buPROPion XL (WELLBUTRIN XL) 150 MG 24 hr tablet TAKE 1 TABLET BY MOUTH DAILY 90 tablet 3 2024    celecoxib (CeleBREX) 200 MG capsule TAKE 1 CAPSULE BY MOUTH DAILY 90 capsule 1 2024    escitalopram (LEXAPRO) 5 MG  tablet TAKE 1 TABLET BY MOUTH DAILY 90 tablet 3 4/21/2024    hydroCHLOROthiazide (HYDRODIURIL) 25 MG tablet TAKE 1 TABLET BY MOUTH DAILY 90 tablet 3 4/21/2024    HYDROcodone-acetaminophen (NORCO) 5-325 MG per tablet Take 1 tablet by mouth Every 6 (Six) Hours As Needed for Mild Pain.       losartan (COZAAR) 25 MG tablet TAKE 1 TABLET BY MOUTH DAILY 90 tablet 3 4/21/2024    methylPREDNISolone (MEDROL) 4 MG dose pack Take as directed on package instructions. 21 tablet 0 4/21/2024    omeprazole (priLOSEC) 20 MG capsule TAKE 1 CAPSULE BY MOUTH DAILY 90 capsule 3 4/21/2024    oxybutynin (DITROPAN) 5 MG tablet TAKE 1 TABLET BY MOUTH  TWICE DAILY (Patient taking differently: Daily.) 180 tablet 3 4/21/2024    pregabalin (LYRICA) 25 MG capsule TAKE 1 CAPSULE BY MOUTH 3 TIMES  DAILY 90 capsule 0 4/21/2024    fluticasone (FLONASE) 50 MCG/ACT nasal spray 2 sprays into the nostril(s) as directed by provider Daily. 48 mL 3        Objective     Results Review:  LABS:    Admission on 04/22/2024   Component Date Value Ref Range Status    Glucose 04/22/2024 139 (H)  65 - 99 mg/dL Final    BUN 04/22/2024 17  8 - 23 mg/dL Final    Creatinine 04/22/2024 0.61  0.57 - 1.00 mg/dL Final    Sodium 04/22/2024 139  136 - 145 mmol/L Final    Potassium 04/22/2024 3.6  3.5 - 5.2 mmol/L Final    Chloride 04/22/2024 100  98 - 107 mmol/L Final    CO2 04/22/2024 24.9  22.0 - 29.0 mmol/L Final    Calcium 04/22/2024 9.4  8.6 - 10.5 mg/dL Final    Total Protein 04/22/2024 6.5  6.0 - 8.5 g/dL Final    Albumin 04/22/2024 4.1  3.5 - 5.2 g/dL Final    ALT (SGPT) 04/22/2024 20  1 - 33 U/L Final    AST (SGOT) 04/22/2024 23  1 - 32 U/L Final    Alkaline Phosphatase 04/22/2024 91  39 - 117 U/L Final    Total Bilirubin 04/22/2024 0.4  0.0 - 1.2 mg/dL Final    Globulin 04/22/2024 2.4  gm/dL Final    A/G Ratio 04/22/2024 1.7  g/dL Final    BUN/Creatinine Ratio 04/22/2024 27.9 (H)  7.0 - 25.0 Final    Anion Gap 04/22/2024 14.1  5.0 - 15.0 mmol/L Final    eGFR  04/22/2024 93.9  >60.0 mL/min/1.73 Final    WBC 04/22/2024 8.67  3.40 - 10.80 10*3/mm3 Final    RBC 04/22/2024 5.21  3.77 - 5.28 10*6/mm3 Final    Hemoglobin 04/22/2024 14.9  12.0 - 15.9 g/dL Final    Hematocrit 04/22/2024 44.9  34.0 - 46.6 % Final    MCV 04/22/2024 86.2  79.0 - 97.0 fL Final    MCH 04/22/2024 28.6  26.6 - 33.0 pg Final    MCHC 04/22/2024 33.2  31.5 - 35.7 g/dL Final    RDW 04/22/2024 13.7  12.3 - 15.4 % Final    RDW-SD 04/22/2024 42.7  37.0 - 54.0 fl Final    MPV 04/22/2024 8.8  6.0 - 12.0 fL Final    Platelets 04/22/2024 266  140 - 450 10*3/mm3 Final    Neutrophil % 04/22/2024 59.6  42.7 - 76.0 % Final    Lymphocyte % 04/22/2024 32.2  19.6 - 45.3 % Final    Monocyte % 04/22/2024 6.5  5.0 - 12.0 % Final    Eosinophil % 04/22/2024 0.8  0.3 - 6.2 % Final    Basophil % 04/22/2024 0.7  0.0 - 1.5 % Final    Immature Grans % 04/22/2024 0.2  0.0 - 0.5 % Final    Neutrophils, Absolute 04/22/2024 5.17  1.70 - 7.00 10*3/mm3 Final    Lymphocytes, Absolute 04/22/2024 2.79  0.70 - 3.10 10*3/mm3 Final    Monocytes, Absolute 04/22/2024 0.56  0.10 - 0.90 10*3/mm3 Final    Eosinophils, Absolute 04/22/2024 0.07  0.00 - 0.40 10*3/mm3 Final    Basophils, Absolute 04/22/2024 0.06  0.00 - 0.20 10*3/mm3 Final    Immature Grans, Absolute 04/22/2024 0.02  0.00 - 0.05 10*3/mm3 Final    nRBC 04/22/2024 0.0  0.0 - 0.2 /100 WBC Final       DIAGNOSTICS:   MRI lumbar spine with and without contrast 4/22/2024:  FINDINGS: There is moderate-to-severe loss of disc height at L5-S1,  grade 1 anterolisthesis of L4 upon L5 and prominence of the posterior  aspect of the disc and endplates at T12-L1, L2-3 and L3-L4.      T12-L1: A mild broad-based disc osteophyte complex is present with no  evidence of herniation.     L1-L2:  A mild broad-based disc osteophyte complex is present resulting  in mild flattening of the ventral surface of the thecal sac.     L2-L3: A broad-based disc osteophyte complex is present which is more  prominent to  the left of midline where there is a superimposed disc  protrusion. This results in moderate-to-severe if not severe lateral  recess narrowing on the left. Mild foraminal stenosis is present on the  left secondary to extension of a disc osteophyte complex into the neural  foramen. Disc material approaches the left L2 nerve as it exits the  neural foramen.     L3-L4: There is moderate canal stenosis secondary to mild-to-moderate  facet and ligamentum flavum hypertrophy and a central disc osteophyte  complex which also includes a central disc protrusion. There is mild and  moderate foraminal stenosis on the right and left respectively. Moderate  lateral recess narrowing is present on the right. Disc material  approaches but does not definitively involve the left L3 nerve within  the neural foramen.     L4-L5: There is fusion of the facets bilaterally. Moderate foraminal  stenosis and moderate-to-severe lateral recess narrowing is present  bilaterally secondary to anterolisthesis of L5 upon S1, loss of disc  height and extension of disc material into the neural foramen.     L5-S1: Moderate-to-severe facet degenerative disease is present  bilaterally. There is moderate and moderate-to-severe foraminal stenosis  on the right and left respectively secondary to loss of disc height and  extension of a disc osteophyte complex into the neural foramen. There is  mild canal stenosis centrally secondary to posterior element  degenerative disease and a broad-based disc osteophyte complex. There is  moderate-to-severe lateral recess narrowing bilaterally.     IMPRESSION:  Multilevel degenerative disease involving the lumbar spine  as described in detail above with no evidence of a focal disc  herniation. This includes fusion of the facets bilaterally at L4-L5,  loss of disc height at L5-S1, anterolisthesis of L4 upon L5 and  multilevel disc osteophyte complexes. Spinal stenosis and lateral recess  narrowing is most prominent at  L4-L5 where there is moderate central  canal stenosis and moderate-to-severe lateral recess narrowing  bilaterally. There is also moderate-to-severe if not severe lateral  recess narrowing to the left at L2-L3 and moderate canal stenosis and  lateral recess narrowing bilaterally at L3-L4. Multilevel facet  degenerative disease is appreciated as well as multilevel foraminal  stenosis. Foraminal stenosis on the left is most prominent at L3-L4 and  L5-S1 and to the right at L4-L5. See above.    I personally reviewed the imaging of the MRI lumbar spine with without contrast on 4/22/2024 and discussed with the patient.  I also reviewed and compared MRI of the lumbar spine from today from that of 4/25/2023.  Results Review:   I reviewed the patient's new clinical results.  I personally viewed and interpreted the patient's labs, imaging study, medications and chart.    Vital Signs   Temp:  [97.7 °F (36.5 °C)-98.8 °F (37.1 °C)] 98.1 °F (36.7 °C)  Heart Rate:  [64-80] 75  Resp:  [16-20] 17  BP: (130-146)/(73-94) 144/88    Physical Exam:  Physical Exam  Vitals reviewed.   Constitutional:       General: She is not in acute distress.     Appearance: Normal appearance. She is not ill-appearing, toxic-appearing or diaphoretic.   HENT:      Head: Normocephalic.      Nose: Nose normal.      Mouth/Throat:      Mouth: Mucous membranes are moist.      Pharynx: Oropharynx is clear.   Eyes:      Extraocular Movements: Extraocular movements intact.      Conjunctiva/sclera: Conjunctivae normal.   Cardiovascular:      Rate and Rhythm: Normal rate.   Pulmonary:      Effort: Pulmonary effort is normal.   Musculoskeletal:      Cervical back: Normal range of motion.      Lumbar back: Positive left straight leg raise test. Negative right straight leg raise test.   Skin:     General: Skin is warm.   Neurological:      Mental Status: She is alert and oriented to person, place, and time.      Deep Tendon Reflexes:      Reflex Scores:        Patellar reflexes are 1+ on the right side and 1+ on the left side.       Achilles reflexes are 1+ on the right side and 1+ on the left side.  Psychiatric:         Mood and Affect: Mood normal.         Speech: Speech normal.         Behavior: Behavior normal.         Thought Content: Thought content normal.         Judgment: Judgment normal.       Neurologic Exam     Mental Status   Oriented to person, place, and time.   Attention: normal. Concentration: normal.   Speech: speech is normal   Level of consciousness: alert  Knowledge: consistent with education.     Motor Exam   Muscle bulk: normal  Overall muscle tone: normal  Right leg tone: normal  Left leg tone: normal    Strength   Right iliopsoas: 5/5  Left iliopsoas: 4/5  Right quadriceps: 5/5  Left quadriceps: 4/5  Right hamstrin/5  Left hamstrin/5  Right anterior tibial: 5/5  Left anterior tibial: 5/5  Right posterior tibial: 5/5  Left posterior tibial: 5/5  Right gastroc: 5/5  Left gastroc: 5/5    Sensory Exam   Reports left anterior thigh numbness     Gait, Coordination, and Reflexes     Gait  Gait: (Gait deferred due to pain)    Reflexes   Right patellar: 1+  Left patellar: 1+  Right achilles: 1+  Left achilles: 1+  Right ankle clonus: absent  Left ankle clonus: absent      Assessment & Plan       Low back pain    Lumbar back pain with radiculopathy affecting left lower extremity    Lumbar back pain with radiculopathy affecting left lower extremity    Weakness of left lower extremity      74-year-old female with acute on chronic low back pain now with 4-day history of low back pain that radiates into the left thigh and groin.  She also has some left anterior lower extremity weakness.  MRI of the lumbar spine shows at least moderate canal stenosis of L2-5 along with moderate to severe lateral recess stenosis.  Current findings currently show progression of changes from last MRI approximately 1 year ago.  Given these findings and current exam, I  "discussed the risks and benefits of an epidural steroid injection and she was agreeable to proceed.  Neurosurgery will check back to see if she receives any benefit.  If she does not receive any improvement, she will likely need to discuss surgical options with Dr. Garvey.       PLAN:       Pain management referral for epidural steroid injection 4/22  Continue IV steroids  Continue pain management efforts        I discussed the patient's findings and my recommendations with patient, family, nursing staff, and Dr. Garvey.     During patient visit, I utilized appropriate personal protective equipment including mask and gloves.  Mask used was standard procedure mask. Appropriate PPE was worn during the entire visit.  Hand hygiene was completed before and after.     Fran Almeida, APRN  04/22/24  13:25 EDT    \"Dictated utilizing Dragon dictation\".    "

## 2024-04-22 NOTE — THERAPY EVALUATION
Patient Name: Alesia Resendez  : 1950    MRN: [id number]                              Today's Date: 2024       Admit Date: 2024    Visit Dx:     ICD-10-CM ICD-9-CM   1. Acute left-sided low back pain, unspecified whether sciatica present  M54.50 724.2     Patient Active Problem List   Diagnosis    Arthritis    Atrophic vaginitis    Depression    Gastroesophageal reflux disease without esophagitis    Mixed hyperlipidemia    Essential hypertension    Menopause present    Overactive bladder    History of colonic polyps    Rectal hemorrhage    Proctocele    Encounter for screening for malignant neoplasm of colon    Stress incontinence in female    Recurrent breast cancer    Viral syndrome    Chronic fatigue    Snoring    Tremor    Community acquired pneumonia    Chronic right-sided low back pain with sciatica    Elevated liver function tests    Piriformis syndrome of left side    Myofasciitis    History of left breast cancer    Diarrhea    Family history of esophageal cancer    Spondylosis of lumbar region without myelopathy or radiculopathy    Cervical stenosis of spinal canal    Cervical spondylosis without myelopathy    Prediabetes    Sacroiliitis    Spinal stenosis of lumbar region    Lumbar radiculopathy    Degeneration of lumbar or lumbosacral intervertebral disc    Class 2 severe obesity due to excess calories with serious comorbidity and body mass index (BMI) of 36.0 to 36.9 in adult    Low back pain    Lumbar back pain with radiculopathy affecting left lower extremity    Lumbar back pain with radiculopathy affecting left lower extremity    Weakness of left lower extremity     Past Medical History:   Diagnosis Date    Anxiety     Arthritis     Breast cancer 2017    LEFT    Carpal tunnel syndrome of right wrist     Chronic pain disorder     Colon polyp     Removed    Depression 2017    Dermatochalasis of both eyelids     Fibromyalgia, primary     GERD (gastroesophageal reflux  disease)     History of radiation therapy 2006    LT BREAST    History of skin cancer     SQUAMOUS CELL REMOVED FROM LT UPPER LIP AND RT THUMB    Hyperlipidemia 2005    Hypertension     Irritable bowel syndrome In my 40’s    Low back pain 2015    Lumbosacral disc disease 2012    Obesity     Osteoarthritis 1990    Ptosis of both eyebrows     Spinal stenosis 2012    Viral syndrome 02/26/2018     Past Surgical History:   Procedure Laterality Date    ANAL FISSURECTOMY  2003    APPENDECTOMY  2014    BLEPHAROPLASTY Bilateral 05/11/2023    Procedure: BILATERAL UPPER LID BLEPHAROPLASTY;  Surgeon: Nicola Rowley MD;  [facility];  Service: Ophthalmology;  Laterality: Bilateral;    BREAST LUMPECTOMY W/ NEEDLE LOCALIZATION Left 2006    BREAST SURGERY  2006 and 2017    breast cancer    BROW LIFT Bilateral 05/11/2023    Procedure: BILATERAL TEMPORAL DIRECT BROWLIFT;  Surgeon: Nicola Rowley MD;  Location: [facility] MAIN OR;  Service: Ophthalmology;  Laterality: Bilateral;    CATARACT EXTRACTION Bilateral 07/2018    CHOLECYSTECTOMY  2007    COLONOSCOPY  2014    COLONOSCOPY Left 05/27/2021    Procedure: COLONOSCOPY;  Surgeon: Sridhar James MD;  Location: [facility] MAIN OR;  Service: Gastroenterology;  Laterality: Left;    COLONOSCOPY N/A 10/10/2022    Procedure: COLONOSCOPY FOR SCREENING;  Surgeon: Sridhar James MD;  Location[facility];  Service: Gastroenterology;  Laterality: N/A;  NORMAL    ENDOSCOPY N/A 10/10/2022    Procedure: ESOPHAGOGASTRODUODENOSCOPY WITH BIOPSY;  Surgeon: Sridhar James MD;  Location: [facility];  Service: Gastroenterology;  Laterality: N/A;  GASTRIC POLYPS, IRREGULAR Z LINE    EPIDURAL Left 06/21/2023    Procedure: LUMBAR/SACRAL TRANSFORAMINAL EPIDURAL LEFT L4-5  57061;  Surgeon: Estrellita Sheppard MD;  Location: [facility] MAIN OR;  Service: Pain Management;  Laterality: Left;    EPIDURAL Bilateral 3/18/2024    Procedure: LUMBAR/SACRAL TRANSFORAMINAL EPIDURAL  BILATERAL L4-5 17643-55;  Surgeon: Estrellita Sheppard MD;  Location: [facility];  Service: Pain Management;  Laterality: Bilateral;    EPIDURAL BLOCK  2020    HYSTERECTOMY  1987    JOINT REPLACEMENT      KNEE SURGERY Right 2014    Knee cap replaced    KNEE SURGERY Left 12/06/2022    joint replacement    MASTECTOMY      MASTECTOMY W/ SENTINEL NODE BIOPSY Bilateral 08/21/2017    Procedure: BILATEREAL BREAST MASTECTOMY WITH SENTINEL NODE BIOPSY ON THE LEFT The sentinel lymph node biopsy will only be performed on the left side;  Surgeon: Diallo Eisenberg MD;  [place] [facility]  Service:     MEDIAL BRANCH BLOCK Bilateral 03/20/2023    Procedure: LUMBAR MEDIAL BRANCH BLOCK BILATERAL L3-L5 #1  11704, 77394;  Surgeon: Estrellita Sheppard MD;  Location: [facility];  Service: Pain Management;  Laterality: Bilateral;    OOPHORECTOMY Bilateral 2000    BSO    ORTHOPEDIC SURGERY  2000 and 2001    Knee replacements    PIRIFORMUS INJECTION Left 07/19/2021    Procedure: Left piriformis injection;  Surgeon: Estrellita Sheppard MD;  Location: [facility];  Service: Pain Management;  Laterality: Left;    PIRIFORMUS INJECTION Left 01/03/2022    Procedure: PIRIFORMIS INJECTION - Left;  Surgeon: Estrellita Sheppard MD;  Location: [facility];  Service: Pain Management;  Laterality: Left;    PIRIFORMUS INJECTION Left 01/18/2023    Procedure: PIRIFORMIS INJECTION Left;  Surgeon: Estrellita Sheppard MD;  Location: [facility];  Service: Pain Management;  Laterality: Left;    PIRIFORMUS INJECTION Left 05/01/2023    Procedure: PIRIFORMIS INJECTION;  Surgeon: Estrellita Sheppard MD;  Location: [facility];  Service: Pain Management;  Laterality: Left;    POSTERIOR REPAIR  05/17/2016    with enterocele repair, midurethral sling, perineoplasty    SACROILIAC JOINT INJECTION Left 05/01/2023    Procedure: SACROILIAC JOINT INJECTION AND PIRIFORMIS INJECTION LEFT  68275;  Surgeon: Estrellita Sheppard MD;  Location: [facility];  Service: Pain  Management;  Laterality: Left;    SKIN BIOPSY      TONSILLECTOMY  1970    TOTAL KNEE ARTHROPLASTY Bilateral 2000, 2001    UPPER GASTROINTESTINAL ENDOSCOPY  10/10/2022      General Information       Row Name 04/22/24 1359          Physical Therapy Time and Intention    Document Type evaluation  -CB     Mode of Treatment individual therapy;physical therapy  -CB       Row Name 04/22/24 1359          General Information    Patient Profile Reviewed yes  -CB     Prior Level of Function independent:;gait;transfer;bed mobility  -CB     Existing Precautions/Restrictions fall  -CB     Barriers to Rehab none identified  -CB       Row Name 04/22/24 1359          Living Environment    People in Home grandchild(helen)  -CB       Row Name 04/22/24 1359          Home Main Entrance    Number of Stairs, Main Entrance none  -CB       Row Name 04/22/24 1359          Cognition    Orientation Status (Cognition) oriented x 4  -CB               User Key  (r) = Recorded By, (t) = Taken By, (c) = Cosigned By      Initials Name Provider Type    CB Kimmie Baires, PT Physical Therapist                   Mobility       Row Name 04/22/24 1547          Bed Mobility    Comment, (Bed Mobility) UIC  -CB       Row Name 04/22/24 1547          Sit-Stand Transfer    Sit-Stand [place] (Transfers) contact guard;verbal cues  -CB     Assistive Device (Sit-Stand Transfers) --  no AD  -CB       Row Name 04/22/24 1547          Gait/Stairs (Locomotion)    [place] Level (Gait) contact guard;minimum assist (75% patient effort);verbal cues  -CB     Assistive Device (Gait) --  HHA on LUE  -CB     Distance in Feet (Gait) 10  x2  -CB     Deviations/Abnormal Patterns (Gait) gait speed decreased;stride length decreased;antalgic  -CB     Comment, (Gait/Stairs) decreased stance time on LLE  -CB               User Key  (r) = Recorded By, (t) = Taken By, (c) = Cosigned By      Initials Name Provider Type    Kimmie Monroy, PT Physical Therapist                    Obj/Interventions       [facility] Name 04/22/24 1547          Range of Motion Comprehensive    General Range of Motion bilateral lower extremity ROM WFL  -CB       [facility] Name 04/22/24 1547          Strength Comprehensive (MMT)    Comment, General Manual Muscle Testing (MMT) Assessment LLE limited by pain 3+/5; L hip flexion 2+/5; RLE 4+/5  -CB       [facility] Name 04/22/24 1547          Balance    Balance Assessment standing static balance;standing dynamic balance  -CB     Static Standing Balance contact guard  -CB     Dynamic Standing Balance contact guard;minimal assist  -CB     Position/Device Used, Standing Balance supported  -CB       Row Name 04/22/24 1547          Sensory Assessment (Somatosensory)    Sensory Assessment (Somatosensory) --  decreased sensati, numbness  in L1-L3 dermatome pattern  -CB               User Key  (r) = Recorded By, (t) = Taken By, (c) = Cosigned By      Initials Name Provider Type    Kimmie Monroy, PT Physical Therapist                   Goals/Plan       Row Name 04/22/24 1550          Bed Mobility Goal 1 (PT)    Activity/Assistive Device (Bed Mobility Goal 1, PT) bed mobility activities, all  -CB     [place] Level/Cues Needed (Bed Mobility Goal 1, PT) modified independence  -CB     Time Frame (Bed Mobility Goal 1, PT) long term goal (LTG);1 week  -CB       Row Name 04/22/24 1550          Transfer Goal 1 (PT)    Activity/Assistive Device (Transfer Goal 1, PT) sit-to-stand/stand-to-sit;bed-to-chair/chair-to-bed  -CB     [place] Level/Cues Needed (Transfer Goal 1, PT) standby assist  -CB     Time Frame (Transfer Goal 1, PT) long term goal (LTG);1 week  -CB       Row Name 04/22/24 1550          Gait Training Goal 1 (PT)    Activity/Assistive Device (Gait Training Goal 1, PT) gait (walking locomotion);improve balance and speed;increase endurance/gait distance;decrease fall risk;diminish gait deviation  -CB     [place] Level (Gait Training Goal 1, PT) standby assist  -CB      Distance (Gait Training Goal 1, PT) 100ft  -CB     Time Frame (Gait Training Goal 1, PT) long term goal (LTG);1 week  -CB       Row Name 04/22/24 8199          Therapy Assessment/Plan (PT)    Planned Therapy Interventions (PT) balance training;bed mobility training;gait training;home exercise program;patient/family education;transfer training;strengthening  -CB               User Key  (r) = Recorded By, (t) = Taken By, (c) = Cosigned By      Initials Name Provider Type    Kimmie Monroy, PT Physical Therapist                   Clinical Impression       Row Name 04/22/24 1543          Pain    Pretreatment Pain Rating 7/10  -CB     Posttreatment Pain Rating 8/10  -CB     Pain Location lower  -CB     Pain Location - back  -CB     Pain Intervention(s) Repositioned;Rest  -CB       Row Name 04/22/24 3743          Plan of Care Review    Plan of Care Reviewed With patient  -CB     Outcome Evaluation Patient is a 74 y.o. female admitted to [facility] for low back pain on 4/22/2024. Imaging revealed moderate canal stenosis of L2-5 along with moderate to severe lateral recess stenosis. Plan for epidural tomorrow AM. PMHx includes GERD, HTN, breast CA, depression. Patient is ind at baseline without AD and grandchild lives with her. She recently has carpal tunnel surgery on RUE. Today, patient required CGA for STS and ambulated 10ftx2 using HHA requiring CGA-Shasha. Antalgic gait and increased pain with ambulation. Sitting rest break  between trials. Strength, pain, activity tolerance, balance deficits noted. Patient may benefit from skilled PT services to address functional deficits and improve level of independence prior to discharge. Anticipate home with assist and OP PT vs HHPT upon DC.  -CB       Row Name 04/22/24 0423          Therapy Assessment/Plan (PT)    Rehab Potential (PT) good, to achieve stated therapy goals  -CB     Criteria for Skilled Interventions Met (PT) yes  -CB     Therapy Frequency (PT) 5 times/wk  -CB       Row  Name 04/22/24 1548          Positioning and Restraints    Pre-Treatment Position sitting in chair/recliner  -CB     Post Treatment Position chair  -CB     In Chair notified nsg;sitting;call light within reach;encouraged to call for assist;with family/caregiver  -CB               User Key  (r) = Recorded By, (t) = Taken By, (c) = Cosigned By      Initials Name Provider Type    Kimmie Monroy, PT Physical Therapist                   Outcome Measures       Row Name 04/22/24 1550 04/22/24 1220       How much help from another person do you currently need...    Turning from your back to your side while in flat bed without using bedrails? 3  -CB 4  -CG    Moving from lying on back to sitting on the side of a flat bed without bedrails? 3  -CB 3  -CG    Moving to and from a bed to a chair (including a wheelchair)? 3  -CB 3  -CG    Standing up from a chair using your arms (e.g., wheelchair, bedside chair)? 3  -CB 4  -CG    Climbing 3-5 steps with a railing? 2  -CB 3  -CG    To walk in hospital room? 2  -CB 3  -CG    AM-PAC 6 Clicks Score (PT) 16  -CB 20  -CG    Highest Level of Mobility Goal 5 --> Static standing  -CB 6 --> Walk 10 steps or more  -CG      Row Name 04/22/24 0839 04/22/24 0800       How much help from another person do you currently need...    Turning from your back to your side while in flat bed without using bedrails? 3  -TM 3  -TM    Moving from lying on back to sitting on the side of a flat bed without bedrails? 2  -TM 2  -TM    Moving to and from a bed to a chair (including a wheelchair)? 2  -TM 2  -TM    Standing up from a chair using your arms (e.g., wheelchair, bedside chair)? 2  -TM 2  -TM    Climbing 3-5 steps with a railing? 2  -TM 2  -TM    To walk in hospital room? 3  -TM 3  -TM    AM-PAC 6 Clicks Score (PT) 14  -TM 14  -TM    Highest Level of Mobility Goal 4 --> Transfer to chair/commode  -TM 4 --> Transfer to chair/commode  -TM      Row Name 04/22/24 1550          Functional Assessment     Outcome Measure Options AM-PAC 6 Clicks Basic Mobility (PT)  -CB               User Key  (r) = Recorded By, (t) = Taken By, (c) = Cosigned By      Initials Name Provider Type    TM Susana Gamboa, RN Registered Nurse    CB Kimmie Baires, PT Physical Therapist    Elías Sanchez RN Registered Nurse                                 Physical Therapy Education       Title: PT OT SLP Therapies (In Progress)       Topic: Physical Therapy (In Progress)       Point: Mobility training (Done)       Learning Progress Summary             Patient Acceptance, E,TB, VU,NR by CB at 4/22/2024 1551                         Point: Home exercise program (Not Started)       Learner Progress:  Not documented in this visit.              Point: Body mechanics (Done)       Learning Progress Summary             Patient Acceptance, E,TB, VU,NR by CB at 4/22/2024 1551                         Point: Precautions (Done)       Learning Progress Summary             Patient Acceptance, E,TB, VU,NR by CB at 4/22/2024 1551                                         User Key       Initials Effective Dates Name Provider Type Discipline    CB 10/22/21 -  Kimmie Baires, PT Physical Therapist PT                  PT Recommendation and Plan  Planned Therapy Interventions (PT): balance training, bed mobility training, gait training, home exercise program, patient/family education, transfer training, strengthening  Plan of Care Reviewed With: patient  Outcome Evaluation: Patient is a 74 y.o. female admitted to [facility] for low back pain on 4/22/2024. Imaging revealed moderate canal stenosis of L2-5 along with moderate to severe lateral recess stenosis. Plan for epidural tomorrow AM. PMHx includes GERD, HTN, breast CA, depression. Patient is ind at baseline without AD and grandchild lives with her. She recently has carpal tunnel surgery on RUE. Today, patient required CGA for STS and ambulated 10ftx2 using HHA requiring CGA-Shasha. Antalgic gait and increased  pain with ambulation. Sitting rest break  between trials. Strength, pain, activity tolerance, balance deficits noted. Patient may benefit from skilled PT services to address functional deficits and improve level of independence prior to discharge. Anticipate home with assist and OP PT vs HHPT upon DC.     Time Calculation:         PT Charges       Row Name 04/22/24 1554             Time Calculation    Start Time 1356  -CB      Stop Time 1408  -CB      Time Calculation (min) 12 min  -CB      PT Received On 04/22/24  -CB      PT - Next Appointment 04/23/24  -CB      PT Goal Re-Cert Due Date 04/29/24  -CB         Time Calculation- PT    Total Timed Code Minutes- PT 8 minute(s)  -CB         Timed Charges    23211 - PT Therapeutic Activity Minutes 8  -CB         Total Minutes    Timed Charges Total Minutes 8  -CB       Total Minutes 8  -CB                User Key  (r) = Recorded By, (t) = Taken By, (c) = Cosigned By      Initials Name Provider Type    CB Kimmie Baires, PT Physical Therapist                  Therapy Charges for Today       Code Description Service Date Service Provider Modifiers Qty    23910822213 HC PT THERAPEUTIC ACT EA 15 MIN 4/22/2024 Kimmie Baires, PT GP 1    31733398067 HC PT EVAL MOD COMPLEXITY 3 4/22/2024 Kimmie Baires, PT GP 1            PT G-Codes  Outcome Measure Options: AM-PAC 6 Clicks Basic Mobility (PT)  AM-PAC 6 Clicks Score (PT): 16  PT Discharge Summary  Anticipated Discharge Disposition (PT): home with home health, home with outpatient therapy services, home with assist    Kimmie Baires PT  4/22/2024

## 2024-04-22 NOTE — PROGRESS NOTES
ED OBSERVATION PROGRESS/DISCHARGE SUMMARY    Date of Admission: 4/22/2024   LOS: 0 days   PCP: Epley, James, APRN    Subjective: Patient is still endorsing lower back pain.  Denies chest pain, dyspnea.  No nausea, vomiting or diarrhea.    Hospital Outcome:   Alesia Resendez is a 74 y.o. female with a history of chronic low back pain who is being admitted to the observation unit for further evaluation of intractable low back pain with left radiculopathy.  CT of the lumbar spine without shows grade 1 anterior listhesis at L4-5 with a small posterior disc protrusion causing severe central canal and bilateral neuroforaminal stenosis, moderate large posterior disc protrusion at L5-S1 as well as severe facet hypertrophy causing severe canal and bilateral neuroforaminal stenosis.  Lab work fairly unremarkable does note a mildly elevated glucose at 139.  Patient started on IV steroids and multimodal pain control.     MRI shows multilevel degenerative disease involving the lumbar spine including fusion the facets bilaterally at L4-L5, loss of disc height at L5-S1, anterolisthesis of L4 upon L5 and multilevel disc osteophyte complexes.  Spinal stenosis and lateral recess narrowing is most prominent at L4-L5 where there is moderate concentric canal stenosis and moderate to severe lateral recess narrowing bilaterally.  There is also moderate to severe if not severe lateral recess narrowing to the left at L2-L3 and moderate canal stenosis and lateral recess narrowing bilaterally at L3-L4.  Multifocal level facet degenerative disease is appreciated as well as multilevel foraminal stenosis.  Foraminal stenosis on the left is most prominent at L3-L4 and L5-S1 and to the right at L4-L5.     Neurosurgery was consulted.  Plan for epidural tomorrow.  Continue multimodal pain management.    ROS:  General: no fevers, chills  Respiratory: no cough, dyspnea  Cardiovascular: no chest pain, palpitations  Abdomen: No abdominal pain, nausea,  vomiting, or diarrhea  Neurologic: No focal weakness    Objective   Physical Exam:  I have reviewed the vital signs.  Temp:  [97.7 °F (36.5 °C)-98.8 °F (37.1 °C)] 97.7 °F (36.5 °C)  Heart Rate:  [60-80] 60  Resp:  [16-20] 17  BP: (130-146)/(73-94) 143/74  General Appearance:    Alert, cooperative, no distress  Head:    Normocephalic, atraumatic  Eyes:    Sclerae anicteric  Neck:   Supple, no mass  Lungs: Clear to auscultation bilaterally, respirations unlabored  Heart: Regular rate and rhythm, S1 and S2 normal, no murmur, rub or gallop  Abdomen:  Soft, non-tender, bowel sounds active, nondistended  Extremities: No clubbing, cyanosis, or edema to lower extremities  Pulses:  2+ and symmetric in distal lower extremities  Skin: No rashes   Neurologic: Oriented x3, Normal strength to extremities    Results Review:    I have reviewed the labs, radiology results and diagnostic studies.    Results from last 7 days   Lab Units 04/22/24  0406   WBC 10*3/mm3 8.67   HEMOGLOBIN g/dL 14.9   HEMATOCRIT % 44.9   PLATELETS 10*3/mm3 266     Results from last 7 days   Lab Units 04/22/24  0406   SODIUM mmol/L 139   POTASSIUM mmol/L 3.6   CHLORIDE mmol/L 100   CO2 mmol/L 24.9   BUN mg/dL 17   CREATININE mg/dL 0.61   CALCIUM mg/dL 9.4   BILIRUBIN mg/dL 0.4   ALK PHOS U/L 91   ALT (SGPT) U/L 20   AST (SGOT) U/L 23   GLUCOSE mg/dL 139*     Imaging Results (Last 24 Hours)       Procedure Component Value Units Date/Time    MRI Lumbar Spine With & Without Contrast [[id number]] Collected: 04/22/24 1118     Updated: 04/22/24 1118    Narrative:      MRI EXAMINATION OF THE LUMBAR SPINE WITH AND WITHOUT CONTRAST     HISTORY: Back pain, left radiculopathy, breast cancer, recent epidural  steroid injection.     COMPARISON: MRI lumbar spine 04/25/2023.     FINDINGS: There is moderate-to-severe loss of disc height at L5-S1,  grade 1 anterolisthesis of L4 upon L5 and prominence of the posterior  aspect of the disc and endplates at T12-L1, L2-3 and  L3-L4.      T12-L1: A mild broad-based disc osteophyte complex is present with no  evidence of herniation.     L1-L2:  A mild broad-based disc osteophyte complex is present resulting  in mild flattening of the ventral surface of the thecal sac.     L2-L3: A broad-based disc osteophyte complex is present which is more  prominent to the left of midline where there is a superimposed disc  protrusion. This results in moderate-to-severe if not severe lateral  recess narrowing on the left. Mild foraminal stenosis is present on the  left secondary to extension of a disc osteophyte complex into the neural  foramen. Disc material approaches the left L2 nerve as it exits the  neural foramen.     L3-L4: There is moderate canal stenosis secondary to mild-to-moderate  facet and ligamentum flavum hypertrophy and a central disc osteophyte  complex which also includes a central disc protrusion. There is mild and  moderate foraminal stenosis on the right and left respectively. Moderate  lateral recess narrowing is present on the right. Disc material  approaches but does not definitively involve the left L3 nerve within  the neural foramen.     L4-L5: There is fusion of the facets bilaterally. Moderate foraminal  stenosis and moderate-to-severe lateral recess narrowing is present  bilaterally secondary to anterolisthesis of L5 upon S1, loss of disc  height and extension of disc material into the neural foramen.     L5-S1: Moderate-to-severe facet degenerative disease is present  bilaterally. There is moderate and moderate-to-severe foraminal stenosis  on the right and left respectively secondary to loss of disc height and  extension of a disc osteophyte complex into the neural foramen. There is  mild canal stenosis centrally secondary to posterior element  degenerative disease and a broad-based disc osteophyte complex. There is  moderate-to-severe lateral recess narrowing bilaterally.       Impression:      Multilevel degenerative  disease involving the lumbar spine  as described in detail above with no evidence of a focal disc  herniation. This includes fusion of the facets bilaterally at L4-L5,  loss of disc height at L5-S1, anterolisthesis of L4 upon L5 and  multilevel disc osteophyte complexes. Spinal stenosis and lateral recess  narrowing is most prominent at L4-L5 where there is moderate central  canal stenosis and moderate-to-severe lateral recess narrowing  bilaterally. There is also moderate-to-severe if not severe lateral  recess narrowing to the left at L2-L3 and moderate canal stenosis and  lateral recess narrowing bilaterally at L3-L4. Multilevel facet  degenerative disease is appreciated as well as multilevel foraminal  stenosis. Foraminal stenosis on the left is most prominent at L3-L4 and  L5-S1 and to the right at L4-L5. See above.             CT Lumbar Spine Without Contrast [[id number]] Collected: 04/22/24 0614     Updated: 04/22/24 0614    Narrative:        Patient: ELIZABETH MCFARLAND  Time Out: 06:13  Exam(s): CT L SPINE     EXAM:    CT Lumbar Spine Without Intravenous Contrast    CLINICAL HISTORY:     Reason for exam: low back pain, left leg pain.    TECHNIQUE:    Axial computed tomography images of the lumbar spine without   intravenous contrast.  This CT exam was performed according to the   principle of ALARA (As Low As Reasonably Achievable) by using one or more   of the following dose reduction techniques: automated exposure control,   adjustment of the mA and or kV according to patient size, and or use of   iterative reconstruction technique.    COMPARISON:    No relevant prior studies available.    FINDINGS:    Vertebrae:   Severe facet hypertrophy most extreme from L3-S1.  No   acute fracture.  Grade 1 anterolisthesis L4-5.      Discs spinal canal neural foramina:    L2-3 moderate sized posterior disc protrusion and facet hypertrophy   causing moderate severe left neural foraminal stenosis and mild moderate   right  neural foraminal stenosis.    Large left eccentric posterior disc protrusion at L3-4 and severe facet   hypertrophy causing severe left neural foramen and lateral recess   stenosis.     Grade 1 anterolisthesis at L4-5, small posterior disc protrusion and   severe facet hypertrophy causing severe central canal and bilateral   neural foraminal stenosis.     Moderate large posterior disc protrusion at L5-S1 as well as severe   facet hypertrophy causing severe central canal and bilateral neural   foraminal stenosis.        Soft tissues: Aortic atherosclerosis.  Cholecystectomy..    IMPRESSION:       1.  No acute fracture.  Severe lumbar spondylosis.  2.  Multilevel severe central canal and neural foraminal stenosis as   detailed above.      Impression:          Electronically signed by Ankit Courtney MD on 04-22-24 at 0613            I have reviewed the medications.  ---------------------------------------------------------------------------------------------  Assessment & Plan   Assessment/Problem List    Low back pain    Lumbar back pain with radiculopathy affecting left lower extremity    Lumbar back pain with radiculopathy affecting left lower extremity    Weakness of left lower extremity      Plan:    Intractable low back pain with left radiculopathy  Chronic back pain  -CT of the lumbar spine shows disc protrusions at L4-5 L5-S1 causing severe central canal and bilateral neuroforaminal stenosis  -MRI of the lumbar spine with and without ordered shows multilevel degenerative disease involving the lumbar spine including fusion the facets bilaterally at L4-L5, loss of disc height at L5-S1, anterolisthesis of L4 upon L5 and multilevel disc osteophyte complexes.  Spinal stenosis and lateral recess narrowing is most prominent at L4-L5 where there is moderate concentric canal stenosis and moderate to severe lateral recess narrowing bilaterally.  There is also moderate to severe if not severe lateral recess narrowing to  the left at L2-L3 and moderate canal stenosis and lateral recess narrowing bilaterally at L3-L4.  Multifocal level facet degenerative disease is appreciated as well as multilevel foraminal stenosis.  Foraminal stenosis on the left is most prominent at L3-L4 and L5-S1 and to the right at L4-L5.   -IV steroids  -Multimodal pain control  -Neurosurgery consulted  -Plan for epidural injection in the morning  -n.p.o. after midnight     Hypertension  -Continue home atenolol, losartan, amlodipine  -Monitor with vital signs every 4 hours     DVT prophylaxis:  Mechanical DVT prophylaxis orders are present.     Disposition: Plan for epidural injection tomorrow.      This note will serve as a progress note    Flori Padilla, APRN 04/22/24 16:55 EDT    I have worn appropriate PPE during this patient encounter, sanitized my hands both with entering and exiting patient's room.      52 minutes has been spent by [facility] providers in the care of this patient while under observation status

## 2024-04-23 ENCOUNTER — APPOINTMENT (OUTPATIENT)
Dept: PAIN MEDICINE | Facility: HOSPITAL | Age: 74
End: 2024-04-23
Payer: MEDICARE

## 2024-04-23 ENCOUNTER — APPOINTMENT (OUTPATIENT)
Dept: GENERAL RADIOLOGY | Facility: HOSPITAL | Age: 74
End: 2024-04-23
Payer: MEDICARE

## 2024-04-23 ENCOUNTER — ANESTHESIA (OUTPATIENT)
Dept: PAIN MEDICINE | Facility: HOSPITAL | Age: 74
End: 2024-04-23
Payer: MEDICARE

## 2024-04-23 ENCOUNTER — ANESTHESIA EVENT (OUTPATIENT)
Dept: PAIN MEDICINE | Facility: HOSPITAL | Age: 74
End: 2024-04-23
Payer: MEDICARE

## 2024-04-23 LAB
ANION GAP SERPL CALCULATED.3IONS-SCNC: 13.5 MMOL/L (ref 5–15)
BUN SERPL-MCNC: 24 MG/DL (ref 8–23)
BUN/CREAT SERPL: 35.3 (ref 7–25)
CALCIUM SPEC-SCNC: 9.2 MG/DL (ref 8.6–10.5)
CHLORIDE SERPL-SCNC: 98 MMOL/L (ref 98–107)
CO2 SERPL-SCNC: 26.5 MMOL/L (ref 22–29)
CREAT SERPL-MCNC: 0.68 MG/DL (ref 0.57–1)
DEPRECATED RDW RBC AUTO: 39.2 FL (ref 37–54)
EGFRCR SERPLBLD CKD-EPI 2021: 91.5 ML/MIN/1.73
ERYTHROCYTE [DISTWIDTH] IN BLOOD BY AUTOMATED COUNT: 12.9 % (ref 12.3–15.4)
GLUCOSE SERPL-MCNC: 207 MG/DL (ref 65–99)
HCT VFR BLD AUTO: 41 % (ref 34–46.6)
HGB BLD-MCNC: 13.6 G/DL (ref 12–15.9)
MCH RBC QN AUTO: 27.7 PG (ref 26.6–33)
MCHC RBC AUTO-ENTMCNC: 33.2 G/DL (ref 31.5–35.7)
MCV RBC AUTO: 83.5 FL (ref 79–97)
PLATELET # BLD AUTO: 241 10*3/MM3 (ref 140–450)
PMV BLD AUTO: 8.8 FL (ref 6–12)
POTASSIUM SERPL-SCNC: 3.8 MMOL/L (ref 3.5–5.2)
RBC # BLD AUTO: 4.91 10*6/MM3 (ref 3.77–5.28)
SODIUM SERPL-SCNC: 138 MMOL/L (ref 136–145)
WBC NRBC COR # BLD AUTO: 7.96 10*3/MM3 (ref 3.4–10.8)

## 2024-04-23 PROCEDURE — 80048 BASIC METABOLIC PNL TOTAL CA: CPT | Performed by: EMERGENCY MEDICINE

## 2024-04-23 PROCEDURE — 77003 FLUOROGUIDE FOR SPINE INJECT: CPT

## 2024-04-23 PROCEDURE — 85027 COMPLETE CBC AUTOMATED: CPT | Performed by: EMERGENCY MEDICINE

## 2024-04-23 PROCEDURE — 25010000002 METHYLPREDNISOLONE PER 80 MG: Performed by: STUDENT IN AN ORGANIZED HEALTH CARE EDUCATION/TRAINING PROGRAM

## 2024-04-23 PROCEDURE — G0378 HOSPITAL OBSERVATION PER HR: HCPCS

## 2024-04-23 PROCEDURE — 25010000002 METHYLPREDNISOLONE PER 125 MG: Performed by: STUDENT IN AN ORGANIZED HEALTH CARE EDUCATION/TRAINING PROGRAM

## 2024-04-23 PROCEDURE — 99214 OFFICE O/P EST MOD 30 MIN: CPT | Performed by: NURSE PRACTITIONER

## 2024-04-23 PROCEDURE — 25510000001 IOPAMIDOL 41 % SOLUTION: Performed by: STUDENT IN AN ORGANIZED HEALTH CARE EDUCATION/TRAINING PROGRAM

## 2024-04-23 RX ORDER — METHYLPREDNISOLONE ACETATE 80 MG/ML
80 INJECTION, SUSPENSION INTRA-ARTICULAR; INTRALESIONAL; INTRAMUSCULAR; SOFT TISSUE ONCE
Status: COMPLETED | OUTPATIENT
Start: 2024-04-23 | End: 2024-04-23

## 2024-04-23 RX ORDER — MIDAZOLAM HYDROCHLORIDE 1 MG/ML
1 INJECTION INTRAMUSCULAR; INTRAVENOUS ONCE AS NEEDED
Status: DISCONTINUED | OUTPATIENT
Start: 2024-04-23 | End: 2024-04-30

## 2024-04-23 RX ORDER — IOPAMIDOL 408 MG/ML
10 INJECTION, SOLUTION INTRATHECAL
Status: COMPLETED | OUTPATIENT
Start: 2024-04-23 | End: 2024-04-23

## 2024-04-23 RX ORDER — LIDOCAINE HYDROCHLORIDE 10 MG/ML
1 INJECTION, SOLUTION INFILTRATION; PERINEURAL ONCE
Status: DISCONTINUED | OUTPATIENT
Start: 2024-04-23 | End: 2024-04-29

## 2024-04-23 RX ORDER — FENTANYL CITRATE 50 UG/ML
50 INJECTION, SOLUTION INTRAMUSCULAR; INTRAVENOUS ONCE
Status: DISCONTINUED | OUTPATIENT
Start: 2024-04-23 | End: 2024-05-01 | Stop reason: HOSPADM

## 2024-04-23 RX ADMIN — PANTOPRAZOLE SODIUM 40 MG: 40 TABLET, DELAYED RELEASE ORAL at 05:32

## 2024-04-23 RX ADMIN — METHYLPREDNISOLONE SODIUM SUCCINATE 60 MG: 125 INJECTION INTRAMUSCULAR; INTRAVENOUS at 14:47

## 2024-04-23 RX ADMIN — PREGABALIN 25 MG: 25 CAPSULE ORAL at 14:47

## 2024-04-23 RX ADMIN — BUPROPION HYDROCHLORIDE 150 MG: 150 TABLET, EXTENDED RELEASE ORAL at 09:56

## 2024-04-23 RX ADMIN — IOPAMIDOL 10 ML: 408 INJECTION, SOLUTION INTRATHECAL at 09:00

## 2024-04-23 RX ADMIN — OXYCODONE AND ACETAMINOPHEN 1 TABLET: 5; 325 TABLET ORAL at 21:06

## 2024-04-23 RX ADMIN — HYDROCHLOROTHIAZIDE 25 MG: 25 TABLET ORAL at 09:55

## 2024-04-23 RX ADMIN — METHYLPREDNISOLONE SODIUM SUCCINATE 60 MG: 125 INJECTION INTRAMUSCULAR; INTRAVENOUS at 22:51

## 2024-04-23 RX ADMIN — LIDOCAINE 2 PATCH: 4 PATCH TOPICAL at 10:55

## 2024-04-23 RX ADMIN — PREGABALIN 25 MG: 25 CAPSULE ORAL at 06:55

## 2024-04-23 RX ADMIN — Medication 10 ML: at 21:46

## 2024-04-23 RX ADMIN — Medication 5 MG: at 21:06

## 2024-04-23 RX ADMIN — METHYLPREDNISOLONE SODIUM SUCCINATE 60 MG: 125 INJECTION INTRAMUSCULAR; INTRAVENOUS at 05:35

## 2024-04-23 RX ADMIN — OXYCODONE AND ACETAMINOPHEN 1 TABLET: 5; 325 TABLET ORAL at 05:32

## 2024-04-23 RX ADMIN — AMLODIPINE BESYLATE 10 MG: 10 TABLET ORAL at 09:56

## 2024-04-23 RX ADMIN — METHYLPREDNISOLONE ACETATE 80 MG: 80 INJECTION, SUSPENSION INTRA-ARTICULAR; INTRALESIONAL; INTRAMUSCULAR; SOFT TISSUE at 09:00

## 2024-04-23 RX ADMIN — OXYCODONE AND ACETAMINOPHEN 1 TABLET: 5; 325 TABLET ORAL at 13:06

## 2024-04-23 RX ADMIN — LOSARTAN POTASSIUM 25 MG: 25 TABLET, FILM COATED ORAL at 09:56

## 2024-04-23 RX ADMIN — METHOCARBAMOL TABLETS 750 MG: 750 TABLET, COATED ORAL at 09:56

## 2024-04-23 RX ADMIN — ESCITALOPRAM 5 MG: 5 TABLET, FILM COATED ORAL at 09:56

## 2024-04-23 RX ADMIN — METHOCARBAMOL TABLETS 750 MG: 750 TABLET, COATED ORAL at 18:25

## 2024-04-23 RX ADMIN — ATENOLOL 25 MG: 25 TABLET ORAL at 09:56

## 2024-04-23 RX ADMIN — ATORVASTATIN CALCIUM 20 MG: 20 TABLET, FILM COATED ORAL at 21:45

## 2024-04-23 RX ADMIN — METHOCARBAMOL TABLETS 750 MG: 750 TABLET, COATED ORAL at 21:06

## 2024-04-23 NOTE — PROGRESS NOTES
ED OBSERVATION PROGRESS/DISCHARGE SUMMARY    Date of Admission: 4/22/2024   LOS: 0 days   PCP: Epley, James, APRN    Final Diagnosis lower back pain      Subjective     Hospital Outcome:   Alesia Resendez is a 74 y.o. female with a history of chronic back pain, hypertension, hyperlipidemia, breast cancer status post lobectomy who presents to [facility] with acute back pain radiates down the left leg.  Patient states that started 4 days ago when she had just stood up from a chair.  She describes the pain starts in her low back wraps around the front of her left thigh and into the groin down to the knee.  Denies any numbness or tingling.  She denies any weakness but does states has been difficult to walk around due to the pain.  She describes it as a constant sharp pain.  Denies any urinary or fecal incontinence.  Denies urinary retention.  Denies pain with urination.  Denies fevers and chills.  Denies lightheadedness or dizziness.  Denies chest pain shortness of breath.  She states that she did have a epidural injection last month done for her chronic low back pain and had previously been improving until 4 days ago.     Patient has been evaluated by neurosurgery and epidurals complete.  Patient still having pain at present.  They would like for us to monitor the patient overnight and control pain as needed.  They will reevaluate in the morning.    ROS:  General: no fevers, chills  Respiratory: no cough, dyspnea  Cardiovascular: no chest pain, palpitations  Abdomen: No abdominal pain, nausea, vomiting, or diarrhea  Neurologic: No focal weakness    Objective   Physical Exam:  I have reviewed the vital signs.  Temp:  [97.5 °F (36.4 °C)-98.4 °F (36.9 °C)] 98 °F (36.7 °C)  Heart Rate:  [57-75] 66  Resp:  [16-18] 16  BP: (107-197)/() 125/76  General Appearance:    Alert, cooperative, no distress  Head:    Normocephalic, atraumatic  Eyes:    Sclerae anicteric  Neck:   Supple, no mass  Lungs: Clear to  auscultation bilaterally, respirations unlabored  Heart: Regular rate and rhythm, S1 and S2 normal, no murmur, rub or gallop  Abdomen:  Soft, non-tender, bowel sounds active, nondistended  Extremities: No clubbing, cyanosis, or edema to lower extremities  Pulses:  2+ and symmetric in distal lower extremities  Skin: No rashes   Neurologic: Oriented x3, Normal strength to extremities    Results Review:    I have reviewed the labs, radiology results and diagnostic studies.    Results from last 7 days   Lab Units 04/23/24  0521   WBC 10*3/mm3 7.96   HEMOGLOBIN g/dL 13.6   HEMATOCRIT % 41.0   PLATELETS 10*3/mm3 241     Results from last 7 days   Lab Units 04/23/24  0521 04/22/24  0406   SODIUM mmol/L 138 139   POTASSIUM mmol/L 3.8 3.6   CHLORIDE mmol/L 98 100   CO2 mmol/L 26.5 24.9   BUN mg/dL 24* 17   CREATININE mg/dL 0.68 0.61   CALCIUM mg/dL 9.2 9.4   BILIRUBIN mg/dL  --  0.4   ALK PHOS U/L  --  91   ALT (SGPT) U/L  --  20   AST (SGOT) U/L  --  23   GLUCOSE mg/dL 207* 139*     Imaging Results (Last 24 Hours)       Procedure Component Value Units Date/Time    [place] Guided Pain Management Spine [[id number]] Resulted: 04/23/24 0917     Updated: 04/23/24 0917    Narrative:      This procedure was auto-finalized with no dictation required.    MRI Lumbar Spine With & Without Contrast [[id number]] Collected: 04/22/24 1118     Updated: 04/23/24 0605    Narrative:      MRI EXAMINATION OF THE LUMBAR SPINE WITH AND WITHOUT CONTRAST     HISTORY: Back pain, left radiculopathy, breast cancer, recent epidural  steroid injection.     COMPARISON: MRI lumbar spine 04/25/2023.     FINDINGS: There is moderate-to-severe loss of disc height at L5-S1,  grade 1 anterolisthesis of L4 upon L5 and prominence of the posterior  aspect of the disc and endplates at T12-L1, L2-3 and L3-L4.      T12-L1: A mild broad-based disc osteophyte complex is present with no  evidence of herniation.     L1-L2:  A mild broad-based disc osteophyte complex is present  resulting  in mild flattening of the ventral surface of the thecal sac.     L2-L3: A broad-based disc osteophyte complex is present which is more  prominent to the left of midline where there is a superimposed disc  protrusion. This results in moderate-to-severe if not severe lateral  recess narrowing on the left. Mild foraminal stenosis is present on the  left secondary to extension of a disc osteophyte complex into the neural  foramen. Disc material approaches the left L2 nerve as it exits the  neural foramen.     L3-L4: There is moderate canal stenosis secondary to mild-to-moderate  facet and ligamentum flavum hypertrophy and a central disc osteophyte  complex which also includes a central disc protrusion. There is mild and  moderate foraminal stenosis on the right and left respectively. Moderate  lateral recess narrowing is present on the right. Disc material  approaches but does not definitively involve the left L3 nerve within  the neural foramen.     L4-L5: There is fusion of the facets bilaterally. Moderate foraminal  stenosis and moderate-to-severe lateral recess narrowing is present  bilaterally secondary to anterolisthesis of L5 upon S1, loss of disc  height and extension of disc material into the neural foramen.     L5-S1: Moderate-to-severe facet degenerative disease is present  bilaterally. There is moderate and moderate-to-severe foraminal stenosis  on the right and left respectively secondary to loss of disc height and  extension of a disc osteophyte complex into the neural foramen. There is  mild canal stenosis centrally secondary to posterior element  degenerative disease and a broad-based disc osteophyte complex. There is  moderate-to-severe lateral recess narrowing bilaterally.       Impression:      Multilevel degenerative disease involving the lumbar spine  as described in detail above with no evidence of a focal disc  herniation. This includes fusion of the facets bilaterally at L4-L5,  loss  of disc height at L5-S1, anterolisthesis of L4 upon L5 and  multilevel disc osteophyte complexes. Spinal stenosis and lateral recess  narrowing is most prominent at L4-L5 where there is moderate central  canal stenosis and moderate-to-severe lateral recess narrowing  bilaterally. There is also moderate-to-severe if not severe lateral  recess narrowing to the left at L2-L3 and moderate canal stenosis and  lateral recess narrowing bilaterally at L3-L4. Multilevel facet  degenerative disease is appreciated as well as multilevel foraminal  stenosis. Foraminal stenosis on the left is most prominent at L3-L4 and  L5-S1 and to the right at L4-L5. Spinal stenosis is accentuated by  congenitally shortened pedicles. See above.           This report was finalized on 4/23/2024 6:02 AM by Dr. Jose Velasquez M.D  on Workstation: [id number]               I have reviewed the medications.  ---------------------------------------------------------------------------------------------  Assessment & Plan   Assessment/Problem List    Low back pain    Lumbar back pain with radiculopathy affecting left lower extremity    Lumbar back pain with radiculopathy affecting left lower extremity    Weakness of left lower extremity      Plan:  Intractable low back pain with left radiculopathy  Chronic back pain  -CT of the lumbar spine shows disc protrusions at L4-5 L5-S1 causing severe central canal and bilateral neuroforaminal stenosis  -MRI of the lumbar spine with and without ordered shows multilevel degenerative disease involving the lumbar spine including fusion the facets bilaterally at L4-L5, loss of disc height at L5-S1, anterolisthesis of L4 upon L5 and multilevel disc osteophyte complexes.  Spinal stenosis and lateral recess narrowing is most prominent at L4-L5 where there is moderate concentric canal stenosis and moderate to severe lateral recess narrowing bilaterally.  There is also moderate to severe if not severe lateral recess  narrowing to the left at L2-L3 and moderate canal stenosis and lateral recess narrowing bilaterally at L3-L4.  Multifocal level facet degenerative disease is appreciated as well as multilevel foraminal stenosis.  Foraminal stenosis on the left is most prominent at L3-L4 and L5-S1 and to the right at L4-L5.   -IV steroids  -Multimodal pain control  -Neurosurgery recommends observe overnight to ensure epidural controls pain  -Neurosurgery eval in the morning  -Regular diet     Hypertension  -Continue home atenolol, losartan, amlodipine  -Monitor with vital signs every 4 hours     DVT prophylaxis:  Mechanical DVT prophylaxis orders are present.    Disposition: Pending    Follow-up after Discharge: Pending    This note will serve as a progress note    Theodore Fuentes III, PA 04/23/24 17:47 EDT    I have worn appropriate PPE during this patient encounter, sanitized my hands both with entering and exiting patient's room.      57 minutes has been spent by [facility] providers in the care of this patient while under observation status

## 2024-04-23 NOTE — ANESTHESIA PROCEDURE NOTES
PAIN Epidural block      Patient reassessed immediately prior to procedure    Patient location during procedure: pain clinic  Indication:procedure for pain  Performed By  Anesthesiologist: Marva Richard MD  Preanesthetic Checklist  Completed: patient identified, risks and benefits discussed, surgical consent, monitors and equipment checked, pre-op evaluation and timeout performed  Additional Notes  Needle placement guided by fluoroscopy and confirmed with SARAH and contrast injection.     Diagnosis:  Post-Op Diagnosis Codes:     * Lumbar radiculopathy [M54.16]    Sedation:  none  Sedation time:  Prep:  Pt Position:prone  Sterile Tech:cap, gloves, sterile barrier and mask  Prep:chlorhexidine gluconate and isopropyl alcohol  Monitoring:EKG, continuous pulse oximetry and blood pressure monitoring  Procedure:Sedation: no     Approach:left paramedian  Guidance: fluoroscopy  Location:lumbar  Level:2-3 (Lumbar Intralaminar)  Needle Type:Tuohy  Needle Gauge:20 G  Aspiration:negative  Medications:  Preservative Free Saline:3mL  Isovue:1mL  Depomedrol:80mg  Post Assessment:  Post-procedure: Bandaid.  Pt Tolerance:patient tolerated the procedure well with no apparent complications  Complications:no

## 2024-04-23 NOTE — PROGRESS NOTES
[facility] THORACIC/LUMBAR NEUROSURGERY PROGRESS NOTE      CC: Low back and left leg pain      Subjective     Interval History: Patient just returned from [facility], does not note any significant relief as of yet.  Otherwise no significant overnight events.      Objective     Vital signs in last 24 hours:  Temp:  [97.5 °F (36.4 °C)-98.4 °F (36.9 °C)] 97.5 °F (36.4 °C)  Heart Rate:  [60-75] 69  Resp:  [16-18] 16  BP: (107-197)/() 139/91    Intake/Output this shift:  No intake/output data recorded.    LABS:  Results from last 7 days   Lab Units 04/23/24  0521 04/22/24  0406   WBC 10*3/mm3 7.96 8.67   HEMOGLOBIN g/dL 13.6 14.9   HEMATOCRIT % 41.0 44.9   PLATELETS 10*3/mm3 241 266     Results from last 7 days   Lab Units 04/23/24  0521 04/22/24  0406   SODIUM mmol/L 138 139   POTASSIUM mmol/L 3.8 3.6   CHLORIDE mmol/L 98 100   CO2 mmol/L 26.5 24.9   BUN mg/dL 24* 17   CREATININE mg/dL 0.68 0.61   CALCIUM mg/dL 9.2 9.4   BILIRUBIN mg/dL  --  0.4   ALK PHOS U/L  --  91   ALT (SGPT) U/L  --  20   AST (SGOT) U/L  --  23   GLUCOSE mg/dL 207* 139*         IMAGING STUDIES:  No new imaging to review    I personally viewed the patient's chart, it was also reviewed by and discussed with Dr Guru Arroyo reviewed/changed: Yes  Norvasc 10 mg p.o. daily  Atenolol 25 mg p.o. daily  Lipitor 20 mg p.o. daily  Wellbutrin 150 mg p.o. daily  Lexapro 5 mg p.o. daily  Hydrochlorothiazide 25 mg p.o. daily  Lidocaine patch every 24 hours  Losartan 25 mg p.o. daily  Robaxin-750 milligrams p.o. 4 times daily  Solu-Medrol 60 mg IV every 8 hours  Protonix 40 mg p.o. every morning  Lyrica 25 mg p.o. every 8 hours  Dilaudid 1 mg IV every 3 hours as needed  Oxycodone 5-325 mg 1 p.o. every 4 hours as needed      Physical Exam:    General:  Awake & alert  Back: Lumbar midline tenderness to palpation.  Straight leg raise on the left, negative straight leg raise on the right  Motor: 5/5 strength to right lower extremity, 4/5 strength to left iliopsoas and  "quadricep, 5/5 to the remaining left lower extremity  Sensation:  Normal to light touch bilateral LE's  Station and Gait: Not tested        Assessment & Plan     ASSESSMENT:      Low back pain    Lumbar back pain with radiculopathy affecting left lower extremity    Lumbar back pain with radiculopathy affecting left lower extremity    Weakness of left lower extremity    74-year-old with lumbar radiculopathy with left lower extremity weakness, had LESI this morning.  Patient states unable to determine any relief of her symptoms as of yet.  Patient still complains of left leg feeling weak and painful.  Reassess patient tomorrow to determine further recommendations.    PLAN:   -Will reassess patient tomorrow to determine further recommendations  -Up with PT    I discussed the patient's findings and my recommendations with patient, nursing staff, and Dr Garvey    During patient visit, I utilized appropriate personal protective equipment including gloves.  Appropriate PPE was worn during the entire visit.  Hand hygiene was completed before and after.      LOS: 0 days       Alley Moreau, APRN  4/23/2024  10:01 EDT    \"Dictated utilizing Dragon dictation\".      "

## 2024-04-23 NOTE — H&P
CHIEF COMPLAINT:   Acute on chronic back pain    HISTORY OF PRESENT ILLNESS:  The patient is a 74 y.o. female who presents today with complaints of acute exacerbation of back pain when standing up from a chair.  The patient was admitted to the hospital and evaluated by neurosurgery who recommended LESI.  The patient has no history of back surgery, she has previously had epidural steroid injections.  Her pain on this admission is in a different location from her chronic symptoms.      The patient's pain is located in low back and radiates around the hip to the anterior and medial thigh.  Symptoms are described as an intense ache and associated with numbness and weakness  Their pain is a 7-8/10 at rest and excruciating with movement or activity.  The pain is worsening in severity.   Their symptoms are exacerbated by movement and activity and alleviated somewhat by rest and medications.    The conservative measures the patient has attempted recently without significant improvement include: Rest, ice, heat, OTC medications, Norco, Lyrica, Medrol Dosepak.     lumbar MRI was obtained yesterday that showed multilevel degenerative disc disease. There is a fusion of the facets at L4-L5 with anterolisthesis.  There is spinal stenosis.      PAST MEDICAL HISTORY:  No current facility-administered medications on file prior to encounter.     Current Outpatient Medications on File Prior to Encounter   Medication Sig Dispense Refill    amLODIPine (NORVASC) 10 MG tablet TAKE 1 TABLET BY MOUTH DAILY 90 tablet 3    atenolol (TENORMIN) 25 MG tablet TAKE 1 TABLET BY MOUTH DAILY 90 tablet 3    atorvastatin (LIPITOR) 20 MG tablet TAKE 1 TABLET BY MOUTH ONCE  DAILY 90 tablet 3    buPROPion XL (WELLBUTRIN XL) 150 MG 24 hr tablet TAKE 1 TABLET BY MOUTH DAILY 90 tablet 3    celecoxib (CeleBREX) 200 MG capsule TAKE 1 CAPSULE BY MOUTH DAILY 90 capsule 1    escitalopram (LEXAPRO) 5 MG tablet TAKE 1 TABLET BY MOUTH DAILY 90 tablet 3     "hydroCHLOROthiazide (HYDRODIURIL) 25 MG tablet TAKE 1 TABLET BY MOUTH DAILY 90 tablet 3    HYDROcodone-acetaminophen (NORCO) 5-325 MG per tablet Take 1 tablet by mouth Every 6 (Six) Hours As Needed for Mild Pain.      losartan (COZAAR) 25 MG tablet TAKE 1 TABLET BY MOUTH DAILY 90 tablet 3    methylPREDNISolone (MEDROL) 4 MG dose pack Take as directed on package instructions. 21 tablet 0    omeprazole (priLOSEC) 20 MG capsule TAKE 1 CAPSULE BY MOUTH DAILY 90 capsule 3    oxybutynin (DITROPAN) 5 MG tablet TAKE 1 TABLET BY MOUTH  TWICE DAILY (Patient taking differently: Daily.) 180 tablet 3    pregabalin (LYRICA) 25 MG capsule TAKE 1 CAPSULE BY MOUTH 3 TIMES  DAILY 90 capsule 0    fluticasone (FLONASE) 50 MCG/ACT nasal spray 2 sprays into the nostril(s) as directed by provider Daily. 48 mL 3       Past Medical History:   Diagnosis Date    Anxiety     Arthritis     Breast cancer 2017    LEFT    Carpal tunnel syndrome of right wrist     Chronic pain disorder 1990’s    Colon polyp 11/22    Removed    Depression 2017    Dermatochalasis of both eyelids     Fibromyalgia, primary 2023    GERD (gastroesophageal reflux disease)     History of radiation therapy 2006    LT BREAST    History of skin cancer     SQUAMOUS CELL REMOVED FROM LT UPPER LIP AND RT THUMB    Hyperlipidemia 2005    Hypertension     Irritable bowel syndrome In my 40’s    Low back pain 2015    Lumbosacral disc disease 2012    Obesity     Osteoarthritis 1990    Ptosis of both eyebrows     Spinal stenosis 2012    Viral syndrome 02/26/2018         SOCIAL HISTORY:  Negative tobacco product use    REVIEW OF SYSTEMS:  No hematologic infectious or constitutional symptoms  Other review of systems non-contributory  Negative screen for SALOME      PHYSICAL EXAM:  /66 (BP Location: Left arm, Patient Position: Lying)   Pulse 67   Temp 36.9 °C (98.4 °F) (Oral)   Resp 18   Ht 172.7 cm (68\")   Wt 99.8 kg (220 lb)   SpO2 94%   BMI 33.45 kg/m²   Well-developed, " well-nourished, no acute distress  Alert and oriented ×3  Extra ocular movements intact  Unlabored respirations  Extremities warm and well-perfused  Deep tendon reflexes diminished in the bilateral patella  Positive straight leg raise bilaterally  Diminished strength in the left lower extremity  Lumbar spine without obvious deformities or ecchymoses      DIAGNOSIS:  Post-Op Diagnosis Codes:     * Lumbar radiculopathy [M54.16]    PLAN:  1.  Lumbar 3 epidural steroid injections, up to 3. If pain control is acceptable after 1 or 2 injections, it was discussed with the patient that they may return for the subsequent injections if and when their pain returns.  The risks were discussed with the patient including failure of relief, worsening pain, Headache (post dural puncture headache), bleeding (epidural hematoma) and infection (epidural abscess or skin infection).  2.  Physical therapy exercises at home as prescribed by physical therapy or from the pain clinic handout.  Continuation of these exercises every day, or multiple times per week, even when the patient has good pain relief, was stressed to the patient as a preventative measure to decrease the frequency and severity of future pain episodes.  3.  Continue pain medicines as already prescribed.  If patient not currently taking any, it is recommended to begin Acetaminophen 1000 mg po q 8 hours.  If other medicines containing Acetaminophen are currently prescribed, maintain daily dose at 3000 mg.    4.  If they can tolerate NSAIDS, it is recommended to take Ibuprofen 600 mg po q 6 hours for 7 days during pain exacerbations.  Alternatively, they may substitute an NSAID of their choice (e.g. Aleve).  This may be taken at the same time as Acetaminophen.  5.  Heat and ice to the affected area as tolerated for pain control.    6.  Daily low impact exercise such as walking or water exercise was recommended to maintain overall health and aid in weight control.   7.  Follow  up as needed for subsequent injections.

## 2024-04-23 NOTE — DISCHARGE PLACEMENT REQUEST
"Elizabeth Resendez (74 y.o. Female)       Date of Birth   1950    Social Security Number       Address   [address] [place] [id number]    Home Phone   [phone]    MRN   [id number]       Yarsanism   Evangelical    Marital Status                               Admission Date   4/22/24    Admission Type   Emergency    Admitting Provider       Attending Provider   Tano Godoy MD    Department, Room/Bed   [facility] OBSERVATION, 127/1       Discharge Date       Discharge Disposition       Discharge Destination                                 Attending Provider: Tano Godoy MD    Allergies: Morphine    Isolation: None   Infection: None   Code Status: CPR    Ht: 172.7 cm (68\")   Wt: 99.8 kg (220 lb)    Admission Cmt: None   Principal Problem: Low back pain [M54.50]                   Active Insurance as of 4/22/2024       Primary Coverage       Payor Plan Insurance Group Employer/Plan Group    MEDICARE MEDICARE A & B        Payor Plan Address Payor Plan Phone Number Payor Plan Fax Number Effective Dates    [address] [phone]  2/1/2015 - None Entered    [facility] [id number]         Subscriber Name Subscriber Birth Date Member ID       ELIZABETH RESENDEZ 1950 [id number]               Secondary Coverage       Payor Plan Insurance Group Employer/Plan Group    AARP MC SUP AARP HEALTH CARE OPTIONS [id number]       Payor Plan Address Payor Plan Phone Number Payor Plan Fax Number Effective Dates    [facility] [phone]  1/1/2016 - None Entered    [address]       [facility] [address]         Subscriber Name Subscriber Birth Date Member ID       ELIZABETH RESENDEZ 1950 [id number]                     Emergency Contacts        (Rel.) Home Phone Work Phone Mobile Phone    RAVEN OLIVEIRA (Friend) [phone] -- [phone]    Tano Reid (Son) [phone] -- --    Verna Reid (Daughter) [phone] -- --    Aaron Oliveira (Friend) -- " -- [phone]

## 2024-04-23 NOTE — CASE MANAGEMENT/SOCIAL WORK
Discharge Planning Assessment  [facility]     Patient Name: Alesia Resendez  MRN: [id number]  Today's Date: 4/23/2024    Admit Date: 4/22/2024    Plan: Home w/ HH vs OP PT pending progress with PT post epidural   Discharge Needs Assessment       Row Name 04/23/24 1051       Living Environment    People in Home alone    Current Living Arrangements home    Potentially Unsafe Housing Conditions none    Primary Care Provided by self    Provides Primary Care For no one    Family Caregiver if Needed child(helen), adult;friend(s)    Able to Return to Prior Arrangements yes       Resource/Environmental Concerns    Resource/Environmental Concerns none       Transition Planning    Patient/Family Anticipates Transition to home with help/services    Patient/Family Anticipated Services at Transition home health care    Transportation Anticipated family or friend will provide       Discharge Needs Assessment    Equipment Currently Used at Home none    Concerns to be Addressed discharge planning                   Discharge Plan       Row Name 04/23/24 1051       Plan    Plan Home w/ HH vs OP PT pending progress with PT post epidural    Patient/Family in Agreement with Plan yes    Plan Comments CCP spoke with patient at bedside; explained role, verified facesheet, and discussed dc plan. Patient uses no DME and is independent for mobility at baseline. She lives alone in a 2 level home with an elevator to enter. She has used [facility] in the past. She has no history of SNF. PT recommend HH PT vs OP PT. Patient states HH vs OP depends on her progress with PT post epidural. She is agreeable to referral to [facility]. She reports her family will transport her home. JOYA RODRIGUEZ                  Continued Care and Services - Admitted Since 4/22/2024       Home Medical Care       Service Provider Request Status Selected Services Address Phone Fax Patient Preferred    [facility] Pending - Request Sent N/A [id number], [address] [address] [phone] --                     Demographic Summary       Row Name 04/23/24 1044       General Information    Admission Type observation    Arrived From home    Referral Source admission list    Reason for Consult discharge planning    Preferred Language English       Contact Information    Permission Granted to Share Info With family/designee                   Functional Status       Row Name 04/23/24 1050       Functional Status    Usual Activity Tolerance good    Current Activity Tolerance good       Functional Status, IADL    Medications independent    Meal Preparation independent    Housekeeping independent    Laundry independent    Shopping independent       Mental Status    General Appearance WDL WDL                   Psychosocial    No documentation.                  Abuse/Neglect    No documentation.                  Legal    No documentation.                  Substance Abuse    No documentation.                  Patient Forms    No documentation.                     JOYA Carrera

## 2024-04-23 NOTE — PLAN OF CARE
Goal Outcome Evaluation:      Pt continued to complain of left leg pain overnight. Scheduled for epidural injection around 0730 this morning.

## 2024-04-23 NOTE — NURSING NOTE
936 transport her for pt, report called to fanny kimball, pain 3/10, no sedation, bandaid intact,up with assist first time, activity as yuan

## 2024-04-23 NOTE — PLAN OF CARE
Goal Outcome Evaluation:              Outcome Evaluation: Pt went for epidural today.  Pt still complains of pain and numbness.  Neurosurgery to assess in the morning.  Plan of care reviewed with patient.

## 2024-04-24 PROBLEM — M54.9 INTRACTABLE BACK PAIN: Status: ACTIVE | Noted: 2024-04-24

## 2024-04-24 PROBLEM — M54.16 LEFT LUMBAR RADICULOPATHY: Status: ACTIVE | Noted: 2024-04-24

## 2024-04-24 PROCEDURE — 25010000002 HYDROMORPHONE 1 MG/ML SOLUTION: Performed by: EMERGENCY MEDICINE

## 2024-04-24 PROCEDURE — 25010000002 ONDANSETRON PER 1 MG: Performed by: EMERGENCY MEDICINE

## 2024-04-24 PROCEDURE — 97530 THERAPEUTIC ACTIVITIES: CPT

## 2024-04-24 PROCEDURE — 99212 OFFICE O/P EST SF 10 MIN: CPT

## 2024-04-24 PROCEDURE — 25010000002 METHYLPREDNISOLONE PER 125 MG: Performed by: STUDENT IN AN ORGANIZED HEALTH CARE EDUCATION/TRAINING PROGRAM

## 2024-04-24 PROCEDURE — 25810000003 SODIUM CHLORIDE 0.9 % SOLUTION: Performed by: EMERGENCY MEDICINE

## 2024-04-24 RX ORDER — POLYETHYLENE GLYCOL 3350 17 G/17G
17 POWDER, FOR SOLUTION ORAL DAILY PRN
Status: DISCONTINUED | OUTPATIENT
Start: 2024-04-24 | End: 2024-05-01 | Stop reason: HOSPADM

## 2024-04-24 RX ORDER — OXYCODONE HYDROCHLORIDE AND ACETAMINOPHEN 5; 325 MG/1; MG/1
1 TABLET ORAL EVERY 4 HOURS PRN
Status: DISPENSED | OUTPATIENT
Start: 2024-04-24 | End: 2024-04-27

## 2024-04-24 RX ORDER — INSULIN LISPRO 100 [IU]/ML
2-9 INJECTION, SOLUTION INTRAVENOUS; SUBCUTANEOUS
Status: DISCONTINUED | OUTPATIENT
Start: 2024-04-25 | End: 2024-05-01 | Stop reason: HOSPADM

## 2024-04-24 RX ORDER — SODIUM CHLORIDE 0.9 % (FLUSH) 0.9 %
10 SYRINGE (ML) INJECTION AS NEEDED
Status: DISCONTINUED | OUTPATIENT
Start: 2024-04-24 | End: 2024-05-01 | Stop reason: HOSPADM

## 2024-04-24 RX ORDER — CALCIUM CARBONATE 500 MG/1
2 TABLET, CHEWABLE ORAL 2 TIMES DAILY PRN
Status: DISCONTINUED | OUTPATIENT
Start: 2024-04-24 | End: 2024-05-01 | Stop reason: HOSPADM

## 2024-04-24 RX ORDER — AMOXICILLIN 250 MG
2 CAPSULE ORAL 2 TIMES DAILY
Status: DISCONTINUED | OUTPATIENT
Start: 2024-04-25 | End: 2024-05-01 | Stop reason: HOSPADM

## 2024-04-24 RX ORDER — DEXTROSE MONOHYDRATE 25 G/50ML
25 INJECTION, SOLUTION INTRAVENOUS
Status: DISCONTINUED | OUTPATIENT
Start: 2024-04-24 | End: 2024-05-01 | Stop reason: HOSPADM

## 2024-04-24 RX ORDER — SODIUM CHLORIDE 9 MG/ML
40 INJECTION, SOLUTION INTRAVENOUS AS NEEDED
Status: DISCONTINUED | OUTPATIENT
Start: 2024-04-24 | End: 2024-05-01 | Stop reason: HOSPADM

## 2024-04-24 RX ORDER — SODIUM CHLORIDE 0.9 % (FLUSH) 0.9 %
10 SYRINGE (ML) INJECTION EVERY 12 HOURS SCHEDULED
Status: DISCONTINUED | OUTPATIENT
Start: 2024-04-25 | End: 2024-05-01 | Stop reason: HOSPADM

## 2024-04-24 RX ORDER — IBUPROFEN 600 MG/1
1 TABLET ORAL
Status: DISCONTINUED | OUTPATIENT
Start: 2024-04-24 | End: 2024-05-01 | Stop reason: HOSPADM

## 2024-04-24 RX ORDER — BISACODYL 10 MG
10 SUPPOSITORY, RECTAL RECTAL DAILY PRN
Status: DISCONTINUED | OUTPATIENT
Start: 2024-04-24 | End: 2024-05-01 | Stop reason: HOSPADM

## 2024-04-24 RX ORDER — BISACODYL 5 MG/1
5 TABLET, DELAYED RELEASE ORAL DAILY PRN
Status: DISCONTINUED | OUTPATIENT
Start: 2024-04-24 | End: 2024-05-01 | Stop reason: HOSPADM

## 2024-04-24 RX ORDER — NICOTINE POLACRILEX 4 MG
15 LOZENGE BUCCAL
Status: DISCONTINUED | OUTPATIENT
Start: 2024-04-24 | End: 2024-05-01 | Stop reason: HOSPADM

## 2024-04-24 RX ADMIN — HYDROMORPHONE HYDROCHLORIDE 1 MG: 1 INJECTION, SOLUTION INTRAMUSCULAR; INTRAVENOUS; SUBCUTANEOUS at 10:04

## 2024-04-24 RX ADMIN — HYDROMORPHONE HYDROCHLORIDE 1 MG: 1 INJECTION, SOLUTION INTRAMUSCULAR; INTRAVENOUS; SUBCUTANEOUS at 00:15

## 2024-04-24 RX ADMIN — PANTOPRAZOLE SODIUM 40 MG: 40 TABLET, DELAYED RELEASE ORAL at 06:25

## 2024-04-24 RX ADMIN — PREGABALIN 25 MG: 25 CAPSULE ORAL at 13:16

## 2024-04-24 RX ADMIN — Medication 10 ML: at 10:13

## 2024-04-24 RX ADMIN — METHYLPREDNISOLONE SODIUM SUCCINATE 60 MG: 125 INJECTION INTRAMUSCULAR; INTRAVENOUS at 22:17

## 2024-04-24 RX ADMIN — HYDROMORPHONE HYDROCHLORIDE 1 MG: 1 INJECTION, SOLUTION INTRAMUSCULAR; INTRAVENOUS; SUBCUTANEOUS at 14:20

## 2024-04-24 RX ADMIN — PREGABALIN 25 MG: 25 CAPSULE ORAL at 06:25

## 2024-04-24 RX ADMIN — OXYCODONE AND ACETAMINOPHEN 1 TABLET: 5; 325 TABLET ORAL at 23:57

## 2024-04-24 RX ADMIN — HYDROMORPHONE HYDROCHLORIDE 1 MG: 1 INJECTION, SOLUTION INTRAMUSCULAR; INTRAVENOUS; SUBCUTANEOUS at 03:16

## 2024-04-24 RX ADMIN — PREGABALIN 25 MG: 25 CAPSULE ORAL at 22:07

## 2024-04-24 RX ADMIN — METHOCARBAMOL TABLETS 750 MG: 750 TABLET, COATED ORAL at 10:10

## 2024-04-24 RX ADMIN — METHYLPREDNISOLONE SODIUM SUCCINATE 60 MG: 125 INJECTION INTRAMUSCULAR; INTRAVENOUS at 06:21

## 2024-04-24 RX ADMIN — METHYLPREDNISOLONE SODIUM SUCCINATE 60 MG: 125 INJECTION INTRAMUSCULAR; INTRAVENOUS at 14:19

## 2024-04-24 RX ADMIN — LOSARTAN POTASSIUM 25 MG: 25 TABLET, FILM COATED ORAL at 10:11

## 2024-04-24 RX ADMIN — METHOCARBAMOL TABLETS 750 MG: 750 TABLET, COATED ORAL at 13:16

## 2024-04-24 RX ADMIN — AMLODIPINE BESYLATE 10 MG: 10 TABLET ORAL at 10:11

## 2024-04-24 RX ADMIN — HYDROCHLOROTHIAZIDE 25 MG: 25 TABLET ORAL at 10:11

## 2024-04-24 RX ADMIN — ATENOLOL 25 MG: 25 TABLET ORAL at 22:44

## 2024-04-24 RX ADMIN — ATORVASTATIN CALCIUM 20 MG: 20 TABLET, FILM COATED ORAL at 22:43

## 2024-04-24 RX ADMIN — METHOCARBAMOL TABLETS 750 MG: 750 TABLET, COATED ORAL at 17:29

## 2024-04-24 RX ADMIN — BUPROPION HYDROCHLORIDE 150 MG: 150 TABLET, EXTENDED RELEASE ORAL at 10:11

## 2024-04-24 RX ADMIN — METHOCARBAMOL TABLETS 750 MG: 750 TABLET, COATED ORAL at 22:07

## 2024-04-24 RX ADMIN — ESCITALOPRAM 5 MG: 5 TABLET, FILM COATED ORAL at 22:43

## 2024-04-24 RX ADMIN — Medication 10 ML: at 23:56

## 2024-04-24 RX ADMIN — SENNOSIDES AND DOCUSATE SODIUM 2 TABLET: 50; 8.6 TABLET ORAL at 23:56

## 2024-04-24 RX ADMIN — SODIUM CHLORIDE 75 ML/HR: 9 INJECTION, SOLUTION INTRAVENOUS at 22:12

## 2024-04-24 RX ADMIN — Medication 10 ML: at 22:17

## 2024-04-24 RX ADMIN — HYDROMORPHONE HYDROCHLORIDE 1 MG: 1 INJECTION, SOLUTION INTRAMUSCULAR; INTRAVENOUS; SUBCUTANEOUS at 22:03

## 2024-04-24 RX ADMIN — ONDANSETRON 4 MG: 2 INJECTION INTRAMUSCULAR; INTRAVENOUS at 10:05

## 2024-04-24 NOTE — PLAN OF CARE
Goal Outcome Evaluation:  Plan of Care Reviewed With: patient           Outcome Evaluation: Upon entering room, pt. sitting up in chair, awake/alert, and agreeable to work with P.T. despite c/o pain.  Pt. able to ambulate 20 feet, Min. assist x 1, with HHA x1 (LUE) for support.  Pt. requires CGA x 1 for sit <-> stand transfers.  BLE ther. ex. program x 10 reps completed for general strengthening.  Limited in upright mobility due to pain and overall fatigue.  Will continue to progress functional mobility as tolerated.

## 2024-04-24 NOTE — PROGRESS NOTES
Patient Care Team:  Epley, James, APRN as PCP - General (Family Medicine)  Virgil Moeller II, MD as Consulting Physician (Radiology)  Diallo Eisenberg MD as Referring Physician (Breast Surgery)  Peyton Keyes MD PhD as Consulting Physician (Hematology and Oncology)  Rimma Keller APRN as Nurse Practitioner (Nurse Practitioner)     KIKO ESTEBAN Progress Note    I supervised care provided by the midlevel provider. We have discussed this patient's history, physical exam, and treatment plan. I have reviewed the midlevel provider's note and I agree with the midlevel provider's findings and plan of care.   SHARED VISIT: This visit was performed by BOTH a physician and an APC. The substantive portion of the medical decision making was performed by this attesting physician who made or approved the management plan and takes responsibility for patient management.   I have personally had a face to face encounter with the patient.   My personal findings are documented below:    Subjective:  Patient's status post LESI, reports no relief.    Physical Exam:  General: No acute distress.  HENT: NCAT, PERRL, Nares patent.  Eyes: no scleral icterus.  Neck: trachea midline, no ROM limitations.  CV: regular rhythm, regular rate.  Respiratory: normal effort, CTAB.  Abdomen: soft, nondistended, NTTP, no rebound tenderness, no guarding or rigidity.  Musculoskeletal: no deformity.  Neuro: alert, moves all extremities, follows commands.  Skin: warm, dry.    Assessment and Plan:  Patient status post epidural with little relief, continuing to treat pain Multimodal, IV steroids, neurosurgery following, pending further recommendations by neurosurgery.

## 2024-04-24 NOTE — PROGRESS NOTES
ED OBSERVATION PROGRESS/DISCHARGE SUMMARY    Date of Admission: 4/22/2024   LOS: 0 days   PCP: Epley, James, APRN    Final Diagnosis intractable back pain      Subjective     Hospital Outcome: Intractable back pain/admission    Alesia Resendez is a 74 y.o. female with a history of chronic back pain, hypertension, hyperlipidemia, breast cancer status post lobectomy who presents to [facility] ER with acute back pain radiates down the left leg.  Patient states that started 4 days ago when she had just stood up from a chair.  She describes the pain starts in her low back wraps around the front of her left thigh and into the groin down to the knee.  Denies any numbness or tingling.  She denies any weakness but does states has been difficult to walk around due to the pain.  She describes it as a constant sharp pain.  Denies any urinary or fecal incontinence.  Denies urinary retention.  Denies pain with urination.  Denies fevers and chills.  Denies lightheadedness or dizziness.  Denies chest pain shortness of breath.  She states that she did have a epidural injection last month done for her chronic low back pain and had previously been improving until 4 days ago.      Patient has been evaluated by neurosurgery and epidurals complete.  Patient still having pain at present.  They would like for us to monitor the patient overnight and control pain as needed.  They will reevaluate in the morning.    Patient has been reevaluated by neurosurgery/Dr. Garvey.  There is a plan for myelogram tomorrow.  Patient's pain continues.  Patient has been in the observation unit for 57 hours.  Will place a call to the hospitalist to discuss admission for intractable lower back pain and further management.    1413:  Discussed the patient's case with Dr. Marie/[facility].  To place in observation/MedSurg status in her care      ROS:  General: no fevers, chills  Respiratory: no cough, dyspnea  Cardiovascular: no chest pain,  palpitations  Abdomen: No abdominal pain, nausea, vomiting, or diarrhea  Neurologic: No focal weakness    Objective   Physical Exam:  I have reviewed the vital signs.  Temp:  [97.3 °F (36.3 °C)-98.1 °F (36.7 °C)] 97.3 °F (36.3 °C)  Heart Rate:  [57-76] 62  Resp:  [16-20] 17  BP: (125-165)/() 145/87  General Appearance:    Alert, cooperative, no distress  Head:    Normocephalic, atraumatic  Eyes:    Sclerae anicteric  Neck:   Supple, no mass  Lungs: Clear to auscultation bilaterally, respirations unlabored  Heart: Regular rate and rhythm, S1 and S2 normal, no murmur, rub or gallop  Abdomen:  Soft, non-tender, bowel sounds active, nondistended  Extremities: No clubbing, cyanosis, or edema to lower extremities  Pulses:  2+ and symmetric in distal lower extremities  Skin: No rashes   Neurologic: Oriented x3, Normal strength to extremities    Results Review:    I have reviewed the labs, radiology results and diagnostic studies.    Results from last 7 days   Lab Units 04/23/24  0521   WBC 10*3/mm3 7.96   HEMOGLOBIN g/dL 13.6   HEMATOCRIT % 41.0   PLATELETS 10*3/mm3 241     Results from last 7 days   Lab Units 04/23/24  0521 04/22/24  0406   SODIUM mmol/L 138 139   POTASSIUM mmol/L 3.8 3.6   CHLORIDE mmol/L 98 100   CO2 mmol/L 26.5 24.9   BUN mg/dL 24* 17   CREATININE mg/dL 0.68 0.61   CALCIUM mg/dL 9.2 9.4   BILIRUBIN mg/dL  --  0.4   ALK PHOS U/L  --  91   ALT (SGPT) U/L  --  20   AST (SGOT) U/L  --  23   GLUCOSE mg/dL 207* 139*     Imaging Results (Last 24 Hours)       ** No results found for the last 24 hours. **            I have reviewed the medications.  ---------------------------------------------------------------------------------------------  Assessment & Plan   Assessment/Problem List    Low back pain    Lumbar back pain with radiculopathy affecting left lower extremity    Lumbar back pain with radiculopathy affecting left lower extremity    Weakness of left lower extremity    Intractable back  pain      Plan:    Intractable low back pain with left radiculopathy  Chronic back pain  -CT of the lumbar spine shows disc protrusions at L4-5 L5-S1 causing severe central canal and bilateral neuroforaminal stenosis  -MRI of the lumbar spine with and without ordered shows multilevel degenerative disease involving the lumbar spine including fusion the facets bilaterally at L4-L5, loss of disc height at L5-S1, anterolisthesis of L4 upon L5 and multilevel disc osteophyte complexes.  Spinal stenosis and lateral recess narrowing is most prominent at L4-L5 where there is moderate concentric canal stenosis and moderate to severe lateral recess narrowing bilaterally.  There is also moderate to severe if not severe lateral recess narrowing to the left at L2-L3 and moderate canal stenosis and lateral recess narrowing bilaterally at L3-L4.  Multifocal level facet degenerative disease is appreciated as well as multilevel foraminal stenosis.  Foraminal stenosis on the left is most prominent at L3-L4 and L5-S1 and to the right at L4-L5.   -IV steroids  -Multimodal pain control  -Patient's pain persist.  Neurosurgery want to do myelogram in the morning.  Patient admitted to /[facility] for further management.     Hypertension  -Continue home atenolol, losartan, amlodipine  -Monitor with vital signs every 4 hours     DVT prophylaxis:  Mechanical DVT prophylaxis orders are present.     Disposition: Pending     Follow-up after Discharge: Pending      Disposition: Admission    Follow-up after Discharge: pending    This note will serve as a progress note and transfer/admission note    Theodore Fuentes III, PA 04/24/24 14:18 EDT    I have worn appropriate PPE during this patient encounter, sanitized my hands both with entering and exiting patient's room.      56 minutes has been spent by [facility] providers in the care of this patient while under observation status

## 2024-04-24 NOTE — PROGRESS NOTES
[facility] THORACIC/LUMBAR NEUROSURGERY PROGRESS NOTE      CC: Low back and left leg pain/ weakness      Subjective     Interval History: Underwent LESI yesterday.  Reports no significant improvement.  Numbness reported to be in left anterior thigh now radiating into the left calf.  Denies SA.  Does not report any issues with bowel or bladder incontinence.  Continues to have left lower extremity weakness.      Objective     Vital signs in last 24 hours:  Temp:  [97.5 °F (36.4 °C)-98.2 °F (36.8 °C)] 98.1 °F (36.7 °C)  Heart Rate:  [57-76] 63  Resp:  [16-20] 18  BP: (125-197)/() 137/75    Intake/Output this shift:  No intake/output data recorded.    LABS:  Results from last 7 days   Lab Units 04/23/24  0521 04/22/24  0406   WBC 10*3/mm3 7.96 8.67   HEMOGLOBIN g/dL 13.6 14.9   HEMATOCRIT % 41.0 44.9   PLATELETS 10*3/mm3 241 266     Results from last 7 days   Lab Units 04/23/24  0521 04/22/24  0406   SODIUM mmol/L 138 139   POTASSIUM mmol/L 3.8 3.6   CHLORIDE mmol/L 98 100   CO2 mmol/L 26.5 24.9   BUN mg/dL 24* 17   CREATININE mg/dL 0.68 0.61   CALCIUM mg/dL 9.2 9.4   BILIRUBIN mg/dL  --  0.4   ALK PHOS U/L  --  91   ALT (SGPT) U/L  --  20   AST (SGOT) U/L  --  23   GLUCOSE mg/dL 207* 139*         IMAGING STUDIES:  No new imaging to review    I personally viewed the patient's chart, it was also reviewed by and discussed with Dr Guru Arroyo reviewed/changed: Yes  Norvasc 10 mg p.o. daily  Atenolol 25 mg p.o. daily  Lipitor 20 mg p.o. daily  Wellbutrin 150 mg p.o. daily  Lexapro 5 mg p.o. daily  Hydrochlorothiazide 25 mg p.o. daily  Lidocaine patch every 24 hours  Losartan 25 mg p.o. daily  Robaxin-750 milligrams p.o. 4 times daily  Solu-Medrol 60 mg IV every 8 hours  Protonix 40 mg p.o. every morning  Lyrica 25 mg p.o. every 8 hours  Dilaudid 1 mg IV every 3 hours as needed  Oxycodone 5-325 mg 1 p.o. every 4 hours as needed      Physical Exam:    General:  Awake & alert  Back:   Straight leg raise on the  left  Motor: 5/5 strength to right lower extremity, 4/5 strength to left iliopsoas and quadricep, 5/5 to the remaining left lower extremity  Sensation: Numbness in left anterior thigh radiating into calf.  Station and Gait: Not tested        Assessment & Plan     ASSESSMENT:      Low back pain    Lumbar back pain with radiculopathy affecting left lower extremity    Lumbar back pain with radiculopathy affecting left lower extremity    Weakness of left lower extremity    74-year-old with left lumbar radiculopathy with left lower extremity weakness who underwent LESI yesterday am.  Today she reports no improvement and left lower extremity pain, numbness, and weakness persist.  She will require further imaging with CT myelogram of the lumbar spine.  Surgical recommendations to follow.  She will be n.p.o. after midnight and undergo myelogram in the morning.    PLAN:     CT myelogram lumbar spine 4/25  N.p.o. after midnight  Continue IV steroids  Continue pain management efforts    I discussed the patient's findings and my recommendations with patient, nursing staff, and Dr Garvey    This part is from Dr. Garvey: I had a long discussion with the patient about situation.  Most of the pain is in her left groin and her left anterior thigh with numbness in her calf.  She has some numbness in her inner thigh as well.  She really did not get much relief from the IV steroids or the epidural block.  She is interested in proceeding with surgery.  I explained to her that morbidly obese people have a higher risk of complications and a lower chance of a good result with surgery on the lower back but on the other hand she does have quite severe stenosis.  I suggest that we do a lumbar myelogram to get a better idea of whether or not we could limit the surgery to less than 3 levels.  I told the patient what a myelogram involves.  I explained that there is a 50% chance of developing a bad headache and nausea as a result of the test.  I  "explained that there is also a very small chance of infection, seizures, and bleeding.  I explained how we would treat a post myelogram headache including bedrest, caffeinated fluids, steroids, and blood patch.  The patient does ask to proceed.    During patient visit, I utilized appropriate personal protective equipment including gloves.  Appropriate PPE was worn during the entire visit.  Hand hygiene was completed before and after.      LOS: 0 days       Fran Hayden Almeida, APRN  4/24/2024  08:00 EDT    \"Dictated utilizing Dragon dictation\".      "

## 2024-04-24 NOTE — PLAN OF CARE
Goal Outcome Evaluation:      Pt. C/o back and leg pain. Percocet and dilaudid given. Pain improved with dilaudid. IV steroids given. Neurosurgery following and PT consulted. 2L O2 while sleeping.

## 2024-04-24 NOTE — PLAN OF CARE
Goal Outcome Evaluation:     When rounding on patient her friend at bedside mentioned to this nurse that patient had an episode of incontinence when moving back into bed. She normally has some stress incontinence but this was different and more. Hospitalist followed a few minutes after and ordered a post void residual and to notify neurosurgery of this episode. Patient denies any new symptoms,  continues to have numbness into the left thigh and calf. Denies needing pain meds at this time. Muscle relaxers are helping.     Neurosurgery (Rola) called back, instructed to do post void residuals overnight and call with increased incontinence issues or post void residuals over 700.

## 2024-04-24 NOTE — THERAPY TREATMENT NOTE
Patient Name: Alesia Resendez  : 1950    MRN: [id number]                              Today's Date: 2024       Admit Date: 2024    Visit Dx:     ICD-10-CM ICD-9-CM   1. Acute left-sided low back pain, unspecified whether sciatica present  M54.50 724.2   2. Lumbar radiculopathy  M54.16 724.4     Patient Active Problem List   Diagnosis    Arthritis    Atrophic vaginitis    Depression    Gastroesophageal reflux disease without esophagitis    Mixed hyperlipidemia    Essential hypertension    Menopause present    Overactive bladder    History of colonic polyps    Rectal hemorrhage    Proctocele    Encounter for screening for malignant neoplasm of colon    Stress incontinence in female    Recurrent breast cancer    Viral syndrome    Chronic fatigue    Snoring    Tremor    Community acquired pneumonia    Chronic right-sided low back pain with sciatica    Elevated liver function tests    Piriformis syndrome of left side    Myofasciitis    History of left breast cancer    Diarrhea    Family history of esophageal cancer    Spondylosis of lumbar region without myelopathy or radiculopathy    Cervical stenosis of spinal canal    Cervical spondylosis without myelopathy    Prediabetes    Sacroiliitis    Spinal stenosis of lumbar region    Lumbar radiculopathy    Degeneration of lumbar or lumbosacral intervertebral disc    Class 2 severe obesity due to excess calories with serious comorbidity and body mass index (BMI) of 36.0 to 36.9 in adult    Low back pain    Lumbar back pain with radiculopathy affecting left lower extremity    Lumbar back pain with radiculopathy affecting left lower extremity    Weakness of left lower extremity    Intractable back pain     Past Medical History:   Diagnosis Date    Anxiety     Arthritis     Breast cancer 2017    LEFT    Carpal tunnel syndrome of right wrist     Chronic pain disorder ’s    Colon polyp     Removed    Depression 2017    Dermatochalasis of both eyelids      Fibromyalgia, primary 2023    GERD (gastroesophageal reflux disease)     History of radiation therapy 2006    LT BREAST    History of skin cancer     SQUAMOUS CELL REMOVED FROM LT UPPER LIP AND RT THUMB    Hyperlipidemia 2005    Hypertension     Irritable bowel syndrome In my 40’s    Low back pain 2015    Lumbosacral disc disease 2012    Obesity     Osteoarthritis 1990    Ptosis of both eyebrows     Spinal stenosis 2012    Viral syndrome 02/26/2018     Past Surgical History:   Procedure Laterality Date    ANAL FISSURECTOMY  2003    APPENDECTOMY  2014    BLEPHAROPLASTY Bilateral 05/11/2023    Procedure: BILATERAL UPPER LID BLEPHAROPLASTY;  Surgeon: Nicola Rowley MD;  Location: [facility] MAIN OR;  Service: Ophthalmology;  Laterality: Bilateral;    BREAST LUMPECTOMY W/ NEEDLE LOCALIZATION Left 2006    BREAST SURGERY  2006 and 2017    breast cancer    BROW LIFT Bilateral 05/11/2023    Procedure: BILATERAL TEMPORAL DIRECT BROWLIFT;  Surgeon: Nicola Rowley MD;  Location: [facility] MAIN OR;  Service: Ophthalmology;  Laterality: Bilateral;    CATARACT EXTRACTION Bilateral 07/2018    CHOLECYSTECTOMY  2007    COLONOSCOPY  2014    COLONOSCOPY Left 05/27/2021    Procedure: COLONOSCOPY;  Surgeon: Sridhar James MD;  [place] [facility] [place];  Service: Gastroenterology;  Laterality: Left;    COLONOSCOPY N/A 10/10/2022    Procedure: COLONOSCOPY FOR SCREENING;  Surgeon: Sridhar James MD;  Location: [facility];  Service: Gastroenterology;  Laterality: N/A;  NORMAL    ENDOSCOPY N/A 10/10/2022    Procedure: ESOPHAGOGASTRODUODENOSCOPY WITH BIOPSY;  Surgeon: Sridhar James MD;  Location: [facility];  Service: Gastroenterology;  Laterality: N/A;  GASTRIC POLYPS, IRREGULAR Z LINE    EPIDURAL Left 06/21/2023    Procedure: LUMBAR/SACRAL TRANSFORAMINAL EPIDURAL LEFT L4-5  90423;  Surgeon: Estrellita Sheppard MD;  Location: [facility];  Service: Pain Management;  Laterality: Left;    EPIDURAL Bilateral  3/18/2024    Procedure: LUMBAR/SACRAL TRANSFORAMINAL EPIDURAL BILATERAL L4-5 21461-54;  Surgeon: Estrellita Sheppard MD;  Location: [facility];  Service: Pain Management;  Laterality: Bilateral;    EPIDURAL BLOCK  2020    HYSTERECTOMY  1987    JOINT REPLACEMENT      KNEE SURGERY Right 2014    Knee cap replaced    KNEE SURGERY Left 12/06/2022    joint replacement    MASTECTOMY      MASTECTOMY W/ SENTINEL NODE BIOPSY Bilateral 08/21/2017    Procedure: BILATEREAL BREAST MASTECTOMY WITH SENTINEL NODE BIOPSY ON THE LEFT The sentinel lymph node biopsy will only be performed on the left side;  Surgeon: Diallo Eisenberg MD;  Location:  [facility];  Service:     MEDIAL BRANCH BLOCK Bilateral 03/20/2023    Procedure: LUMBAR MEDIAL BRANCH BLOCK BILATERAL L3-L5 #1  79408, 57500;  Surgeon: Estrellita Sheppard MD;  Location: [facility];  Service: Pain Management;  Laterality: Bilateral;    OOPHORECTOMY Bilateral 2000    BSO    ORTHOPEDIC SURGERY  2000 and 2001    Knee replacements    PIRIFORMUS INJECTION Left 07/19/2021    Procedure: Left piriformis injection;  Surgeon: Estrellita Sheppard MD;  Location: [facility];  Service: Pain Management;  Laterality: Left;    PIRIFORMUS INJECTION Left 01/03/2022    Procedure: PIRIFORMIS INJECTION - Left;  Surgeon: Estrellita Sheppard MD;  Location: [facility];  Service: Pain Management;  Laterality: Left;    PIRIFORMUS INJECTION Left 01/18/2023    Procedure: PIRIFORMIS INJECTION Left;  Surgeon: Estrellita Sheppard MD;  Location: [facility];  Service: Pain Management;  Laterality: Left;    PIRIFORMUS INJECTION Left 05/01/2023    Procedure: PIRIFORMIS INJECTION;  Surgeon: Estrellita Sheppard MD;  Location: [facility];  Service: Pain Management;  Laterality: Left;    POSTERIOR REPAIR  05/17/2016    with enterocele repair, midurethral sling, perineoplasty    SACROILIAC JOINT INJECTION Left 05/01/2023    Procedure: SACROILIAC JOINT INJECTION AND PIRIFORMIS INJECTION LEFT  96258;  Surgeon:  Estrellita Sheppard MD;  [place] [facility] [place];  Service: Pain Management;  Laterality: Left;    SKIN BIOPSY      TONSILLECTOMY  1970    TOTAL KNEE ARTHROPLASTY Bilateral 2000, 2001    UPPER GASTROINTESTINAL ENDOSCOPY  10/10/2022      General Information       Row Name 04/24/24 1525          Physical Therapy Time and Intention    Document Type therapy note (daily note)  -MS     Mode of Treatment physical therapy;individual therapy  -MS       Row Name 04/24/24 1525          General Information    Patient Profile Reviewed yes  -MS     Existing Precautions/Restrictions spinal;fall   -MS     Barriers to Rehab none identified  -MS       Row Name 04/24/24 1525          Cognition    Orientation Status (Cognition) oriented x 3  -MS       Row Name 04/24/24 1525          Safety Issues, Functional Mobility    Comment, Safety Issues/Impairments (Mobility) Gait belt used for safety.  -MS               User Key  (r) = Recorded By, (t) = Taken By, (c) = Cosigned By      Initials Name Provider Type    MS Harpal Marie, PT Physical Therapist                   Mobility       Row Name 04/24/24 1526          Bed Mobility    Bed Mobility supine-sit;sit-supine  -MS     Comment, (Bed Mobility) Up in chair this PM.  -MS       Row Name 04/24/24 1526          Sit-Stand Transfer    Sit-Stand Taney (Transfers) contact guard  -MS     Assistive Device (Sit-Stand Transfers) --  HHA x 1 (LUE)  -MS       Row Name 04/24/24 1526          Gait/Stairs (Locomotion)    Taney Level (Gait) minimum assist (75% patient effort)  -MS     Assistive Device (Gait) --  HHA x 1 (LUE)  -MS     Distance in Feet (Gait) 20  -MS     Deviations/Abnormal Patterns (Gait) antalgic;david decreased  -MS     Bilateral Gait Deviations forward flexed posture  -MS     Comment, (Gait/Stairs) Verbal/tactile cues given for posture correction.  -MS               User Key  (r) = Recorded By, (t) = Taken By, (c) = Cosigned By      Initials Name Provider Type    MS  Harpal Marie, PT Physical Therapist                   Obj/Interventions       Row Name 04/24/24 1527          Motor Skills    Therapeutic Exercise --  BLE ther. ex. program x 10 reps completed (Hip Flexion, LAQ's)  -MS               User Key  (r) = Recorded By, (t) = Taken By, (c) = Cosigned By      Initials Name Provider Type    Harpal Abrams, PT Physical Therapist                   Goals/Plan    No documentation.                  Clinical Impression       Row Name 04/24/24 1527          Pain    Pretreatment Pain Rating 8/10  -MS     Posttreatment Pain Rating 8/10  -MS     Pain Location - back  -MS     Pain Intervention(s) Medication (See MAR);Nursing Notified;Repositioned  -MS       Row Name 04/24/24 1527          Positioning and Restraints    Pre-Treatment Position sitting in chair/recliner  -MS     Post Treatment Position chair  -MS     In Chair notified nsg;sitting;encouraged to call for assist;call light within reach;with family/caregiver  -MS               User Key  (r) = Recorded By, (t) = Taken By, (c) = Cosigned By      Initials Name Provider Type    Harpal Abrams, PT Physical Therapist                   Outcome Measures       Row Name 04/24/24 1528          How much help from another person do you currently need...    Turning from your back to your side while in flat bed without using bedrails? 3  -MS     Moving from lying on back to sitting on the side of a flat bed without bedrails? 3  -MS     Moving to and from a bed to a chair (including a wheelchair)? 3  -MS     Standing up from a chair using your arms (e.g., wheelchair, bedside chair)? 3  -MS     Climbing 3-5 steps with a railing? 2  -MS     To walk in hospital room? 3  -MS     AM-PAC 6 Clicks Score (PT) 17  -MS     Highest Level of Mobility Goal 5 --> Static standing  -MS       Row Name 04/24/24 1528          Functional Assessment    Outcome Measure Options AM-PAC 6 Clicks Basic Mobility (PT)  -MS               User Key  (r) =  Recorded By, (t) = Taken By, (c) = Cosigned By      Initials Name Provider Type    MS MarieHarpal, PT Physical Therapist                                 Physical Therapy Education       Title: PT OT SLP Therapies (Done)       Topic: Physical Therapy (Done)       Point: Mobility training (Done)       Learning Progress Summary             Patient Acceptance, E,D, VU,NR by MS at 4/24/2024 1528    Acceptance, E,TB, VU,NR by CB at 4/22/2024 1551                         Point: Home exercise program (Done)       Learning Progress Summary             Patient Acceptance, E,D, VU,NR by MS at 4/24/2024 1528                         Point: Body mechanics (Done)       Learning Progress Summary             Patient Acceptance, E,D, VU,NR by MS at 4/24/2024 1528    Acceptance, E,TB, VU,NR by CB at 4/22/2024 1551                         Point: Precautions (Done)       Learning Progress Summary             Patient Acceptance, E,D, VU,NR by MS at 4/24/2024 1528    Acceptance, E,TB, VU,NR by CB at 4/22/2024 1551                                         User Key       Initials Effective Dates Name Provider Type Discipline    MS 06/16/21 -  Harpal Marie, PT Physical Therapist PT    CB 10/22/21 -  Kimmie Baires PT Physical Therapist PT                  PT Recommendation and Plan     Plan of Care Reviewed With: patient  Outcome Evaluation: Upon entering room, pt. sitting up in chair, awake/alert, and agreeable to work with P.T. despite c/o pain.  Pt. able to ambulate 20 feet, Min. assist x 1, with HHA x1 (LUE) for support.  Pt. requires CGA x 1 for sit <-> stand transfers.  BLE ther. ex. program x 10 reps completed for general strengthening.  Limited in upright mobility due to pain and overall fatigue.  Will continue to progress functional mobility as tolerated.     Time Calculation:         PT Charges       Row Name 04/24/24 1534             Time Calculation    Start Time 1345  -MS      Stop Time 1400  -MS      Time Calculation  (min) 15 min  -MS      PT Received On 04/24/24  -MS      PT - Next Appointment 04/25/24  -MS         Time Calculation- PT    Total Timed Code Minutes- PT 14 minute(s)  -MS                User Key  (r) = Recorded By, (t) = Taken By, (c) = Cosigned By      Initials Name Provider Type    Harpal Abrams, PT Physical Therapist                  Therapy Charges for Today       Code Description Service Date Service Provider Modifiers Qty    [id number]  PT THERAPEUTIC ACT EA 15 MIN 4/24/2024 Harpal Marie, PT GP 1            PT G-Codes  Outcome Measure Options: AM-PAC 6 Clicks Basic Mobility (PT)  AM-PAC 6 Clicks Score (PT): 17       Harpal Marie, PT  4/24/2024

## 2024-04-25 ENCOUNTER — APPOINTMENT (OUTPATIENT)
Dept: GENERAL RADIOLOGY | Facility: HOSPITAL | Age: 74
End: 2024-04-25
Payer: MEDICARE

## 2024-04-25 ENCOUNTER — APPOINTMENT (OUTPATIENT)
Dept: CT IMAGING | Facility: HOSPITAL | Age: 74
End: 2024-04-25
Payer: MEDICARE

## 2024-04-25 LAB
ALBUMIN SERPL-MCNC: 3.8 G/DL (ref 3.5–5.2)
ALBUMIN/GLOB SERPL: 1.7 G/DL
ALP SERPL-CCNC: 68 U/L (ref 39–117)
ALT SERPL W P-5'-P-CCNC: 47 U/L (ref 1–33)
ANION GAP SERPL CALCULATED.3IONS-SCNC: 9.8 MMOL/L (ref 5–15)
AST SERPL-CCNC: 39 U/L (ref 1–32)
BASOPHILS # BLD AUTO: 0.01 10*3/MM3 (ref 0–0.2)
BASOPHILS NFR BLD AUTO: 0.2 % (ref 0–1.5)
BILIRUB SERPL-MCNC: 0.4 MG/DL (ref 0–1.2)
BILIRUB UR QL STRIP: NEGATIVE
BUN SERPL-MCNC: 27 MG/DL (ref 8–23)
BUN/CREAT SERPL: 38.6 (ref 7–25)
CALCIUM SPEC-SCNC: 9.2 MG/DL (ref 8.6–10.5)
CHLORIDE SERPL-SCNC: 100 MMOL/L (ref 98–107)
CLARITY UR: CLEAR
CO2 SERPL-SCNC: 32.2 MMOL/L (ref 22–29)
COLOR UR: YELLOW
CREAT SERPL-MCNC: 0.7 MG/DL (ref 0.57–1)
DEPRECATED RDW RBC AUTO: 40.8 FL (ref 37–54)
EGFRCR SERPLBLD CKD-EPI 2021: 90.9 ML/MIN/1.73
EOSINOPHIL # BLD AUTO: 0 10*3/MM3 (ref 0–0.4)
EOSINOPHIL NFR BLD AUTO: 0 % (ref 0.3–6.2)
ERYTHROCYTE [DISTWIDTH] IN BLOOD BY AUTOMATED COUNT: 13.1 % (ref 12.3–15.4)
GLOBULIN UR ELPH-MCNC: 2.3 GM/DL
GLUCOSE BLDC GLUCOMTR-MCNC: 134 MG/DL (ref 70–130)
GLUCOSE BLDC GLUCOMTR-MCNC: 139 MG/DL (ref 70–130)
GLUCOSE BLDC GLUCOMTR-MCNC: 144 MG/DL (ref 70–130)
GLUCOSE SERPL-MCNC: 166 MG/DL (ref 65–99)
GLUCOSE UR STRIP-MCNC: NEGATIVE MG/DL
HBA1C MFR BLD: 6.3 % (ref 4.8–5.6)
HCT VFR BLD AUTO: 42.9 % (ref 34–46.6)
HGB BLD-MCNC: 14.2 G/DL (ref 12–15.9)
HGB UR QL STRIP.AUTO: NEGATIVE
IMM GRANULOCYTES # BLD AUTO: 0.06 10*3/MM3 (ref 0–0.05)
IMM GRANULOCYTES NFR BLD AUTO: 1 % (ref 0–0.5)
INR PPP: 1.09 (ref 0.9–1.1)
KETONES UR QL STRIP: NEGATIVE
LEUKOCYTE ESTERASE UR QL STRIP.AUTO: NEGATIVE
LYMPHOCYTES # BLD AUTO: 1.24 10*3/MM3 (ref 0.7–3.1)
LYMPHOCYTES NFR BLD AUTO: 19.7 % (ref 19.6–45.3)
MCH RBC QN AUTO: 28.3 PG (ref 26.6–33)
MCHC RBC AUTO-ENTMCNC: 33.1 G/DL (ref 31.5–35.7)
MCV RBC AUTO: 85.5 FL (ref 79–97)
MONOCYTES # BLD AUTO: 0.28 10*3/MM3 (ref 0.1–0.9)
MONOCYTES NFR BLD AUTO: 4.4 % (ref 5–12)
NEUTROPHILS NFR BLD AUTO: 4.71 10*3/MM3 (ref 1.7–7)
NEUTROPHILS NFR BLD AUTO: 74.7 % (ref 42.7–76)
NITRITE UR QL STRIP: NEGATIVE
NRBC BLD AUTO-RTO: 0 /100 WBC (ref 0–0.2)
PH UR STRIP.AUTO: <=5 [PH] (ref 5–8)
PLATELET # BLD AUTO: 248 10*3/MM3 (ref 140–450)
PMV BLD AUTO: 9.1 FL (ref 6–12)
POTASSIUM SERPL-SCNC: 4.9 MMOL/L (ref 3.5–5.2)
PROT SERPL-MCNC: 6.1 G/DL (ref 6–8.5)
PROT UR QL STRIP: NEGATIVE
PROTHROMBIN TIME: 14.4 SECONDS (ref 11.7–14.2)
RBC # BLD AUTO: 5.02 10*6/MM3 (ref 3.77–5.28)
SODIUM SERPL-SCNC: 142 MMOL/L (ref 136–145)
SP GR UR STRIP: 1.03 (ref 1–1.03)
T4 FREE SERPL-MCNC: 1.26 NG/DL (ref 0.93–1.7)
TSH SERPL DL<=0.05 MIU/L-ACNC: 0.25 UIU/ML (ref 0.27–4.2)
UROBILINOGEN UR QL STRIP: NORMAL
WBC NRBC COR # BLD AUTO: 6.3 10*3/MM3 (ref 3.4–10.8)

## 2024-04-25 PROCEDURE — 72114 X-RAY EXAM L-S SPINE BENDING: CPT

## 2024-04-25 PROCEDURE — 62304 MYELOGRAPHY LUMBAR INJECTION: CPT

## 2024-04-25 PROCEDURE — 84443 ASSAY THYROID STIM HORMONE: CPT | Performed by: NEUROLOGICAL SURGERY

## 2024-04-25 PROCEDURE — 72132 CT LUMBAR SPINE W/DYE: CPT

## 2024-04-25 PROCEDURE — 25010000002 METHYLPREDNISOLONE PER 125 MG: Performed by: NEUROLOGICAL SURGERY

## 2024-04-25 PROCEDURE — 25510000001 IOPAMIDOL 41 % SOLUTION: Performed by: NEUROLOGICAL SURGERY

## 2024-04-25 PROCEDURE — 72240 MYELOGRAPHY NECK SPINE: CPT

## 2024-04-25 PROCEDURE — 81003 URINALYSIS AUTO W/O SCOPE: CPT | Performed by: NURSE PRACTITIONER

## 2024-04-25 PROCEDURE — 25010000002 LIDOCAINE 1 % SOLUTION: Performed by: NEUROLOGICAL SURGERY

## 2024-04-25 PROCEDURE — 25810000003 SODIUM CHLORIDE 0.9 % SOLUTION: Performed by: NEUROLOGICAL SURGERY

## 2024-04-25 PROCEDURE — 25010000002 HYDROMORPHONE 1 MG/ML SOLUTION: Performed by: INTERNAL MEDICINE

## 2024-04-25 PROCEDURE — 85025 COMPLETE CBC W/AUTO DIFF WBC: CPT | Performed by: INTERNAL MEDICINE

## 2024-04-25 PROCEDURE — 82948 REAGENT STRIP/BLOOD GLUCOSE: CPT

## 2024-04-25 PROCEDURE — 99232 SBSQ HOSP IP/OBS MODERATE 35: CPT | Performed by: NEUROLOGICAL SURGERY

## 2024-04-25 PROCEDURE — 25010000002 METHYLPREDNISOLONE PER 125 MG: Performed by: STUDENT IN AN ORGANIZED HEALTH CARE EDUCATION/TRAINING PROGRAM

## 2024-04-25 PROCEDURE — 84439 ASSAY OF FREE THYROXINE: CPT | Performed by: NEUROLOGICAL SURGERY

## 2024-04-25 PROCEDURE — 25010000002 HYDROMORPHONE 1 MG/ML SOLUTION: Performed by: NEUROLOGICAL SURGERY

## 2024-04-25 PROCEDURE — 85610 PROTHROMBIN TIME: CPT | Performed by: NEUROLOGICAL SURGERY

## 2024-04-25 PROCEDURE — 80053 COMPREHEN METABOLIC PANEL: CPT | Performed by: NEUROLOGICAL SURGERY

## 2024-04-25 PROCEDURE — 83036 HEMOGLOBIN GLYCOSYLATED A1C: CPT | Performed by: NEUROLOGICAL SURGERY

## 2024-04-25 RX ORDER — LIDOCAINE HYDROCHLORIDE 10 MG/ML
10 INJECTION, SOLUTION INFILTRATION; PERINEURAL ONCE
Status: COMPLETED | OUTPATIENT
Start: 2024-04-25 | End: 2024-04-25

## 2024-04-25 RX ORDER — IOPAMIDOL 408 MG/ML
20 INJECTION, SOLUTION INTRATHECAL
Status: COMPLETED | OUTPATIENT
Start: 2024-04-25 | End: 2024-04-25

## 2024-04-25 RX ADMIN — METHYLPREDNISOLONE SODIUM SUCCINATE 60 MG: 125 INJECTION INTRAMUSCULAR; INTRAVENOUS at 21:28

## 2024-04-25 RX ADMIN — HYDROCHLOROTHIAZIDE 25 MG: 25 TABLET ORAL at 09:32

## 2024-04-25 RX ADMIN — PREGABALIN 25 MG: 25 CAPSULE ORAL at 21:17

## 2024-04-25 RX ADMIN — LOSARTAN POTASSIUM 25 MG: 25 TABLET, FILM COATED ORAL at 09:32

## 2024-04-25 RX ADMIN — SENNOSIDES AND DOCUSATE SODIUM 2 TABLET: 50; 8.6 TABLET ORAL at 21:16

## 2024-04-25 RX ADMIN — HYDROMORPHONE HYDROCHLORIDE 1 MG: 1 INJECTION, SOLUTION INTRAMUSCULAR; INTRAVENOUS; SUBCUTANEOUS at 03:16

## 2024-04-25 RX ADMIN — METHYLPREDNISOLONE SODIUM SUCCINATE 60 MG: 125 INJECTION INTRAMUSCULAR; INTRAVENOUS at 05:41

## 2024-04-25 RX ADMIN — METHOCARBAMOL TABLETS 750 MG: 750 TABLET, COATED ORAL at 21:17

## 2024-04-25 RX ADMIN — METHOCARBAMOL TABLETS 750 MG: 750 TABLET, COATED ORAL at 13:08

## 2024-04-25 RX ADMIN — AMLODIPINE BESYLATE 10 MG: 10 TABLET ORAL at 09:32

## 2024-04-25 RX ADMIN — ATORVASTATIN CALCIUM 20 MG: 20 TABLET, FILM COATED ORAL at 09:32

## 2024-04-25 RX ADMIN — BUPROPION HYDROCHLORIDE 150 MG: 150 TABLET, EXTENDED RELEASE ORAL at 09:32

## 2024-04-25 RX ADMIN — PREGABALIN 25 MG: 25 CAPSULE ORAL at 05:41

## 2024-04-25 RX ADMIN — PREGABALIN 25 MG: 25 CAPSULE ORAL at 14:01

## 2024-04-25 RX ADMIN — IOPAMIDOL 20 ML: 408 INJECTION, SOLUTION INTRATHECAL at 07:07

## 2024-04-25 RX ADMIN — LIDOCAINE HYDROCHLORIDE 5 ML: 10 INJECTION, SOLUTION INFILTRATION; PERINEURAL at 07:06

## 2024-04-25 RX ADMIN — Medication 10 ML: at 21:18

## 2024-04-25 RX ADMIN — Medication 10 ML: at 09:35

## 2024-04-25 RX ADMIN — METHOCARBAMOL TABLETS 750 MG: 750 TABLET, COATED ORAL at 17:33

## 2024-04-25 RX ADMIN — PANTOPRAZOLE SODIUM 40 MG: 40 TABLET, DELAYED RELEASE ORAL at 05:41

## 2024-04-25 RX ADMIN — METHOCARBAMOL TABLETS 750 MG: 750 TABLET, COATED ORAL at 09:32

## 2024-04-25 RX ADMIN — HYDROMORPHONE HYDROCHLORIDE 1 MG: 1 INJECTION, SOLUTION INTRAMUSCULAR; INTRAVENOUS; SUBCUTANEOUS at 21:19

## 2024-04-25 RX ADMIN — METHYLPREDNISOLONE SODIUM SUCCINATE 60 MG: 125 INJECTION INTRAMUSCULAR; INTRAVENOUS at 14:01

## 2024-04-25 RX ADMIN — HYDROMORPHONE HYDROCHLORIDE 1 MG: 1 INJECTION, SOLUTION INTRAMUSCULAR; INTRAVENOUS; SUBCUTANEOUS at 14:01

## 2024-04-25 RX ADMIN — SODIUM CHLORIDE 75 ML/HR: 9 INJECTION, SOLUTION INTRAVENOUS at 16:33

## 2024-04-25 NOTE — PLAN OF CARE
Goal Outcome Evaluation:   Pt had a myelogram done this morning. Must lay flat for 24 hours afterward, may sit up to eat/drink. Came back to unit around 0900. IVF continued. No complaints of pain. Plan for back surgery Monday with neurosurg. No acute signs of distress noted. X3 side rails up, bed in low locked position, call light within reach.

## 2024-04-25 NOTE — PLAN OF CARE
Goal Outcome Evaluation:    1954: Patient arrived to P380 from observation unit. VSS. Skin assessment completed. All safety measures in place. Patient stated she went to the bathroom when she got to her new room, she is aware to let us know when she urinates so we can bladder scan her for post void residuals.     A & O X4. Up with assist X 1. On RA, no respiratory distress noted. On continuous pulse oximetry. Medicated per MAR. VSS. PRN percocet given X 1 for left leg pain. PRN Dilaudid given X 2 for left leg pain. NPO after midnight. Continuous to have have numbness to left thigh and calf, stated is same as previous. No episodes of incontinences this shift, post void residual 0 mls. Urine sample sent to lab. Bed low, locked, alarm on and call light within reach.     0545: Patient off the floor to radiology triage for Myelogram

## 2024-04-25 NOTE — CASE MANAGEMENT/SOCIAL WORK
Continued Stay Note  [facility]     Patient Name: Alesia Resendez  MRN: [id number]  Today's Date: 4/25/2024    Admit Date: 4/22/2024    Plan: Patient plans to return home with family to transport.   Discharge Plan       Row Name 04/25/24 1048       Plan    Plan Patient plans to return home with family to transport.    Patient/Family in Agreement with Plan yes    Plan Comments Spoke with patient at bedside. Introduced self. Discussed discharge plans, patient planning to return home with A . 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                   Discharge Codes    No documentation.                       Xochitl Lopez RN

## 2024-04-25 NOTE — SIGNIFICANT NOTE
04/25/24 1236   OTHER   Discipline occupational therapist   Rehab Time/Intention   Session Not Performed patient unavailable for evaluation;other (see comments)  (per chart review pt is on 24 hr bedrest this date and OT will hold and f/u tomorrow as pt becomes appropriate.)   Recommendation   OT - Next Appointment 04/26/24

## 2024-04-25 NOTE — PROGRESS NOTES
LOS: 1 day   Patient Care Team:  Epley, James, APRN as PCP - General (Family Medicine)  Virgil Moeller II, MD as Consulting Physician (Radiology)  Diallo Eisenberg MD as Referring Physician (Breast Surgery)  Peyton Keyes MD PhD as Consulting Physician (Hematology and Oncology)  Rimma Keller APRN as Nurse Practitioner (Nurse Practitioner)    Chief Complaint: Back and left leg pain    Subjective     This patient continues with pain in the anterior thigh and some numbness and tingling in her left calf.      Interval History:     History taken from: patient chart    Objective      She has good movement of both lower extremities    Vital Signs  Temp:  [97.3 °F (36.3 °C)-98.4 °F (36.9 °C)] 98.2 °F (36.8 °C)  Heart Rate:  [52-67] 54  Resp:  [16-18] 18  BP: (132-154)/(74-96) 154/93       Results Review:     I reviewed the patient's new clinical results.  Reviewed her plain films, myelogram, and CT scan done this morning.  This shows multilevel degenerative disease on the plain films but no evidence of instability.  There is a grade 1 spondylolisthesis of L4 on L5.  On the myelogram itself there is a definite nerve root cut out on the left at L2-3 that is fairly significant.  There is obvious stenosis at L2-3, L3-4 and L4-5.  On the post myelographic CT scan the lower thoracic spine down to L2 looks okay.  At L2-3 there is a herniated disc on the left side that is causing a fair amount of pressure on the nerve.  The facets there are fairly sagittally oriented.  L3-4 shows some stenosis worse on the left than the right although not severe.  L4-5 also shows stenosis bilaterally that is fairly severe.  L5-S1 looks okay.    I think of any surgery were done we would have to consider a 3 level decompression and fusion versus just a unilateral left L2-3 laminectomy and discectomy.      Assessment & Plan       Low back pain    Lumbar back pain with radiculopathy affecting left lower extremity    Lumbar back  pain with radiculopathy affecting left lower extremity    Weakness of left lower extremity    Intractable back pain    Left lumbar radiculopathy      I had a very long discussion with the patient and her family about the situation.  I also explained the situation to her brother-in-law who is a retired orthopedic surgeon.  I told him that from my point of view with her morbid obesity and the fact that she has a herniated disc at L2-3 I think there is a decent chance a minimally invasive unilateral laminectomy and discectomy at L2-3 would be useful.  I told her that if she goes ahead with this she absolutely has to agree to get her body in better shape going forward.  I told the patient about the risks, complications and expected outcome of the lumbar surgery.  I explained that there was an 70% chance of getting rid of the pain in the leg.  I explained that there would still be back pain after the surgery.  Initially this will be quite severe but will improve over time.  There is a 2 or 3% chance of infection, bleeding, CSF leak, damage to the nerve as a result of surgery, paralysis, as well as anesthetic risk.  There is a 5% chance of late instability which could require a fusion later on and a 10% chance of recurrent disc herniation.  We discussed the postoperative hospital and home course.    She will have to be scheduled for a: Left lumbar 2 to lumbar 3 laminectomy and discectomy with metrx      Moise Garvey MD  04/25/24  09:02 EDT

## 2024-04-25 NOTE — SIGNIFICANT NOTE
04/25/24 1122   OTHER   Discipline physical therapy assistant   Rehab Time/Intention   Session Not Performed patient unavailable for treatment  (Per RN, pt is currently on 24 hr bed rest. PT will follow up tomorrow.)   Recommendation   PT - Next Appointment 04/26/24

## 2024-04-25 NOTE — CONSULTS
PCP: Epley, James, APRN  Consulting provider Gumaro perkins    Chief complaint   Chief Complaint   Patient presents with    Back Pain    Extremity Pain     Left thigh   Continued back pain    HPI  Patient is a 74 y.o. female presents with a history of chronic pain and low back pain and obesity who presented to the ER with acute on chronic back pain and was admitted to the observation unit 4/22.  Patient started having increasing pain 4 days prior to admission and that was sharp/stabbing radiated down her left leg.  She tried some narcotics at home without relief.  She is seen a spine surgeon in the past but nothing recently.  She had some subjective weakness in the left leg.  She had a history of epidural injections which she sees pain management Dr. Sheppard for.  Last 1 being a couple months ago.  Patient states after being admitted she started having some numbness in her groin area over her thigh down to her mid calf on the left.  She also had some incontinence today.  Neurosurgery evaluated her and she underwent epidural injection and has been on steroids and has had little change.    Neurosurgery is wanting to do a myelogram tomorrow to see what intervention may need to be done.    PAST MEDICAL HISTORY  Past Medical History:   Diagnosis Date    Anxiety     Arthritis     Breast cancer 2017    LEFT    Carpal tunnel syndrome of right wrist     Chronic pain disorder 1990’s    Colon polyp 11/22    Removed    Depression 2017    Dermatochalasis of both eyelids     Fibromyalgia, primary 2023    GERD (gastroesophageal reflux disease)     History of radiation therapy 2006    LT BREAST    History of skin cancer     SQUAMOUS CELL REMOVED FROM LT UPPER LIP AND RT THUMB    Hyperlipidemia 2005    Hypertension     Irritable bowel syndrome In my 40’s    Low back pain 2015    Lumbosacral disc disease 2012    Obesity     Osteoarthritis 1990    Ptosis of both eyebrows     Spinal stenosis 2012    Viral syndrome 02/26/2018       PAST  SURGICAL HISTORY  Past Surgical History:   Procedure Laterality Date    ANAL FISSURECTOMY  2003    APPENDECTOMY  2014    BLEPHAROPLASTY Bilateral 05/11/2023    Procedure: BILATERAL UPPER LID BLEPHAROPLASTY;  Surgeon: Nicola Rowley MD;  Location:  [facility];  Service: Ophthalmology;  Laterality: Bilateral;    BREAST LUMPECTOMY W/ NEEDLE LOCALIZATION Left 2006    BREAST SURGERY  2006 and 2017    breast cancer    BROW LIFT Bilateral 05/11/2023    Procedure: BILATERAL TEMPORAL DIRECT BROWLIFT;  Surgeon: Nicola Rowley MD;  Location:  [facility];  Service: Ophthalmology;  Laterality: Bilateral;    CATARACT EXTRACTION Bilateral 07/2018    CHOLECYSTECTOMY  2007    COLONOSCOPY  2014    COLONOSCOPY Left 05/27/2021    Procedure: COLONOSCOPY;  Surgeon: Sridhar James MD;  Location: [facility];  Service: Gastroenterology;  Laterality: Left;    COLONOSCOPY N/A 10/10/2022    Procedure: COLONOSCOPY FOR SCREENING;  Surgeon: Sridhar James MD;  Location: [facility];  Service: Gastroenterology;  Laterality: N/A;  NORMAL    ENDOSCOPY N/A 10/10/2022    Procedure: ESOPHAGOGASTRODUODENOSCOPY WITH BIOPSY;  Surgeon: Sridhar James MD;  Location: [facility];  Service: Gastroenterology;  Laterality: N/A;  GASTRIC POLYPS, IRREGULAR Z LINE    EPIDURAL Left 06/21/2023    Procedure: LUMBAR/SACRAL TRANSFORAMINAL EPIDURAL LEFT L4-5  52750;  Surgeon: Estrellita Sheppard MD;  Location: [facility];  Service: Pain Management;  Laterality: Left;    EPIDURAL Bilateral 3/18/2024    Procedure: LUMBAR/SACRAL TRANSFORAMINAL EPIDURAL BILATERAL L4-5 13404-18;  Surgeon: Estrellita Sheppard MD;  Location: [facility];  Service: Pain Management;  Laterality: Bilateral;    EPIDURAL BLOCK  2020    HYSTERECTOMY  1987    JOINT REPLACEMENT      KNEE SURGERY Right 2014    Knee cap replaced    KNEE SURGERY Left 12/06/2022    joint replacement    MASTECTOMY      MASTECTOMY W/ SENTINEL NODE BIOPSY Bilateral 08/21/2017     Procedure: BILATEREAL BREAST MASTECTOMY WITH SENTINEL NODE BIOPSY ON THE LEFT The sentinel lymph node biopsy will only be performed on the left side;  Surgeon: Diallo Eisenberg MD;  Location: [facility];  Service:     MEDIAL BRANCH BLOCK Bilateral 03/20/2023    Procedure: LUMBAR MEDIAL BRANCH BLOCK BILATERAL L3-L5 #1  86315, 42678;  Surgeon: Estrellita Sheppard MD;  [facility]: [facility];  Service: Pain Management;  Laterality: Bilateral;    OOPHORECTOMY Bilateral 2000    BSO    ORTHOPEDIC SURGERY  2000 and 2001    Knee replacements    PIRIFORMUS INJECTION Left 07/19/2021    Procedure: Left piriformis injection;  Surgeon: Estrellita Sheppard MD;  Location: [facility];  Service: Pain Management;  Laterality: Left;    PIRIFORMUS INJECTION Left 01/03/2022    Procedure: PIRIFORMIS INJECTION - Left;  Surgeon: Estrellita Sheppard MD;  [facility]: [facility];  Service: Pain Management;  Laterality: Left;    PIRIFORMUS INJECTION Left 01/18/2023    Procedure: PIRIFORMIS INJECTION Left;  Surgeon: Estrellita Sheppard MD;  [facility];  Service: Pain Management;  Laterality: Left;    PIRIFORMUS INJECTION Left 05/01/2023    Procedure: PIRIFORMIS INJECTION;  Surgeon: Estrellita Sheppard MD;  [facility]: [facility];  Service: Pain Management;  Laterality: Left;    POSTERIOR REPAIR  05/17/2016    with enterocele repair, midurethral sling, perineoplasty    SACROILIAC JOINT INJECTION Left 05/01/2023    Procedure: SACROILIAC JOINT INJECTION AND PIRIFORMIS INJECTION LEFT  06945;  Surgeon: Estrellita Sheppard MD;  Location: [facility];  Service: Pain Management;  Laterality: Left;    SKIN BIOPSY      TONSILLECTOMY  1970    TOTAL KNEE ARTHROPLASTY Bilateral 2000, 2001    UPPER GASTROINTESTINAL ENDOSCOPY  10/10/2022       FAMILY HISTORY  Family History   Problem Relation Age of Onset    Heart attack Mother     Esophageal cancer Mother 74    Stomach cancer Mother     Heart disease Mother         triple bypass    Hypertension  Mother     Cancer Mother             Coronary artery disease Mother     Colon polyps Father     Alcohol abuse Father             Diabetes Father         type 2    Heart attack Father     Heart failure Father     Hyperlipidemia Father     Hypertension Father     Prostate cancer Father 85    Clotting disorder Father     Heart disease Father         quadruple bypass    Arthritis Father     Stroke Father     Coronary artery disease Father     Birth defects Brother     Heart attack Brother     Heart disease Brother     Hypertension Brother     Crohn's disease Daughter     Clotting disorder Daughter     Learning disabilities Brother         Mentally retarded    Malig Hyperthermia Neg Hx     Colon cancer Neg Hx     Inflammatory bowel disease Neg Hx     Ulcerative colitis Neg Hx        SOCIAL HISTORY  Social History     Tobacco Use    Smoking status: Never    Smokeless tobacco: Never   Vaping Use    Vaping status: Never Used   Substance Use Topics    Alcohol use: No    Drug use: No       MEDICATIONS:  Medications Prior to Admission   Medication Sig Dispense Refill Last Dose    amLODIPine (NORVASC) 10 MG tablet TAKE 1 TABLET BY MOUTH DAILY 90 tablet 3 2024    atenolol (TENORMIN) 25 MG tablet TAKE 1 TABLET BY MOUTH DAILY 90 tablet 3 2024    atorvastatin (LIPITOR) 20 MG tablet TAKE 1 TABLET BY MOUTH ONCE  DAILY 90 tablet 3 2024    buPROPion XL (WELLBUTRIN XL) 150 MG 24 hr tablet TAKE 1 TABLET BY MOUTH DAILY 90 tablet 3 2024    celecoxib (CeleBREX) 200 MG capsule TAKE 1 CAPSULE BY MOUTH DAILY 90 capsule 1 2024    escitalopram (LEXAPRO) 5 MG tablet TAKE 1 TABLET BY MOUTH DAILY 90 tablet 3 2024    hydroCHLOROthiazide (HYDRODIURIL) 25 MG tablet TAKE 1 TABLET BY MOUTH DAILY 90 tablet 3 2024    HYDROcodone-acetaminophen (NORCO) 5-325 MG per tablet Take 1 tablet by mouth Every 6 (Six) Hours As Needed for Mild Pain.       losartan (COZAAR) 25 MG tablet TAKE 1 TABLET BY MOUTH DAILY 90  "tablet 3 4/21/2024    methylPREDNISolone (MEDROL) 4 MG dose pack Take as directed on package instructions. 21 tablet 0 4/21/2024    omeprazole (priLOSEC) 20 MG capsule TAKE 1 CAPSULE BY MOUTH DAILY 90 capsule 3 4/21/2024    oxybutynin (DITROPAN) 5 MG tablet TAKE 1 TABLET BY MOUTH  TWICE DAILY (Patient taking differently: Daily.) 180 tablet 3 4/21/2024    pregabalin (LYRICA) 25 MG capsule TAKE 1 CAPSULE BY MOUTH 3 TIMES  DAILY 90 capsule 0 4/21/2024    fluticasone (FLONASE) 50 MCG/ACT nasal spray 2 sprays into the nostril(s) as directed by provider Daily. 48 mL 3        Allergies:  Morphine    Review of Systems:  fatigue   overweight, back pain paresthesia  Negative for following (except as per HPI):  Constitution: chills, fevers,   Eyes: change of vision, loss of vision and discharge  ENT: ear drainage, ear ringing and facial trauma  Respiratory: cough, pleuritic pain, shortness of air  Cardiovascular: chest pressure, pain, lower extremity edema, palpitations  Gastrointestinal: constipation, diarrhea, nausea, vomiting, pain    Integument: rash and wound  Hematologic / Lymphatic: excessive bleeding and easy bruising  Musculoskeletal: joint pain, joint stiffness, joint swelling and muscle pain  Neurological: headaches, numbness, seizures and tremors  Behavioral / Psych: anxiety, and hallucinations         Vital Signs  Temp:  [97.3 °F (36.3 °C)-98.1 °F (36.7 °C)] 98.1 °F (36.7 °C)  Heart Rate:  [57-76] 63  Resp:  [17-20] 18  BP: (132-165)/() 144/90  Flowsheet Rows      Flowsheet Row First Filed Value   Admission Height 172.7 cm (68\") Documented at 04/22/2024 0339   Admission Weight 99.8 kg (220 lb) Documented at 04/22/2024 0339           Body mass index is 33.45 kg/m².      Physical Exam:  General Appearance:    Alert, cooperative, in no acute distress   Head:    Normocephalic, without obvious abnormality, atraumatic   Eyes:         conjunctivae and sclerae normal, no icterus, PERRLA   ENT:    Ears grossly " intact, oral mucosa moist,   Neck:   No adenopathy, supple, trachea midline,        Lungs:     Clear to auscultation,respirations regular, even and                   unlabored    Heart:    Regular rhythm and normal rate,  no murmur, normal S1 and S2,   Abdomen:     Normal bowel sounds, no masses,  soft non-tender, non-distended, obese   Extremities:   Moves all extremities well, no cyanosis, no edema,             Pulses:   Pulses palpable and equal bilaterally   Skin:   No bleeding, rash, bruising    Neurologic:      Psych:   Cranial nerves 2 - 12 grossly intact, sensation grossly intact excetp over left thigh,  Moves all extremities well  slightly decreased on LLE    Alert and Oriented x 3, Normal Affect    I washed/sanitized my hands before entering the room and immediately upon leaving the room.    LABS:  Admission on 04/22/2024   Component Date Value Ref Range Status    Glucose 04/22/2024 139 (H)  65 - 99 mg/dL Final    BUN 04/22/2024 17  8 - 23 mg/dL Final    Creatinine 04/22/2024 0.61  0.57 - 1.00 mg/dL Final    Sodium 04/22/2024 139  136 - 145 mmol/L Final    Potassium 04/22/2024 3.6  3.5 - 5.2 mmol/L Final    Chloride 04/22/2024 100  98 - 107 mmol/L Final    CO2 04/22/2024 24.9  22.0 - 29.0 mmol/L Final    Calcium 04/22/2024 9.4  8.6 - 10.5 mg/dL Final    Total Protein 04/22/2024 6.5  6.0 - 8.5 g/dL Final    Albumin 04/22/2024 4.1  3.5 - 5.2 g/dL Final    ALT (SGPT) 04/22/2024 20  1 - 33 U/L Final    AST (SGOT) 04/22/2024 23  1 - 32 U/L Final    Alkaline Phosphatase 04/22/2024 91  39 - 117 U/L Final    Total Bilirubin 04/22/2024 0.4  0.0 - 1.2 mg/dL Final    Globulin 04/22/2024 2.4  gm/dL Final    A/G Ratio 04/22/2024 1.7  g/dL Final    BUN/Creatinine Ratio 04/22/2024 27.9 (H)  7.0 - 25.0 Final    Anion Gap 04/22/2024 14.1  5.0 - 15.0 mmol/L Final    eGFR 04/22/2024 93.9  >60.0 mL/min/1.73 Final    WBC 04/22/2024 8.67  3.40 - 10.80 10*3/mm3 Final    RBC 04/22/2024 5.21  3.77 - 5.28 10*6/mm3 Final     Hemoglobin 04/22/2024 14.9  12.0 - 15.9 g/dL Final    Hematocrit 04/22/2024 44.9  34.0 - 46.6 % Final    MCV 04/22/2024 86.2  79.0 - 97.0 fL Final    MCH 04/22/2024 28.6  26.6 - 33.0 pg Final    MCHC 04/22/2024 33.2  31.5 - 35.7 g/dL Final    RDW 04/22/2024 13.7  12.3 - 15.4 % Final    RDW-SD 04/22/2024 42.7  37.0 - 54.0 fl Final    MPV 04/22/2024 8.8  6.0 - 12.0 fL Final    Platelets 04/22/2024 266  140 - 450 10*3/mm3 Final    Neutrophil % 04/22/2024 59.6  42.7 - 76.0 % Final    Lymphocyte % 04/22/2024 32.2  19.6 - 45.3 % Final    Monocyte % 04/22/2024 6.5  5.0 - 12.0 % Final    Eosinophil % 04/22/2024 0.8  0.3 - 6.2 % Final    Basophil % 04/22/2024 0.7  0.0 - 1.5 % Final    Immature Grans % 04/22/2024 0.2  0.0 - 0.5 % Final    Neutrophils, Absolute 04/22/2024 5.17  1.70 - 7.00 10*3/mm3 Final    Lymphocytes, Absolute 04/22/2024 2.79  0.70 - 3.10 10*3/mm3 Final    Monocytes, Absolute 04/22/2024 0.56  0.10 - 0.90 10*3/mm3 Final    Eosinophils, Absolute 04/22/2024 0.07  0.00 - 0.40 10*3/mm3 Final    Basophils, Absolute 04/22/2024 0.06  0.00 - 0.20 10*3/mm3 Final    Immature Grans, Absolute 04/22/2024 0.02  0.00 - 0.05 10*3/mm3 Final    nRBC 04/22/2024 0.0  0.0 - 0.2 /100 WBC Final    WBC 04/23/2024 7.96  3.40 - 10.80 10*3/mm3 Final    RBC 04/23/2024 4.91  3.77 - 5.28 10*6/mm3 Final    Hemoglobin 04/23/2024 13.6  12.0 - 15.9 g/dL Final    Hematocrit 04/23/2024 41.0  34.0 - 46.6 % Final    MCV 04/23/2024 83.5  79.0 - 97.0 fL Final    MCH 04/23/2024 27.7  26.6 - 33.0 pg Final    MCHC 04/23/2024 33.2  31.5 - 35.7 g/dL Final    RDW 04/23/2024 12.9  12.3 - 15.4 % Final    RDW-SD 04/23/2024 39.2  37.0 - 54.0 fl Final    MPV 04/23/2024 8.8  6.0 - 12.0 fL Final    Platelets 04/23/2024 241  140 - 450 10*3/mm3 Final    Glucose 04/23/2024 207 (H)  65 - 99 mg/dL Final    BUN 04/23/2024 24 (H)  8 - 23 mg/dL Final    Creatinine 04/23/2024 0.68  0.57 - 1.00 mg/dL Final    Sodium 04/23/2024 138  136 - 145 mmol/L Final    Potassium  04/23/2024 3.8  3.5 - 5.2 mmol/L Final    Chloride 04/23/2024 98  98 - 107 mmol/L Final    CO2 04/23/2024 26.5  22.0 - 29.0 mmol/L Final    Calcium 04/23/2024 9.2  8.6 - 10.5 mg/dL Final    BUN/Creatinine Ratio 04/23/2024 35.3 (H)  7.0 - 25.0 Final    Anion Gap 04/23/2024 13.5  5.0 - 15.0 mmol/L Final    eGFR 04/23/2024 91.5  >60.0 mL/min/1.73 Final         DIAGNOSTICS:  Mri lsine  Multilevel degenerative disease involving the lumbar spine  as described in detail above with no evidence of a focal disc  herniation. This includes fusion of the facets bilaterally at L4-L5,  loss of disc height at L5-S1, anterolisthesis of L4 upon L5 and  multilevel disc osteophyte complexes. Spinal stenosis and lateral recess  narrowing is most prominent at L4-L5 where there is moderate central  canal stenosis and moderate-to-severe lateral recess narrowing  bilaterally. There is also moderate-to-severe if not severe lateral  recess narrowing to the left at L2-L3 and moderate canal stenosis and  lateral recess narrowing bilaterally at L3-L4. Multilevel facet  degenerative disease is appreciated as well as multilevel foraminal  stenosis. Foraminal stenosis on the left is most prominent at L3-L4 and  L5-S1 and to the right at L4-L5. Spinal stenosis is accentuated by  congenitally shortened pedicles. See above.         Results Review:   I reviewed the patient's new clinical results.  Discussed with ER physician  Old records reviewed / Medical Decision Making High Complexity  I personally viewed and interpreted the patient's EKG/Telemetry data- sinus rhythm      ASSESSMENT AND PLAN    Low back pain    Lumbar back pain with radiculopathy affecting left lower extremity    Lumbar back pain with radiculopathy affecting left lower extremity    Weakness of left lower extremity    Intractable back pain    Left lumbar radiculopathy       Lumbar back pain with radiculopathy affecting left lower extremity with    Intractable back pain,   Weakness of  left lower extremity secondary to lumbar stenosis and decreased mobility  -Status post epidural and steroids  -Has had a cane patch, Robaxin scheduled, Percocet and Dilaudid as needed  -Neurosurgery is aware of the paresthesia and incontinence  -Check postvoid residuals  -Neurosurgery continuing to evaluate  -PT already seen the patient.  Will get OT to evaluate and treat as well       obesity  -May need to get nutrition to evaluate and possibly spiritual care  -Neurosurgery already informed the patient of high risk and increased complications due to obesity    Hyperglycemia  -Possibly secondary to just the steroids but given her body habitus there is high likelihood of diabetes.  -Check an A1c in the morning  -Start on sliding scale insulin    Fibromyalgia and chronic pain disorder  -Usually only gets epidural pain and injections and not on chronic medications.  She was on Celebrex.  She sees Dr. Sheppard as an outpatient with pain management  -     Chronic medical conditions being monitored- stable, continue medical management  -Hyperlipidemia  -Anxiety and IBS      +DVT proph:    scd due to procedure tomorrow  + CODE STATUS: Full       I discussed the patient's findings and my recommendations with the patient and/or family.  Please reference all orders placed.    Susana Marie MD  04/24/24  23:28 EDT      This document is intended for medical expert use only. Reading of this document by patients and/or patient's family without participating in medical staff guidance may result in misinterpretation and unintended morbidity. Any interpretation of such data is the responsibility of the patient and/or family member responsible for the patient in concert with their primary or specialist providers, and NOT to be left for sources of online searches such as [employer], [employer] or similar queries. Relying on these approaches to knowledge may result in misinterpretation, misguided goals of care and even death should patients or  family members try recommendations outside of the realm of professional medical care in a supervised way.    Dictated utilizing Voice dictation:  Parts of this note may be an electronic transcription/translation of spoke language to printed text using Dragon dictation system.

## 2024-04-25 NOTE — PROGRESS NOTES
Name: Alesia Resendez ADMIT: 2024   : 1950  PCP: Epley, James, APRN    MRN: [id number] LOS: 1 days   AGE/SEX: 74 y.o. female  ROOM: [facility]     Subjective   Subjective   Persistent back pain and  LLE weakness numbness. No CP SOB fever chills. + urination, BM yesterday        Objective   Objective   Vital Signs  Temp:  [97.7 °F (36.5 °C)-98.4 °F (36.9 °C)] 97.7 °F (36.5 °C)  Heart Rate:  [52-67] 55  Resp:  [16-18] 18  BP: (132-154)/(74-96) 151/96  SpO2:  [92 %-99 %] 95 %  on   ;   Device (Oxygen Therapy): room air  Body mass index is 33.45 kg/m².  Physical Exam  Vitals reviewed.   Constitutional:       Appearance: She is well-developed.   HENT:      Head: Normocephalic and atraumatic.   Cardiovascular:      Rate and Rhythm: Normal rate and regular rhythm.   Pulmonary:      Effort: Pulmonary effort is normal. No respiratory distress.      Breath sounds: Normal breath sounds.   Abdominal:      General: Bowel sounds are normal. There is no distension.      Palpations: Abdomen is soft.   Musculoskeletal:      Comments: LLE weakness and inner thigh numbness   Neurological:      General: No focal deficit present.      Mental Status: She is alert and oriented to person, place, and time.   Psychiatric:         Behavior: Behavior normal.         Thought Content: Thought content normal.         Judgment: Judgment normal.       Results Review     I reviewed the patient's new clinical results.  Results from last 7 days   Lab Units 24  0937 24  0521 24  0406   WBC 10*3/mm3 6.30 7.96 8.67   HEMOGLOBIN g/dL 14.2 13.6 14.9   PLATELETS 10*3/mm3 248 241 266     Results from last 7 days   Lab Units 24  0937 24  0521 24  0406   SODIUM mmol/L 142 138 139   POTASSIUM mmol/L 4.9 3.8 3.6   CHLORIDE mmol/L 100 98 100   CO2 mmol/L 32.2* 26.5 24.9   BUN mg/dL 27* 24* 17   CREATININE mg/dL 0.70 0.68 0.61   GLUCOSE mg/dL 166* 207* 139*   EGFR mL/min/1.73 90.9 91.5 93.9     Results from last 7  days   Lab Units 04/25/24  0937 04/22/24  0406   ALBUMIN g/dL 3.8 4.1   BILIRUBIN mg/dL 0.4 0.4   ALK PHOS U/L 68 91   AST (SGOT) U/L 39* 23   ALT (SGPT) U/L 47* 20     Results from last 7 days   Lab Units 04/25/24  0937 04/23/24  0521 04/22/24  0406   CALCIUM mg/dL 9.2 9.2 9.4   ALBUMIN g/dL 3.8  --  4.1       Hemoglobin A1C   Date/Time Value Ref Range Status   04/25/2024 0937 6.30 (H) 4.80 - 5.60 % Final     Glucose   Date/Time Value Ref Range Status   04/25/2024 1214 139 (H) 70 - 130 mg/dL Final       CT Lumbar Spine With Intrathecal Contrast    Result Date: 4/25/2024  1. Multilevel lumbar degenerative disease including multilevel canal stenosis. Please see full discussion above.   Radiation dose reduction techniques were utilized, including automated exposure control and exposure modulation based on body size.        I have personally reviewed all medications:  Scheduled Medications  amLODIPine, 10 mg, Oral, Daily  atenolol, 25 mg, Oral, Daily  atorvastatin, 20 mg, Oral, Daily  buPROPion XL, 150 mg, Oral, Daily  escitalopram, 5 mg, Oral, Daily  fentanyl, 50 mcg, Intravenous, Once  hydroCHLOROthiazide, 25 mg, Oral, Daily  insulin lispro, 2-9 Units, Subcutaneous, 4x Daily AC & at Bedtime  lidocaine, 1 mL, Intradermal, Once  Lidocaine, 2 patch, Transdermal, Q24H  losartan, 25 mg, Oral, Daily  methocarbamol, 750 mg, Oral, 4x Daily  methylPREDNISolone sodium succinate, 60 mg, Intravenous, Q8H  pantoprazole, 40 mg, Oral, Q AM  pregabalin, 25 mg, Oral, Q8H  senna-docusate sodium, 2 tablet, Oral, BID  sodium chloride, 10 mL, Intravenous, Q12H  sodium chloride, 10 mL, Intravenous, Q12H    Infusions  sodium chloride, 75 mL/hr, Last Rate: 75 mL/hr (04/24/24 2212)    Diet  Diet: Regular/House; Fluid Consistency: Thin (IDDSI 0)    I have personally reviewed:  [x]  Laboratory   [x]  Microbiology   [x]  Radiology   [x]  EKG/Telemetry  [x]  Cardiology/Vascular   []  Pathology    []  Records       Assessment/Plan     Active  Hospital Problems    Diagnosis  POA    **Low back pain [M54.50]  Yes    Intractable back pain [M54.9]  Yes    Left lumbar radiculopathy [M54.16]  Yes    Lumbar back pain with radiculopathy affecting left lower extremity [M54.16]  Yes    Lumbar back pain with radiculopathy affecting left lower extremity [M54.16]  Unknown    Weakness of left lower extremity [R29.898]  Unknown      Resolved Hospital Problems   No resolved problems to display.       74 y.o. female admitted with Low back pain.       Lumbar back pain with radiculopathy  Weakness of LLE/ decreased mobility/ paresthesia and incontinence  ZACKARY following  Status post epidural 4/23 and steroids.   lidocaine patch, Robaxin scheduled, Percocet and Dilaudid as needed  Myelogram today, await ZACKARY input.    PT OT      Prediabeties/ obesity   increased risk of complications as a result.A1c 6.3. Monitor glucose with SSI while on steroids      Fibromyalgia and chronic pain disorder   Usually only gets epidural pain and injections and not on chronic medications.  She was on Celebrex.  She sees Dr. Sheppard as an outpatient with pain management     Chronic medical conditions being monitored- stable, continue medical management  -Hyperlipidemia  -Anxiety and IBS   SCDs for DVT prophylaxis.  Full code.  Discussed with patient and nursing staff.  Anticipate discharge  TBD pending ZACKARY plans   Expected Discharge Date: 4/29/2024; Expected Discharge Time:       KYLIE Contreras  [facility]  04/25/24  12:16 EDT

## 2024-04-26 ENCOUNTER — APPOINTMENT (OUTPATIENT)
Dept: CARDIOLOGY | Facility: HOSPITAL | Age: 74
End: 2024-04-26
Payer: MEDICARE

## 2024-04-26 ENCOUNTER — APPOINTMENT (OUTPATIENT)
Dept: GENERAL RADIOLOGY | Facility: HOSPITAL | Age: 74
End: 2024-04-26
Payer: MEDICARE

## 2024-04-26 PROBLEM — E66.811 CLASS 1 OBESITY WITH BODY MASS INDEX (BMI) OF 33.0 TO 33.9 IN ADULT: Status: ACTIVE | Noted: 2023-08-02

## 2024-04-26 PROBLEM — E66.9 CLASS 1 OBESITY WITH BODY MASS INDEX (BMI) OF 33.0 TO 33.9 IN ADULT: Status: ACTIVE | Noted: 2023-08-02

## 2024-04-26 LAB
ANION GAP SERPL CALCULATED.3IONS-SCNC: 10.6 MMOL/L (ref 5–15)
AORTIC ARCH: 3.2 CM
AORTIC DIMENSIONLESS INDEX: 0.7 (DI)
ASCENDING AORTA: 3.7 CM
BASOPHILS # BLD AUTO: 0.01 10*3/MM3 (ref 0–0.2)
BASOPHILS NFR BLD AUTO: 0.2 % (ref 0–1.5)
BH CV ECHO MEAS - ACS: 2.36 CM
BH CV ECHO MEAS - AO MAX PG: 10.5 MMHG
BH CV ECHO MEAS - AO MEAN PG: 5.9 MMHG
BH CV ECHO MEAS - AO ROOT DIAM: 3.7 CM
BH CV ECHO MEAS - AO V2 MAX: 162.2 CM/SEC
BH CV ECHO MEAS - AO V2 VTI: 35.8 CM
BH CV ECHO MEAS - AVA(I,D): 2.19 CM2
BH CV ECHO MEAS - EDV(CUBED): 187.7 ML
BH CV ECHO MEAS - EDV(MOD-SP2): 84 ML
BH CV ECHO MEAS - EDV(MOD-SP4): 95 ML
BH CV ECHO MEAS - EF(MOD-BP): 61 %
BH CV ECHO MEAS - EF(MOD-SP2): 64.3 %
BH CV ECHO MEAS - EF(MOD-SP4): 58.9 %
BH CV ECHO MEAS - ESV(CUBED): 52.8 ML
BH CV ECHO MEAS - ESV(MOD-SP2): 30 ML
BH CV ECHO MEAS - ESV(MOD-SP4): 39 ML
BH CV ECHO MEAS - FS: 34.5 %
BH CV ECHO MEAS - IVS/LVPW: 1.06 CM
BH CV ECHO MEAS - IVSD: 1.23 CM
BH CV ECHO MEAS - LAT PEAK E' VEL: 7.4 CM/SEC
BH CV ECHO MEAS - LV MASS(C)D: 290.6 GRAMS
BH CV ECHO MEAS - LV MAX PG: 4.8 MMHG
BH CV ECHO MEAS - LV MEAN PG: 2.7 MMHG
BH CV ECHO MEAS - LV V1 MAX: 110 CM/SEC
BH CV ECHO MEAS - LV V1 VTI: 24.2 CM
BH CV ECHO MEAS - LVIDD: 5.7 CM
BH CV ECHO MEAS - LVIDS: 3.8 CM
BH CV ECHO MEAS - LVOT AREA: 3.2 CM2
BH CV ECHO MEAS - LVOT DIAM: 2.03 CM
BH CV ECHO MEAS - LVPWD: 1.17 CM
BH CV ECHO MEAS - MED PEAK E' VEL: 4.9 CM/SEC
BH CV ECHO MEAS - MV A DUR: 0.12 SEC
BH CV ECHO MEAS - MV A MAX VEL: 123.2 CM/SEC
BH CV ECHO MEAS - MV DEC SLOPE: 255.5 CM/SEC2
BH CV ECHO MEAS - MV DEC TIME: 0.24 SEC
BH CV ECHO MEAS - MV E MAX VEL: 59.1 CM/SEC
BH CV ECHO MEAS - MV E/A: 0.48
BH CV ECHO MEAS - MV MAX PG: 5.1 MMHG
BH CV ECHO MEAS - MV MEAN PG: 1.36 MMHG
BH CV ECHO MEAS - MV P1/2T: 75.2 MSEC
BH CV ECHO MEAS - MV V2 VTI: 25 CM
BH CV ECHO MEAS - MVA(P1/2T): 2.9 CM2
BH CV ECHO MEAS - MVA(VTI): 3.1 CM2
BH CV ECHO MEAS - PA ACC TIME: 0.08 SEC
BH CV ECHO MEAS - PA V2 MAX: 102.7 CM/SEC
BH CV ECHO MEAS - PULM A REVS DUR: 0.12 SEC
BH CV ECHO MEAS - PULM A REVS VEL: 34.2 CM/SEC
BH CV ECHO MEAS - PULM DIAS VEL: 46.2 CM/SEC
BH CV ECHO MEAS - PULM S/D: 1.36
BH CV ECHO MEAS - PULM SYS VEL: 62.8 CM/SEC
BH CV ECHO MEAS - RAP SYSTOLE: 3 MMHG
BH CV ECHO MEAS - RV MAX PG: 3.9 MMHG
BH CV ECHO MEAS - RV V1 MAX: 98.5 CM/SEC
BH CV ECHO MEAS - RV V1 VTI: 23.4 CM
BH CV ECHO MEAS - SUP REN AO DIAM: 2.6 CM
BH CV ECHO MEAS - SV(LVOT): 78.4 ML
BH CV ECHO MEAS - SV(MOD-SP2): 54 ML
BH CV ECHO MEAS - SV(MOD-SP4): 56 ML
BH CV ECHO MEAS - TAPSE (>1.6): 2 CM
BH CV ECHO MEASUREMENTS AVERAGE E/E' RATIO: 9.61
BH CV XLRA - RV BASE: 2.7 CM
BH CV XLRA - RV LENGTH: 6.7 CM
BH CV XLRA - RV MID: 2.02 CM
BH CV XLRA - TDI S': 18.6 CM/SEC
BUN SERPL-MCNC: 23 MG/DL (ref 8–23)
BUN/CREAT SERPL: 34.3 (ref 7–25)
CALCIUM SPEC-SCNC: 8.7 MG/DL (ref 8.6–10.5)
CHLORIDE SERPL-SCNC: 100 MMOL/L (ref 98–107)
CO2 SERPL-SCNC: 28.4 MMOL/L (ref 22–29)
CREAT SERPL-MCNC: 0.67 MG/DL (ref 0.57–1)
DEPRECATED RDW RBC AUTO: 42.2 FL (ref 37–54)
EGFRCR SERPLBLD CKD-EPI 2021: 91.8 ML/MIN/1.73
EOSINOPHIL # BLD AUTO: 0 10*3/MM3 (ref 0–0.4)
EOSINOPHIL NFR BLD AUTO: 0 % (ref 0.3–6.2)
ERYTHROCYTE [DISTWIDTH] IN BLOOD BY AUTOMATED COUNT: 13.3 % (ref 12.3–15.4)
GLUCOSE BLDC GLUCOMTR-MCNC: 137 MG/DL (ref 70–130)
GLUCOSE BLDC GLUCOMTR-MCNC: 138 MG/DL (ref 70–130)
GLUCOSE BLDC GLUCOMTR-MCNC: 159 MG/DL (ref 70–130)
GLUCOSE BLDC GLUCOMTR-MCNC: 173 MG/DL (ref 70–130)
GLUCOSE SERPL-MCNC: 166 MG/DL (ref 65–99)
HCT VFR BLD AUTO: 45.4 % (ref 34–46.6)
HGB BLD-MCNC: 15.2 G/DL (ref 12–15.9)
IMM GRANULOCYTES # BLD AUTO: 0.06 10*3/MM3 (ref 0–0.05)
IMM GRANULOCYTES NFR BLD AUTO: 1 % (ref 0–0.5)
LEFT ATRIUM VOLUME INDEX: 30.4 ML/M2
LYMPHOCYTES # BLD AUTO: 1.37 10*3/MM3 (ref 0.7–3.1)
LYMPHOCYTES NFR BLD AUTO: 21.9 % (ref 19.6–45.3)
MCH RBC QN AUTO: 28.8 PG (ref 26.6–33)
MCHC RBC AUTO-ENTMCNC: 33.5 G/DL (ref 31.5–35.7)
MCV RBC AUTO: 86 FL (ref 79–97)
MONOCYTES # BLD AUTO: 0.45 10*3/MM3 (ref 0.1–0.9)
MONOCYTES NFR BLD AUTO: 7.2 % (ref 5–12)
NEUTROPHILS NFR BLD AUTO: 4.36 10*3/MM3 (ref 1.7–7)
NEUTROPHILS NFR BLD AUTO: 69.7 % (ref 42.7–76)
NRBC BLD AUTO-RTO: 0 /100 WBC (ref 0–0.2)
PLATELET # BLD AUTO: 227 10*3/MM3 (ref 140–450)
PMV BLD AUTO: 9.1 FL (ref 6–12)
POTASSIUM SERPL-SCNC: 4 MMOL/L (ref 3.5–5.2)
QT INTERVAL: 471 MS
QTC INTERVAL: 471 MS
RBC # BLD AUTO: 5.28 10*6/MM3 (ref 3.77–5.28)
SINUS: 3.5 CM
SODIUM SERPL-SCNC: 139 MMOL/L (ref 136–145)
STJ: 3.3 CM
WBC NRBC COR # BLD AUTO: 6.25 10*3/MM3 (ref 3.4–10.8)

## 2024-04-26 PROCEDURE — 85025 COMPLETE CBC W/AUTO DIFF WBC: CPT | Performed by: NEUROLOGICAL SURGERY

## 2024-04-26 PROCEDURE — 93306 TTE W/DOPPLER COMPLETE: CPT | Performed by: INTERNAL MEDICINE

## 2024-04-26 PROCEDURE — 93306 TTE W/DOPPLER COMPLETE: CPT

## 2024-04-26 PROCEDURE — 25010000002 HYDROMORPHONE 1 MG/ML SOLUTION: Performed by: NEUROLOGICAL SURGERY

## 2024-04-26 PROCEDURE — 82948 REAGENT STRIP/BLOOD GLUCOSE: CPT

## 2024-04-26 PROCEDURE — 80048 BASIC METABOLIC PNL TOTAL CA: CPT | Performed by: NEUROLOGICAL SURGERY

## 2024-04-26 PROCEDURE — 25810000003 SODIUM CHLORIDE 0.9 % SOLUTION: Performed by: NEUROLOGICAL SURGERY

## 2024-04-26 PROCEDURE — 93010 ELECTROCARDIOGRAM REPORT: CPT | Performed by: INTERNAL MEDICINE

## 2024-04-26 PROCEDURE — 71045 X-RAY EXAM CHEST 1 VIEW: CPT

## 2024-04-26 PROCEDURE — 25010000002 METHYLPREDNISOLONE PER 125 MG: Performed by: NEUROLOGICAL SURGERY

## 2024-04-26 PROCEDURE — S0260 H&P FOR SURGERY: HCPCS

## 2024-04-26 PROCEDURE — 97165 OT EVAL LOW COMPLEX 30 MIN: CPT

## 2024-04-26 PROCEDURE — 93005 ELECTROCARDIOGRAM TRACING: CPT | Performed by: NURSE PRACTITIONER

## 2024-04-26 PROCEDURE — 97530 THERAPEUTIC ACTIVITIES: CPT

## 2024-04-26 RX ADMIN — METHOCARBAMOL TABLETS 750 MG: 750 TABLET, COATED ORAL at 08:11

## 2024-04-26 RX ADMIN — ATENOLOL 25 MG: 25 TABLET ORAL at 20:39

## 2024-04-26 RX ADMIN — PREGABALIN 25 MG: 25 CAPSULE ORAL at 23:11

## 2024-04-26 RX ADMIN — PANTOPRAZOLE SODIUM 40 MG: 40 TABLET, DELAYED RELEASE ORAL at 06:24

## 2024-04-26 RX ADMIN — METHYLPREDNISOLONE SODIUM SUCCINATE 60 MG: 125 INJECTION INTRAMUSCULAR; INTRAVENOUS at 06:24

## 2024-04-26 RX ADMIN — PREGABALIN 25 MG: 25 CAPSULE ORAL at 14:33

## 2024-04-26 RX ADMIN — METHYLPREDNISOLONE SODIUM SUCCINATE 60 MG: 125 INJECTION INTRAMUSCULAR; INTRAVENOUS at 14:33

## 2024-04-26 RX ADMIN — PREGABALIN 25 MG: 25 CAPSULE ORAL at 06:24

## 2024-04-26 RX ADMIN — Medication 10 ML: at 08:11

## 2024-04-26 RX ADMIN — METHOCARBAMOL TABLETS 750 MG: 750 TABLET, COATED ORAL at 11:50

## 2024-04-26 RX ADMIN — Medication 10 ML: at 20:40

## 2024-04-26 RX ADMIN — BUPROPION HYDROCHLORIDE 150 MG: 150 TABLET, EXTENDED RELEASE ORAL at 08:11

## 2024-04-26 RX ADMIN — OXYCODONE AND ACETAMINOPHEN 1 TABLET: 5; 325 TABLET ORAL at 18:22

## 2024-04-26 RX ADMIN — OXYCODONE AND ACETAMINOPHEN 1 TABLET: 5; 325 TABLET ORAL at 23:11

## 2024-04-26 RX ADMIN — METHYLPREDNISOLONE SODIUM SUCCINATE 60 MG: 125 INJECTION INTRAMUSCULAR; INTRAVENOUS at 23:11

## 2024-04-26 RX ADMIN — SENNOSIDES AND DOCUSATE SODIUM 2 TABLET: 50; 8.6 TABLET ORAL at 08:11

## 2024-04-26 RX ADMIN — OXYCODONE AND ACETAMINOPHEN 1 TABLET: 5; 325 TABLET ORAL at 06:24

## 2024-04-26 RX ADMIN — HYDROMORPHONE HYDROCHLORIDE 1 MG: 1 INJECTION, SOLUTION INTRAMUSCULAR; INTRAVENOUS; SUBCUTANEOUS at 13:13

## 2024-04-26 RX ADMIN — ESCITALOPRAM 5 MG: 5 TABLET, FILM COATED ORAL at 20:39

## 2024-04-26 RX ADMIN — SODIUM CHLORIDE 75 ML/HR: 9 INJECTION, SOLUTION INTRAVENOUS at 06:25

## 2024-04-26 RX ADMIN — HYDROCHLOROTHIAZIDE 25 MG: 25 TABLET ORAL at 08:11

## 2024-04-26 RX ADMIN — METHOCARBAMOL TABLETS 750 MG: 750 TABLET, COATED ORAL at 18:13

## 2024-04-26 RX ADMIN — AMLODIPINE BESYLATE 10 MG: 10 TABLET ORAL at 08:11

## 2024-04-26 RX ADMIN — LOSARTAN POTASSIUM 25 MG: 25 TABLET, FILM COATED ORAL at 08:11

## 2024-04-26 RX ADMIN — ATORVASTATIN CALCIUM 20 MG: 20 TABLET, FILM COATED ORAL at 08:11

## 2024-04-26 NOTE — THERAPY TREATMENT NOTE
Patient Name: Alesia Resendez  : 1950    MRN: [id number]                              Today's Date: 2024       Admit Date: 2024    Visit Dx:     ICD-10-CM ICD-9-CM   1. Acute left-sided low back pain, unspecified whether sciatica present  M54.50 724.2   2. Lumbar radiculopathy  M54.16 724.4   3. Left lumbar radiculopathy  M54.16 724.4     Patient Active Problem List   Diagnosis    Arthritis    Atrophic vaginitis    Depression    Gastroesophageal reflux disease without esophagitis    Mixed hyperlipidemia    Essential hypertension    Menopause present    Overactive bladder    History of colonic polyps    Rectal hemorrhage    Proctocele    Encounter for screening for malignant neoplasm of colon    Stress incontinence in female    Recurrent breast cancer    Viral syndrome    Chronic fatigue    Snoring    Tremor    Community acquired pneumonia    Chronic right-sided low back pain with sciatica    Elevated liver function tests    Piriformis syndrome of left side    Myofasciitis    History of left breast cancer    Diarrhea    Family history of esophageal cancer    Spondylosis of lumbar region without myelopathy or radiculopathy    Cervical stenosis of spinal canal    Cervical spondylosis without myelopathy    Prediabetes    Sacroiliitis    Spinal stenosis of lumbar region    Lumbar radiculopathy    Degeneration of lumbar or lumbosacral intervertebral disc    Class 1 obesity with body mass index (BMI) of 33.0 to 33.9 in adult    Low back pain    Lumbar back pain with radiculopathy affecting left lower extremity    Lumbar back pain with radiculopathy affecting left lower extremity    Weakness of left lower extremity    Intractable back pain    Left lumbar radiculopathy     Past Medical History:   Diagnosis Date    Anxiety     Arthritis     Breast cancer 2017    LEFT    Carpal tunnel syndrome of right wrist     Chronic pain disorder ’s    Colon polyp     Removed    Depression 2017    Dermatochalasis  of both eyelids     Fibromyalgia, primary 2023    GERD (gastroesophageal reflux disease)     History of radiation therapy 2006    LT BREAST    History of skin cancer     SQUAMOUS CELL REMOVED FROM LT UPPER LIP AND RT THUMB    Hyperlipidemia 2005    Hypertension     Irritable bowel syndrome In my 40’s    Low back pain 2015    Lumbosacral disc disease 2012    Obesity     Osteoarthritis 1990    Ptosis of both eyebrows     Spinal stenosis 2012    Viral syndrome 02/26/2018     Past Surgical History:   Procedure Laterality Date    ANAL FISSURECTOMY  2003    APPENDECTOMY  2014    BLEPHAROPLASTY Bilateral 05/11/2023    Procedure: BILATERAL UPPER LID BLEPHAROPLASTY;  Surgeon: Nicola Rowley MD;  Location: [facility] MAIN OR;  Service: Ophthalmology;  Laterality: Bilateral;    BREAST LUMPECTOMY W/ NEEDLE LOCALIZATION Left 2006    BREAST SURGERY  2006 and 2017    breast cancer    BROW LIFT Bilateral 05/11/2023    Procedure: BILATERAL TEMPORAL DIRECT BROWLIFT;  Surgeon: Nicola Rowley MD;  Location: [facility] MAIN OR;  Service: Ophthalmology;  Laterality: Bilateral;    CATARACT EXTRACTION Bilateral 07/2018    CHOLECYSTECTOMY  2007    COLONOSCOPY  2014    COLONOSCOPY Left 05/27/2021    Procedure: COLONOSCOPY;  Surgeon: Sridhar James MD;  Location: [facility] MAIN OR;  Service: Gastroenterology;  Laterality: Left;    COLONOSCOPY N/A 10/10/2022    Procedure: COLONOSCOPY FOR SCREENING;  Surgeon: Sridhar James MD;  Location: [facility] MAIN OR;  Service: Gastroenterology;  Laterality: N/A;  NORMAL    ENDOSCOPY N/A 10/10/2022    Procedure: ESOPHAGOGASTRODUODENOSCOPY WITH BIOPSY;  Surgeon: Sridhar James MD;  Location: [facility] MAIN OR;  Service: Gastroenterology;  Laterality: N/A;  GASTRIC POLYPS, IRREGULAR Z LINE    EPIDURAL Left 06/21/2023    Procedure: LUMBAR/SACRAL TRANSFORAMINAL EPIDURAL LEFT L4-5  26782;  Surgeon: Estrellita Sheppard MD;  Location: [facility] MAIN OR;  Service: Pain Management;  Laterality: Left;     EPIDURAL Bilateral 3/18/2024    Procedure: LUMBAR/SACRAL TRANSFORAMINAL EPIDURAL BILATERAL L4-5 14232-55;  Surgeon: Estrellita Sheppard MD;  Location: [facility] MAIN OR;  Service: Pain Management;  Laterality: Bilateral;    EPIDURAL BLOCK  2020    HYSTERECTOMY  1987    JOINT REPLACEMENT      KNEE SURGERY Right 2014    Knee cap replaced    KNEE SURGERY Left 12/06/2022    joint replacement    MASTECTOMY      MASTECTOMY W/ SENTINEL NODE BIOPSY Bilateral 08/21/2017    Procedure: BILATEREAL BREAST MASTECTOMY WITH SENTINEL NODE BIOPSY ON THE LEFT The sentinel lymph node biopsy will only be performed on the left side;  Surgeon: Diallo Eisenberg MD;  Location:  [facility];  Service:     MEDIAL BRANCH BLOCK Bilateral 03/20/2023    Procedure: LUMBAR MEDIAL BRANCH BLOCK BILATERAL L3-L5 #1  92874, 27253;  Surgeon: Estrellita Sheppard MD;  [place] [facility] [place];  Service: Pain Management;  Laterality: Bilateral;    OOPHORECTOMY Bilateral 2000    BSO    ORTHOPEDIC SURGERY  2000 and 2001    Knee replacements    PIRIFORMUS INJECTION Left 07/19/2021    Procedure: Left piriformis injection;  Surgeon: Etsrellita Sheppard MD;  Location: [facility];  Service: Pain Management;  Laterality: Left;    PIRIFORMUS INJECTION Left 01/03/2022    Procedure: PIRIFORMIS INJECTION - Left;  Surgeon: Estrellita Sheppard MD;  Location: [facility];  Service: Pain Management;  Laterality: Left;    PIRIFORMUS INJECTION Left 01/18/2023    Procedure: PIRIFORMIS INJECTION Left;  Surgeon: Estrellita Sheppard MD;  Location: [facility];  Service: Pain Management;  Laterality: Left;    PIRIFORMUS INJECTION Left 05/01/2023    Procedure: PIRIFORMIS INJECTION;  Surgeon: Estrellita Sheppard MD;  Location: [facility];  Service: Pain Management;  Laterality: Left;    POSTERIOR REPAIR  05/17/2016    with enterocele repair, midurethral sling, perineoplasty    SACROILIAC JOINT INJECTION Left 05/01/2023    Procedure: SACROILIAC JOINT INJECTION AND PIRIFORMIS INJECTION LEFT   50373;  Surgeon: Estrellita Sheppard MD;  Location: [facility] MAIN OR;  Service: Pain Management;  Laterality: Left;    SKIN BIOPSY      TONSILLECTOMY  1970    TOTAL KNEE ARTHROPLASTY Bilateral 2000, 2001    UPPER GASTROINTESTINAL ENDOSCOPY  10/10/2022      General Information       Row Name 04/26/24 1154          Physical Therapy Time and Intention    Document Type therapy note (daily note)  -CS     Mode of Treatment co-treatment;physical therapy;occupational therapy  -CS       Row Name 04/26/24 1154          General Information    Patient Profile Reviewed yes  -CS     Existing Precautions/Restrictions spinal;fall  -CS       Row Name 04/26/24 1154          Cognition    Orientation Status (Cognition) oriented x 4  -CS       Row Name 04/26/24 1154          Safety Issues, Functional Mobility    Impairments Affecting Function (Mobility) pain;strength;endurance/activity tolerance  -CS     Comment, Safety Issues/Impairments (Mobility) Co treatment medically appropriate and necessary due to patient acuity level, activity tolerance and safety of patient and staff. Treatment is focusing on progression of care and goals established in the POC.  -CS               User Key  (r) = Recorded By, (t) = Taken By, (c) = Cosigned By      Initials Name Provider Type    CS Lynn Bull, PT Physical Therapist                   Mobility       Row Name 04/26/24 1200          Bed Mobility    Comment, (Bed Mobility) NT - UIC  -CS       Row Name 04/26/24 1200          Sit-Stand Transfer    Sit-Stand Refugio (Transfers) contact guard;verbal cues  -CS     Assistive Device (Sit-Stand Transfers) other (see comments)  no AD  -CS       Row Name 04/26/24 1200          Gait/Stairs (Locomotion)    Refugio Level (Gait) minimum assist (75% patient effort)  -CS     Assistive Device (Gait) other (see comments)  HHA  -CS     Distance in Feet (Gait) 40  -CS     Deviations/Abnormal Patterns (Gait) antalgic;david decreased  -CS     Bilateral Gait  Deviations forward flexed posture  -CS     Left Sided Gait Deviations weight shift ability decreased  -CS     Comment, (Gait/Stairs) very slow antalgic gait; no LOB  -CS               User Key  (r) = Recorded By, (t) = Taken By, (c) = Cosigned By      Initials Name Provider Type    CS Lynn Bull, PT Physical Therapist                   Obj/Interventions       Row Name 04/26/24 1201          Motor Skills    Therapeutic Exercise other (see comments)  10 reps B LE AP, LAQ, QS, & GS  -CS       Row Name 04/26/24 1201          Balance    Balance Assessment standing static balance;standing dynamic balance  -CS     Static Standing Balance contact guard  -CS     Dynamic Standing Balance minimal assist  -CS     Position/Device Used, Standing Balance supported;other (see comments)  HHA  -CS               User Key  (r) = Recorded By, (t) = Taken By, (c) = Cosigned By      Initials Name Provider Type    CS Lynn Bull, PT Physical Therapist                   Goals/Plan    No documentation.                  Clinical Impression       Row Name 04/26/24 1202          Pain    Pretreatment Pain Rating 7/10  -CS     Posttreatment Pain Rating 7/10  -CS     Pain Location - Side/Orientation Left  -CS     Pain Location lower  -CS     Pain Location - extremity  -CS     Pain Intervention(s) Ambulation/increased activity;Rest;Repositioned  -CS       Row Name 04/26/24 1202          Plan of Care Review    Plan of Care Reviewed With patient  -CS     Progress improving  -CS     Outcome Evaluation Pt received sitting UIC and agreeable to PT. Pt stood and ambulated 40' c HHA requiring min A. Pt demo's a very slow antalgic gait with difficulty weight shifting due to L LE pain. Pt would benefit from use of a QC due to recent R UE carpal tunnel sx. Pt returned to room and completed B LE ther-ex and tolerated well. PT encouraged pt to complete HEP and ambulate with staff. Pt is planning back sx Monday or Tuesday. PT will f/u post-op.  -CS        Row Name 04/26/24 1202          Therapy Assessment/Plan (PT)    Criteria for Skilled Interventions Met (PT) yes;meets criteria  -CS     Therapy Frequency (PT) 5 times/wk  -CS       Row Name 04/26/24 1202          Vital Signs    O2 Delivery Pre Treatment room air  -CS     O2 Delivery Intra Treatment room air  -CS     O2 Delivery Post Treatment room air  -CS       Row Name 04/26/24 1202          Positioning and Restraints    Pre-Treatment Position sitting in chair/recliner  -CS     Post Treatment Position chair  -CS     In Chair reclined;call light within reach;encouraged to call for assist  no alarm upon arrival  -CS               User Key  (r) = Recorded By, (t) = Taken By, (c) = Cosigned By      Initials Name Provider Type    CS Lynn Bull, PT Physical Therapist                   Outcome Measures       Row Name 04/26/24 1205          How much help from another person do you currently need...    Turning from your back to your side while in flat bed without using bedrails? 4  -CS     Moving from lying on back to sitting on the side of a flat bed without bedrails? 3  -CS     Moving to and from a bed to a chair (including a wheelchair)? 3  -CS     Standing up from a chair using your arms (e.g., wheelchair, bedside chair)? 3  -CS     Climbing 3-5 steps with a railing? 2  -CS     To walk in hospital room? 3  -CS     AM-PAC 6 Clicks Score (PT) 18  -CS     Highest Level of Mobility Goal 6 --> Walk 10 steps or more  -CS       Row Name 04/26/24 1205          Functional Assessment    Outcome Measure Options AM-PAC 6 Clicks Basic Mobility (PT)  -CS               User Key  (r) = Recorded By, (t) = Taken By, (c) = Cosigned By      Initials Name Provider Type    CS Lynn Bull, PT Physical Therapist                                 Physical Therapy Education       Title: PT OT SLP Therapies (Done)       Topic: Physical Therapy (Done)       Point: Mobility training (Done)       Learning Progress Summary              Patient Acceptance, E,TB, VU,DU by CS at 4/26/2024 1205    Acceptance, E,D, VU,NR by MS at 4/24/2024 1528    Acceptance, E,TB, VU,NR by CB at 4/22/2024 1551                         Point: Home exercise program (Done)       Learning Progress Summary             Patient Acceptance, E,TB, VU,DU by CS at 4/26/2024 1205    Acceptance, E,D, VU,NR by MS at 4/24/2024 1528                         Point: Body mechanics (Done)       Learning Progress Summary             Patient Acceptance, E,TB, VU,DU by CS at 4/26/2024 1205    Acceptance, E,D, VU,NR by MS at 4/24/2024 1528    Acceptance, E,TB, VU,NR by CB at 4/22/2024 1551                         Point: Precautions (Done)       Learning Progress Summary             Patient Acceptance, E,TB, VU,DU by CS at 4/26/2024 1205    Acceptance, E,D, VU,NR by MS at 4/24/2024 1528    Acceptance, E,TB, VU,NR by CB at 4/22/2024 1551                                         User Key       Initials Effective Dates Name Provider Type Discipline    MS 06/16/21 -  Harpal Marie PT Physical Therapist PT    CB 10/22/21 -  Kimmie Baires, SARAH Physical Therapist PT     09/22/22 -  Lynn Bull, SARAH Physical Therapist PT                  PT Recommendation and Plan     Plan of Care Reviewed With: patient  Progress: improving  Outcome Evaluation: Pt received sitting UIC and agreeable to PT. Pt stood and ambulated 40' c HHA requiring min A. Pt demo's a very slow antalgic gait with difficulty weight shifting due to L LE pain. Pt would benefit from use of a QC due to recent R UE carpal tunnel sx. Pt returned to room and completed B LE ther-ex and tolerated well. PT encouraged pt to complete HEP and ambulate with staff. Pt is planning back sx Monday or Tuesday. PT will f/u post-op.     Time Calculation:         PT Charges       Row Name 04/26/24 1205             Time Calculation    Start Time 1052  -CS      Stop Time 1103  -      Time Calculation (min) 11 min  -CS      PT Received On 04/26/24   -CS      PT - Next Appointment 04/29/24  -CS         Time Calculation- PT    Total Timed Code Minutes- PT 10 minute(s)  -CS         Timed Charges    43928 - PT Therapeutic Activity Minutes 10  -CS         Total Minutes    Timed Charges Total Minutes 10  -CS       Total Minutes 10  -CS                User Key  (r) = Recorded By, (t) = Taken By, (c) = Cosigned By      Initials Name Provider Type    CS Lynn Bull, PT Physical Therapist                  Therapy Charges for Today       Code Description Service Date Service Provider Modifiers Qty    [id number]  PT THERAPEUTIC ACT EA 15 MIN 4/26/2024 Lynn Bull, PT GP 1            PT G-Codes  Outcome Measure Options: AM-PAC 6 Clicks Basic Mobility (PT)  AM-PAC 6 Clicks Score (PT): 18  PT Discharge Summary  Anticipated Discharge Disposition (PT): home with assist, home with home health    Lynn Bull PT  4/26/2024

## 2024-04-26 NOTE — PLAN OF CARE
Goal Outcome Evaluation:      A&O x 4, room air, up with assistance to chair and ambulating with PT into hallway this shift, IV SL, tolerating regular diet, Percocet given x 1, Dilaudid given x 1, plan for L2-L3 Laminectomy with Dissection 4/29, MCKENZIE, NEIL.

## 2024-04-26 NOTE — PLAN OF CARE
Goal Outcome Evaluation:  Plan of Care Reviewed With: patient        Progress: no change  Outcome Evaluation: pt is a 73 yo female admitted with LBP, L lumber radiculopathy. 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 She is seen this date for OT eval, A&Ox4, hx recent R carpal tunnel surgery. She is UIC on arrival, LLE pain very limiting this date as well as overall activity tolerance. She reports getting to BR commode earlier prior to OT arrival. Min A for func mob as well as STS with HHA. She is quick to fatigue, plans for back surgery on Monday. will continue to follow to assess post op.      Anticipated Discharge Disposition (OT):  (pending post op progress)

## 2024-04-26 NOTE — PLAN OF CARE
Goal Outcome Evaluation:  Plan of Care Reviewed With: patient        Progress: improving  Outcome Evaluation: Pt received sitting UIC and agreeable to PT. Pt stood and ambulated 40' c HHA requiring min A. Pt demo's a very slow antalgic gait with difficulty weight shifting due to L LE pain. Pt would benefit from use of a QC due to recent R UE carpal tunnel sx. Pt returned to room and completed B LE ther-ex and tolerated well. PT encouraged pt to complete HEP and ambulate with staff. Pt is planning back sx Monday or Tuesday. PT will f/u post-op.      Anticipated Discharge Disposition (PT): home with assist, home with home health

## 2024-04-26 NOTE — PROGRESS NOTES
Name: Alesia Resendez ADMIT: 2024   : 1950  PCP: Epley, James, APRN    MRN: [id number] LOS: 2 days   AGE/SEX: 74 y.o. female  ROOM: [facility]     Subjective   Subjective   Persistent back pain and LLE weakness numbness that is unchanged. Bowels moving. No new complaints. Urinating without issues.  No CP SOB fever chills. Occasional dyspnea on exertion at baseline       Objective   Objective   Vital Signs  Temp:  [97.7 °F (36.5 °C)-98.2 °F (36.8 °C)] 97.7 °F (36.5 °C)  Heart Rate:  [52-59] 56  Resp:  [18-20] 18  BP: (143-166)/(84-95) 166/95  SpO2:  [92 %-96 %] 96 %  on   ;   Device (Oxygen Therapy): room air  Body mass index is 33.45 kg/m².  Physical Exam  Vitals reviewed.   Constitutional:       Appearance: She is well-developed. She is obese. She is not ill-appearing.   HENT:      Head: Normocephalic and atraumatic.   Cardiovascular:      Rate and Rhythm: Normal rate and regular rhythm.   Pulmonary:      Effort: Pulmonary effort is normal. No respiratory distress.      Breath sounds: Normal breath sounds.   Abdominal:      General: Bowel sounds are normal. There is no distension.      Palpations: Abdomen is soft.   Musculoskeletal:      Comments: LLE weakness and inner thigh numbness   Neurological:      General: No focal deficit present.      Mental Status: She is alert and oriented to person, place, and time.   Psychiatric:         Behavior: Behavior normal.         Thought Content: Thought content normal.         Judgment: Judgment normal.       Results Review     I reviewed the patient's new clinical results.  Results from last 7 days   Lab Units 24  0558 24  0937 24  0521 24  0406   WBC 10*3/mm3 6.25 6.30 7.96 8.67   HEMOGLOBIN g/dL 15.2 14.2 13.6 14.9   PLATELETS 10*3/mm3 227 248 241 266     Results from last 7 days   Lab Units 24  0558 24  0937 24  0521 24  0406   SODIUM mmol/L 139 142 138 139   POTASSIUM mmol/L 4.0 4.9 3.8 3.6   CHLORIDE mmol/L  100 100 98 100   CO2 mmol/L 28.4 32.2* 26.5 24.9   BUN mg/dL 23 27* 24* 17   CREATININE mg/dL 0.67 0.70 0.68 0.61   GLUCOSE mg/dL 166* 166* 207* 139*   EGFR mL/min/1.73 91.8 90.9 91.5 93.9     Results from last 7 days   Lab Units 04/25/24  0937 04/22/24  0406   ALBUMIN g/dL 3.8 4.1   BILIRUBIN mg/dL 0.4 0.4   ALK PHOS U/L 68 91   AST (SGOT) U/L 39* 23   ALT (SGPT) U/L 47* 20     Results from last 7 days   Lab Units 04/26/24  0558 04/25/24  0937 04/23/24  0521 04/22/24  0406   CALCIUM mg/dL 8.7 9.2 9.2 9.4   ALBUMIN g/dL  --  3.8  --  4.1       Hemoglobin A1C   Date/Time Value Ref Range Status   04/25/2024 0937 6.30 (H) 4.80 - 5.60 % Final     Glucose   Date/Time Value Ref Range Status   04/26/2024 0803 137 (H) 70 - 130 mg/dL Final   04/25/2024 2135 144 (H) 70 - 130 mg/dL Final   04/25/2024 1706 134 (H) 70 - 130 mg/dL Final   04/25/2024 1214 139 (H) 70 - 130 mg/dL Final       CT Lumbar Spine With Intrathecal Contrast    Result Date: 4/25/2024  1. Multilevel lumbar degenerative disease including multilevel canal stenosis. Please see full discussion above.   Radiation dose reduction techniques were utilized, including automated exposure control and exposure modulation based on body size.   This report was finalized on 4/25/2024 4:53 PM by Dr. Jac Wood M.D on Workstation: [id number]       I have personally reviewed all medications:  Scheduled Medications  amLODIPine, 10 mg, Oral, Daily  atenolol, 25 mg, Oral, Daily  atorvastatin, 20 mg, Oral, Daily  buPROPion XL, 150 mg, Oral, Daily  escitalopram, 5 mg, Oral, Daily  fentanyl, 50 mcg, Intravenous, Once  hydroCHLOROthiazide, 25 mg, Oral, Daily  insulin lispro, 2-9 Units, Subcutaneous, 4x Daily AC & at Bedtime  lidocaine, 1 mL, Intradermal, Once  Lidocaine, 2 patch, Transdermal, Q24H  losartan, 25 mg, Oral, Daily  methocarbamol, 750 mg, Oral, 4x Daily  methylPREDNISolone sodium succinate, 60 mg, Intravenous, Q8H  pantoprazole, 40 mg, Oral, Q AM  pregabalin, 25 mg,  Oral, Q8H  senna-docusate sodium, 2 tablet, Oral, BID  sodium chloride, 10 mL, Intravenous, Q12H  sodium chloride, 10 mL, Intravenous, Q12H    Infusions     Diet  Diet: Regular/House; Fluid Consistency: Thin (IDDSI 0)  NPO Diet NPO Type: Strict NPO, Sips with Meds    I have personally reviewed:  [x]  Laboratory   [x]  Microbiology   [x]  Radiology   [x]  EKG/Telemetry  [x]  Cardiology/Vascular   []  Pathology    []  Records       Assessment/Plan     Active Hospital Problems    Diagnosis  POA    **Low back pain [M54.50]  Yes    Intractable back pain [M54.9]  Yes    Left lumbar radiculopathy [M54.16]  Yes    Lumbar back pain with radiculopathy affecting left lower extremity [M54.16]  Yes    Lumbar back pain with radiculopathy affecting left lower extremity [M54.16]  Unknown    Weakness of left lower extremity [R29.898]  Unknown      Resolved Hospital Problems   No resolved problems to display.       74 y.o. female admitted with Low back pain.       Lumbar back pain with radiculopathy  Weakness of LLE/ decreased mobility/ paresthesia and incontinence  ZACKARY following  Status post epidural 4/23 and steroids.  Continue lidocaine patch, Robaxin scheduled, Percocet and Dilaudid as needed  Myelogram with herniated disc at L2-L3.  ZACKARY recommending unilateral laminectomy and discectomy planned for Monday 4/29.      Prediabeties/ obesity/HLD   increased risk of complications as a result.A1c 6.3. Monitor glucose with SSI while on steroids   hx of ALMODOVAR - at risk for post op complications. No prior cardiac workup in EMR. Check preop CXR EKG echocardiogram.    TSH is low but T4 normal. Advise repeat labs with PCP     Fibromyalgia and chronic pain disorder   Usually only gets epidural pain and injections and not on chronic medications.  She was on Celebrex.  She sees Dr. Sheppard as an outpatient with pain management    Chronic medical conditions being monitored- stable, continue medical management  -Anxiety and IBS    Lab break tmrw.       SCDs for DVT prophylaxis.  Full code.  Discussed with patient and nursing staff.  Anticipate discharge next week in the postop setting.        KYLIE Contreras  [facility]  04/26/24  11:14 EDT

## 2024-04-26 NOTE — PROGRESS NOTES
LOS: 2 days   Patient Care Team:  Epley, James, APRN as PCP - General (Family Medicine)  Virgil Moeller II, MD as Consulting Physician (Radiology)  Diallo Eisenberg MD as Referring Physician (Breast Surgery)  Peyton Keyes MD PhD as Consulting Physician (Hematology and Oncology)  Rimma Keller APRN as Nurse Practitioner (Nurse Practitioner)    Chief Complaint: Back and left leg pain    Subjective     This patient continues with pain in the anterior thigh and some numbness and tingling in her left calf.      Interval History:     History taken from: patient chart    Objective      She has good movement of both lower extremities    Vital Signs  Temp:  [97.7 °F (36.5 °C)-98.2 °F (36.8 °C)] 97.7 °F (36.5 °C)  Heart Rate:  [52-59] 56  Resp:  [18-20] 18  BP: (143-166)/(84-95) 166/95       Results Review:        Assessment & Plan       Low back pain    Lumbar back pain with radiculopathy affecting left lower extremity    Lumbar back pain with radiculopathy affecting left lower extremity    Weakness of left lower extremity    Intractable back pain    Left lumbar radiculopathy      74-year-old female with acute onset low back pain with radiculopathy with weakness.  She will undergo a left-sided L2-3 laminectomy and discectomy on Monday.  We will get medical clearance.  Neurosurgery will check back on her on 4/28.    Plan:  Obtain medical clearance  Left L2-3 laminectomy and discectomy 4/29      KYLIE Navas  04/26/24  09:53 EDT

## 2024-04-26 NOTE — PLAN OF CARE
Goal Outcome Evaluation:    A & O X4. Up with assist X 1. On RA, no respiratory distress noted. Medicated per MAR. VSS. PRN dilaudid given X 1 for left leg pain. PRN percocet given X1 for left leg pain.  No episodes of incontinences this shift, voiding well. Patient laid flat this shift, post myelogram on 4/25. Bed low, locked, alarm on and call light within reach.

## 2024-04-26 NOTE — THERAPY EVALUATION
Patient Name: Alesia Resendez  : 1950    MRN: [id number]                              Today's Date: 2024       Admit Date: 2024    Visit Dx:     ICD-10-CM ICD-9-CM   1. Acute left-sided low back pain, unspecified whether sciatica present  M54.50 724.2   2. Lumbar radiculopathy  M54.16 724.4   3. Left lumbar radiculopathy  M54.16 724.4     Patient Active Problem List   Diagnosis    Arthritis    Atrophic vaginitis    Depression    Gastroesophageal reflux disease without esophagitis    Mixed hyperlipidemia    Essential hypertension    Menopause present    Overactive bladder    History of colonic polyps    Rectal hemorrhage    Proctocele    Encounter for screening for malignant neoplasm of colon    Stress incontinence in female    Recurrent breast cancer    Viral syndrome    Chronic fatigue    Snoring    Tremor    Community acquired pneumonia    Chronic right-sided low back pain with sciatica    Elevated liver function tests    Piriformis syndrome of left side    Myofasciitis    History of left breast cancer    Diarrhea    Family history of esophageal cancer    Spondylosis of lumbar region without myelopathy or radiculopathy    Cervical stenosis of spinal canal    Cervical spondylosis without myelopathy    Prediabetes    Sacroiliitis    Spinal stenosis of lumbar region    Lumbar radiculopathy    Degeneration of lumbar or lumbosacral intervertebral disc    Class 1 obesity with body mass index (BMI) of 33.0 to 33.9 in adult    Low back pain    Lumbar back pain with radiculopathy affecting left lower extremity    Lumbar back pain with radiculopathy affecting left lower extremity    Weakness of left lower extremity    Intractable back pain    Left lumbar radiculopathy     Past Medical History:   Diagnosis Date    Anxiety     Arthritis     Breast cancer 2017    LEFT    Carpal tunnel syndrome of right wrist     Chronic pain disorder ’s    Colon polyp     Removed    Depression 2017    Dermatochalasis  of both eyelids     Fibromyalgia, primary 2023    GERD (gastroesophageal reflux disease)     History of radiation therapy 2006    LT BREAST    History of skin cancer     SQUAMOUS CELL REMOVED FROM LT UPPER LIP AND RT THUMB    Hyperlipidemia 2005    Hypertension     Irritable bowel syndrome In my 40’s    Low back pain 2015    Lumbosacral disc disease 2012    Obesity     Osteoarthritis 1990    Ptosis of both eyebrows     Spinal stenosis 2012    Viral syndrome 02/26/2018     Past Surgical History:   Procedure Laterality Date    ANAL FISSURECTOMY  2003    APPENDECTOMY  2014    BLEPHAROPLASTY Bilateral 05/11/2023    Procedure: BILATERAL UPPER LID BLEPHAROPLASTY;  Surgeon: Nicola Rowley MD;  Location: [facility] MAIN OR;  Service: Ophthalmology;  Laterality: Bilateral;    BREAST LUMPECTOMY W/ NEEDLE LOCALIZATION Left 2006    BREAST SURGERY  2006 and 2017    breast cancer    BROW LIFT Bilateral 05/11/2023    Procedure: BILATERAL TEMPORAL DIRECT BROWLIFT;  Surgeon: Nicola Rowley MD;  Location: [facility] MAIN OR;  Service: Ophthalmology;  Laterality: Bilateral;    CATARACT EXTRACTION Bilateral 07/2018    CHOLECYSTECTOMY  2007    COLONOSCOPY  2014    COLONOSCOPY Left 05/27/2021    Procedure: COLONOSCOPY;  Surgeon: Sridhar James MD;  [place] [facility] [place];  Service: Gastroenterology;  Laterality: Left;    COLONOSCOPY N/A 10/10/2022    Procedure: COLONOSCOPY FOR SCREENING;  Surgeon: Sridhar James MD;  [place] [facility] [place];  Service: Gastroenterology;  Laterality: N/A;  NORMAL    ENDOSCOPY N/A 10/10/2022    Procedure: ESOPHAGOGASTRODUODENOSCOPY WITH BIOPSY;  Surgeon: Sridhar James MD;  [place] [facility] [place];  Service: Gastroenterology;  Laterality: N/A;  GASTRIC POLYPS, IRREGULAR Z LINE    EPIDURAL Left 06/21/2023    Procedure: LUMBAR/SACRAL TRANSFORAMINAL EPIDURAL LEFT L4-5  00639;  Surgeon: Estrellita Sheppard MD;  Location: [facility];  Service: Pain Management;  Laterality: Left;     EPIDURAL Bilateral 3/18/2024    Procedure: LUMBAR/SACRAL TRANSFORAMINAL EPIDURAL BILATERAL L4-5 79655-52;  Surgeon: Estrellita Sheppard MD;  Location: [facility];  Service: Pain Management;  Laterality: Bilateral;    EPIDURAL BLOCK  2020    HYSTERECTOMY  1987    JOINT REPLACEMENT      KNEE SURGERY Right 2014    Knee cap replaced    KNEE SURGERY Left 12/06/2022    joint replacement    MASTECTOMY      MASTECTOMY W/ SENTINEL NODE BIOPSY Bilateral 08/21/2017    Procedure: BILATEREAL BREAST MASTECTOMY WITH SENTINEL NODE BIOPSY ON THE LEFT The sentinel lymph node biopsy will only be performed on the left side;  Surgeon: Diallo Eisenberg MD;  Location:  [facility];  Service:     MEDIAL BRANCH BLOCK Bilateral 03/20/2023    Procedure: LUMBAR MEDIAL BRANCH BLOCK BILATERAL L3-L5 #1  88305, 90153;  Surgeon: Estrellita Sheppard MD;  Location: [facility] OR;  Service: Pain Management;  Laterality: Bilateral;    OOPHORECTOMY Bilateral 2000    BSO    ORTHOPEDIC SURGERY  2000 and 2001    Knee replacements    PIRIFORMUS INJECTION Left 07/19/2021    Procedure: Left piriformis injection;  Surgeon: Estrellita Sheppard MD;  Location: [facility];  Service: Pain Management;  Laterality: Left;    PIRIFORMUS INJECTION Left 01/03/2022    Procedure: PIRIFORMIS INJECTION - Left;  Surgeon: Estrellita Sheppard MD;  Location: [facility];  Service: Pain Management;  Laterality: Left;    PIRIFORMUS INJECTION Left 01/18/2023    Procedure: PIRIFORMIS INJECTION Left;  Surgeon: Estrellita Sheppard MD;  Location[facility];  Service: Pain Management;  Laterality: Left;    PIRIFORMUS INJECTION Left 05/01/2023    Procedure: PIRIFORMIS INJECTION;  Surgeon: Estrellita Sheppard MD;  Location: [facility];  Service: Pain Management;  Laterality: Left;    POSTERIOR REPAIR  05/17/2016    with enterocele repair, midurethral sling, perineoplasty    SACROILIAC JOINT INJECTION Left 05/01/2023    Procedure: SACROILIAC JOINT INJECTION AND PIRIFORMIS INJECTION LEFT   [id number];  Surgeon: Estrellita Sheppard MD;  [place] [facility] [place];  Service: Pain Management;  Laterality: Left;    SKIN BIOPSY      TONSILLECTOMY  1970    TOTAL KNEE ARTHROPLASTY Bilateral 2000, 2001    UPPER GASTROINTESTINAL ENDOSCOPY  10/10/2022      General Information       Row Name 04/26/24 [facility]0          OT Time and Intention    Document Type evaluation  -     Mode of Treatment occupational therapy;co-treatment  -       Row Name 04/26/24 1230          General Information    Patient Profile Reviewed yes  -MW     Prior Level of Function independent:  -MW     Existing Precautions/Restrictions spinal;fall  -MW     Barriers to Rehab none identified  -       Row Name 04/26/24 1230          Living Environment    People in Home alone  cares for granddaughter  -       Row Name 04/26/24 1230          Cognition    Orientation Status (Cognition) oriented x 4  -MW       Row Name 04/26/24 1230          Safety Issues, Functional Mobility    Impairments Affecting Function (Mobility) pain;strength;endurance/activity tolerance  -               User Key  (r) = Recorded By, (t) = Taken By, (c) = Cosigned By      Initials Name Provider Type    MW Kaity Lizama, EZEQUIEL Occupational Therapist                     Mobility/ADL's       Row Name 04/26/24 1232          Bed Mobility    Comment, (Bed Mobility) NT UIC  -       Row Name 04/26/24 1232          Transfers    Transfers sit-stand transfer;stand-sit transfer;toilet transfer  -       Row Name 04/26/24 1232          Sit-Stand Transfer    Sit-Stand [place] (Transfers) contact guard;verbal cues  -     Assistive Device (Sit-Stand Transfers) other (see comments)  -     Comment, (Sit-Stand Transfer) HHA L side  -       Row Name 04/26/24 1232          Stand-Sit Transfer    Stand-Sit [place] (Transfers) contact guard  -       Row Name 04/26/24 1232          Toilet Transfer    Comment, (Toilet Transfer) reports completed prior to session, reports no concerns   -[facility] Name 04/26/24 1232          Functional Mobility    Functional Mobility- Comment to doorway and returned to chair, pain radiating down LLE impacting upright act tolerance  -[facility] Name 04/26/24 1232          Activities of Daily Living    BADL Assessment/Intervention lower body dressing  -MW       Row Name 04/26/24 1232          Lower Body Dressing Assessment/Training    [place] Level (Lower Body Dressing) standby assist;socks  -     Position (Lower Body Dressing) supported sitting  -               User Key  (r) = Recorded By, (t) = Taken By, (c) = Cosigned By      Initials Name Provider Type    Kaity Gipson OT Occupational Therapist                   Obj/Interventions       [facility] Name 04/26/24 1234          Sensory Assessment (Somatosensory)    Sensory Assessment (Somatosensory) UE sensation intact  -MW       Row Name 04/26/24 1234          Vision Assessment/Intervention    Visual Impairment/Limitations WFL  -MW       Row Name 04/26/24 1234          Range of Motion Comprehensive    Comment, General Range of Motion R wrist limited ROM due to recent surgery, however functional  -MW       Row Name 04/26/24 1234          Strength Comprehensive (MMT)    General Manual Muscle Testing (MMT) Assessment upper extremity strength deficits identified  -     Comment, General Manual Muscle Testing (MMT) Assessment BUE generalized weakness, grossly 4-/5  -MW       [facility] Name 04/26/24 1234          Motor Skills    Motor Skills coordination  -     Coordination right;upper extremity;minimal impairment  -MW       Row Name 04/26/24 1234          Balance    Balance Assessment sitting static balance;sitting dynamic balance;standing static balance;standing dynamic balance;sit to stand dynamic balance  -     Static Sitting Balance modified independence  -     Dynamic Sitting Balance supervision  -     Position, Sitting Balance sitting in chair  -     Sit to Stand Dynamic Balance contact guard   -MW     Static Standing Balance contact guard  -MW     Dynamic Standing Balance minimal assist  -MW     Position/Device Used, Standing Balance supported;other (see comments)  HHA  -MW     Comment, Balance no overt LOBs  -MW               User Key  (r) = Recorded By, (t) = Taken By, (c) = Cosigned By      Initials Name Provider Type    MW Kaity Lizama, OT Occupational Therapist                   Goals/Plan       Row Name 04/26/24 1240          Transfer Goal 1 (OT)    Activity/Assistive Device (Transfer Goal 1, OT) transfers, all  -MW     [place] Level/Cues Needed (Transfer Goal 1, OT) independent  -MW     Time Frame (Transfer Goal 1, OT) short term goal (STG);2 weeks  -MW     Progress/Outcome (Transfer Goal 1, OT) goal ongoing  -       Row Name 04/26/24 1240          Dressing Goal 1 (OT)    Activity/Device (Dressing Goal 1, OT) dressing skills, all  -MW     [place]/Cues Needed (Dressing Goal 1, OT) independent  -MW     Time Frame (Dressing Goal 1, OT) short term goal (STG);2 weeks  -       Row Name 04/26/24 1240          Toileting Goal 1 (OT)    Activity/Device (Toileting Goal 1, OT) grab bar/safety frame;toileting skills, all  -MW     [place] Level/Cues Needed (Toileting Goal 1, OT) independent  -MW     Time Frame (Toileting Goal 1, OT) 2 weeks;short term goal (STG)  -       Row Name 04/26/24 1240          Strength Goal 1 (OT)    Strength Goal 1 (OT) improve BUE strength to 4+/5 to increase (I) with ADLs  -MW     Time Frame (Strength Goal 1, OT) short term goal (STG);2 weeks  -MW     Progress/Outcome (Strength Goal 1, OT) goal ongoing  -       Row Name 04/26/24 1240          Therapy Assessment/Plan (OT)    Planned Therapy Interventions (OT) activity tolerance training;neuromuscular control/coordination retraining;patient/caregiver education/training;transfer/mobility retraining;strengthening exercise;ROM/therapeutic exercise;occupation/activity based interventions;functional balance  retraining  -               User Key  (r) = Recorded By, (t) = Taken By, (c) = Cosigned By      Initials Name Provider Type    MW Kaity Lizama, OT Occupational Therapist                   Clinical Impression       Row Name 04/26/24 1237          Pain Assessment    Pretreatment Pain Rating 7/10  -MW     Posttreatment Pain Rating 7/10  -MW     Pain Location - Side/Orientation Left  -MW     Pain Location lower  -MW     Pain Location - extremity  -MW     Pain Intervention(s) Rest;Repositioned  -       Row Name 04/26/24 1237          Plan of Care Review    Plan of Care Reviewed With patient  -MW     Progress no change  -     Outcome Evaluation pt is a 75 yo female admitted with LBP, L lumber radiculopathy. 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 She is seen this date for OT natividad, RASHMI&Ox4, hx recent R carpal tunnel surgery. She is UIC on arrival, LLE pain very limiting this date as well as overall activity tolerance. She reports getting to BR commode earlier prior to OT arrival. Min A for func mob as well as STS with HHA. She is quick to fatigue, plans for back surgery on Monday. will continue to follow to assess post op.  -       Row Name 04/26/24 1237          Therapy Assessment/Plan (OT)    Rehab Potential (OT) good, to achieve stated therapy goals  -     Criteria for Skilled Therapeutic Interventions Met (OT) skilled treatment is necessary  -     Therapy Frequency (OT) 5 times/wk  -       Row Name 04/26/24 1237          Therapy Plan Review/Discharge Plan (OT)    Anticipated Discharge Disposition (OT) --  pending post op progress  -       Row Name 04/26/24 1237          Vital Signs    O2 Delivery Pre Treatment room air  -       Row Name 04/26/24 1237          Positioning and Restraints    Pre-Treatment Position sitting in chair/recliner  -MW     Post Treatment Position chair  -MW     In Chair notified nsg;reclined;call light within reach;encouraged to call for assist  no chair  alarm on arrival  -               User Key  (r) = Recorded By, (t) = Taken By, (c) = Cosigned By      Initials Name Provider Type    Kaity Gipson OT Occupational Therapist                   Outcome Measures       Row Name 04/26/24 1241          How much help from another is currently needed...    Putting on and taking off regular lower body clothing? 3  -MW     Bathing (including washing, rinsing, and drying) 3  -MW     Toileting (which includes using toilet bed pan or urinal) 3  -MW     Putting on and taking off regular upper body clothing 4  -MW     Taking care of personal grooming (such as brushing teeth) 4  -MW     Eating meals 4  -MW     AM-PAC 6 Clicks Score (OT) 21  -MW       Row Name 04/26/24 1205          How much help from another person do you currently need...    Turning from your back to your side while in flat bed without using bedrails? 4  -CS     Moving from lying on back to sitting on the side of a flat bed without bedrails? 3  -CS     Moving to and from a bed to a chair (including a wheelchair)? 3  -CS     Standing up from a chair using your arms (e.g., wheelchair, bedside chair)? 3  -CS     Climbing 3-5 steps with a railing? 2  -CS     To walk in hospital room? 3  -CS     AM-PAC 6 Clicks Score (PT) 18  -CS     Highest Level of Mobility Goal 6 --> Walk 10 steps or more  -CS       Row Name 04/26/24 1241 04/26/24 1205       Functional Assessment    Outcome Measure Options AM-PAC 6 Clicks Daily Activity (OT)  - AM-PAC 6 Clicks Basic Mobility (PT)  -CS              User Key  (r) = Recorded By, (t) = Taken By, (c) = Cosigned By      Initials Name Provider Type    Kaity Gipson OT Occupational Therapist    Lynn Duke PT Physical Therapist                      OT Recommendation and Plan  Planned Therapy Interventions (OT): activity tolerance training, neuromuscular control/coordination retraining, patient/caregiver education/training, transfer/mobility retraining,  strengthening exercise, ROM/therapeutic exercise, occupation/activity based interventions, functional balance retraining  Therapy Frequency (OT): 5 times/wk  Plan of Care Review  Plan of Care Reviewed With: patient  Progress: no change  Outcome Evaluation: pt is a 75 yo female admitted with LBP, L lumber radiculopathy. 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 She is seen this date for OT eval, A&Ox4, hx recent R carpal tunnel surgery. She is UIC on arrival, LLE pain very limiting this date as well as overall activity tolerance. She reports getting to BR commode earlier prior to OT arrival. Min A for func mob as well as STS with HHA. She is quick to fatigue, plans for back surgery on Monday. will continue to follow to assess post op.     Time Calculation:   Evaluation Complexity (OT)  Review Occupational Profile/Medical/Therapy History Complexity: brief/low complexity  Assessment, Occupational Performance/Identification of Deficit Complexity: 1-3 performance deficits  Clinical Decision Making Complexity (OT): problem focused assessment/low complexity  Overall Complexity of Evaluation (OT): low complexity     Time Calculation- OT       Row Name 04/26/24 1241             Time Calculation- OT    OT Start Time 1050  -MW      OT Stop Time 1100  -MW      OT Time Calculation (min) 10 min  -MW      OT Received On 04/26/24  -MW      OT - Next Appointment 04/30/24  -MW      OT Goal Re-Cert Due Date 05/10/24  -MW         Untimed Charges    OT Eval/Re-eval Minutes 10  -MW         Total Minutes    Untimed Charges Total Minutes 10  -MW       Total Minutes 10  -MW                User Key  (r) = Recorded By, (t) = Taken By, (c) = Cosigned By      Initials Name Provider Type    Kaity Gipson OT Occupational Therapist                  Therapy Charges for Today       Code Description Service Date Service Provider Modifiers Qty    [id number] HC OT EVAL LOW COMPLEXITY 2 4/26/2024 Kaity Lizama OT GO 1                  Kaity Lizama, OT  4/26/2024

## 2024-04-27 LAB
GLUCOSE BLDC GLUCOMTR-MCNC: 142 MG/DL (ref 70–130)
GLUCOSE BLDC GLUCOMTR-MCNC: 154 MG/DL (ref 70–130)
GLUCOSE BLDC GLUCOMTR-MCNC: 177 MG/DL (ref 70–130)
GLUCOSE BLDC GLUCOMTR-MCNC: 210 MG/DL (ref 70–130)

## 2024-04-27 PROCEDURE — 63710000001 INSULIN LISPRO (HUMAN) PER 5 UNITS: Performed by: NEUROLOGICAL SURGERY

## 2024-04-27 PROCEDURE — 25010000002 METHYLPREDNISOLONE PER 125 MG: Performed by: NEUROLOGICAL SURGERY

## 2024-04-27 PROCEDURE — 82948 REAGENT STRIP/BLOOD GLUCOSE: CPT

## 2024-04-27 PROCEDURE — 63710000001 ONDANSETRON ODT 4 MG TABLET DISPERSIBLE: Performed by: NEUROLOGICAL SURGERY

## 2024-04-27 RX ORDER — OXYCODONE AND ACETAMINOPHEN 7.5; 325 MG/1; MG/1
1 TABLET ORAL EVERY 4 HOURS PRN
Status: DISCONTINUED | OUTPATIENT
Start: 2024-04-27 | End: 2024-04-29

## 2024-04-27 RX ORDER — OXYCODONE HYDROCHLORIDE AND ACETAMINOPHEN 5; 325 MG/1; MG/1
1 TABLET ORAL EVERY 4 HOURS PRN
Status: DISCONTINUED | OUTPATIENT
Start: 2024-04-27 | End: 2024-04-27

## 2024-04-27 RX ADMIN — LOSARTAN POTASSIUM 25 MG: 25 TABLET, FILM COATED ORAL at 09:31

## 2024-04-27 RX ADMIN — METHYLPREDNISOLONE SODIUM SUCCINATE 60 MG: 125 INJECTION INTRAMUSCULAR; INTRAVENOUS at 14:24

## 2024-04-27 RX ADMIN — ATENOLOL 25 MG: 25 TABLET ORAL at 21:49

## 2024-04-27 RX ADMIN — PREGABALIN 25 MG: 25 CAPSULE ORAL at 14:24

## 2024-04-27 RX ADMIN — METHOCARBAMOL TABLETS 750 MG: 750 TABLET, COATED ORAL at 12:44

## 2024-04-27 RX ADMIN — ESCITALOPRAM 5 MG: 5 TABLET, FILM COATED ORAL at 21:49

## 2024-04-27 RX ADMIN — ONDANSETRON 4 MG: 4 TABLET, ORALLY DISINTEGRATING ORAL at 14:24

## 2024-04-27 RX ADMIN — Medication 10 ML: at 09:35

## 2024-04-27 RX ADMIN — Medication 10 ML: at 21:51

## 2024-04-27 RX ADMIN — INSULIN LISPRO 2 UNITS: 100 INJECTION, SOLUTION INTRAVENOUS; SUBCUTANEOUS at 12:43

## 2024-04-27 RX ADMIN — METHYLPREDNISOLONE SODIUM SUCCINATE 60 MG: 125 INJECTION INTRAMUSCULAR; INTRAVENOUS at 21:49

## 2024-04-27 RX ADMIN — PREGABALIN 25 MG: 25 CAPSULE ORAL at 06:40

## 2024-04-27 RX ADMIN — METHYLPREDNISOLONE SODIUM SUCCINATE 60 MG: 125 INJECTION INTRAMUSCULAR; INTRAVENOUS at 06:40

## 2024-04-27 RX ADMIN — PREGABALIN 25 MG: 25 CAPSULE ORAL at 21:49

## 2024-04-27 RX ADMIN — INSULIN LISPRO 2 UNITS: 100 INJECTION, SOLUTION INTRAVENOUS; SUBCUTANEOUS at 17:07

## 2024-04-27 RX ADMIN — AMLODIPINE BESYLATE 10 MG: 10 TABLET ORAL at 09:31

## 2024-04-27 RX ADMIN — METHOCARBAMOL TABLETS 750 MG: 750 TABLET, COATED ORAL at 17:07

## 2024-04-27 RX ADMIN — BUPROPION HYDROCHLORIDE 150 MG: 150 TABLET, EXTENDED RELEASE ORAL at 09:31

## 2024-04-27 RX ADMIN — Medication 10 ML: at 09:34

## 2024-04-27 RX ADMIN — HYDROCHLOROTHIAZIDE 25 MG: 25 TABLET ORAL at 09:31

## 2024-04-27 RX ADMIN — OXYCODONE AND ACETAMINOPHEN 1 TABLET: 7.5; 325 TABLET ORAL at 21:48

## 2024-04-27 RX ADMIN — METHOCARBAMOL TABLETS 750 MG: 750 TABLET, COATED ORAL at 21:49

## 2024-04-27 RX ADMIN — METHOCARBAMOL TABLETS 750 MG: 750 TABLET, COATED ORAL at 09:31

## 2024-04-27 RX ADMIN — OXYCODONE AND ACETAMINOPHEN 1 TABLET: 5; 325 TABLET ORAL at 14:24

## 2024-04-27 RX ADMIN — PANTOPRAZOLE SODIUM 40 MG: 40 TABLET, DELAYED RELEASE ORAL at 06:40

## 2024-04-27 RX ADMIN — INSULIN LISPRO 4 UNITS: 100 INJECTION, SOLUTION INTRAVENOUS; SUBCUTANEOUS at 21:55

## 2024-04-27 RX ADMIN — ATORVASTATIN CALCIUM 20 MG: 20 TABLET, FILM COATED ORAL at 21:55

## 2024-04-27 NOTE — PROGRESS NOTES
Name: Alesia Resendez ADMIT: 2024   : 1950  PCP: Epley, James, APRN    MRN: [id number] LOS: 3 days   AGE/SEX: 74 y.o. female  ROOM: [facility]     Subjective   Subjective   Seen sitting up in chair.  Persistent back pain and LLE weakness numbness.  No worse no better than on admit.  Has had BM. Urinating without issues.  No CP, syncope, near syncope,  SOB fever chills.   Does have some mild ALMODOVAR which felt to be related to the increase pain with activity. Had been doing water aerobics 4 times a week prior to her recent back injury.  She states he HR gets up into 160's without any chest pain or shoa during water aerobics.  States the dilaudid makes her too loopy.  She would prefer a bit of higher dose of oral as opposed to IV to help with pain control.      Reports FH of CAD in mom 72 (CAB), DAD CAD 60's with CAB, and brother who had MI and  just 5 years ago.        Objective   Objective   Vital Signs  Temp:  [97.8 °F (36.6 °C)-98.4 °F (36.9 °C)] 98.4 °F (36.9 °C)  Heart Rate:  [55-65] 61  Resp:  [16-18] 16  BP: (132-149)/(83-98) 141/83  SpO2:  [94 %-97 %] 95 %  on   ;   Device (Oxygen Therapy): room air  Body mass index is 33.45 kg/m².      Physical Exam  Vitals reviewed.   Constitutional:       Appearance: She is well-developed. She is obese. She is not ill-appearing.   HENT:      Head: Normocephalic and atraumatic.   Cardiovascular:      Rate and Rhythm: Normal rate and regular rhythm.   Pulmonary:      Effort: Pulmonary effort is normal. No respiratory distress.      Breath sounds: Normal breath sounds.   Abdominal:      General: Bowel sounds are normal. There is no distension.      Palpations: Abdomen is soft.      Tenderness: There is no abdominal tenderness.   Musculoskeletal:      Comments: LLE weakness and inner thigh numbness   Skin:     General: Skin is warm and dry.   Neurological:      General: No focal deficit present.      Mental Status: She is alert and oriented to person, place, and  time. Mental status is at baseline.      Motor: Weakness (left leg weakness 3/4) present.   Psychiatric:         Mood and Affect: Mood normal.         Behavior: Behavior normal.         Thought Content: Thought content normal.         Judgment: Judgment normal.       Results Review     I reviewed the patient's new clinical results.  Results from last 7 days   Lab Units 04/26/24  0558 04/25/24  0937 04/23/24  0521 04/22/24  0406   WBC 10*3/mm3 6.25 6.30 7.96 8.67   HEMOGLOBIN g/dL 15.2 14.2 13.6 14.9   PLATELETS 10*3/mm3 227 248 241 266     Results from last 7 days   Lab Units 04/26/24  0558 04/25/24  0937 04/23/24  0521 04/22/24  0406   SODIUM mmol/L 139 142 138 139   POTASSIUM mmol/L 4.0 4.9 3.8 3.6   CHLORIDE mmol/L 100 100 98 100   CO2 mmol/L 28.4 32.2* 26.5 24.9   BUN mg/dL 23 27* 24* 17   CREATININE mg/dL 0.67 0.70 0.68 0.61   GLUCOSE mg/dL 166* 166* 207* 139*   EGFR mL/min/1.73 91.8 90.9 91.5 93.9     Results from last 7 days   Lab Units 04/25/24  0937 04/22/24  0406   ALBUMIN g/dL 3.8 4.1   BILIRUBIN mg/dL 0.4 0.4   ALK PHOS U/L 68 91   AST (SGOT) U/L 39* 23   ALT (SGPT) U/L 47* 20     Results from last 7 days   Lab Units 04/26/24  0558 04/25/24  0937 04/23/24  0521 04/22/24  0406   CALCIUM mg/dL 8.7 9.2 9.2 9.4   ALBUMIN g/dL  --  3.8  --  4.1       Hemoglobin A1C   Date/Time Value Ref Range Status   04/25/2024 0937 6.30 (H) 4.80 - 5.60 % Final     Glucose   Date/Time Value Ref Range Status   04/27/2024 1610 177 (H) 70 - 130 mg/dL Final   04/27/2024 1129 154 (H) 70 - 130 mg/dL Final   04/27/2024 0746 142 (H) 70 - 130 mg/dL Final   04/26/2024 2005 173 (H) 70 - 130 mg/dL Final   04/26/2024 1642 138 (H) 70 - 130 mg/dL Final   04/26/2024 1156 159 (H) 70 - 130 mg/dL Final   04/26/2024 0803 137 (H) 70 - 130 mg/dL Final       XR Chest 1 View    Result Date: 4/26/2024  No focal pulmonary consolidation. Borderline heart size. Tortuous aorta. Follow-up as clinically indicated.    This report was finalized on 4/26/2024  12:50 PM by Dr. Jared Reich M.D on Workstation: [id number]       Echo 4/26/24  Interpretation Summary         The left ventricular cavity is mildly dilated.    Left ventricular systolic function is normal. Left ventricular ejection fraction appears to be 61 - 65%.    Left ventricular wall thickness is consistent with mild concentric hypertrophy.    Left ventricular diastolic function is consistent with (grade I) impaired relaxation.    Normal right ventricular cavity size and systolic function noted.    The left atrial cavity is moderately dilated.    Mild mitral valve regurgitation is present.    Insufficient TR velocity profile to estimate the right ventricular systolic pressure.    The ascending aorta is mildly enlarged at 3.7 cm    There is no evidence of pericardial effusion.             8/14/17          I have personally reviewed all medications:  Scheduled Medications  amLODIPine, 10 mg, Oral, Daily  atenolol, 25 mg, Oral, Daily  atorvastatin, 20 mg, Oral, Daily  buPROPion XL, 150 mg, Oral, Daily  escitalopram, 5 mg, Oral, Daily  fentanyl, 50 mcg, Intravenous, Once  hydroCHLOROthiazide, 25 mg, Oral, Daily  insulin lispro, 2-9 Units, Subcutaneous, 4x Daily AC & at Bedtime  lidocaine, 1 mL, Intradermal, Once  Lidocaine, 2 patch, Transdermal, Q24H  losartan, 25 mg, Oral, Daily  methocarbamol, 750 mg, Oral, 4x Daily  methylPREDNISolone sodium succinate, 60 mg, Intravenous, Q8H  pantoprazole, 40 mg, Oral, Q AM  pregabalin, 25 mg, Oral, Q8H  senna-docusate sodium, 2 tablet, Oral, BID  sodium chloride, 10 mL, Intravenous, Q12H  sodium chloride, 10 mL, Intravenous, Q12H    Infusions     Diet  Diet: Regular/House; Fluid Consistency: Thin (IDDSI 0)  NPO Diet NPO Type: Strict NPO, Sips with Meds    I have personally reviewed:  [x]  Laboratory   []  Microbiology   [x]  Radiology   [x]  EKG/Telemetry  [x]  Cardiology/Vascular   []  Pathology    []  Records    Estimated Creatinine Clearance: 91.1 mL/min (by C-G formula  based on SCr of 0.67 mg/dL).       Assessment/Plan     Active Hospital Problems    Diagnosis  POA    **Low back pain [M54.50]  Yes    Intractable back pain [M54.9]  Yes    Left lumbar radiculopathy [M54.16]  Yes    Lumbar back pain with radiculopathy affecting left lower extremity [M54.16]  Yes    Lumbar back pain with radiculopathy affecting left lower extremity [M54.16]  Yes    Weakness of left lower extremity [R29.898]  Yes    Class 1 obesity with body mass index (BMI) of 33.0 to 33.9 in adult [E66.9, Z68.33]  Not Applicable    Prediabetes [R73.03]  Yes      Resolved Hospital Problems   No resolved problems to display.       74 y.o. female admitted with Low back pain.       Lumbar back pain with radiculopathy  Weakness of LLE/ decreased mobility/ paresthesia and incontinence  ZACKARY following  Status post epidural 4/23 and steroids.  Continue lidocaine patch, Robaxin scheduled, Percocet and Dilaudid as needed.  Will increase dose of percocet to 7.5 mg from 5 mg to see if this helps with pain control. Myelogram with herniated disc at L2-L3.  ZACKARY recommending unilateral laminectomy and discectomy planned for Monday 4/29. ZACKARY is asking for medical clearance prior to surgery. Will ask Cardiology to see given abnormalities on echo and strong family history of CAD with personal risk factors.       Prediabeties/ obesity/HLD   increased risk of complications as a result.A1c 6.3. Monitor glucose with SSI while on steroids   hx of ALMODOVAR - at risk for post op complications. No prior cardiac workup in EMR.  CXR showing tortuous aorta and borderline heart size, pulmonary no focal consolidations  12 lead EKG - SR and  stable when compared to 8/2017.   Echocardiogram showed normal EF normal 61 -65% with mild concentric LVH, grade 1 diastolic dysfunction, LA moderately dilated with mild MR with ascending aorta enlargement at 3.7 cm. Family hx reviewed and both mother, father, and brother with CAD.  Will ask cardiology to see for CV  risk assessment prior to surgery.     TSH is low but T4 normal. Advise repeat labs with PCP.      Fibromyalgia and chronic pain disorder   Usually only gets epidural pain and injections and not on chronic medications.  She was on Celebrex.  She sees Dr. Sheppard as an outpatient with pain management    Chronic medical conditions anxiety and IBS being monitored- stable, continue medical management            SCDs for DVT prophylaxis.  Full code.  Discussed with patient and nursing staff.  Anticipate discharge next week in the postop setting.        KYLIE Romero  [facility]  04/27/24  17:03 EDT

## 2024-04-28 LAB
ANION GAP SERPL CALCULATED.3IONS-SCNC: 9 MMOL/L (ref 5–15)
BASOPHILS # BLD AUTO: 0.01 10*3/MM3 (ref 0–0.2)
BASOPHILS NFR BLD AUTO: 0.1 % (ref 0–1.5)
BUN SERPL-MCNC: 25 MG/DL (ref 8–23)
BUN/CREAT SERPL: 33.8 (ref 7–25)
CALCIUM SPEC-SCNC: 9.1 MG/DL (ref 8.6–10.5)
CHLORIDE SERPL-SCNC: 98 MMOL/L (ref 98–107)
CHOLEST SERPL-MCNC: 98 MG/DL (ref 0–200)
CO2 SERPL-SCNC: 30 MMOL/L (ref 22–29)
CREAT SERPL-MCNC: 0.74 MG/DL (ref 0.57–1)
DEPRECATED RDW RBC AUTO: 42.2 FL (ref 37–54)
EGFRCR SERPLBLD CKD-EPI 2021: 85 ML/MIN/1.73
EOSINOPHIL # BLD AUTO: 0 10*3/MM3 (ref 0–0.4)
EOSINOPHIL NFR BLD AUTO: 0 % (ref 0.3–6.2)
ERYTHROCYTE [DISTWIDTH] IN BLOOD BY AUTOMATED COUNT: 13.4 % (ref 12.3–15.4)
GLUCOSE BLDC GLUCOMTR-MCNC: 131 MG/DL (ref 70–130)
GLUCOSE BLDC GLUCOMTR-MCNC: 156 MG/DL (ref 70–130)
GLUCOSE BLDC GLUCOMTR-MCNC: 174 MG/DL (ref 70–130)
GLUCOSE BLDC GLUCOMTR-MCNC: 239 MG/DL (ref 70–130)
GLUCOSE SERPL-MCNC: 182 MG/DL (ref 65–99)
HCT VFR BLD AUTO: 48.9 % (ref 34–46.6)
HDLC SERPL-MCNC: 45 MG/DL (ref 40–60)
HGB BLD-MCNC: 16.1 G/DL (ref 12–15.9)
IMM GRANULOCYTES # BLD AUTO: 0.06 10*3/MM3 (ref 0–0.05)
IMM GRANULOCYTES NFR BLD AUTO: 0.7 % (ref 0–0.5)
LDLC SERPL CALC-MCNC: 39 MG/DL (ref 0–100)
LDLC/HDLC SERPL: 0.9 {RATIO}
LYMPHOCYTES # BLD AUTO: 1.57 10*3/MM3 (ref 0.7–3.1)
LYMPHOCYTES NFR BLD AUTO: 18 % (ref 19.6–45.3)
MCH RBC QN AUTO: 28.3 PG (ref 26.6–33)
MCHC RBC AUTO-ENTMCNC: 32.9 G/DL (ref 31.5–35.7)
MCV RBC AUTO: 86.1 FL (ref 79–97)
MONOCYTES # BLD AUTO: 0.43 10*3/MM3 (ref 0.1–0.9)
MONOCYTES NFR BLD AUTO: 4.9 % (ref 5–12)
NEUTROPHILS NFR BLD AUTO: 6.66 10*3/MM3 (ref 1.7–7)
NEUTROPHILS NFR BLD AUTO: 76.3 % (ref 42.7–76)
NRBC BLD AUTO-RTO: 0 /100 WBC (ref 0–0.2)
PLATELET # BLD AUTO: 238 10*3/MM3 (ref 140–450)
PMV BLD AUTO: 9.1 FL (ref 6–12)
POTASSIUM SERPL-SCNC: 4.9 MMOL/L (ref 3.5–5.2)
RBC # BLD AUTO: 5.68 10*6/MM3 (ref 3.77–5.28)
SODIUM SERPL-SCNC: 137 MMOL/L (ref 136–145)
TRIGL SERPL-MCNC: 63 MG/DL (ref 0–150)
VLDLC SERPL-MCNC: 14 MG/DL (ref 5–40)
WBC NRBC COR # BLD AUTO: 8.73 10*3/MM3 (ref 3.4–10.8)

## 2024-04-28 PROCEDURE — 63710000001 INSULIN LISPRO (HUMAN) PER 5 UNITS: Performed by: NEUROLOGICAL SURGERY

## 2024-04-28 PROCEDURE — 80061 LIPID PANEL: CPT | Performed by: INTERNAL MEDICINE

## 2024-04-28 PROCEDURE — 99222 1ST HOSP IP/OBS MODERATE 55: CPT | Performed by: INTERNAL MEDICINE

## 2024-04-28 PROCEDURE — 82948 REAGENT STRIP/BLOOD GLUCOSE: CPT

## 2024-04-28 PROCEDURE — 80048 BASIC METABOLIC PNL TOTAL CA: CPT | Performed by: NURSE PRACTITIONER

## 2024-04-28 PROCEDURE — 85025 COMPLETE CBC W/AUTO DIFF WBC: CPT | Performed by: NURSE PRACTITIONER

## 2024-04-28 PROCEDURE — 25010000002 METHYLPREDNISOLONE PER 125 MG: Performed by: NEUROLOGICAL SURGERY

## 2024-04-28 PROCEDURE — S0260 H&P FOR SURGERY: HCPCS

## 2024-04-28 RX ADMIN — AMLODIPINE BESYLATE 10 MG: 10 TABLET ORAL at 08:23

## 2024-04-28 RX ADMIN — INSULIN LISPRO 4 UNITS: 100 INJECTION, SOLUTION INTRAVENOUS; SUBCUTANEOUS at 21:21

## 2024-04-28 RX ADMIN — ATENOLOL 25 MG: 25 TABLET ORAL at 20:30

## 2024-04-28 RX ADMIN — Medication 10 ML: at 08:23

## 2024-04-28 RX ADMIN — Medication 10 ML: at 20:38

## 2024-04-28 RX ADMIN — OXYCODONE AND ACETAMINOPHEN 1 TABLET: 7.5; 325 TABLET ORAL at 12:14

## 2024-04-28 RX ADMIN — PREGABALIN 25 MG: 25 CAPSULE ORAL at 14:15

## 2024-04-28 RX ADMIN — LIDOCAINE 2 PATCH: 4 PATCH TOPICAL at 08:26

## 2024-04-28 RX ADMIN — OXYCODONE AND ACETAMINOPHEN 1 TABLET: 7.5; 325 TABLET ORAL at 20:30

## 2024-04-28 RX ADMIN — METHOCARBAMOL TABLETS 750 MG: 750 TABLET, COATED ORAL at 17:06

## 2024-04-28 RX ADMIN — METHYLPREDNISOLONE SODIUM SUCCINATE 60 MG: 125 INJECTION INTRAMUSCULAR; INTRAVENOUS at 06:33

## 2024-04-28 RX ADMIN — BUPROPION HYDROCHLORIDE 150 MG: 150 TABLET, EXTENDED RELEASE ORAL at 08:24

## 2024-04-28 RX ADMIN — HYDROCHLOROTHIAZIDE 25 MG: 25 TABLET ORAL at 08:24

## 2024-04-28 RX ADMIN — INSULIN LISPRO 2 UNITS: 100 INJECTION, SOLUTION INTRAVENOUS; SUBCUTANEOUS at 17:07

## 2024-04-28 RX ADMIN — PANTOPRAZOLE SODIUM 40 MG: 40 TABLET, DELAYED RELEASE ORAL at 06:33

## 2024-04-28 RX ADMIN — INSULIN LISPRO 2 UNITS: 100 INJECTION, SOLUTION INTRAVENOUS; SUBCUTANEOUS at 12:14

## 2024-04-28 RX ADMIN — PREGABALIN 25 MG: 25 CAPSULE ORAL at 21:20

## 2024-04-28 RX ADMIN — PREGABALIN 25 MG: 25 CAPSULE ORAL at 06:33

## 2024-04-28 RX ADMIN — METHOCARBAMOL TABLETS 750 MG: 750 TABLET, COATED ORAL at 20:30

## 2024-04-28 RX ADMIN — METHYLPREDNISOLONE SODIUM SUCCINATE 60 MG: 125 INJECTION INTRAMUSCULAR; INTRAVENOUS at 23:45

## 2024-04-28 RX ADMIN — OXYCODONE AND ACETAMINOPHEN 1 TABLET: 7.5; 325 TABLET ORAL at 04:33

## 2024-04-28 RX ADMIN — ESCITALOPRAM 5 MG: 5 TABLET, FILM COATED ORAL at 20:30

## 2024-04-28 RX ADMIN — METHOCARBAMOL TABLETS 750 MG: 750 TABLET, COATED ORAL at 08:24

## 2024-04-28 RX ADMIN — METHYLPREDNISOLONE SODIUM SUCCINATE 60 MG: 125 INJECTION INTRAMUSCULAR; INTRAVENOUS at 14:15

## 2024-04-28 RX ADMIN — METHOCARBAMOL TABLETS 750 MG: 750 TABLET, COATED ORAL at 12:14

## 2024-04-28 RX ADMIN — ATORVASTATIN CALCIUM 20 MG: 20 TABLET, FILM COATED ORAL at 20:30

## 2024-04-28 RX ADMIN — LOSARTAN POTASSIUM 25 MG: 25 TABLET, FILM COATED ORAL at 08:23

## 2024-04-28 NOTE — PROGRESS NOTES
LOS: 4 days   Patient Care Team:  Epley, James, APRN as PCP - General (Family Medicine)  Virgil Moeller II, MD as Consulting Physician (Radiology)  Diallo Eisenberg MD as Referring Physician (Breast Surgery)  Peyton Keyes MD PhD as Consulting Physician (Hematology and Oncology)  Rimma Keller APRN as Nurse Practitioner (Nurse Practitioner)    Chief Complaint: Back and left leg pain    Subjective     This patient continues with pain in the anterior thigh and some numbness and tingling in her left calf.    Otherwise no acute issues.  Interval History:     History taken from: patient chart    Objective      She has good movement of both lower extremities    Vital Signs  Temp:  [97.7 °F (36.5 °C)-98.4 °F (36.9 °C)] 97.9 °F (36.6 °C)  Heart Rate:  [50-61] 55  Resp:  [16-18] 18  BP: (127-176)/(80-97) 176/92       Results Review:        Assessment & Plan       Low back pain    Prediabetes    Class 1 obesity with body mass index (BMI) of 33.0 to 33.9 in adult    Lumbar back pain with radiculopathy affecting left lower extremity    Lumbar back pain with radiculopathy affecting left lower extremity    Weakness of left lower extremity    Intractable back pain    Left lumbar radiculopathy      74-year-old female with acute onset low back pain with radiculopathy with weakness.  She will undergo a left-sided L2-3 laminectomy and discectomy on Monday.  Has been seen by internal medicine and cardiology for clearance.        Plan:    N.p.o. after midnight  Left L2-3 laminectomy and discectomy 4/29      KYLIE Navas  04/28/24  12:02 EDT

## 2024-04-28 NOTE — PLAN OF CARE
Goal Outcome Evaluation:VSS, afebrile, surgery scheduled for Monday. Cardiology consult for clearance, BM yesterday, PO percocet for pain prn. Accuchecks with s/s coverage. Cont on IV Solumedrol S2ysafk. Will cont to monitor. Call light in reach.

## 2024-04-28 NOTE — PROGRESS NOTES
Name: Alesia Resendez ADMIT: 2024   : 1950  PCP: Epley, James, APRN    MRN: [id number] LOS: 4 days   AGE/SEX: 74 y.o. female  ROOM: [facility]/     Subjective   Subjective   Seen sitting up in chair.  Persistent back pain and LLE weakness numbness. Pain control better today with increase in percocet dosing.    Has had BM. Urinating without issues.  No CP, syncope, near syncope,  SOB fever chills.   Does have some mild ALMODOVAR which felt to be related to the increase pain with activity.        Objective   Objective   Vital Signs  Temp:  [97.7 °F (36.5 °C)-98.4 °F (36.9 °C)] 98.4 °F (36.9 °C)  Heart Rate:  [50-61] 60  Resp:  [16-18] 16  BP: (127-176)/(80-97) 136/86  SpO2:  [92 %-96 %] 93 %  on   ;   Device (Oxygen Therapy): room air  Body mass index is 33.45 kg/m².      Physical Exam  Vitals reviewed.   Constitutional:       Appearance: She is well-developed. She is obese. She is not ill-appearing.   HENT:      Head: Normocephalic and atraumatic.   Cardiovascular:      Rate and Rhythm: Normal rate and regular rhythm.   Pulmonary:      Effort: Pulmonary effort is normal. No respiratory distress.      Breath sounds: Normal breath sounds.   Abdominal:      General: Bowel sounds are normal. There is no distension.      Palpations: Abdomen is soft.      Tenderness: There is no abdominal tenderness.   Musculoskeletal:      Comments: LLE weakness and inner thigh numbness   Skin:     General: Skin is warm and dry.   Neurological:      General: No focal deficit present.      Mental Status: She is alert and oriented to person, place, and time. Mental status is at baseline.      Motor: Weakness (left leg weakness 3/4) present.   Psychiatric:         Mood and Affect: Mood normal.         Behavior: Behavior normal.         Thought Content: Thought content normal.         Judgment: Judgment normal.       Results Review     I reviewed the patient's new clinical results.  Results from last 7 days   Lab Units 24  0643  04/26/24  0558 04/25/24  0937 04/23/24  0521   WBC 10*3/mm3 8.73 6.25 6.30 7.96   HEMOGLOBIN g/dL 16.1* 15.2 14.2 13.6   PLATELETS 10*3/mm3 238 227 248 241     Results from last 7 days   Lab Units 04/28/24  0539 04/26/24  0558 04/25/24  0937 04/23/24  0521   SODIUM mmol/L 137 139 142 138   POTASSIUM mmol/L 4.9 4.0 4.9 3.8   CHLORIDE mmol/L 98 100 100 98   CO2 mmol/L 30.0* 28.4 32.2* 26.5   BUN mg/dL 25* 23 27* 24*   CREATININE mg/dL 0.74 0.67 0.70 0.68   GLUCOSE mg/dL 182* 166* 166* 207*   EGFR mL/min/1.73 85.0 91.8 90.9 91.5     Results from last 7 days   Lab Units 04/25/24  0937 04/22/24  0406   ALBUMIN g/dL 3.8 4.1   BILIRUBIN mg/dL 0.4 0.4   ALK PHOS U/L 68 91   AST (SGOT) U/L 39* 23   ALT (SGPT) U/L 47* 20     Results from last 7 days   Lab Units 04/28/24  0539 04/26/24  0558 04/25/24  0937 04/23/24  0521 04/22/24  0406   CALCIUM mg/dL 9.1 8.7 9.2 9.2 9.4   ALBUMIN g/dL  --   --  3.8  --  4.1       Glucose   Date/Time Value Ref Range Status   04/28/2024 1202 156 (H) 70 - 130 mg/dL Final   04/28/2024 0734 131 (H) 70 - 130 mg/dL Final   04/27/2024 2010 210 (H) 70 - 130 mg/dL Final   04/27/2024 1610 177 (H) 70 - 130 mg/dL Final   04/27/2024 1129 154 (H) 70 - 130 mg/dL Final   04/27/2024 0746 142 (H) 70 - 130 mg/dL Final   04/26/2024 2005 173 (H) 70 - 130 mg/dL Final       No radiology results for the last day    Echo 4/26/24  Interpretation Summary         The left ventricular cavity is mildly dilated.    Left ventricular systolic function is normal. Left ventricular ejection fraction appears to be 61 - 65%.    Left ventricular wall thickness is consistent with mild concentric hypertrophy.    Left ventricular diastolic function is consistent with (grade I) impaired relaxation.    Normal right ventricular cavity size and systolic function noted.    The left atrial cavity is moderately dilated.    Mild mitral valve regurgitation is present.    Insufficient TR velocity profile to estimate the right ventricular  systolic pressure.    The ascending aorta is mildly enlarged at 3.7 cm    There is no evidence of pericardial effusion.             8/14/17          I have personally reviewed all medications:  Scheduled Medications  amLODIPine, 10 mg, Oral, Daily  atenolol, 25 mg, Oral, Daily  atorvastatin, 20 mg, Oral, Daily  buPROPion XL, 150 mg, Oral, Daily  escitalopram, 5 mg, Oral, Daily  fentanyl, 50 mcg, Intravenous, Once  hydroCHLOROthiazide, 25 mg, Oral, Daily  insulin lispro, 2-9 Units, Subcutaneous, 4x Daily AC & at Bedtime  lidocaine, 1 mL, Intradermal, Once  Lidocaine, 2 patch, Transdermal, Q24H  losartan, 25 mg, Oral, Daily  methocarbamol, 750 mg, Oral, 4x Daily  methylPREDNISolone sodium succinate, 60 mg, Intravenous, Q8H  pantoprazole, 40 mg, Oral, Q AM  pregabalin, 25 mg, Oral, Q8H  senna-docusate sodium, 2 tablet, Oral, BID  sodium chloride, 10 mL, Intravenous, Q12H  sodium chloride, 10 mL, Intravenous, Q12H    Infusions     Diet  Diet: Regular/House; Fluid Consistency: Thin (IDDSI 0)  NPO Diet NPO Type: Strict NPO, Sips with Meds    I have personally reviewed:  [x]  Laboratory   []  Microbiology   [x]  Radiology   [x]  EKG/Telemetry  [x]  Cardiology/Vascular   []  Pathology    []  Records    Estimated Creatinine Clearance: 82.4 mL/min (by C-G formula based on SCr of 0.74 mg/dL).       Assessment/Plan     Active Hospital Problems    Diagnosis  POA    **Low back pain [M54.50]  Yes    Intractable back pain [M54.9]  Yes    Left lumbar radiculopathy [M54.16]  Yes    Lumbar back pain with radiculopathy affecting left lower extremity [M54.16]  Yes    Lumbar back pain with radiculopathy affecting left lower extremity [M54.16]  Yes    Weakness of left lower extremity [R29.898]  Yes    Class 1 obesity with body mass index (BMI) of 33.0 to 33.9 in adult [E66.9, Z68.33]  Not Applicable    Prediabetes [R73.03]  Yes      Resolved Hospital Problems   No resolved problems to display.       74 y.o. female admitted with Low back  pain.       Lumbar back pain with radiculopathy  Weakness of LLE/ decreased mobility/ paresthesia and incontinence  ZACKARY following  Status post epidural 4/23 and steroids.  Continue lidocaine patch, Robaxin scheduled, Percocet and Dilaudid as needed.  Will increase dose of percocet to 7.5 mg from 5 mg to see if this helps with pain control. Myelogram with herniated disc at L2-L3.  ZACKARY recommending unilateral laminectomy and discectomy planned for Monday 4/29. ZACKARY is asking for medical clearance prior to surgery.     - Preop testing with EKG and echo obtained given FH and personal risk factors.  No prior cardiac workup in EMR.  CXR showing tortuous aorta and borderline heart size, pulmonary no focal consolidations  12 lead EKG - SR and  stable when compared to 8/2017.   Echocardiogram showed normal EF normal 61 -65% with mild concentric LVH, grade 1 diastolic dysfunction, LA moderately dilated with mild MR with ascending aorta enlargement at 3.7 cm. Family hx reviewed and both mother, father, and brother with CAD.  Cardiology consulted to see for CV risk assessment prior to surgery. They have seen and cleared for surgery.   Cards reviewed echo and feel that 3.7 cm aorta is normal for her body size and recommend good blood pressure control.      Prediabeties/ obesity/HLD   increased risk of complications as a result.A1c 6.3. Monitor glucose with SSI while on steroids.      TSH is low but T4 normal. Advise repeat labs with PCP.      Fibromyalgia and chronic pain disorder   Usually only gets epidural pain and injections and not on chronic medications.  She was on Celebrex.  She sees Dr. Sheppard as an outpatient with pain management    Chronic medical conditions anxiety and IBS being monitored- stable, continue medical management     Elevated BUN : mildly increased BUN at 25 with normal creatinine.  Encourage non caffeine PO fluids. Hold HCTZ.  No hypotension noted            SCDs for DVT prophylaxis.  Full code.  Discussed  with patient and nursing staff.  Anticipate discharge next week in the postop setting.        KYLIE Romero  [facility]  04/28/24  15:33 EDT

## 2024-04-28 NOTE — CONSULTS
[facility]    Patient Name: Alesia Resendez  :1950  74 y.o.  LOS: 4  Encounter Provider: Angel Covarrubias MD      Patient Care Team:  Epley, James, APRN as PCP - General (Family Medicine)  Virgil Moeller II, MD as Consulting Physician (Radiology)  Diallo Eisenberg MD as Referring Physician (Breast Surgery)  Peyton Keyes MD PhD as Consulting Physician (Hematology and Oncology)  Rimma Keller APRN as Nurse Practitioner (Nurse Practitioner)    Chief Complaint/Consult question: Surgical Clearance    PMH/HPI: HLD, HTN, GERD, Breast CA, obesity, Chronic low back pain    Interval History: Alesia Resendez is a 74 year old female with a history of chronic low back pain with an increase in pain over the last few days. Pt was admitted for MRI and pain control and neurosurgery was consulted. Plan for a left-sided L2-3 laminectomy and discectomy on Monday. We are being asked to see for surgical clearance.     At the time of interview, patient appears to be in no acute distress.  Main complaint is weakness in the left leg.  She has no known cardiovascular history and was previously quite active with water aerobics and able to get her heart rate up without any anginal symptoms.  She last climbed a full flight of stairs about 2 weeks ago without problem. Denies chest pain, dyspnea on exertion, palpitation, leg edema, orthopnea, PND, dizziness, syncope, bleeding, or unexpected falls.    ECHO 24    The left ventricular cavity is mildly dilated.    Left ventricular systolic function is normal. Left ventricular ejection fraction appears to be 61 - 65%.    Left ventricular wall thickness is consistent with mild concentric hypertrophy.    Left ventricular diastolic function is consistent with (grade I) impaired relaxation.    Normal right ventricular cavity size and systolic function noted.    The left atrial cavity is moderately dilated.    Mild mitral valve regurgitation is present.     "Insufficient TR velocity profile to estimate the right ventricular systolic pressure.    The ascending aorta is mildly enlarged at 3.7 cm    There is no evidence of pericardial effusion.      Objective   Vital Signs  Temp:  [97.7 °F (36.5 °C)-98.4 °F (36.9 °C)] 98.4 °F (36.9 °C)  Heart Rate:  [50-61] 60  Resp:  [16-18] 16  BP: (127-176)/(80-97) 136/86    Intake/Output Summary (Last 24 hours) at 4/28/2024 1208  Last data filed at 4/28/2024 0904  Gross per 24 hour   Intake 1110 ml   Output --   Net 1110 ml     Flowsheet Rows      Flowsheet Row First Filed Value   Admission Height 172.7 cm (68\") Documented at 04/22/2024 0339   Admission Weight 99.8 kg (220 lb) Documented at 04/22/2024 0339              Vitals and nursing note reviewed.   Constitutional:       Appearance: Healthy appearance. Not in distress.   Neck:      Vascular: No JVR.   Pulmonary:      Effort: Pulmonary effort is normal.      Breath sounds: Normal breath sounds. No wheezing. No rhonchi. No rales.   Cardiovascular:      Normal rate. Regular rhythm.      Murmurs: There is no murmur.   Edema:     Peripheral edema absent.   Abdominal:      General: There is no distension.      Palpations: Abdomen is soft.      Tenderness: There is no abdominal tenderness.   Musculoskeletal:      Cervical back: Neck supple. Skin:     General: Skin is cool.   Neurological:      Mental Status: Alert and oriented to person, place and time.           Pertinent Test Results:  Results from last 7 days   Lab Units 04/28/24  0539 04/26/24  0558 04/25/24  0937 04/23/24  0521 04/22/24  0406   SODIUM mmol/L 137 139 142 138 139   POTASSIUM mmol/L 4.9 4.0 4.9 3.8 3.6   CHLORIDE mmol/L 98 100 100 98 100   CO2 mmol/L 30.0* 28.4 32.2* 26.5 24.9   BUN mg/dL 25* 23 27* 24* 17   CREATININE mg/dL 0.74 0.67 0.70 0.68 0.61   GLUCOSE mg/dL 182* 166* 166* 207* 139*   CALCIUM mg/dL 9.1 8.7 9.2 9.2 9.4   AST (SGOT) U/L  --   --  39*  --  23   ALT (SGPT) U/L  --   --  47*  --  20         Results " from last 7 days   Lab Units 04/28/24  0539 04/26/24  0558 04/25/24  0937 04/23/24  0521 04/22/24  0406   WBC 10*3/mm3 8.73 6.25 6.30 7.96 8.67   HEMOGLOBIN g/dL 16.1* 15.2 14.2 13.6 14.9   HEMATOCRIT % 48.9* 45.4 42.9 41.0 44.9   PLATELETS 10*3/mm3 238 227 248 241 266     Results from last 7 days   Lab Units 04/25/24  0937   INR  1.09         Results from last 7 days   Lab Units 04/28/24  0539   CHOLESTEROL mg/dL 98   TRIGLYCERIDES mg/dL 63   HDL CHOL mg/dL 45         Results from last 7 days   Lab Units 04/25/24  0937   TSH uIU/mL 0.249*                       Medication Review:   amLODIPine, 10 mg, Oral, Daily  atenolol, 25 mg, Oral, Daily  atorvastatin, 20 mg, Oral, Daily  buPROPion XL, 150 mg, Oral, Daily  escitalopram, 5 mg, Oral, Daily  fentanyl, 50 mcg, Intravenous, Once  hydroCHLOROthiazide, 25 mg, Oral, Daily  insulin lispro, 2-9 Units, Subcutaneous, 4x Daily AC & at Bedtime  lidocaine, 1 mL, Intradermal, Once  Lidocaine, 2 patch, Transdermal, Q24H  losartan, 25 mg, Oral, Daily  methocarbamol, 750 mg, Oral, 4x Daily  methylPREDNISolone sodium succinate, 60 mg, Intravenous, Q8H  pantoprazole, 40 mg, Oral, Q AM  pregabalin, 25 mg, Oral, Q8H  senna-docusate sodium, 2 tablet, Oral, BID  sodium chloride, 10 mL, Intravenous, Q12H  sodium chloride, 10 mL, Intravenous, Q12H              Assessment & Plan     Active Hospital Problems    Diagnosis  POA    **Low back pain [M54.50]  Yes    Intractable back pain [M54.9]  Yes    Left lumbar radiculopathy [M54.16]  Yes    Lumbar back pain with radiculopathy affecting left lower extremity [M54.16]  Yes    Lumbar back pain with radiculopathy affecting left lower extremity [M54.16]  Yes    Weakness of left lower extremity [R29.898]  Yes    Class 1 obesity with body mass index (BMI) of 33.0 to 33.9 in adult [E66.9, Z68.33]  Not Applicable    Prediabetes [R73.03]  Yes      Resolved Hospital Problems   No resolved problems to display.        Preoperative evaluation: Being  planned for a left-sided L2-3 laminectomy and discectomy. EKG on admission showed NSR without ischemic changes.  Echo this admission showed normal biventricular function, grade 1 diastolic dysfunction, mild mitral regurgitation.  Patient was previously quite active performing 4-10 METs on a regular basis without angina and underwent general anesthesia for knee replacement 1.5 years ago without issue.  RCRI score 1 (surgery type), Das score predicts 0.1% risk of MI or cardiac arrest within 30-day perioperative window, okay to proceed to planned surgery without further cardiac testing.  Hyperlipidemia: Lipids this admission well-controlled.  Continue home atorvastatin 20 daily.  Hypertension: On home amlodipine 10 daily, atenolol 25 daily, HCTZ 25 daily, losartan 25 daily  Ascending aorta enlargement: Echo reported ascending mild aorta enlargement at 3.7 cm, which is actually within normal range for her body size.  BP control as above.  Prediabetes    Thank you for involving us in the care of Ms. Resendez.  Cardiology will sign off at this time but please do not hesitate to reach back out to us if additional questions arise.    Angel Covarrubias MD  [facility]  04/28/24  05:50 EDT

## 2024-04-28 NOTE — PLAN OF CARE
Goal Outcome Evaluation:  Plan of Care Reviewed With: patient        Progress: no change     Pt AxOx4, calm and cooperative. Takes pills whole with water, see MAR. PRN Percocet given once for pain control, with good effect. Tolerating Reg diet. Cardiology cleared pt for procedure tomorrow. NPO after Midnight. Pt up in the recliner most of the day, ambulating independently in room. Friends and family visiting throughout the day. Plan of care ongoing.

## 2024-04-29 ENCOUNTER — ANESTHESIA (OUTPATIENT)
Dept: PERIOP | Facility: HOSPITAL | Age: 74
End: 2024-04-29
Payer: MEDICARE

## 2024-04-29 ENCOUNTER — APPOINTMENT (OUTPATIENT)
Dept: GENERAL RADIOLOGY | Facility: HOSPITAL | Age: 74
End: 2024-04-29
Payer: MEDICARE

## 2024-04-29 ENCOUNTER — ANESTHESIA EVENT (OUTPATIENT)
Dept: PERIOP | Facility: HOSPITAL | Age: 74
End: 2024-04-29
Payer: MEDICARE

## 2024-04-29 LAB
ALBUMIN SERPL-MCNC: 3.6 G/DL (ref 3.5–5.2)
ALBUMIN/GLOB SERPL: 1.6 G/DL
ALP SERPL-CCNC: 79 U/L (ref 39–117)
ALT SERPL W P-5'-P-CCNC: 49 U/L (ref 1–33)
ANION GAP SERPL CALCULATED.3IONS-SCNC: 10 MMOL/L (ref 5–15)
ANION GAP SERPL CALCULATED.3IONS-SCNC: 8.1 MMOL/L (ref 5–15)
AST SERPL-CCNC: 25 U/L (ref 1–32)
BASOPHILS # BLD AUTO: 0.01 10*3/MM3 (ref 0–0.2)
BASOPHILS NFR BLD AUTO: 0.1 % (ref 0–1.5)
BILIRUB SERPL-MCNC: 0.6 MG/DL (ref 0–1.2)
BUN SERPL-MCNC: 23 MG/DL (ref 8–23)
BUN SERPL-MCNC: 25 MG/DL (ref 8–23)
BUN/CREAT SERPL: 30.7 (ref 7–25)
BUN/CREAT SERPL: 32.5 (ref 7–25)
CALCIUM SPEC-SCNC: 9.4 MG/DL (ref 8.6–10.5)
CALCIUM SPEC-SCNC: 9.6 MG/DL (ref 8.6–10.5)
CHLORIDE SERPL-SCNC: 98 MMOL/L (ref 98–107)
CHLORIDE SERPL-SCNC: 98 MMOL/L (ref 98–107)
CO2 SERPL-SCNC: 28 MMOL/L (ref 22–29)
CO2 SERPL-SCNC: 29.9 MMOL/L (ref 22–29)
CREAT SERPL-MCNC: 0.75 MG/DL (ref 0.57–1)
CREAT SERPL-MCNC: 0.77 MG/DL (ref 0.57–1)
DEPRECATED RDW RBC AUTO: 41.2 FL (ref 37–54)
EGFRCR SERPLBLD CKD-EPI 2021: 81.1 ML/MIN/1.73
EGFRCR SERPLBLD CKD-EPI 2021: 83.7 ML/MIN/1.73
EOSINOPHIL # BLD AUTO: 0 10*3/MM3 (ref 0–0.4)
EOSINOPHIL NFR BLD AUTO: 0 % (ref 0.3–6.2)
ERYTHROCYTE [DISTWIDTH] IN BLOOD BY AUTOMATED COUNT: 13.4 % (ref 12.3–15.4)
GLOBULIN UR ELPH-MCNC: 2.3 GM/DL
GLUCOSE BLDC GLUCOMTR-MCNC: 140 MG/DL (ref 70–130)
GLUCOSE BLDC GLUCOMTR-MCNC: 192 MG/DL (ref 70–130)
GLUCOSE BLDC GLUCOMTR-MCNC: 205 MG/DL (ref 70–130)
GLUCOSE SERPL-MCNC: 181 MG/DL (ref 65–99)
GLUCOSE SERPL-MCNC: 181 MG/DL (ref 65–99)
HCT VFR BLD AUTO: 48.6 % (ref 34–46.6)
HGB BLD-MCNC: 16.4 G/DL (ref 12–15.9)
IMM GRANULOCYTES # BLD AUTO: 0.06 10*3/MM3 (ref 0–0.05)
IMM GRANULOCYTES NFR BLD AUTO: 0.6 % (ref 0–0.5)
LYMPHOCYTES # BLD AUTO: 1.69 10*3/MM3 (ref 0.7–3.1)
LYMPHOCYTES NFR BLD AUTO: 16.7 % (ref 19.6–45.3)
MCH RBC QN AUTO: 28.9 PG (ref 26.6–33)
MCHC RBC AUTO-ENTMCNC: 33.7 G/DL (ref 31.5–35.7)
MCV RBC AUTO: 85.7 FL (ref 79–97)
MONOCYTES # BLD AUTO: 0.51 10*3/MM3 (ref 0.1–0.9)
MONOCYTES NFR BLD AUTO: 5 % (ref 5–12)
NEUTROPHILS NFR BLD AUTO: 7.83 10*3/MM3 (ref 1.7–7)
NEUTROPHILS NFR BLD AUTO: 77.6 % (ref 42.7–76)
NRBC BLD AUTO-RTO: 0 /100 WBC (ref 0–0.2)
PLATELET # BLD AUTO: 203 10*3/MM3 (ref 140–450)
PMV BLD AUTO: 9 FL (ref 6–12)
POTASSIUM SERPL-SCNC: 4.2 MMOL/L (ref 3.5–5.2)
POTASSIUM SERPL-SCNC: 6.3 MMOL/L (ref 3.5–5.2)
POTASSIUM SERPL-SCNC: 6.4 MMOL/L (ref 3.5–5.2)
PROT SERPL-MCNC: 5.9 G/DL (ref 6–8.5)
RBC # BLD AUTO: 5.67 10*6/MM3 (ref 3.77–5.28)
SODIUM SERPL-SCNC: 136 MMOL/L (ref 136–145)
SODIUM SERPL-SCNC: 136 MMOL/L (ref 136–145)
WBC NRBC COR # BLD AUTO: 10.1 10*3/MM3 (ref 3.4–10.8)

## 2024-04-29 PROCEDURE — 82948 REAGENT STRIP/BLOOD GLUCOSE: CPT

## 2024-04-29 PROCEDURE — 63710000001 INSULIN LISPRO (HUMAN) PER 5 UNITS: Performed by: NEUROLOGICAL SURGERY

## 2024-04-29 PROCEDURE — 25810000003 LACTATED RINGERS PER 1000 ML: Performed by: ANESTHESIOLOGY

## 2024-04-29 PROCEDURE — 25010000002 METHYLPREDNISOLONE PER 125 MG: Performed by: NEUROLOGICAL SURGERY

## 2024-04-29 PROCEDURE — 25010000002 VANCOMYCIN 1 G RECONSTITUTED SOLUTION 1 EACH VIAL: Performed by: NEUROLOGICAL SURGERY

## 2024-04-29 PROCEDURE — 80053 COMPREHEN METABOLIC PANEL: CPT | Performed by: NURSE PRACTITIONER

## 2024-04-29 PROCEDURE — 25010000002 SUGAMMADEX 200 MG/2ML SOLUTION: Performed by: NURSE ANESTHETIST, CERTIFIED REGISTERED

## 2024-04-29 PROCEDURE — 63030 LAMOT DCMPRN NRV RT 1 LMBR: CPT | Performed by: NEUROLOGICAL SURGERY

## 2024-04-29 PROCEDURE — C1713 ANCHOR/SCREW BN/BN,TIS/BN: HCPCS | Performed by: NEUROLOGICAL SURGERY

## 2024-04-29 PROCEDURE — 25010000002 SODIUM CHLORIDE 0.9 % WITH KCL 20 MEQ 20-0.9 MEQ/L-% SOLUTION: Performed by: NEUROLOGICAL SURGERY

## 2024-04-29 PROCEDURE — 25010000002 FENTANYL CITRATE (PF) 50 MCG/ML SOLUTION: Performed by: NURSE ANESTHETIST, CERTIFIED REGISTERED

## 2024-04-29 PROCEDURE — 85025 COMPLETE CBC W/AUTO DIFF WBC: CPT | Performed by: NURSE PRACTITIONER

## 2024-04-29 PROCEDURE — 84132 ASSAY OF SERUM POTASSIUM: CPT | Performed by: ANESTHESIOLOGY

## 2024-04-29 PROCEDURE — 25010000002 MIDAZOLAM PER 1 MG: Performed by: ANESTHESIOLOGY

## 2024-04-29 PROCEDURE — 25010000002 CEFAZOLIN PER 500 MG: Performed by: NEUROLOGICAL SURGERY

## 2024-04-29 PROCEDURE — 01NB0ZZ RELEASE LUMBAR NERVE, OPEN APPROACH: ICD-10-PCS | Performed by: NEUROLOGICAL SURGERY

## 2024-04-29 PROCEDURE — 76000 FLUOROSCOPY <1 HR PHYS/QHP: CPT

## 2024-04-29 PROCEDURE — 25010000002 PROPOFOL 200 MG/20ML EMULSION: Performed by: NURSE ANESTHETIST, CERTIFIED REGISTERED

## 2024-04-29 PROCEDURE — 0SB20ZZ EXCISION OF LUMBAR VERTEBRAL DISC, OPEN APPROACH: ICD-10-PCS | Performed by: NEUROLOGICAL SURGERY

## 2024-04-29 PROCEDURE — 25010000002 DEXAMETHASONE SODIUM PHOSPHATE 20 MG/5ML SOLUTION: Performed by: NURSE ANESTHETIST, CERTIFIED REGISTERED

## 2024-04-29 DEVICE — IMPLANTABLE DEVICE
Type: IMPLANTABLE DEVICE | Site: SPINE LUMBAR | Status: FUNCTIONAL
Brand: SURGIFLO® HEMOSTATIC MATRIX KIT WITH THROMBIN

## 2024-04-29 DEVICE — SSC BONE WAX
Type: IMPLANTABLE DEVICE | Site: SPINE LUMBAR | Status: FUNCTIONAL
Brand: SSC BONE WAX

## 2024-04-29 DEVICE — IMPLANTABLE DEVICE
Type: IMPLANTABLE DEVICE | Site: SPINE LUMBAR | Status: FUNCTIONAL
Brand: SURGIFOAM® ABSORBABLE GELATIN SPONGE, U.S.P.

## 2024-04-29 RX ORDER — DEXAMETHASONE SODIUM PHOSPHATE 4 MG/ML
INJECTION, SOLUTION INTRA-ARTICULAR; INTRALESIONAL; INTRAMUSCULAR; INTRAVENOUS; SOFT TISSUE AS NEEDED
Status: DISCONTINUED | OUTPATIENT
Start: 2024-04-29 | End: 2024-04-29 | Stop reason: SURG

## 2024-04-29 RX ORDER — LIDOCAINE HYDROCHLORIDE 10 MG/ML
0.5 INJECTION, SOLUTION INFILTRATION; PERINEURAL ONCE AS NEEDED
Status: DISCONTINUED | OUTPATIENT
Start: 2024-04-29 | End: 2024-04-29 | Stop reason: HOSPADM

## 2024-04-29 RX ORDER — DROPERIDOL 2.5 MG/ML
0.62 INJECTION, SOLUTION INTRAMUSCULAR; INTRAVENOUS
Status: DISCONTINUED | OUTPATIENT
Start: 2024-04-29 | End: 2024-04-29 | Stop reason: HOSPADM

## 2024-04-29 RX ORDER — SODIUM CHLORIDE AND POTASSIUM CHLORIDE 150; 900 MG/100ML; MG/100ML
100 INJECTION, SOLUTION INTRAVENOUS CONTINUOUS
Status: DISCONTINUED | OUTPATIENT
Start: 2024-04-29 | End: 2024-04-30

## 2024-04-29 RX ORDER — ROCURONIUM BROMIDE 10 MG/ML
INJECTION, SOLUTION INTRAVENOUS AS NEEDED
Status: DISCONTINUED | OUTPATIENT
Start: 2024-04-29 | End: 2024-04-29 | Stop reason: SURG

## 2024-04-29 RX ORDER — ONDANSETRON 2 MG/ML
4 INJECTION INTRAMUSCULAR; INTRAVENOUS ONCE AS NEEDED
Status: DISCONTINUED | OUTPATIENT
Start: 2024-04-29 | End: 2024-04-29 | Stop reason: HOSPADM

## 2024-04-29 RX ORDER — LIDOCAINE HYDROCHLORIDE 20 MG/ML
INJECTION, SOLUTION INFILTRATION; PERINEURAL AS NEEDED
Status: DISCONTINUED | OUTPATIENT
Start: 2024-04-29 | End: 2024-04-29 | Stop reason: SURG

## 2024-04-29 RX ORDER — PROPOFOL 10 MG/ML
INJECTION, EMULSION INTRAVENOUS AS NEEDED
Status: DISCONTINUED | OUTPATIENT
Start: 2024-04-29 | End: 2024-04-29 | Stop reason: SURG

## 2024-04-29 RX ORDER — LABETALOL HYDROCHLORIDE 5 MG/ML
5 INJECTION, SOLUTION INTRAVENOUS
Status: DISCONTINUED | OUTPATIENT
Start: 2024-04-29 | End: 2024-04-29 | Stop reason: HOSPADM

## 2024-04-29 RX ORDER — FENTANYL CITRATE 50 UG/ML
25 INJECTION, SOLUTION INTRAMUSCULAR; INTRAVENOUS
Status: DISCONTINUED | OUTPATIENT
Start: 2024-04-29 | End: 2024-04-29 | Stop reason: HOSPADM

## 2024-04-29 RX ORDER — PROMETHAZINE HYDROCHLORIDE 25 MG/1
25 TABLET ORAL ONCE AS NEEDED
Status: DISCONTINUED | OUTPATIENT
Start: 2024-04-29 | End: 2024-04-29 | Stop reason: HOSPADM

## 2024-04-29 RX ORDER — FENTANYL CITRATE 50 UG/ML
50 INJECTION, SOLUTION INTRAMUSCULAR; INTRAVENOUS ONCE AS NEEDED
Status: DISCONTINUED | OUTPATIENT
Start: 2024-04-29 | End: 2024-04-29 | Stop reason: HOSPADM

## 2024-04-29 RX ORDER — SODIUM CHLORIDE 0.9 % (FLUSH) 0.9 %
3-10 SYRINGE (ML) INJECTION AS NEEDED
Status: DISCONTINUED | OUTPATIENT
Start: 2024-04-29 | End: 2024-04-29 | Stop reason: HOSPADM

## 2024-04-29 RX ORDER — EPHEDRINE SULFATE 50 MG/ML
INJECTION INTRAVENOUS AS NEEDED
Status: DISCONTINUED | OUTPATIENT
Start: 2024-04-29 | End: 2024-04-29 | Stop reason: SURG

## 2024-04-29 RX ORDER — NALOXONE HCL 0.4 MG/ML
0.2 VIAL (ML) INJECTION AS NEEDED
Status: DISCONTINUED | OUTPATIENT
Start: 2024-04-29 | End: 2024-04-29 | Stop reason: HOSPADM

## 2024-04-29 RX ORDER — SODIUM CHLORIDE, SODIUM LACTATE, POTASSIUM CHLORIDE, CALCIUM CHLORIDE 600; 310; 30; 20 MG/100ML; MG/100ML; MG/100ML; MG/100ML
9 INJECTION, SOLUTION INTRAVENOUS CONTINUOUS
Status: DISCONTINUED | OUTPATIENT
Start: 2024-04-29 | End: 2024-04-29

## 2024-04-29 RX ORDER — SODIUM CHLORIDE 9 MG/ML
40 INJECTION, SOLUTION INTRAVENOUS AS NEEDED
Status: DISCONTINUED | OUTPATIENT
Start: 2024-04-29 | End: 2024-05-01 | Stop reason: HOSPADM

## 2024-04-29 RX ORDER — ONDANSETRON 4 MG/1
4 TABLET, ORALLY DISINTEGRATING ORAL EVERY 6 HOURS PRN
Status: DISCONTINUED | OUTPATIENT
Start: 2024-04-29 | End: 2024-05-01 | Stop reason: HOSPADM

## 2024-04-29 RX ORDER — IPRATROPIUM BROMIDE AND ALBUTEROL SULFATE 2.5; .5 MG/3ML; MG/3ML
3 SOLUTION RESPIRATORY (INHALATION) ONCE AS NEEDED
Status: DISCONTINUED | OUTPATIENT
Start: 2024-04-29 | End: 2024-04-29 | Stop reason: HOSPADM

## 2024-04-29 RX ORDER — HYDROMORPHONE HYDROCHLORIDE 1 MG/ML
0.25 INJECTION, SOLUTION INTRAMUSCULAR; INTRAVENOUS; SUBCUTANEOUS
Status: DISCONTINUED | OUTPATIENT
Start: 2024-04-29 | End: 2024-04-29 | Stop reason: HOSPADM

## 2024-04-29 RX ORDER — EPHEDRINE SULFATE 50 MG/ML
5 INJECTION, SOLUTION INTRAVENOUS ONCE AS NEEDED
Status: DISCONTINUED | OUTPATIENT
Start: 2024-04-29 | End: 2024-04-29 | Stop reason: HOSPADM

## 2024-04-29 RX ORDER — PROMETHAZINE HYDROCHLORIDE 25 MG/1
25 SUPPOSITORY RECTAL ONCE AS NEEDED
Status: DISCONTINUED | OUTPATIENT
Start: 2024-04-29 | End: 2024-04-29 | Stop reason: HOSPADM

## 2024-04-29 RX ORDER — SODIUM CHLORIDE 0.9 % (FLUSH) 0.9 %
3 SYRINGE (ML) INJECTION EVERY 12 HOURS SCHEDULED
Status: DISCONTINUED | OUTPATIENT
Start: 2024-04-29 | End: 2024-04-29 | Stop reason: HOSPADM

## 2024-04-29 RX ORDER — HYDROCODONE BITARTRATE AND ACETAMINOPHEN 7.5; 325 MG/1; MG/1
1 TABLET ORAL EVERY 4 HOURS PRN
Status: DISCONTINUED | OUTPATIENT
Start: 2024-04-29 | End: 2024-04-29 | Stop reason: HOSPADM

## 2024-04-29 RX ORDER — FENTANYL CITRATE 50 UG/ML
INJECTION, SOLUTION INTRAMUSCULAR; INTRAVENOUS AS NEEDED
Status: DISCONTINUED | OUTPATIENT
Start: 2024-04-29 | End: 2024-04-29 | Stop reason: SURG

## 2024-04-29 RX ORDER — MIDAZOLAM HYDROCHLORIDE 1 MG/ML
0.5 INJECTION INTRAMUSCULAR; INTRAVENOUS
Status: DISCONTINUED | OUTPATIENT
Start: 2024-04-29 | End: 2024-04-29 | Stop reason: HOSPADM

## 2024-04-29 RX ORDER — HYDRALAZINE HYDROCHLORIDE 20 MG/ML
5 INJECTION INTRAMUSCULAR; INTRAVENOUS
Status: DISCONTINUED | OUTPATIENT
Start: 2024-04-29 | End: 2024-04-29 | Stop reason: HOSPADM

## 2024-04-29 RX ORDER — HYDROCODONE BITARTRATE AND ACETAMINOPHEN 5; 325 MG/1; MG/1
1 TABLET ORAL EVERY 4 HOURS PRN
Status: DISCONTINUED | OUTPATIENT
Start: 2024-04-29 | End: 2024-05-01 | Stop reason: HOSPADM

## 2024-04-29 RX ORDER — SODIUM CHLORIDE 0.9 % (FLUSH) 0.9 %
10 SYRINGE (ML) INJECTION AS NEEDED
Status: DISCONTINUED | OUTPATIENT
Start: 2024-04-29 | End: 2024-05-01 | Stop reason: HOSPADM

## 2024-04-29 RX ORDER — SODIUM CHLORIDE 0.9 % (FLUSH) 0.9 %
3 SYRINGE (ML) INJECTION EVERY 12 HOURS SCHEDULED
Status: DISCONTINUED | OUTPATIENT
Start: 2024-04-29 | End: 2024-05-01 | Stop reason: HOSPADM

## 2024-04-29 RX ORDER — DIPHENHYDRAMINE HYDROCHLORIDE 50 MG/ML
12.5 INJECTION INTRAMUSCULAR; INTRAVENOUS
Status: DISCONTINUED | OUTPATIENT
Start: 2024-04-29 | End: 2024-04-29 | Stop reason: HOSPADM

## 2024-04-29 RX ORDER — FLUMAZENIL 0.1 MG/ML
0.2 INJECTION INTRAVENOUS AS NEEDED
Status: DISCONTINUED | OUTPATIENT
Start: 2024-04-29 | End: 2024-04-29 | Stop reason: HOSPADM

## 2024-04-29 RX ORDER — ONDANSETRON 2 MG/ML
4 INJECTION INTRAMUSCULAR; INTRAVENOUS EVERY 6 HOURS PRN
Status: DISCONTINUED | OUTPATIENT
Start: 2024-04-29 | End: 2024-05-01 | Stop reason: HOSPADM

## 2024-04-29 RX ORDER — MAGNESIUM HYDROXIDE 1200 MG/15ML
LIQUID ORAL AS NEEDED
Status: DISCONTINUED | OUTPATIENT
Start: 2024-04-29 | End: 2024-04-29 | Stop reason: HOSPADM

## 2024-04-29 RX ORDER — HYDROCODONE BITARTRATE AND ACETAMINOPHEN 5; 325 MG/1; MG/1
1 TABLET ORAL ONCE AS NEEDED
Status: COMPLETED | OUTPATIENT
Start: 2024-04-29 | End: 2024-04-29

## 2024-04-29 RX ADMIN — ATENOLOL 25 MG: 25 TABLET ORAL at 22:51

## 2024-04-29 RX ADMIN — METHOCARBAMOL TABLETS 750 MG: 750 TABLET, COATED ORAL at 21:14

## 2024-04-29 RX ADMIN — PREGABALIN 25 MG: 25 CAPSULE ORAL at 06:22

## 2024-04-29 RX ADMIN — SODIUM CHLORIDE 2000 MG: 900 INJECTION INTRAVENOUS at 17:38

## 2024-04-29 RX ADMIN — MIDAZOLAM 0.5 MG: 1 INJECTION INTRAMUSCULAR; INTRAVENOUS at 09:06

## 2024-04-29 RX ADMIN — METHYLPREDNISOLONE SODIUM SUCCINATE 60 MG: 125 INJECTION INTRAMUSCULAR; INTRAVENOUS at 22:51

## 2024-04-29 RX ADMIN — DEXAMETHASONE SODIUM PHOSPHATE 8 MG: 4 INJECTION, SOLUTION INTRAMUSCULAR; INTRAVENOUS at 09:44

## 2024-04-29 RX ADMIN — ACETAMINOPHEN 650 MG: 325 TABLET, FILM COATED ORAL at 15:30

## 2024-04-29 RX ADMIN — ESCITALOPRAM 5 MG: 5 TABLET, FILM COATED ORAL at 22:51

## 2024-04-29 RX ADMIN — ATORVASTATIN CALCIUM 20 MG: 20 TABLET, FILM COATED ORAL at 21:14

## 2024-04-29 RX ADMIN — FENTANYL CITRATE 50 MCG: 50 INJECTION, SOLUTION INTRAMUSCULAR; INTRAVENOUS at 09:54

## 2024-04-29 RX ADMIN — PANTOPRAZOLE SODIUM 40 MG: 40 TABLET, DELAYED RELEASE ORAL at 06:22

## 2024-04-29 RX ADMIN — METHYLPREDNISOLONE SODIUM SUCCINATE 60 MG: 125 INJECTION INTRAMUSCULAR; INTRAVENOUS at 15:18

## 2024-04-29 RX ADMIN — PROPOFOL 150 MG: 10 INJECTION, EMULSION INTRAVENOUS at 09:39

## 2024-04-29 RX ADMIN — PREGABALIN 25 MG: 25 CAPSULE ORAL at 22:51

## 2024-04-29 RX ADMIN — HYDROCODONE BITARTRATE AND ACETAMINOPHEN 1 TABLET: 5; 325 TABLET ORAL at 22:52

## 2024-04-29 RX ADMIN — SUGAMMADEX 200 MG: 100 INJECTION, SOLUTION INTRAVENOUS at 10:58

## 2024-04-29 RX ADMIN — Medication 3 ML: at 15:21

## 2024-04-29 RX ADMIN — SODIUM CHLORIDE, POTASSIUM CHLORIDE, SODIUM LACTATE AND CALCIUM CHLORIDE 9 ML/HR: 600; 310; 30; 20 INJECTION, SOLUTION INTRAVENOUS at 08:56

## 2024-04-29 RX ADMIN — HYDROCODONE BITARTRATE AND ACETAMINOPHEN 1 TABLET: 5; 325 TABLET ORAL at 11:53

## 2024-04-29 RX ADMIN — LIDOCAINE HYDROCHLORIDE 100 MG: 20 INJECTION, SOLUTION INFILTRATION; PERINEURAL at 09:39

## 2024-04-29 RX ADMIN — SENNOSIDES AND DOCUSATE SODIUM 2 TABLET: 50; 8.6 TABLET ORAL at 21:14

## 2024-04-29 RX ADMIN — HYDROCODONE BITARTRATE AND ACETAMINOPHEN 1 TABLET: 5; 325 TABLET ORAL at 18:26

## 2024-04-29 RX ADMIN — Medication 10 ML: at 21:00

## 2024-04-29 RX ADMIN — SODIUM CHLORIDE 2000 MG: 900 INJECTION INTRAVENOUS at 09:31

## 2024-04-29 RX ADMIN — METHYLPREDNISOLONE SODIUM SUCCINATE 60 MG: 125 INJECTION INTRAMUSCULAR; INTRAVENOUS at 06:22

## 2024-04-29 RX ADMIN — ROCURONIUM BROMIDE 50 MG: 10 INJECTION, SOLUTION INTRAVENOUS at 09:39

## 2024-04-29 RX ADMIN — EPHEDRINE SULFATE 10 MG: 50 INJECTION INTRAVENOUS at 10:25

## 2024-04-29 RX ADMIN — INSULIN LISPRO 2 UNITS: 100 INJECTION, SOLUTION INTRAVENOUS; SUBCUTANEOUS at 21:30

## 2024-04-29 RX ADMIN — PREGABALIN 25 MG: 25 CAPSULE ORAL at 16:25

## 2024-04-29 RX ADMIN — METHOCARBAMOL TABLETS 750 MG: 750 TABLET, COATED ORAL at 17:38

## 2024-04-29 NOTE — ANESTHESIA PREPROCEDURE EVALUATION
Anesthesia Evaluation     Patient summary reviewed and Nursing notes reviewed   NPO Solid Status: > 8 hours  NPO Liquid Status: > 2 hours           Airway   Mallampati: II  TM distance: >3 FB  Neck ROM: full  No difficulty expected  Comment: Grade I view with MAC 3 and [place] 2  Dental - normal exam     Pulmonary - normal exam    breath sounds clear to auscultation  Cardiovascular - normal exam    ECG reviewed  Rhythm: regular  Rate: normal    (+) hypertension 2 medications or greater, hyperlipidemia      Neuro/Psych  (+) tremors, numbness, psychiatric history Depression and Anxiety  GI/Hepatic/Renal/Endo    (+) obesity, GERD, GI bleeding     ROS Comment: K 6.4 today but hemolysis noted on sample, will repeat    Musculoskeletal     (+) back pain, chronic pain, myalgias      ROS comment: Cervical and lumbar spinal stenosis/fibromyalgia  Abdominal   (+) obese   Substance History      OB/GYN          Other   arthritis,   history of cancer      Other Comment: Hx left breast CA              Anesthesia Plan    ASA 3     general     (Repeat K 4.2, can proceed with surgery)  intravenous induction     Anesthetic plan, risks, benefits, and alternatives have been provided, discussed and informed consent has been obtained with: patient.    CODE STATUS:    Code Status (Patient has no pulse and is not breathing): CPR (Attempt to Resuscitate)  Medical Interventions (Patient has pulse or is breathing): Full Support

## 2024-04-29 NOTE — OP NOTE
Preop diagnosis: Herniated disc left L2-3    Postop diagnosis: same    Procedure performed: Left L2-3 laminectomy, medial facetectomy and discectomy using minimal access spinal technologies microsurgical technique microsurgical instrumentation    Spinal Surgery Levels Completed:1 Level     Surgeon: Moise Garvey M.D.    Assistant: Savannah Calle CFA who was instrumental in helping with visualization of neural structures, hemostasis, and retraction of neural structures.  Her skilled assistance was necessary for the success of this case    Indications for the procedure:  This is a patient with severe pain in the legs.  Previous imaging had shown neural compression at the L2-3 levels.  As a result of this and the failure of conservative therapy the patient elected to proceed with surgery.    Operative summary:  After induction of general anesthesia the patient was intubated and placed on the operating table in the prone position on a Sajan table.  All pressure points were padded including peripheral points of entrapment.  The back was prepped with Chloraprep and then draped with Ioban, half sheets, and a split sheet.      The L2 3 level was localized with intraoperative fluoroscopy an incision was made on the left just above the pedicle.  Successive dilating tubes up to 18 mm by 6 cm were placed over that area.  Soft tissue was then removed from the supralaminar space.  The inferior laminar arch of L2 as well as the superior laminar arch of L3 and the medial aspect of facet were removed with the [employer] (EBTH) drill.  The remainder of the operation was done under high-power magnification of the operating microscope using microsurgical technique and microsurgical instrumentation.  The ligamentum flavum was opened and removed out to the level of the pedicle using the Kerrison rongeurs.  This exposed the lateral thecal sac and the nerve root of L3.  Once the lateral recess was opened the dissection was carried up to the L2  pedicle completely decompressing the superior nerve root.    Once this was done the L3 nerve root was mobilized medially to expose the disc. There was evidence of a large disc herniation compressing the nerve just under the nerve root.  This was carefully teased out and then the disc space was incised and disc material was removed with the down-biting cervical curettes and the pituitary rongeurs.  Once the disc space was emptied as much as possible  bleeding was controlled with bipolar cautery.    Once the entire decompression was completed we were able to explore under the nerve root and the thecal sac using the Bauer ball probe to be sure there was no evidence of residual compression.  There being none bleeding was controlled again using the bipolar cautery, FloSeal, and thrombin and Gelfoam.  The area was then copiously irrigated with vancomycin solution and the tube was removed. The paraspinous musculature was injected with 100 cc 1/8% Marcaine with 1:200,000 epinephrine solution.    Another gram of Kefzol was given prior to closure.    The incision was then closed in layers and dressed and the patient was taken to the recovery room in stable condition there were no apparent complications.  Sponge, instrument, and needle counts were correct at the end of the procedure.

## 2024-04-29 NOTE — ANESTHESIA POSTPROCEDURE EVALUATION
Patient: Alesia Resendez    Procedure Summary       Date: 04/29/24 Room / Location: [facility] OR 29 / [facility] MAIN OR    Anesthesia Start: 0935 Anesthesia Stop: 1115    Procedure: Left lumbar 2 to lumbar 3 laminectomy and discectomy with metrx (Left: Spine Lumbar) Diagnosis:       Left lumbar radiculopathy      (Left lumbar radiculopathy [M54.16])    Surgeons: Moise Garvey MD Provider: Cholo Yepez MD    Anesthesia Type: general ASA Status: 3            Anesthesia Type: general    Vitals  Vitals Value Taken Time   /76 04/29/24 1145   Temp 36.6 °C (97.9 °F) 04/29/24 1113   Pulse 56 04/29/24 1157   Resp 17 04/29/24 1145   SpO2 99 % 04/29/24 1157   Vitals shown include unfiled device data.        Post Anesthesia Care and Evaluation    Level of consciousness: awake and alert  Pain management: adequate    Airway patency: patent  Anesthetic complications: No anesthetic complications  PONV Status: controlled  Cardiovascular status: blood pressure returned to baseline and acceptable  Respiratory status: acceptable  Hydration status: acceptable    
---

## 2024-04-29 NOTE — BRIEF OP NOTE
LUMBAR DISCECTOMY POSTERIOR WITH METRIX  Progress Note    Alesia Resendez  4/29/2024    Pre-op Diagnosis:   Left lumbar radiculopathy [M54.16]       Post-Op Diagnosis Codes:     * Left lumbar radiculopathy [M54.16]    Procedure/CPT® Codes:        Procedure(s):  Left lumbar 2 to lumbar 3 laminectomy and discectomy with metrx              Surgeon(s):  Moise Garvey MD    Anesthesia: General    Staff:   Circulator: Anand Drake RN  Scrub Person: Mena Lopez  Assistant: Savannah Calle CSA  Assistant: Savannah Calle CSA      Estimated Blood Loss: 100ml    Urine Voided: * No values recorded between 4/29/2024  9:35 AM and 4/29/2024 11:09 AM *    Specimens:                None          Drains: * No LDAs found *    Findings: Large herniated disc        Complications: None      Moise Garvey MD     Date: 4/29/2024  Time: 11:12 EDT

## 2024-04-29 NOTE — NURSING NOTE
PT post op today, Potassium chloride was ordered but pt potassium elevated. Not giving. PT drinking and voiding. Using ice for back and pain medicine.

## 2024-04-29 NOTE — SIGNIFICANT NOTE
04/29/24 0749   OTHER   Discipline physical therapist   Rehab Time/Intention   Session Not Performed other (see comments)  (Patient in OR today for left L2-3 laminectomy and discectomy. PT will hold today and will plan to follow up with reevaluation tomorrow.)   Recommendation   PT - Next Appointment 04/30/24

## 2024-04-29 NOTE — ANESTHESIA PROCEDURE NOTES
Airway  Urgency: elective    Date/Time: 4/29/2024 9:43 AM  Airway not difficult    General Information and Staff    Patient location during procedure: OR  CRNA/CAA: Monserrat Palafox CRNA    Indications and Patient Condition  Indications for airway management: airway protection    Preoxygenated: yes  Mask difficulty assessment: 1 - vent by mask    Final Airway Details  Final airway type: endotracheal airway      Successful airway: ETT  Cuffed: yes   Successful intubation technique: direct laryngoscopy  Facilitating devices/methods: intubating stylet  Endotracheal tube insertion site: oral  Blade: Jennifer  Blade size: 3  ETT size (mm): 7.0  Cormack-Lehane Classification: grade I - full view of glottis  Placement verified by: chest auscultation and capnometry   Cuff volume (mL): 8  Measured from: lips  Number of attempts at approach: 1  Assessment: lips, teeth, and gum same as pre-op and atraumatic intubation

## 2024-04-29 NOTE — PLAN OF CARE
Problem: Adult Inpatient Plan of Care  Goal: Plan of Care Review  Outcome: Ongoing, Progressing  Flowsheets  Taken 4/29/2024 0441 by Jessica Thomas, RN  Plan of Care Reviewed With: patient  Outcome Evaluation: PRN pain med given x1. Dressing to right wrist intact. NPO since midnight in anticipation of procedure this am.  Taken 4/28/2024 1823 by Delilah Maza, RN  Progress: no change   Goal Outcome Evaluation:  Plan of Care Reviewed With: patient           Outcome Evaluation: PRN pain med given x1. Dressing to right wrist intact. NPO since midnight in anticipation of procedure this am.

## 2024-04-29 NOTE — PLAN OF CARE
Problem: Adult Inpatient Plan of Care  Goal: Plan of Care Review  Outcome: Ongoing, Progressing  Goal: Patient-Specific Goal (Individualized)  Outcome: Ongoing, Progressing  Goal: Absence of Hospital-Acquired Illness or Injury  Outcome: Ongoing, Progressing  Intervention: Identify and Manage Fall Risk  Recent Flowsheet Documentation  Taken 4/29/2024 1400 by Keesha Bradley RN  Safety Promotion/Fall Prevention:   fall prevention program maintained   assistive device/personal items within reach   clutter free environment maintained   safety round/check completed   room organization consistent   nonskid shoes/slippers when out of bed  Goal: Optimal Comfort and Wellbeing  Outcome: Ongoing, Progressing  Goal: Readiness for Transition of Care  Outcome: Ongoing, Progressing     Problem: Pain Acute  Goal: Acceptable Pain Control and Functional Ability  Outcome: Ongoing, Progressing     Problem: Skin Injury Risk Increased  Goal: Skin Health and Integrity  Outcome: Ongoing, Progressing     Problem: Fall Injury Risk  Goal: Absence of Fall and Fall-Related Injury  Outcome: Ongoing, Progressing  Intervention: Promote Injury-Free Environment  Recent Flowsheet Documentation  Taken 4/29/2024 1400 by Keesha Bradley RN  Safety Promotion/Fall Prevention:   fall prevention program maintained   assistive device/personal items within reach   clutter free environment maintained   safety round/check completed   room organization consistent   nonskid shoes/slippers when out of bed   Goal Outcome Evaluation:

## 2024-04-29 NOTE — PROGRESS NOTES
Name: Alesia Resendez ADMIT: 2024   : 1950  PCP: Epley, James, APRN    MRN: [id number] LOS: 5 days   AGE/SEX: 74 y.o. female  ROOM: [facility]     Subjective   Subjective   Seen in OR recovery. Awake and doing ok post op. Doesn't have pain at this time. No CP SOB         Objective   Objective   Vital Signs  Temp:  [97.7 °F (36.5 °C)-98.4 °F (36.9 °C)] 97.8 °F (36.6 °C)  Heart Rate:  [54-70] 54  Resp:  [12-20] 12  BP: (122-142)/(75-99) 141/96  SpO2:  [94 %-100 %] 100 %  on  Flow (L/min):  [2-4] 2;   Device (Oxygen Therapy): room air  Body mass index is 33.45 kg/m².      Physical Exam  Vitals reviewed.   Constitutional:       Appearance: She is well-developed. She is obese. She is not ill-appearing.   HENT:      Mouth/Throat:      Mouth: Mucous membranes are moist.   Cardiovascular:      Rate and Rhythm: Normal rate and regular rhythm.   Pulmonary:      Effort: Pulmonary effort is normal. No respiratory distress.      Breath sounds: Normal breath sounds.   Abdominal:      General: Bowel sounds are normal. There is no distension.      Palpations: Abdomen is soft.      Tenderness: There is no abdominal tenderness.   Skin:     General: Skin is warm and dry.   Neurological:      General: No focal deficit present.      Mental Status: She is alert and oriented to person, place, and time. Mental status is at baseline.      Motor: No weakness.   Psychiatric:         Mood and Affect: Mood normal.         Behavior: Behavior normal.         Thought Content: Thought content normal.       Results Review     I reviewed the patient's new clinical results.  Results from last 7 days   Lab Units 24  0545 24  0539 24  0558 24  0937   WBC 10*3/mm3 10.10 8.73 6.25 6.30   HEMOGLOBIN g/dL 16.4* 16.1* 15.2 14.2   PLATELETS 10*3/mm3 203 238 227 248     Results from last 7 days   Lab Units 24  0853 24  0545 24  0539 24  0558 24  0937   SODIUM mmol/L  --  136  136 137 139 142    POTASSIUM mmol/L 4.2 6.4*  6.3* 4.9 4.0 4.9   CHLORIDE mmol/L  --  98  98 98 100 100   CO2 mmol/L  --  28.0  29.9* 30.0* 28.4 32.2*   BUN mg/dL  --  25*  23 25* 23 27*   CREATININE mg/dL  --  0.77  0.75 0.74 0.67 0.70   GLUCOSE mg/dL  --  181*  181* 182* 166* 166*   EGFR mL/min/1.73  --  81.1  83.7 85.0 91.8 90.9     Results from last 7 days   Lab Units 04/29/24  0545 04/25/24  0937   ALBUMIN g/dL 3.6 3.8   BILIRUBIN mg/dL 0.6 0.4   ALK PHOS U/L 79 68   AST (SGOT) U/L 25 39*   ALT (SGPT) U/L 49* 47*     Results from last 7 days   Lab Units 04/29/24  0545 04/28/24  0539 04/26/24  0558 04/25/24  0937   CALCIUM mg/dL 9.6  9.4 9.1 8.7 9.2   ALBUMIN g/dL 3.6  --   --  3.8       Glucose   Date/Time Value Ref Range Status   04/29/2024 0741 140 (H) 70 - 130 mg/dL Final   04/28/2024 2047 239 (H) 70 - 130 mg/dL Final   04/28/2024 1643 174 (H) 70 - 130 mg/dL Final   04/28/2024 1202 156 (H) 70 - 130 mg/dL Final   04/28/2024 0734 131 (H) 70 - 130 mg/dL Final   04/27/2024 2010 210 (H) 70 - 130 mg/dL Final   04/27/2024 1610 177 (H) 70 - 130 mg/dL Final       No radiology results for the last day    Echo 4/26/24  Interpretation Summary         The left ventricular cavity is mildly dilated.    Left ventricular systolic function is normal. Left ventricular ejection fraction appears to be 61 - 65%.    Left ventricular wall thickness is consistent with mild concentric hypertrophy.    Left ventricular diastolic function is consistent with (grade I) impaired relaxation.    Normal right ventricular cavity size and systolic function noted.    The left atrial cavity is moderately dilated.    Mild mitral valve regurgitation is present.    Insufficient TR velocity profile to estimate the right ventricular systolic pressure.    The ascending aorta is mildly enlarged at 3.7 cm    There is no evidence of pericardial effusion.             8/14/17          I have personally reviewed all medications:  Scheduled Medications  amLODIPine, 10  mg, Oral, Daily  atenolol, 25 mg, Oral, Daily  [Transfer Hold] atorvastatin, 20 mg, Oral, Daily  [Transfer Hold] buPROPion XL, 150 mg, Oral, Daily  [Transfer Hold] escitalopram, 5 mg, Oral, Daily  [Transfer Hold] fentanyl, 50 mcg, Intravenous, Once  [Held by provider] hydroCHLOROthiazide, 25 mg, Oral, Daily  [Transfer Hold] insulin lispro, 2-9 Units, Subcutaneous, 4x Daily AC & at Bedtime  [Transfer Hold] lidocaine, 1 mL, Intradermal, Once  [Transfer Hold] Lidocaine, 2 patch, Transdermal, Q24H  losartan, 25 mg, Oral, Daily  [Transfer Hold] methocarbamol, 750 mg, Oral, 4x Daily  [Transfer Hold] methylPREDNISolone sodium succinate, 60 mg, Intravenous, Q8H  [Transfer Hold] pantoprazole, 40 mg, Oral, Q AM  pregabalin, 25 mg, Oral, Q8H  [Transfer Hold] senna-docusate sodium, 2 tablet, Oral, BID  [Transfer Hold] sodium chloride, 10 mL, Intravenous, Q12H  [Transfer Hold] sodium chloride, 10 mL, Intravenous, Q12H    Infusions  lactated ringers, 9 mL/hr, Last Rate: 9 mL/hr (04/29/24 0935)      Diet  NPO Diet NPO Type: Strict NPO, Sips with Meds    I have personally reviewed:  [x]  Laboratory   []  Microbiology   [x]  Radiology   [x]  EKG/Telemetry  [x]  Cardiology/Vascular   []  Pathology    []  Records    Estimated Creatinine Clearance: 81.3 mL/min (by C-G formula based on SCr of 0.75 mg/dL).       Assessment/Plan     Active Hospital Problems    Diagnosis  POA    **Low back pain [M54.50]  Yes    Intractable back pain [M54.9]  Yes    Left lumbar radiculopathy [M54.16]  Yes    Lumbar back pain with radiculopathy affecting left lower extremity [M54.16]  Yes    Lumbar back pain with radiculopathy affecting left lower extremity [M54.16]  Yes    Weakness of left lower extremity [R29.898]  Yes    Class 1 obesity with body mass index (BMI) of 33.0 to 33.9 in adult [E66.9, Z68.33]  Not Applicable    Prediabetes [R73.03]  Yes      Resolved Hospital Problems   No resolved problems to display.       74 y.o. female admitted with Low  back pain.       Lumbar back pain with radiculopathy  Weakness of LLE/ decreased mobility/ paresthesia and incontinence  ZACKARY following  Status post epidural 4/23 and steroids.  Post op pain management per surgery. Continue , Robaxin scheduled, Percocet and Dilaudid as needed.    Myelogram with herniated disc at L2-L3.  Status post unilateral laminectomy and discectomy planned for Monday 4/29.   - monitor labs post op   - Preop testing with EKG and echo obtained given FH and personal risk factors.  No prior cardiac workup in EMR.  CXR showing tortuous aorta and borderline heart size, pulmonary no focal consolidations  12 lead EKG - SR and  stable when compared to 8/2017.   Echocardiogram showed normal EF normal 61 -65% with mild concentric LVH, grade 1 diastolic dysfunction, LA moderately dilated with mild MR with ascending aorta enlargement at 3.7 cm.   Cardiology consulted to see for CV risk assessment prior to surgery.   Cards reviewed echo and feel that 3.7 cm aorta is normal for her body size and recommend good blood pressure control.      Prediabeties/ obesity/HLD   increased risk of complications as a result.A1c 6.3. Monitor glucose with SSI while on steroids.      TSH is low but T4 normal. Advise repeat labs with PCP.      Fibromyalgia and chronic pain disorder   Usually only gets epidural pain and injections and not on chronic medications.  She was on Celebrex.  She sees Dr. Sheppard as an outpatient with pain management    Chronic medical conditions anxiety and IBS being monitored- stable, continue medical management     Elevated BUN : mildly increased BUN at 25 with normal creatinine.  Encourage non caffeine PO fluids. Hold HCTZ.  No hypotension noted            SCDs for DVT prophylaxis.  Full code.  Discussed with patient and nursing staff.  Anticipate discharge later this week post op         KYLIE Contreras  [facility]  04/29/24  13:23 EDT

## 2024-04-30 LAB
ANION GAP SERPL CALCULATED.3IONS-SCNC: 8.6 MMOL/L (ref 5–15)
BASOPHILS # BLD AUTO: 0.03 10*3/MM3 (ref 0–0.2)
BASOPHILS NFR BLD AUTO: 0.2 % (ref 0–1.5)
BUN SERPL-MCNC: 21 MG/DL (ref 8–23)
BUN/CREAT SERPL: 31.8 (ref 7–25)
CALCIUM SPEC-SCNC: 8.9 MG/DL (ref 8.6–10.5)
CHLORIDE SERPL-SCNC: 99 MMOL/L (ref 98–107)
CO2 SERPL-SCNC: 30.4 MMOL/L (ref 22–29)
CREAT SERPL-MCNC: 0.66 MG/DL (ref 0.57–1)
DEPRECATED RDW RBC AUTO: 41.7 FL (ref 37–54)
EGFRCR SERPLBLD CKD-EPI 2021: 92.2 ML/MIN/1.73
EOSINOPHIL # BLD AUTO: 0 10*3/MM3 (ref 0–0.4)
EOSINOPHIL NFR BLD AUTO: 0 % (ref 0.3–6.2)
ERYTHROCYTE [DISTWIDTH] IN BLOOD BY AUTOMATED COUNT: 13.5 % (ref 12.3–15.4)
GLUCOSE BLDC GLUCOMTR-MCNC: 124 MG/DL (ref 70–130)
GLUCOSE BLDC GLUCOMTR-MCNC: 130 MG/DL (ref 70–130)
GLUCOSE BLDC GLUCOMTR-MCNC: 191 MG/DL (ref 70–130)
GLUCOSE BLDC GLUCOMTR-MCNC: 208 MG/DL (ref 70–130)
GLUCOSE SERPL-MCNC: 177 MG/DL (ref 65–99)
HCT VFR BLD AUTO: 46.6 % (ref 34–46.6)
HGB BLD-MCNC: 15.8 G/DL (ref 12–15.9)
IMM GRANULOCYTES # BLD AUTO: 0.15 10*3/MM3 (ref 0–0.05)
IMM GRANULOCYTES NFR BLD AUTO: 1 % (ref 0–0.5)
LYMPHOCYTES # BLD AUTO: 1.27 10*3/MM3 (ref 0.7–3.1)
LYMPHOCYTES NFR BLD AUTO: 8.2 % (ref 19.6–45.3)
MCH RBC QN AUTO: 29.2 PG (ref 26.6–33)
MCHC RBC AUTO-ENTMCNC: 33.9 G/DL (ref 31.5–35.7)
MCV RBC AUTO: 86 FL (ref 79–97)
MONOCYTES # BLD AUTO: 0.94 10*3/MM3 (ref 0.1–0.9)
MONOCYTES NFR BLD AUTO: 6.1 % (ref 5–12)
NEUTROPHILS NFR BLD AUTO: 13.01 10*3/MM3 (ref 1.7–7)
NEUTROPHILS NFR BLD AUTO: 84.5 % (ref 42.7–76)
NRBC BLD AUTO-RTO: 0 /100 WBC (ref 0–0.2)
PLATELET # BLD AUTO: 220 10*3/MM3 (ref 140–450)
PMV BLD AUTO: 9.3 FL (ref 6–12)
POTASSIUM SERPL-SCNC: 4.5 MMOL/L (ref 3.5–5.2)
RBC # BLD AUTO: 5.42 10*6/MM3 (ref 3.77–5.28)
SODIUM SERPL-SCNC: 138 MMOL/L (ref 136–145)
WBC NRBC COR # BLD AUTO: 15.4 10*3/MM3 (ref 3.4–10.8)

## 2024-04-30 PROCEDURE — 80048 BASIC METABOLIC PNL TOTAL CA: CPT | Performed by: NEUROLOGICAL SURGERY

## 2024-04-30 PROCEDURE — 25010000002 METHYLPREDNISOLONE PER 125 MG: Performed by: NEUROLOGICAL SURGERY

## 2024-04-30 PROCEDURE — 99024 POSTOP FOLLOW-UP VISIT: CPT | Performed by: NURSE PRACTITIONER

## 2024-04-30 PROCEDURE — 97164 PT RE-EVAL EST PLAN CARE: CPT

## 2024-04-30 PROCEDURE — 25010000002 SODIUM CHLORIDE 0.9 % WITH KCL 20 MEQ 20-0.9 MEQ/L-% SOLUTION: Performed by: NURSE PRACTITIONER

## 2024-04-30 PROCEDURE — 85025 COMPLETE CBC W/AUTO DIFF WBC: CPT | Performed by: NEUROLOGICAL SURGERY

## 2024-04-30 PROCEDURE — 82948 REAGENT STRIP/BLOOD GLUCOSE: CPT

## 2024-04-30 PROCEDURE — 97530 THERAPEUTIC ACTIVITIES: CPT

## 2024-04-30 PROCEDURE — 97535 SELF CARE MNGMENT TRAINING: CPT

## 2024-04-30 PROCEDURE — 25010000002 CEFAZOLIN PER 500 MG: Performed by: NEUROLOGICAL SURGERY

## 2024-04-30 PROCEDURE — 63710000001 INSULIN LISPRO (HUMAN) PER 5 UNITS: Performed by: NEUROLOGICAL SURGERY

## 2024-04-30 PROCEDURE — 97168 OT RE-EVAL EST PLAN CARE: CPT

## 2024-04-30 RX ORDER — HYDROCODONE BITARTRATE AND ACETAMINOPHEN 5; 325 MG/1; MG/1
1 TABLET ORAL EVERY 4 HOURS PRN
Qty: 30 TABLET | Refills: 0 | Status: SHIPPED | OUTPATIENT
Start: 2024-04-30 | End: 2024-05-05

## 2024-04-30 RX ORDER — NALOXONE HYDROCHLORIDE 4 MG/.1ML
SPRAY NASAL
Qty: 2 EACH | Refills: 0 | Status: SHIPPED | OUTPATIENT
Start: 2024-04-30

## 2024-04-30 RX ORDER — CEPHALEXIN 500 MG/1
500 CAPSULE ORAL 4 TIMES DAILY
Qty: 20 CAPSULE | Refills: 0 | Status: SHIPPED | OUTPATIENT
Start: 2024-04-30 | End: 2024-05-05

## 2024-04-30 RX ORDER — SODIUM CHLORIDE AND POTASSIUM CHLORIDE 150; 900 MG/100ML; MG/100ML
50 INJECTION, SOLUTION INTRAVENOUS CONTINUOUS
Status: DISCONTINUED | OUTPATIENT
Start: 2024-04-30 | End: 2024-05-01 | Stop reason: HOSPADM

## 2024-04-30 RX ADMIN — HYDROCODONE BITARTRATE AND ACETAMINOPHEN 1 TABLET: 5; 325 TABLET ORAL at 02:52

## 2024-04-30 RX ADMIN — BUPROPION HYDROCHLORIDE 150 MG: 150 TABLET, EXTENDED RELEASE ORAL at 08:25

## 2024-04-30 RX ADMIN — METHYLPREDNISOLONE SODIUM SUCCINATE 60 MG: 125 INJECTION INTRAMUSCULAR; INTRAVENOUS at 06:49

## 2024-04-30 RX ADMIN — SENNOSIDES AND DOCUSATE SODIUM 2 TABLET: 50; 8.6 TABLET ORAL at 08:24

## 2024-04-30 RX ADMIN — SENNOSIDES AND DOCUSATE SODIUM 2 TABLET: 50; 8.6 TABLET ORAL at 21:03

## 2024-04-30 RX ADMIN — SODIUM CHLORIDE 2000 MG: 900 INJECTION INTRAVENOUS at 01:50

## 2024-04-30 RX ADMIN — METHOCARBAMOL TABLETS 750 MG: 750 TABLET, COATED ORAL at 11:55

## 2024-04-30 RX ADMIN — Medication 10 ML: at 08:25

## 2024-04-30 RX ADMIN — INSULIN LISPRO 4 UNITS: 100 INJECTION, SOLUTION INTRAVENOUS; SUBCUTANEOUS at 16:50

## 2024-04-30 RX ADMIN — METHYLPREDNISOLONE SODIUM SUCCINATE 60 MG: 125 INJECTION INTRAMUSCULAR; INTRAVENOUS at 14:58

## 2024-04-30 RX ADMIN — HYDROCODONE BITARTRATE AND ACETAMINOPHEN 1 TABLET: 5; 325 TABLET ORAL at 22:40

## 2024-04-30 RX ADMIN — POTASSIUM CHLORIDE AND SODIUM CHLORIDE 50 ML/HR: 900; 150 INJECTION, SOLUTION INTRAVENOUS at 09:05

## 2024-04-30 RX ADMIN — PREGABALIN 25 MG: 25 CAPSULE ORAL at 13:13

## 2024-04-30 RX ADMIN — AMLODIPINE BESYLATE 10 MG: 10 TABLET ORAL at 08:24

## 2024-04-30 RX ADMIN — METHOCARBAMOL TABLETS 750 MG: 750 TABLET, COATED ORAL at 21:04

## 2024-04-30 RX ADMIN — ATORVASTATIN CALCIUM 20 MG: 20 TABLET, FILM COATED ORAL at 21:04

## 2024-04-30 RX ADMIN — ESCITALOPRAM 5 MG: 5 TABLET, FILM COATED ORAL at 21:04

## 2024-04-30 RX ADMIN — HYDROCODONE BITARTRATE AND ACETAMINOPHEN 1 TABLET: 5; 325 TABLET ORAL at 10:18

## 2024-04-30 RX ADMIN — PREGABALIN 25 MG: 25 CAPSULE ORAL at 06:49

## 2024-04-30 RX ADMIN — LOSARTAN POTASSIUM 25 MG: 25 TABLET, FILM COATED ORAL at 08:24

## 2024-04-30 RX ADMIN — METHYLPREDNISOLONE SODIUM SUCCINATE 60 MG: 125 INJECTION INTRAMUSCULAR; INTRAVENOUS at 22:38

## 2024-04-30 RX ADMIN — ATENOLOL 25 MG: 25 TABLET ORAL at 21:04

## 2024-04-30 RX ADMIN — METHOCARBAMOL TABLETS 750 MG: 750 TABLET, COATED ORAL at 08:24

## 2024-04-30 RX ADMIN — Medication 3 ML: at 08:25

## 2024-04-30 RX ADMIN — Medication 3 ML: at 21:05

## 2024-04-30 RX ADMIN — METHOCARBAMOL TABLETS 750 MG: 750 TABLET, COATED ORAL at 17:26

## 2024-04-30 RX ADMIN — INSULIN LISPRO 2 UNITS: 100 INJECTION, SOLUTION INTRAVENOUS; SUBCUTANEOUS at 11:55

## 2024-04-30 RX ADMIN — PANTOPRAZOLE SODIUM 40 MG: 40 TABLET, DELAYED RELEASE ORAL at 06:49

## 2024-04-30 RX ADMIN — Medication 10 ML: at 21:05

## 2024-04-30 NOTE — THERAPY RE-EVALUATION
Patient Name: Alesia Resendez  : 1950    MRN: [id number]                              Today's Date: 2024       Admit Date: 2024    Visit Dx:     ICD-10-CM ICD-9-CM   1. Herniation of lumbar intervertebral disc with radiculopathy  M51.16 722.10     724.4   2. Acute left-sided low back pain, unspecified whether sciatica present  M54.50 724.2   3. Lumbar radiculopathy  M54.16 724.4   4. Left lumbar radiculopathy  M54.16 724.4     Patient Active Problem List   Diagnosis    Arthritis    Atrophic vaginitis    Depression    Gastroesophageal reflux disease without esophagitis    Mixed hyperlipidemia    Essential hypertension    Menopause present    Overactive bladder    History of colonic polyps    Rectal hemorrhage    Proctocele    Encounter for screening for malignant neoplasm of colon    Stress incontinence in female    Recurrent breast cancer    Viral syndrome    Chronic fatigue    Snoring    Tremor    Community acquired pneumonia    Chronic right-sided low back pain with sciatica    Elevated liver function tests    Piriformis syndrome of left side    Myofasciitis    History of left breast cancer    Diarrhea    Family history of esophageal cancer    Spondylosis of lumbar region without myelopathy or radiculopathy    Cervical stenosis of spinal canal    Cervical spondylosis without myelopathy    Prediabetes    Sacroiliitis    Spinal stenosis of lumbar region    Lumbar radiculopathy    Degeneration of lumbar or lumbosacral intervertebral disc    Class 1 obesity with body mass index (BMI) of 33.0 to 33.9 in adult    Low back pain    Lumbar back pain with radiculopathy affecting left lower extremity    Lumbar back pain with radiculopathy affecting left lower extremity    Weakness of left lower extremity    Intractable back pain    Left lumbar radiculopathy     Past Medical History:   Diagnosis Date    Anxiety     Arthritis     Breast cancer 2017    LEFT    Carpal tunnel syndrome of right wrist     Chronic  pain disorder 1990’s    Colon polyp 11/22    Removed    Depression 2017    Dermatochalasis of both eyelids     Fibromyalgia, primary 2023    GERD (gastroesophageal reflux disease)     History of radiation therapy 2006    LT BREAST    History of skin cancer     SQUAMOUS CELL REMOVED FROM LT UPPER LIP AND RT THUMB    Hyperlipidemia 2005    Hypertension     Irritable bowel syndrome In my 40’s    Low back pain 2015    Lumbosacral disc disease 2012    Obesity     Osteoarthritis 1990    Ptosis of both eyebrows     Spinal stenosis 2012    Viral syndrome 02/26/2018     Past Surgical History:   Procedure Laterality Date    ANAL FISSURECTOMY  2003    APPENDECTOMY  2014    BLEPHAROPLASTY Bilateral 05/11/2023    Procedure: BILATERAL UPPER LID BLEPHAROPLASTY;  Surgeon: Nicola Rowley MD;  Location: [facility] MAIN OR;  Service: Ophthalmology;  Laterality: Bilateral;    BREAST LUMPECTOMY W/ NEEDLE LOCALIZATION Left 2006    BREAST SURGERY  2006 and 2017    breast cancer    BROW LIFT Bilateral 05/11/2023    Procedure: BILATERAL TEMPORAL DIRECT BROWLIFT;  Surgeon: Nicola Rowley MD;  Location: [facility] MAIN OR;  Service: Ophthalmology;  Laterality: Bilateral;    CATARACT EXTRACTION Bilateral 07/2018    CHOLECYSTECTOMY  2007    COLONOSCOPY  2014    COLONOSCOPY Left 05/27/2021    Procedure: COLONOSCOPY;  Surgeon: Sridhar James MD;  [place] [facility] [place]  Service: Gastroenterology;  Laterality: Left;    COLONOSCOPY N/A 10/10/2022    Procedure: COLONOSCOPY FOR SCREENING;  Surgeon: Sridhar James MD;  [place] [facility] [place];  Service: Gastroenterology;  Laterality: N/A;  NORMAL    ENDOSCOPY N/A 10/10/2022    Procedure: ESOPHAGOGASTRODUODENOSCOPY WITH BIOPSY;  Surgeon: Sridhar James MD;  [place] [facility] [place];  Service: Gastroenterology;  Laterality: N/A;  GASTRIC POLYPS, IRREGULAR Z LINE    EPIDURAL Left 06/21/2023    Procedure: LUMBAR/SACRAL TRANSFORAMINAL EPIDURAL LEFT L4-5  05150;  Surgeon: Silver  Estrellita CARABALLO MD;  Location: [facility];  Service: Pain Management;  Laterality: Left;    EPIDURAL Bilateral 3/18/2024    Procedure: LUMBAR/SACRAL TRANSFORAMINAL EPIDURAL BILATERAL L4-5 74149-85;  Surgeon: Estrellita Sheppard MD;  Location: [facility];  Service: Pain Management;  Laterality: Bilateral;    EPIDURAL BLOCK  2020    HYSTERECTOMY  1987    JOINT REPLACEMENT      KNEE SURGERY Right 2014    Knee cap replaced    KNEE SURGERY Left 12/06/2022    joint replacement    LUMBAR DISCECTOMY Left 4/29/2024    Procedure: Left lumbar 2 to lumbar 3 laminectomy and discectomy with metrx;  Surgeon: Moise Garvey MD;  Location: [facility] MAIN OR;  Service: Neurosurgery;  Laterality: Left;    MASTECTOMY      MASTECTOMY W/ SENTINEL NODE BIOPSY Bilateral 08/21/2017    Procedure: BILATEREAL BREAST MASTECTOMY WITH SENTINEL NODE BIOPSY ON THE LEFT The sentinel lymph node biopsy will only be performed on the left side;  Surgeon: Diallo Eisenberg MD;  Location: [facility] OR [facility];  Service:     MEDIAL BRANCH BLOCK Bilateral 03/20/2023    Procedure: LUMBAR MEDIAL BRANCH BLOCK BILATERAL L3-L5 #1  95070, 86223;  Surgeon: Estrellita Sheppard MD;  Location: [facility];  Service: Pain Management;  Laterality: Bilateral;    OOPHORECTOMY Bilateral 2000    BSO    ORTHOPEDIC SURGERY  2000 and 2001    Knee replacements    PIRIFORMUS INJECTION Left 07/19/2021    Procedure: Left piriformis injection;  Surgeon: Estrellita Sheppard MD;  Location: [facility];  Service: Pain Management;  Laterality: Left;    PIRIFORMUS INJECTION Left 01/03/2022    Procedure: PIRIFORMIS INJECTION - Left;  Surgeon: Estrellita Sheppard MD;  Location: [facility];  Service: Pain Management;  Laterality: Left;    PIRIFORMUS INJECTION Left 01/18/2023    Procedure: PIRIFORMIS INJECTION Left;  Surgeon: Estrellita Sheppard MD;  Location: [facility];  Service: Pain Management;  Laterality: Left;    PIRIFORMUS INJECTION Left 05/01/2023    Procedure: PIRIFORMIS INJECTION;   Surgeon: Estrellita Sheppard MD;  Location: [facility];  Service: Pain Management;  Laterality: Left;    POSTERIOR REPAIR  05/17/2016    with enterocele repair, midurethral sling, perineoplasty    SACROILIAC JOINT INJECTION Left 05/01/2023    Procedure: SACROILIAC JOINT INJECTION AND PIRIFORMIS INJECTION LEFT  39568;  Surgeon: Estrellita Sheppard MD;  Location: [facility];  Service: Pain Management;  Laterality: Left;    SKIN BIOPSY      TONSILLECTOMY  1970    TOTAL KNEE ARTHROPLASTY Bilateral 2000, 2001    UPPER GASTROINTESTINAL ENDOSCOPY  10/10/2022      General Information       Row Name 04/30/24 1325          OT Time and Intention    Document Type re-evaluation  -     Mode of Treatment occupational therapy  -       Row Name 04/30/24 1325          General Information    Patient Profile Reviewed yes  -     Prior Level of Function independent:;ADL's;all household mobility  -     Existing Precautions/Restrictions spinal;fall  HOB flat, no sitting  -       Row Name 04/30/24 1325          Cognition    Orientation Status (Cognition) oriented x 4  -       Row Name 04/30/24 1325          Safety Issues, Functional Mobility    Impairments Affecting Function (Mobility) pain;strength;endurance/activity tolerance  -               User Key  (r) = Recorded By, (t) = Taken By, (c) = Cosigned By      Initials Name Provider Type    JW Tere Luong OT Occupational Therapist                     Mobility/ADL's       Row Name 04/30/24 1408          Bed Mobility    Comment, (Bed Mobility) standing in room upon OT arrival. left UIC  -       Row Name 04/30/24 1408          Transfers    Transfers sit-stand transfer;toilet transfer  -       Row Name 04/30/24 1408          Sit-Stand Transfer    Sit-Stand [place] (Transfers) standby assist  -     Assistive Device (Sit-Stand Transfers) walker, front-wheeled  -       Row Name 04/30/24 1408          Toilet Transfer    Type (Toilet Transfer) sit-stand;stand-sit  -      [place] Level (Toilet Transfer) supervision  -     Assistive Device (Toilet Transfer) commode;grab bars/safety frame  -JW       Row Name 04/30/24 1408          Functional Mobility    Functional Mobility- Comment spv fpr fxl ambulation in/out of bathroom  -JW       Row Name 04/30/24 1408          Activities of Daily Living    BADL Assessment/Intervention lower body dressing;toileting;grooming  -JW       Row Name 04/30/24 1408          Lower Body Dressing Assessment/Training    [place] Level (Lower Body Dressing) standby assist;socks  -     Position (Lower Body Dressing) supported sitting  -     Comment, (Lower Body Dressing) fig-4 position  -JW       Row Name 04/30/24 1408          Toileting Assessment/Training    [place] Level (Toileting) toileting skills;supervision  -JW       Row Name 04/30/24 1408          Grooming Assessment/Training    [place] Level (Grooming) grooming skills;independent  -               User Key  (r) = Recorded By, (t) = Taken By, (c) = Cosigned By      Initials Name Provider Type     Tere Luong OT Occupational Therapist                   Obj/Interventions       Row Name 04/30/24 1410          Sensory Assessment (Somatosensory)    Sensory Assessment (Somatosensory) UE sensation intact  -JW       Row Name 04/30/24 1410          Vision Assessment/Intervention    Visual Impairment/Limitations L  -JW       Row Name 04/30/24 1410          Range of Motion Comprehensive    General Range of Motion bilateral upper extremity ROM WFL  -JW       Row Name 04/30/24 1410          Strength Comprehensive (MMT)    Comment, General Manual Muscle Testing (MMT) Assessment UE strength WFL  -JW       Row Name 04/30/24 1410          Balance    Static Sitting Balance modified independence  -     Dynamic Sitting Balance modified independence  -     Static Standing Balance supervision  -     Dynamic Standing Balance standby assist  -     Position/Device Used, Standing Balance  walker, front-wheeled  -     Balance Interventions sitting;standing;occupation based/functional task  -               User Key  (r) = Recorded By, (t) = Taken By, (c) = Cosigned By      Initials Name Provider Type    Tere Alejandra OT Occupational Therapist                   Goals/Plan       Row Name 04/30/24 1415          Toileting Goal 1 (OT)    Activity/Device (Toileting Goal 1, OT) grab bar/safety frame;toileting skills, all  -     [place] Level/Cues Needed (Toileting Goal 1, OT) independent  -     Time Frame (Toileting Goal 1, OT) 2 weeks;short term goal (STG)  -     Progress/Outcome (Toileting Goal 1, OT) goal met  -               User Key  (r) = Recorded By, (t) = Taken By, (c) = Cosigned By      Initials Name Provider Type    Tere Alejandra OT Occupational Therapist                   Clinical Impression       Row Name 04/30/24 1411          Pain Assessment    Pretreatment Pain Rating 2/10  -     Posttreatment Pain Rating 2/10  -     Pain Location - back  -       Row Name 04/30/24 1411          Plan of Care Review    Plan of Care Reviewed With patient  -     Outcome Evaluation Pt seen for Ot re-evaluation as she is POD 1 s/p L2-3 laminectomy and discectomy. ZACKARY orders for HOB flat and no sitting. Pt was found standing in her room with nsg aid present, finishing toileting. UE strength and coordination WFL. She completes LB dressing with SBA in fig-4 position to don socks. Completes toilet TF with spv, grooming independently and fxl ambulation around the room with spv using RW, no LOB. Home safety education provided and how to attain shower chair for increased safety and independence with bathing tasks. Pt agreeable. No further OT services warranted at this time. Rec d/c home with assist as needed  -       Row Name 04/30/24 1411          Therapy Plan Review/Discharge Plan (OT)    Anticipated Discharge Disposition (OT) home with assist  -[facility] Name 04/30/24 1411           Positioning and Restraints    Pre-Treatment Position standing in room  -JW     Post Treatment Position bed  -JW     In Bed supine;call light within reach;encouraged to call for assist;exit alarm on  -JW               User Key  (r) = Recorded By, (t) = Taken By, (c) = Cosigned By      Initials Name Provider Type    Tere Alejandra OT Occupational Therapist                   Outcome Measures       Row Name 04/30/24 1416          How much help from another is currently needed...    Putting on and taking off regular lower body clothing? 4  -JW     Bathing (including washing, rinsing, and drying) 3  -JW     Toileting (which includes using toilet bed pan or urinal) 4  -JW     Putting on and taking off regular upper body clothing 4  -JW     Taking care of personal grooming (such as brushing teeth) 4  -JW     Eating meals 4  -JW     AM-PAC 6 Clicks Score (OT) 23  -JW       Row Name 04/30/24 1141 04/30/24 0824       How much help from another person do you currently need...    Turning from your back to your side while in flat bed without using bedrails? 3  -CH 3  -DD    Moving from lying on back to sitting on the side of a flat bed without bedrails? 3  -CH 3  -DD    Moving to and from a bed to a chair (including a wheelchair)? 3  -CH 3  -DD    Standing up from a chair using your arms (e.g., wheelchair, bedside chair)? 3  -CH 3  -DD    Climbing 3-5 steps with a railing? 2  -CH 3  -DD    To walk in hospital room? 3  -CH 3  -DD    AM-PAC 6 Clicks Score (PT) 17  -CH 18  -DD    Highest Level of Mobility Goal 5 --> Static standing  -CH 6 --> Walk 10 steps or more  -DD      Row Name 04/30/24 0300          How much help from another person do you currently need...    Turning from your back to your side while in flat bed without using bedrails? 3  -JF     Moving from lying on back to sitting on the side of a flat bed without bedrails? 3  -JF     Moving to and from a bed to a chair (including a wheelchair)? 3  -JF     Standing up from  a chair using your arms (e.g., wheelchair, bedside chair)? 3  -JF     Climbing 3-5 steps with a railing? 3  -JF     To walk in hospital room? 3  -JF     AM-PAC 6 Clicks Score (PT) 18  -JF     Highest Level of Mobility Goal 6 --> Walk 10 steps or more  -JF       Row Name 04/30/24 1416 04/30/24 1141       Functional Assessment    Outcome Measure Options AM-PAC 6 Clicks Daily Activity (OT)  - AM-PAC 6 Clicks Basic Mobility (PT)  -              User Key  (r) = Recorded By, (t) = Taken By, (c) = Cosigned By      Initials Name Provider Type     Shoshana Wells, PT Physical Therapist    Beba Penn, RN Registered Nurse    Tere Alejandra OT Occupational Therapist    Lorna Casatnon, RN Registered Nurse                      OT Recommendation and Plan     Plan of Care Review  Plan of Care Reviewed With: patient  Outcome Evaluation: Pt seen for Ot re-evaluation as she is POD 1 s/p L2-3 laminectomy and discectomy. ZACKARY orders for HOB flat and no sitting. Pt was found standing in her room with nsg aid present, finishing toileting. UE strength and coordination WFL. She completes LB dressing with SBA in fig-4 position to don socks. Completes toilet TF with spv, grooming independently and fxl ambulation around the room with spv using RW, no LOB. Home safety education provided and how to attain shower chair for increased safety and independence with bathing tasks. Pt agreeable. No further OT services warranted at this time. Rec d/c home with assist as needed     Time Calculation:         Time Calculation- OT       Row Name 04/30/24 1416             Time Calculation- OT    OT Start Time 0838  -      OT Stop Time 0854  -      OT Time Calculation (min) 16 min  -      Total Timed Code Minutes- OT 8 minute(s)  -      OT Received On 04/30/24  -         Timed Charges    64207 - OT Self Care/Mgmt Minutes 8  -         Untimed Charges    OT Eval/Re-eval Minutes 8  -         Total Minutes    Timed  Charges Total Minutes 8  -JW      Untimed Charges Total Minutes 8  -JW       Total Minutes 16  -JW                User Key  (r) = Recorded By, (t) = Taken By, (c) = Cosigned By      Initials Name Provider Type    Tere Alejandra OT Occupational Therapist                  Therapy Charges for Today       Code Description Service Date Service Provider Modifiers Qty    [id number]  OT SELF CARE/MGMT/TRAIN EA 15 MIN 4/30/2024 Tere Luong OT GO 1    [id number]  OT RE-EVAL 2 4/30/2024 Tere Luong OT GO 1                 Tere Luong OT  4/30/2024

## 2024-04-30 NOTE — DISCHARGE INSTRUCTIONS
LUMBAR SURGERY - CARMEN AYERS M.D.  [address], [address]  [place] [address]  [phone]    Instructions & Care After Your Lumbar Surgery    1. No sitting except on the commode.  You may lie on a firm couch but not on a waterbed or recliner.  You may lie in any position that is comfortable, using only one pillow under your head. Either stand at a counter or lie on your side for meals. Three weeks after surgery you may begin sitting for 30 minutes 3 times per day.    2. No driving for three weeks.  You may ride in the car in a passenger seat that reclines or lying down in the back seat.      3. No bending. If you drop something allow someone else to pick it up.    4. Don’t lift anything heavier than a coffee cup or paperback book.    5. Gradually increase your activity each day.  You should get out of bed every hour during the day.  Walk outside as soon as you feel up to it.  Walk short distances frequently rather than making a long trip.  Frequency is more important than distance.  Your goal is to be walking 2 to 3 miles per day when you return for your post-operative visit. (Never do this in one trip.)    6. You may climb stairs.    7. Remove your bandage the second day after surgery and leave it off.  If you notice any redness, swelling or drainage, call the office.  There are steri-strips across the incision.  If these are still present ten days after surgery, remove them gently.      8. You may shower five days after surgery.  Keep the incision dry until then.  Don’t let the water beat directly on the incision and gently pat it dry.    9. Physical Therapy will be arranged at your post-operative visit if needed.    10. Your prescription for pain medication may be filled for half the original amount prior to your return office visit.  Due to changes in Federal Law in order to have this medication refilled you must contact the office four days prior to the date and make arrangements to pick the  prescription up in the office.  This prescription refill cannot be called in to the pharmacy. Your prescription will be ready for pick-up the day the refill is due.    Don’t be alarmed if you experience some of your pre-operative symptoms after going home.  This is not uncommon and normally goes away in a few days but may last longer.  If you have any questions or concerns, please call our office.

## 2024-04-30 NOTE — PLAN OF CARE
Goal Outcome Evaluation:      Patient is POD #1 for a left L 2 to L3 laminectomy and discectomy.  Patient is alert x4.  Dressing is dry, and intact.  Tylenol, norco, were ordered for pain.  Continues on solumedrol.  Accuchecks taken.  Plan for patient to discharge home with home health tomorrow.  No sign of distress noted.  Will continue to monitor and update accordingly.

## 2024-04-30 NOTE — PROGRESS NOTES
Name: Alesia Resendez ADMIT: 2024   : 1950  PCP: Epley, James, APRN    MRN: [id number] LOS: 6 days   AGE/SEX: 74 y.o. female  ROOM: [facility]     Subjective   Subjective   POD1. Doing ok. Pain is tolerable.   hopes to go home tmrw but still very weak. NO BM in 2 days. No CP SOB fever or chills. No numbness in extremities.         Objective   Objective   Vital Signs  Temp:  [97.9 °F (36.6 °C)-98.8 °F (37.1 °C)] 98.4 °F (36.9 °C)  Heart Rate:  [51-58] 56  Resp:  [16-17] 16  BP: (103-152)/(62-97) 104/62  SpO2:  [93 %-98 %] 94 %  on   ;   Device (Oxygen Therapy): room air  Body mass index is 33.45 kg/m².      Physical Exam  Vitals reviewed.   Constitutional:       Appearance: She is well-developed. She is obese. She is ill-appearing (weaknes but NAD).   HENT:      Mouth/Throat:      Mouth: Mucous membranes are moist.   Cardiovascular:      Rate and Rhythm: Normal rate and regular rhythm.   Pulmonary:      Effort: Pulmonary effort is normal. No respiratory distress.      Breath sounds: Normal breath sounds.   Abdominal:      General: Bowel sounds are normal. There is no distension.      Palpations: Abdomen is soft.      Tenderness: There is no abdominal tenderness.   Skin:     General: Skin is warm and dry.   Neurological:      General: No focal deficit present.      Mental Status: She is alert and oriented to person, place, and time. Mental status is at baseline.      Motor: No weakness.   Psychiatric:         Mood and Affect: Mood normal.         Behavior: Behavior normal.         Thought Content: Thought content normal.       Results Review     I reviewed the patient's new clinical results.  Results from last 7 days   Lab Units 24  0404 24  0545 24  0539 24  0558   WBC 10*3/mm3 15.40* 10.10 8.73 6.25   HEMOGLOBIN g/dL 15.8 16.4* 16.1* 15.2   PLATELETS 10*3/mm3 220 203 238 227     Results from last 7 days   Lab Units 24  0404 24  0853 24  0545 24  0539  04/26/24  0558   SODIUM mmol/L 138  --  136  136 137 139   POTASSIUM mmol/L 4.5 4.2 6.4*  6.3* 4.9 4.0   CHLORIDE mmol/L 99  --  98  98 98 100   CO2 mmol/L 30.4*  --  28.0  29.9* 30.0* 28.4   BUN mg/dL 21  --  25*  23 25* 23   CREATININE mg/dL 0.66  --  0.77  0.75 0.74 0.67   GLUCOSE mg/dL 177*  --  181*  181* 182* 166*   EGFR mL/min/1.73 92.2  --  81.1  83.7 85.0 91.8     Results from last 7 days   Lab Units 04/29/24  0545 04/25/24  0937   ALBUMIN g/dL 3.6 3.8   BILIRUBIN mg/dL 0.6 0.4   ALK PHOS U/L 79 68   AST (SGOT) U/L 25 39*   ALT (SGPT) U/L 49* 47*     Results from last 7 days   Lab Units 04/30/24  0404 04/29/24  0545 04/28/24  0539 04/26/24  0558 04/25/24  0937   CALCIUM mg/dL 8.9 9.6  9.4 9.1 8.7 9.2   ALBUMIN g/dL  --  3.6  --   --  3.8       Glucose   Date/Time Value Ref Range Status   04/30/2024 1122 191 (H) 70 - 130 mg/dL Final   04/30/2024 0703 130 70 - 130 mg/dL Final   04/29/2024 2023 192 (H) 70 - 130 mg/dL Final   04/29/2024 1749 205 (H) 70 - 130 mg/dL Final   04/29/2024 0741 140 (H) 70 - 130 mg/dL Final   04/28/2024 2047 239 (H) 70 - 130 mg/dL Final   04/28/2024 1643 174 (H) 70 - 130 mg/dL Final       No radiology results for the last day    Echo 4/26/24  Interpretation Summary         The left ventricular cavity is mildly dilated.    Left ventricular systolic function is normal. Left ventricular ejection fraction appears to be 61 - 65%.    Left ventricular wall thickness is consistent with mild concentric hypertrophy.    Left ventricular diastolic function is consistent with (grade I) impaired relaxation.    Normal right ventricular cavity size and systolic function noted.    The left atrial cavity is moderately dilated.    Mild mitral valve regurgitation is present.    Insufficient TR velocity profile to estimate the right ventricular systolic pressure.    The ascending aorta is mildly enlarged at 3.7 cm    There is no evidence of pericardial effusion.             8/14/17          I have  personally reviewed all medications:  Scheduled Medications  amLODIPine, 10 mg, Oral, Daily  atenolol, 25 mg, Oral, Daily  atorvastatin, 20 mg, Oral, Daily  buPROPion XL, 150 mg, Oral, Daily  escitalopram, 5 mg, Oral, Daily  fentanyl, 50 mcg, Intravenous, Once  [Held by provider] hydroCHLOROthiazide, 25 mg, Oral, Daily  insulin lispro, 2-9 Units, Subcutaneous, 4x Daily AC & at Bedtime  losartan, 25 mg, Oral, Daily  methocarbamol, 750 mg, Oral, 4x Daily  methylPREDNISolone sodium succinate, 60 mg, Intravenous, Q8H  pantoprazole, 40 mg, Oral, Q AM  pregabalin, 25 mg, Oral, Q8H  senna-docusate sodium, 2 tablet, Oral, BID  sodium chloride, 10 mL, Intravenous, Q12H  sodium chloride, 10 mL, Intravenous, Q12H  sodium chloride, 3 mL, Intravenous, Q12H    Infusions  sodium chloride 0.9 % with KCl 20 mEq, 50 mL/hr, Last Rate: 50 mL/hr (04/30/24 0905)      Diet  Diet: Regular/House; Fluid Consistency: Thin (IDDSI 0)    I have personally reviewed:  [x]  Laboratory   []  Microbiology   [x]  Radiology   [x]  EKG/Telemetry  [x]  Cardiology/Vascular   []  Pathology    []  Records    Estimated Creatinine Clearance: 92.4 mL/min (by C-G formula based on SCr of 0.66 mg/dL).       Assessment/Plan     Active Hospital Problems    Diagnosis  POA    **Low back pain [M54.50]  Yes    Intractable back pain [M54.9]  Yes    Left lumbar radiculopathy [M54.16]  Yes    Lumbar back pain with radiculopathy affecting left lower extremity [M54.16]  Yes    Lumbar back pain with radiculopathy affecting left lower extremity [M54.16]  Yes    Weakness of left lower extremity [R29.898]  Yes    Class 1 obesity with body mass index (BMI) of 33.0 to 33.9 in adult [E66.9, Z68.33]  Not Applicable    Prediabetes [R73.03]  Yes      Resolved Hospital Problems   No resolved problems to display.       74 y.o. female admitted with Low back pain.       Lumbar back pain with radiculopathy  Weakness of LLE/ decreased mobility/ paresthesia and incontinence  ZACKARY following   Status post epidural 4/23 and steroids.  Post op pain management per surgery. Continue , Robaxin scheduled, Percocet and Dilaudid as needed.    Myelogram with herniated disc at L2-L3.  Status post unilateral laminectomy and discectomy planned for Monday 4/29.   - monitor labs post op   - Preop testing with EKG and echo obtained given FH and personal risk factors.  No prior cardiac workup in EMR.  CXR showing tortuous aorta and borderline heart size, pulmonary no focal consolidations  12 lead EKG - SR and  stable when compared to 8/2017.   Echocardiogram showed normal EF normal 61 -65% with mild concentric LVH, grade 1 diastolic dysfunction, LA moderately dilated with mild MR with ascending aorta enlargement at 3.7 cm. -Cards reviewed echo and feel that 3.7 cm aorta is normal for her body size and recommend good blood pressure control.   Monitor post op and await PT OT eval      Prediabeties/ obesity/HLD   increased risk of complications as a result.A1c 6.3. Monitor glucose with SSI while on steroids.      TSH is low but T4 normal. Advise repeat labs with PCP.      Fibromyalgia and chronic pain disorder   Usually only gets epidural pain and injections and not on chronic medications.  She was on Celebrex.  She sees Dr. Sheppard as an outpatient with pain management    Chronic medical conditions anxiety and IBS being monitored- stable, continue medical management     Elevated BUN : resolved. Stop fluids but hold HCTZ           SCDs for DVT prophylaxis.  Full code.  Discussed with patient and nursing staff.  Anticipate discharge tmrw with KYLIE Sheppard  [facility]  04/30/24  13:45 EDT

## 2024-04-30 NOTE — PLAN OF CARE
Goal Outcome Evaluation:  Plan of Care Reviewed With: patient           Outcome Evaluation: Pt seen for Ot re-evaluation as she is POD 1 s/p L2-3 laminectomy and discectomy. ZACKARY orders for HOB flat and no sitting. Pt was found standing in her room with nsg aid present, finishing toileting. UE strength and coordination WFL. She completes LB dressing with SBA in fig-4 position to don socks. Completes toilet TF with spv, grooming independently and fxl ambulation around the room with spv using RW, no LOB. Home safety education provided and how to attain shower chair for increased safety and independence with bathing tasks. Pt agreeable. No further OT services warranted at this time. Rec d/c home with assist as needed      Anticipated Discharge Disposition (OT): home with assist

## 2024-04-30 NOTE — THERAPY EVALUATION
Patient Name: Alesia Resendez  : 1950    MRN: [id number]                              Today's Date: 2024       Admit Date: 2024    Visit Dx:     ICD-10-CM ICD-9-CM   1. Herniation of lumbar intervertebral disc with radiculopathy  M51.16 722.10     724.4   2. Acute left-sided low back pain, unspecified whether sciatica present  M54.50 724.2   3. Lumbar radiculopathy  M54.16 724.4   4. Left lumbar radiculopathy  M54.16 724.4     Patient Active Problem List   Diagnosis    Arthritis    Atrophic vaginitis    Depression    Gastroesophageal reflux disease without esophagitis    Mixed hyperlipidemia    Essential hypertension    Menopause present    Overactive bladder    History of colonic polyps    Rectal hemorrhage    Proctocele    Encounter for screening for malignant neoplasm of colon    Stress incontinence in female    Recurrent breast cancer    Viral syndrome    Chronic fatigue    Snoring    Tremor    Community acquired pneumonia    Chronic right-sided low back pain with sciatica    Elevated liver function tests    Piriformis syndrome of left side    Myofasciitis    History of left breast cancer    Diarrhea    Family history of esophageal cancer    Spondylosis of lumbar region without myelopathy or radiculopathy    Cervical stenosis of spinal canal    Cervical spondylosis without myelopathy    Prediabetes    Sacroiliitis    Spinal stenosis of lumbar region    Lumbar radiculopathy    Degeneration of lumbar or lumbosacral intervertebral disc    Class 1 obesity with body mass index (BMI) of 33.0 to 33.9 in adult    Low back pain    Lumbar back pain with radiculopathy affecting left lower extremity    Lumbar back pain with radiculopathy affecting left lower extremity    Weakness of left lower extremity    Intractable back pain    Left lumbar radiculopathy     Past Medical History:   Diagnosis Date    Anxiety     Arthritis     Breast cancer 2017    LEFT    Carpal tunnel syndrome of right wrist     Chronic  pain disorder 1990’s    Colon polyp 11/22    Removed    Depression 2017    Dermatochalasis of both eyelids     Fibromyalgia, primary 2023    GERD (gastroesophageal reflux disease)     History of radiation therapy 2006    LT BREAST    History of skin cancer     SQUAMOUS CELL REMOVED FROM LT UPPER LIP AND RT THUMB    Hyperlipidemia 2005    Hypertension     Irritable bowel syndrome In my 40’s    Low back pain 2015    Lumbosacral disc disease 2012    Obesity     Osteoarthritis 1990    Ptosis of both eyebrows     Spinal stenosis 2012    Viral syndrome 02/26/2018     Past Surgical History:   Procedure Laterality Date    ANAL FISSURECTOMY  2003    APPENDECTOMY  2014    BLEPHAROPLASTY Bilateral 05/11/2023    Procedure: BILATERAL UPPER LID BLEPHAROPLASTY;  Surgeon: Nicola Rowley MD;  Location: [facility] MAIN OR;  Service: Ophthalmology;  Laterality: Bilateral;    BREAST LUMPECTOMY W/ NEEDLE LOCALIZATION Left 2006    BREAST SURGERY  2006 and 2017    breast cancer    BROW LIFT Bilateral 05/11/2023    Procedure: BILATERAL TEMPORAL DIRECT BROWLIFT;  Surgeon: Nicola Rowley MD;  Location: [facility] MAIN OR;  Service: Ophthalmology;  Laterality: Bilateral;    CATARACT EXTRACTION Bilateral 07/2018    CHOLECYSTECTOMY  2007    COLONOSCOPY  2014    COLONOSCOPY Left 05/27/2021    Procedure: COLONOSCOPY;  Surgeon: Sridhar James MD;  [place] [facility] [place];  Service: Gastroenterology;  Laterality: Left;    COLONOSCOPY N/A 10/10/2022    Procedure: COLONOSCOPY FOR SCREENING;  Surgeon: Sridhar James MD;  [place] [facility] [place];  Service: Gastroenterology;  Laterality: N/A;  NORMAL    ENDOSCOPY N/A 10/10/2022    Procedure: ESOPHAGOGASTRODUODENOSCOPY WITH BIOPSY;  Surgeon: Sridhar James MD;  [place] [facility] [place];  Service: Gastroenterology;  Laterality: N/A;  GASTRIC POLYPS, IRREGULAR Z LINE    EPIDURAL Left 06/21/2023    Procedure: LUMBAR/SACRAL TRANSFORAMINAL EPIDURAL LEFT L4-5  82009;  Surgeon: Silver  Estrellita CARABALLO MD;  Location: [facility] MAIN OR;  Service: Pain Management;  Laterality: Left;    EPIDURAL Bilateral 3/18/2024    Procedure: LUMBAR/SACRAL TRANSFORAMINAL EPIDURAL BILATERAL L4-5 07666-09;  Surgeon: Estrellita Sheppard MD;  Location: [facility] MAIN OR;  Service: Pain Management;  Laterality: Bilateral;    EPIDURAL BLOCK  2020    HYSTERECTOMY  1987    JOINT REPLACEMENT      KNEE SURGERY Right 2014    Knee cap replaced    KNEE SURGERY Left 12/06/2022    joint replacement    LUMBAR DISCECTOMY Left 4/29/2024    Procedure: Left lumbar 2 to lumbar 3 laminectomy and discectomy with metrx;  Surgeon: Moise Garvey MD;  Location: [facility] MAIN OR;  Service: Neurosurgery;  Laterality: Left;    MASTECTOMY      MASTECTOMY W/ SENTINEL NODE BIOPSY Bilateral 08/21/2017    Procedure: BILATEREAL BREAST MASTECTOMY WITH SENTINEL NODE BIOPSY ON THE LEFT The sentinel lymph node biopsy will only be performed on the left side;  Surgeon: Diallo Eisenberg MD;  Location: [facility] OR [facility];  Service:     MEDIAL BRANCH BLOCK Bilateral 03/20/2023    Procedure: LUMBAR MEDIAL BRANCH BLOCK BILATERAL L3-L5 #1  84288, 32136;  Surgeon: Estrellita Sheppard MD;  Location: [facility] MAIN [facility];  Service: Pain Management;  Laterality: Bilateral;    OOPHORECTOMY Bilateral 2000    BSO    ORTHOPEDIC SURGERY  2000 and 2001    Knee replacements    PIRIFORMUS INJECTION Left 07/19/2021    Procedure: Left piriformis injection;  Surgeon: Esterllita Sheppard MD;  Location: [facility];  Service: Pain Management;  Laterality: Left;    PIRIFORMUS INJECTION Left 01/03/2022    Procedure: PIRIFORMIS INJECTION - Left;  Surgeon: Estrellita Sheppard MD;  Location: [facility];  Service: Pain Management;  Laterality: Left;    PIRIFORMUS INJECTION Left 01/18/2023    Procedure: PIRIFORMIS INJECTION Left;  Surgeon: Estrellita Sheppard MD;  Location: [facility];  Service: Pain Management;  Laterality: Left;    PIRIFORMUS INJECTION Left 05/01/2023    Procedure: PIRIFORMIS INJECTION;   Surgeon: Estrellita Sheppard MD;  Location: [facility];  Service: Pain Management;  Laterality: Left;    POSTERIOR REPAIR  05/17/2016    with enterocele repair, midurethral sling, perineoplasty    SACROILIAC JOINT INJECTION Left 05/01/2023    Procedure: SACROILIAC JOINT INJECTION AND PIRIFORMIS INJECTION LEFT  45308;  Surgeon: Estrellita Sheppard MD;  Location: [facility];  Service: Pain Management;  Laterality: Left;    SKIN BIOPSY      TONSILLECTOMY  1970    TOTAL KNEE ARTHROPLASTY Bilateral 2000, 2001    UPPER GASTROINTESTINAL ENDOSCOPY  10/10/2022      General Information       Row Name 04/30/24 1039          Physical Therapy Time and Intention    Document Type re-evaluation  -     Mode of Treatment individual therapy;physical therapy  -       Row Name 04/30/24 1039          General Information    Prior Level of Function independent:;gait;transfer;bed mobility  -     Existing Precautions/Restrictions spinal;fall  no sitting  -     Barriers to Rehab none identified  -       Row Name 04/30/24 1039          Living Environment    People in Home alone  -       Row Name 04/30/24 1039          Home Main Entrance    Number of Stairs, Main Entrance none  -       Row Name 04/30/24 1039          Cognition    Orientation Status (Cognition) oriented x 4  -       Row Name 04/30/24 1039          Safety Issues, Functional Mobility    Impairments Affecting Function (Mobility) pain;strength;endurance/activity tolerance  -               User Key  (r) = Recorded By, (t) = Taken By, (c) = Cosigned By      Initials Name Provider Type     Shoshana Wells PT Physical Therapist                   Mobility       Row Name 04/30/24 1132          Bed Mobility    Bed Mobility supine-sit;sit-supine  -     Supine-Sit [place] (Bed Mobility) standby assist  -     Sit-Supine [place] (Bed Mobility) contact guard  -     Assistive Device (Bed Mobility) bed rails;head of bed elevated  -     Comment, (Bed  Mobility) pt performed log roll with cuing  -       Row Name 04/30/24 1132          Sit-Stand Transfer    Sit-Stand [place] (Transfers) verbal cues;nonverbal cues (demo/gesture);contact guard  -CH     Assistive Device (Sit-Stand Transfers) walker, front-wheeled  -CH       Row Name 04/30/24 1132          Gait/Stairs (Locomotion)    [place] Level (Gait) verbal cues;nonverbal cues (demo/gesture);contact guard  -CH     Assistive Device (Gait) walker, front-wheeled  -CH     Distance in Feet (Gait) 80  -CH     Deviations/Abnormal Patterns (Gait) david decreased;gait speed decreased;stride length decreased  -     Bilateral Gait Deviations forward flexed posture  -     Comment, (Gait/Stairs) gait slow and slightly unsteady, no overt LOB  -               User Key  (r) = Recorded By, (t) = Taken By, (c) = Cosigned By      Initials Name Provider Type    Shoshana Lynne, PT Physical Therapist                   Obj/Interventions       Row Name 04/30/24 1134          Range of Motion Comprehensive    General Range of Motion bilateral lower extremity ROM WFL  -       Row Name 04/30/24 1134          Strength Comprehensive (MMT)    Comment, General Manual Muscle Testing (MMT) Assessment L hip flexion 3/5, all other B LE strength grossly 4/5  -       Row Name 04/30/24 1134          Motor Skills    Therapeutic Exercise --  10 reps B LE AP and LAQ  -[facility] Name 04/30/24 1134          Balance    Static Standing Balance verbal cues;non-verbal cues (demo/gesture);contact guard  -CH     Dynamic Standing Balance verbal cues;non-verbal cues (demo/gesture);contact guard  -CH     Position/Device Used, Standing Balance walker, rolling  -CH               User Key  (r) = Recorded By, (t) = Taken By, (c) = Cosigned By      Initials Name Provider Type    Shoshana Lynne, PT Physical Therapist                   Goals/Plan       Row Name 04/30/24 1141          Bed Mobility Goal 1 (PT)    Activity/Assistive  Device (Bed Mobility Goal 1, PT) bed mobility activities, all  -CH     [place] Level/Cues Needed (Bed Mobility Goal 1, PT) modified independence  -CH     Time Frame (Bed Mobility Goal 1, PT) 1 week  -       Row Name 04/30/24 1141          Transfer Goal 1 (PT)    Activity/Assistive Device (Transfer Goal 1, PT) transfers, all;walker, rolling  -CH     [place] Level/Cues Needed (Transfer Goal 1, PT) standby assist  -CH     Time Frame (Transfer Goal 1, PT) 1 week  -       Row Name 04/30/24 1141          Gait Training Goal 1 (PT)    Activity/Assistive Device (Gait Training Goal 1, PT) gait (walking locomotion);walker, rolling  -CH     [place] Level (Gait Training Goal 1, PT) supervision required  -CH     Distance (Gait Training Goal 1, PT) 150  -CH     Time Frame (Gait Training Goal 1, PT) 1 week  -       Row Name 04/30/24 1141          Therapy Assessment/Plan (PT)    Planned Therapy Interventions (PT) balance training;bed mobility training;gait training;home exercise program;patient/family education;strengthening;stair training;transfer training  -               User Key  (r) = Recorded By, (t) = Taken By, (c) = Cosigned By      Initials Name Provider Type     Shoshana Wells, PT Physical Therapist                   Clinical Impression       Row Name 04/30/24 1135          Pain    Pretreatment Pain Rating 5/10  -CH     Posttreatment Pain Rating 5/10  -     Pain Location upper  -     Pain Location - back  -     Pre/Posttreatment Pain Comment Pt reports upper back and sternal pain, better with positioning, no SOA or other cardiac symptoms associated with it, pt attributes it to positioning during surgery, RN notified  -     Pain Intervention(s) Repositioned  -       Row Name 04/30/24 1131          Plan of Care Review    Plan of Care Reviewed With patient;family  -     Outcome Evaluation Pt is a 75 yo F who is post-op L2-3 laminectomy and discectomy. Pt presents to PT with impaired  functional mobility and gait secondary to pain, L hip weakness along with some overall generalized weakness, and decreased activity tolerance. Pt may benefit from skilled PT to address strength, mobility, and gait.  -       Row Name 04/30/24 1135          Therapy Assessment/Plan (PT)    Patient/Family Therapy Goals Statement (PT) to return to PLOF  -     Rehab Potential (PT) good, to achieve stated therapy goals  -     Criteria for Skilled Interventions Met (PT) skilled treatment is necessary  -     Therapy Frequency (PT) 5 times/wk  -       Row Name 04/30/24 1135          Positioning and Restraints    Pre-Treatment Position in bed  -     Post Treatment Position bed  -     In Bed supine;call light within reach;encouraged to call for assist;exit alarm on;with family/caregiver;notified [facility]  -               User Key  (r) = Recorded By, (t) = Taken By, (c) = Cosigned By      Initials Name Provider Type     Shoshana Wells, PT Physical Therapist                   Outcome Measures       Row Name 04/30/24 1141 04/30/24 0824       How much help from another person do you currently need...    Turning from your back to your side while in flat bed without using bedrails? 3  -CH 3  -DD    Moving from lying on back to sitting on the side of a flat bed without bedrails? 3  -CH 3  -DD    Moving to and from a bed to a chair (including a wheelchair)? 3  -CH 3  -DD    Standing up from a chair using your arms (e.g., wheelchair, bedside chair)? 3  -CH 3  -DD    Climbing 3-5 steps with a railing? 2  -CH 3  -DD    To walk in hospital room? 3  -CH 3  -DD    AM-PAC 6 Clicks Score (PT) 17  -CH 18  -DD    Highest Level of Mobility Goal 5 --> Static standing  - 6 --> Walk 10 steps or more  -DD      Row Name 04/30/24 0300          How much help from another person do you currently need...    Turning from your back to your side while in flat bed without using bedrails? 3  -JF     Moving from lying on back to sitting on  the side of a flat bed without bedrails? 3  -JF     Moving to and from a bed to a chair (including a wheelchair)? 3  -JF     Standing up from a chair using your arms (e.g., wheelchair, bedside chair)? 3  -JF     Climbing 3-5 steps with a railing? 3  -JF     To walk in hospital room? 3  -JF     AM-PAC 6 Clicks Score (PT) 18  -JF     Highest Level of Mobility Goal 6 --> Walk 10 steps or more  -       Row Name 04/30/24 1141          Functional Assessment    Outcome Measure Options AM-PAC 6 Clicks Basic Mobility (PT)  -               User Key  (r) = Recorded By, (t) = Taken By, (c) = Cosigned By      Initials Name Provider Type     Shoshana Wells, PT Physical Therapist    Beba Penn, RN Registered Nurse    Lorna Castanon RN Registered Nurse                                 Physical Therapy Education       Title: PT OT SLP Therapies (Done)       Topic: Physical Therapy (Done)       Point: Mobility training (Done)       Learning Progress Summary             Patient Acceptance, E,TB,D, VU,NR by  at 4/30/2024 1142    Acceptance, E,TB, VU,DU by  at 4/26/2024 1205    Acceptance, E,D, VU,NR by MS at 4/24/2024 1528    Acceptance, E,TB, VU,NR by  at 4/22/2024 1551                         Point: Home exercise program (Done)       Learning Progress Summary             Patient Acceptance, E,TB,D, VU,NR by  at 4/30/2024 1142    Acceptance, E,TB, VU,DU by  at 4/26/2024 1205    Acceptance, E,D, VU,NR by MS at 4/24/2024 1528                         Point: Body mechanics (Done)       Learning Progress Summary             Patient Acceptance, E,TB,D, VU,NR by  at 4/30/2024 1142    Acceptance, E,TB, VU,DU by  at 4/26/2024 1205    Acceptance, E,D, VU,NR by MS at 4/24/2024 1528    Acceptance, E,TB, VU,NR by CB at 4/22/2024 1551                         Point: Precautions (Done)       Learning Progress Summary             Patient Acceptance, E,TB,D, VU,NR by  at 4/30/2024 1142    Acceptance, E,TB,  CYNDIE,DU by  at 4/26/2024 1205    Acceptance, E,D, VU,NR by MS at 4/24/2024 1528    Acceptance, E,TB, VU,NR by  at 4/22/2024 1551                                         User Key       Initials Effective Dates Name Provider Type Discipline     06/16/21 -  Shoshana Wells, PT Physical Therapist PT    MS 06/16/21 -  Harpal Marie, PT Physical Therapist PT    CB 10/22/21 -  Kimmie Baires, PT Physical Therapist PT    CS 09/22/22 -  Lynn Bull, PT Physical Therapist PT                  PT Recommendation and Plan  Planned Therapy Interventions (PT): balance training, bed mobility training, gait training, home exercise program, patient/family education, strengthening, stair training, transfer training  Plan of Care Reviewed With: patient, family  Outcome Evaluation: Pt is a 73 yo F who is post-op L2-3 laminectomy and discectomy. Pt presents to PT with impaired functional mobility and gait secondary to pain, L hip weakness along with some overall generalized weakness, and decreased activity tolerance. Pt may benefit from skilled PT to address strength, mobility, and gait.     Time Calculation:         PT Charges       Row Name 04/30/24 1142             Time Calculation    Start Time 0953  -      Stop Time 1016  -      Time Calculation (min) 23 min  -      PT Received On 04/30/24  -      PT - Next Appointment 05/01/24  -      PT Goal Re-Cert Due Date 05/07/24  -         Time Calculation- PT    Total Timed Code Minutes- PT 18 minute(s)  -         Timed Charges    27494 - PT Therapeutic Activity Minutes 18  -CH         Total Minutes    Timed Charges Total Minutes 18  -CH       Total Minutes 18  -CH                User Key  (r) = Recorded By, (t) = Taken By, (c) = Cosigned By      Initials Name Provider Type     Shoshana Wells, PT Physical Therapist                  Therapy Charges for Today       Code Description Service Date Service Provider Modifiers Qty    [id number]  PT THERAPEUTIC  ACT EA 15 MIN 4/30/2024 Shoshana Wells, PT GP 1    [id number] HC PT RE-EVAL ESTABLISHED PLAN 2 4/30/2024 Shoshana Wells, PT GP 1            PT G-Codes  Outcome Measure Options: AM-PAC 6 Clicks Basic Mobility (PT)  AM-PAC 6 Clicks Score (PT): 17  AM-PAC 6 Clicks Score (OT): 21  PT Discharge Summary  Anticipated Discharge Disposition (PT): home with assist, home with home health    Shoshana Wells, PT  4/30/2024

## 2024-04-30 NOTE — CASE MANAGEMENT/SOCIAL WORK
Continued Stay Note  [facility]     Patient Name: Alesia Resendez  MRN: [id number]  Today's Date: 4/30/2024    Admit Date: 4/22/2024    Plan: Home with family support & [facility].   Discharge Plan       Row Name 04/30/24 1720       Plan    Plan Home with family support & [facility].    Patient/Family in Agreement with Plan yes    Plan Comments Spoke with the patient who said she plans to discharge home with family support & [facility]. She declines needs for SNF. She also said her family will transport her home at discharge. No other needs identified at this time.                   Discharge Codes    No documentation.                 Expected Discharge Date and Time       Expected Discharge Date Expected Discharge Time    Apr 30, 2024               Xochitl THAKKAR RN

## 2024-04-30 NOTE — PLAN OF CARE
Goal Outcome Evaluation:           Progress: improving                           Pt is a post op 1 of an L3 to m L4 laminectomy and discectomy , dressing is clean dry and intact. Pt continues with the island dressing. Pt has ambulated to the bathroom with an assist x1, voiding function intact. Pt continues with PO pain meds that provide relief.Pt educated on blood pressure monitoring.  Pt resting at this time, will continue to monitor.

## 2024-04-30 NOTE — PLAN OF CARE
Goal Outcome Evaluation:  Plan of Care Reviewed With: patient, family           Outcome Evaluation: Pt is a 73 yo F who is post-op L2-3 laminectomy and discectomy. Pt presents to PT with impaired functional mobility and gait secondary to pain, L hip weakness along with some overall generalized weakness, and decreased activity tolerance. Pt may benefit from skilled PT to address strength, mobility, and gait.      Anticipated Discharge Disposition (PT): home with assist, home with home health

## 2024-04-30 NOTE — PROGRESS NOTES
LOS: 6 days   Patient Care Team:  Epley, James, APRN as PCP - General (Family Medicine)  Virgil Moeller II, MD as Consulting Physician (Radiology)  Diallo Eisenberg MD as Referring Physician (Breast Surgery)  Peyton Keyes MD PhD as Consulting Physician (Hematology and Oncology)  Rimma Keller APRN as Nurse Practitioner (Nurse Practitioner)    NEUROSURGERY POST OP VISIT    Interval History: Feeling better in terms of leg pain. Some expected back pain; pain med helps. Ambulating some in room with help. Voiding without difficulty. Passing flatus. Last BM 2 days ago. Denies feelings of constipation.       History taken from: patient chart    Objective        Vital Signs  Temp:  [97.8 °F (36.6 °C)-98.1 °F (36.7 °C)] 97.9 °F (36.6 °C)  Heart Rate:  [51-65] 51  Resp:  [12-17] 16  BP: (122-152)/(75-99) 137/89    Physical Exam:     AA&O x 3.   No motor deficits. Some tingling L leg as expected.   Abdomen soft, non-tender.   No calf swelling or tenderness to bilateral palpation.   Lumbar dressing dry and intact. No surrounding swelling or redness.        Results Review:     I reviewed the patient's new clinical results.    .  Results from last 7 days   Lab Units 04/30/24  0404 04/29/24  0545 04/28/24  0539   WBC 10*3/mm3 15.40* 10.10 8.73   HEMOGLOBIN g/dL 15.8 16.4* 16.1*   HEMATOCRIT % 46.6 48.6* 48.9*   PLATELETS 10*3/mm3 220 203 238       Assessment & Plan       Low back pain    Prediabetes    Class 1 obesity with body mass index (BMI) of 33.0 to 33.9 in adult    Lumbar back pain with radiculopathy affecting left lower extremity    Lumbar back pain with radiculopathy affecting left lower extremity    Weakness of left lower extremity    Intractable back pain    Left lumbar radiculopathy      POD 1 left L2/3 laminectomy, discectomy for disc herniation and radiculopathy  Post op leukocytosis; afebrile, VSS    Continue HOB flat; no sitting - except to get out of bed or use commode.   Continue to  mobilize; IS encouraged  Continue norco for pain - Rx sent to patient's pharmacy  CBC in am  OK to discharge when walking ok and cleared by admitting service.   Neurosurgery post op instructions are in AVS; Rx for Norco and Keflex sent to patient's pharmacy.   F/U Dr. Garvey May 16, 2024 at 2:15 pm  Neurosurgery will see tomorrow, if still here.    Akiko Mccormack, APRN  04/30/24  07:35 EDT

## 2024-05-01 ENCOUNTER — READMISSION MANAGEMENT (OUTPATIENT)
Dept: CALL CENTER | Facility: HOSPITAL | Age: 74
End: 2024-05-01
Payer: MEDICARE

## 2024-05-01 VITALS
HEIGHT: 68 IN | RESPIRATION RATE: 16 BRPM | WEIGHT: 220 LBS | TEMPERATURE: 97.6 F | OXYGEN SATURATION: 96 % | DIASTOLIC BLOOD PRESSURE: 68 MMHG | SYSTOLIC BLOOD PRESSURE: 115 MMHG | HEART RATE: 59 BPM | BODY MASS INDEX: 33.34 KG/M2

## 2024-05-01 PROBLEM — R29.898 WEAKNESS OF LEFT LOWER EXTREMITY: Status: RESOLVED | Noted: 2024-04-22 | Resolved: 2024-05-01

## 2024-05-01 PROBLEM — M54.9 INTRACTABLE BACK PAIN: Status: RESOLVED | Noted: 2024-04-24 | Resolved: 2024-05-01

## 2024-05-01 PROBLEM — M54.16 LUMBAR BACK PAIN WITH RADICULOPATHY AFFECTING LEFT LOWER EXTREMITY: Status: RESOLVED | Noted: 2024-04-22 | Resolved: 2024-05-01

## 2024-05-01 PROBLEM — Z98.890 STATUS POST LAMINECTOMY: Status: ACTIVE | Noted: 2024-05-01

## 2024-05-01 LAB
BASOPHILS # BLD AUTO: 0.01 10*3/MM3 (ref 0–0.2)
BASOPHILS NFR BLD AUTO: 0.1 % (ref 0–1.5)
DEPRECATED RDW RBC AUTO: 43 FL (ref 37–54)
EOSINOPHIL # BLD AUTO: 0 10*3/MM3 (ref 0–0.4)
EOSINOPHIL NFR BLD AUTO: 0 % (ref 0.3–6.2)
ERYTHROCYTE [DISTWIDTH] IN BLOOD BY AUTOMATED COUNT: 13.5 % (ref 12.3–15.4)
GLUCOSE BLDC GLUCOMTR-MCNC: 137 MG/DL (ref 70–130)
HCT VFR BLD AUTO: 45.4 % (ref 34–46.6)
HGB BLD-MCNC: 15 G/DL (ref 12–15.9)
IMM GRANULOCYTES # BLD AUTO: 0.08 10*3/MM3 (ref 0–0.05)
IMM GRANULOCYTES NFR BLD AUTO: 0.7 % (ref 0–0.5)
LYMPHOCYTES # BLD AUTO: 1.68 10*3/MM3 (ref 0.7–3.1)
LYMPHOCYTES NFR BLD AUTO: 15.3 % (ref 19.6–45.3)
MCH RBC QN AUTO: 28.7 PG (ref 26.6–33)
MCHC RBC AUTO-ENTMCNC: 33 G/DL (ref 31.5–35.7)
MCV RBC AUTO: 86.8 FL (ref 79–97)
MONOCYTES # BLD AUTO: 0.75 10*3/MM3 (ref 0.1–0.9)
MONOCYTES NFR BLD AUTO: 6.8 % (ref 5–12)
NEUTROPHILS NFR BLD AUTO: 77.1 % (ref 42.7–76)
NEUTROPHILS NFR BLD AUTO: 8.43 10*3/MM3 (ref 1.7–7)
NRBC BLD AUTO-RTO: 0 /100 WBC (ref 0–0.2)
PLATELET # BLD AUTO: 186 10*3/MM3 (ref 140–450)
PMV BLD AUTO: 9.3 FL (ref 6–12)
RBC # BLD AUTO: 5.23 10*6/MM3 (ref 3.77–5.28)
WBC NRBC COR # BLD AUTO: 10.95 10*3/MM3 (ref 3.4–10.8)

## 2024-05-01 PROCEDURE — 25010000002 METHYLPREDNISOLONE PER 125 MG: Performed by: NEUROLOGICAL SURGERY

## 2024-05-01 PROCEDURE — 85025 COMPLETE CBC W/AUTO DIFF WBC: CPT | Performed by: NURSE PRACTITIONER

## 2024-05-01 PROCEDURE — 82948 REAGENT STRIP/BLOOD GLUCOSE: CPT

## 2024-05-01 RX ORDER — POLYETHYLENE GLYCOL 3350 17 G/17G
17 POWDER, FOR SOLUTION ORAL DAILY PRN
Start: 2024-05-01

## 2024-05-01 RX ORDER — AMOXICILLIN 250 MG
2 CAPSULE ORAL 2 TIMES DAILY
Qty: 20 TABLET | Refills: 0 | Status: SHIPPED | OUTPATIENT
Start: 2024-05-01

## 2024-05-01 RX ORDER — TIZANIDINE 4 MG/1
4 TABLET ORAL EVERY 6 HOURS PRN
Qty: 20 TABLET | Refills: 0 | Status: SHIPPED | OUTPATIENT
Start: 2024-05-01 | End: 2024-05-06

## 2024-05-01 RX ADMIN — BUPROPION HYDROCHLORIDE 150 MG: 150 TABLET, EXTENDED RELEASE ORAL at 08:32

## 2024-05-01 RX ADMIN — PREGABALIN 25 MG: 25 CAPSULE ORAL at 06:27

## 2024-05-01 RX ADMIN — AMLODIPINE BESYLATE 10 MG: 10 TABLET ORAL at 08:32

## 2024-05-01 RX ADMIN — HYDROCODONE BITARTRATE AND ACETAMINOPHEN 1 TABLET: 5; 325 TABLET ORAL at 06:27

## 2024-05-01 RX ADMIN — Medication 10 ML: at 08:32

## 2024-05-01 RX ADMIN — METHOCARBAMOL TABLETS 750 MG: 750 TABLET, COATED ORAL at 11:43

## 2024-05-01 RX ADMIN — HYDROCODONE BITARTRATE AND ACETAMINOPHEN 1 TABLET: 5; 325 TABLET ORAL at 11:43

## 2024-05-01 RX ADMIN — Medication 3 ML: at 08:32

## 2024-05-01 RX ADMIN — METHYLPREDNISOLONE SODIUM SUCCINATE 60 MG: 125 INJECTION INTRAMUSCULAR; INTRAVENOUS at 06:27

## 2024-05-01 RX ADMIN — LOSARTAN POTASSIUM 25 MG: 25 TABLET, FILM COATED ORAL at 08:32

## 2024-05-01 RX ADMIN — PANTOPRAZOLE SODIUM 40 MG: 40 TABLET, DELAYED RELEASE ORAL at 06:31

## 2024-05-01 RX ADMIN — METHOCARBAMOL TABLETS 750 MG: 750 TABLET, COATED ORAL at 08:31

## 2024-05-01 NOTE — DISCHARGE SUMMARY
Patient Name: Alesia Resendez  : 1950  MRN: [id number]    Date of Admission: 2024  Date of Discharge:  2024  Primary Care Physician: Epley, James, APRN      Chief Complaint:   Back Pain and Extremity Pain (Left thigh)      Discharge Diagnoses     Active Hospital Problems    Diagnosis  POA    **Low back pain [M54.50]  Yes    Status post laminectomy [Z98.890]  Not Applicable    Left lumbar radiculopathy [M54.16]  Yes    Lumbar back pain with radiculopathy affecting left lower extremity [M54.16]  Yes    Class 1 obesity with body mass index (BMI) of 33.0 to 33.9 in adult [E66.9, Z68.33]  Not Applicable    Prediabetes [R73.03]  Yes      Resolved Hospital Problems    Diagnosis Date Resolved POA    Intractable back pain [M54.9] 2024 Yes    Lumbar back pain with radiculopathy affecting left lower extremity [M54.16] 2024 Yes    Weakness of left lower extremity [R29.898] 2024 Yes        Hospital Course     Ms. Resendez is a 74 y.o. female with a history of lumbar back pain with radiculopathy that presents with left lower extremity weakness, paresthesias, incontinence and decreased mobility.  Neurosurgery has been following the patient this admission.  She is status post epidural on  and continuation of IV steroids.  Her pain persisted with myelogram demonstrating herniated disc at L2-L3.  She is now status post unilateral laminectomy and discectomy on .  She did undergo cardiac clearance with CXR demonstrated tortuous aorta and borderline heart size, pulmonary no focal consolidations. Echocardiogram showed normal EF normal 61 -65% with mild concentric LVH, grade 1 diastolic dysfunction, LA moderately dilated with mild MR with ascending aorta enlargement at 3.7 cm.  Cardiology reviewed echo and feel that 3.7 cm aorta is normal for her body size and recommend good blood pressure control.  It is noted that she is prediabetic with an A1c of 6.3 and has been counseled on diet  and weight loss with close follow-up with PCP after discharge.  Her TSH was also low with normal T4 so recommend repeat labs with PCP.  She has been working with PT and stable for home with home health.  Discharge instructions have been provided by neurosurgery and she will follow-up with Dr. Holt on May 16.        Day of Discharge     Subjective:  Pain is ok. No new events or issues.     Physical Exam:  Temp:  [97.6 °F (36.4 °C)-99 °F (37.2 °C)] 97.6 °F (36.4 °C)  Heart Rate:  [56-62] 59  Resp:  [16] 16  BP: (101-119)/(61-70) 115/68  Body mass index is 33.45 kg/m².  Physical Exam  Vitals and nursing note reviewed.   Constitutional:       Appearance: Normal appearance. She is well-developed. She is obese. She is not ill-appearing.   HENT:      Head: Normocephalic and atraumatic.   Eyes:      Conjunctiva/sclera: Conjunctivae normal.   Cardiovascular:      Rate and Rhythm: Normal rate and regular rhythm.      Heart sounds: Normal heart sounds.   Pulmonary:      Effort: Pulmonary effort is normal.      Breath sounds: Normal breath sounds.   Abdominal:      General: Bowel sounds are normal. There is no distension.      Palpations: Abdomen is soft.      Tenderness: There is no abdominal tenderness.   Musculoskeletal:      Cervical back: Normal range of motion.   Skin:     General: Skin is warm and dry.   Neurological:      General: No focal deficit present.      Mental Status: She is alert and oriented to person, place, and time.   Psychiatric:         Behavior: Behavior normal.         Consultants     Consult Orders (all) (From admission, onward)       Start     Ordered    04/27/24 1755  Inpatient Cardiology Consult  Once        Specialty:  Cardiology  Provider:  Xochitl Edwards MD    04/27/24 1755    04/26/24 0957  Inpatient Internal Medicine Consult  Once        Specialty:  Internal Medicine  Provider:  Angela Vee MD    04/26/24 0957    04/24/24 2332  Inpatient Consult to Case Management Social  Services  Once        Provider:  (Not yet assigned)    04/24/24 2333    04/24/24 1322  Inpatient Hospitalist Consult  Once        Specialty:  Hospitalist  Provider:  Susana Marie MD    04/24/24 1322    04/22/24 1243  Inpatient Anesthesia Pain Management Consult  Once        Specialty:  Pain Medicine  Provider:  (Not yet assigned)    04/22/24 1243    04/22/24 0609  Inpatient Neurosurgery Consult  Once        Specialty:  Neurosurgery  Provider:  Kory Rivera IV, MD    04/22/24 0610                  Procedures     Left lumbar 2 to lumbar 3 laminectomy and discectomy with metrx    Imaging Results (All)       Procedure Component Value Units Date/Time    FL C Arm During Surgery [[id number]] Resulted: 04/29/24 1139     Updated: 04/29/24 1139    Narrative:      This procedure was auto-finalized with no dictation required.    XR Chest 1 View [[id number]] Collected: 04/26/24 1249     Updated: 04/26/24 1253    Narrative:      XR CHEST 1 VW-     HISTORY: Female who is 74 years-old, preoperative evaluation     TECHNIQUE: Frontal view of the chest     COMPARISON: 4/29/2019     FINDINGS: The heart size is borderline. Aorta is tortuous. Pulmonary  vasculature is unremarkable. No focal pulmonary consolidation, pleural  effusion, or pneumothorax. No acute osseous process.       Impression:      No focal pulmonary consolidation. Borderline heart size.  Tortuous aorta. Follow-up as clinically indicated.           This report was finalized on 4/26/2024 12:50 PM by Dr. Jared Reich M.D on Workstation: [id number]       CT Lumbar Spine With Intrathecal Contrast [[id number]] Collected: 04/25/24 1035     Updated: 04/25/24 1656    Narrative:      POSTMYELOGRAM CT SCAN OF THE LUMBAR SPINE INCLUDING RECONSTRUCTION  IMAGES 04/25/2024     HISTORY: Low back pain. Left leg pain. Lower extremity weakness.     Following the lumbar spine myelogram, postmyelogram CT scan was obtained  from the lower thoracic spine to the sacrum. Sagittal and  coronal  reconstruction images were reviewed.     The T11-T12 intervertebral disc appears within normal limits. No canal  stenosis is seen.     The T12-L1 intervertebral disc appears within normal limits with some  minimal facet joint arthropathy. No significant canal stenosis is seen.     At L1-2 there is minimal broad-based disc bulge with mild bilateral  foraminal narrowing. There is mild facet joint arthropathy and  ligamentum flavum hypertrophy and minimal concentric canal stenosis.     At L2-3 there is 6 mm to 7 mm retrolisthesis of L2 on L3 with some  uncovering of the intervertebral disc and mild to moderate broad-based  disc bulge. There is bilateral foraminal narrowing. There is facet joint  arthropathy ligamentum flavum hypertrophy and moderate canal stenosis at  L2-3.     At L3-4 there is 2 mm to 3 mm retrolisthesis of L3 on L4 with mild to  moderate broad-based disc bulge. There is bilateral foraminal narrowing.  There is facet joint arthropathy, ligamentum flavum hypertrophy and  moderately severe canal stenosis at L3-4.     At L4-5 there is mild anterolisthesis of L4 on L5 with uncovering of the  intervertebral disc and mild disc bulge. There is bilateral foraminal  narrowing, facet joint arthropathy and moderately severe canal stenosis  at L4-5.     Vacuum disc phenomenon with mild disc osteophyte complex at L5-S1 is  seen. There is bilateral facet joint arthropathy and mild canal stenosis  at L5-S1.     Sagittal images show no fractures. Lower cord appears normal. No other  subluxation is seen.     The lumbar spine myelogram images show minimal canal stenosis at L1-2  with moderately severe canal stenosis at L2-3, L3-4, L4-5 and L5-S1.  There is mass effect on the exiting nerve root sleeves at these levels  as well.       Impression:      1. Multilevel lumbar degenerative disease including multilevel canal  stenosis. Please see full discussion above.        Radiation dose reduction techniques were  utilized, including automated  exposure control and exposure modulation based on body size.        This report was finalized on 4/25/2024 4:53 PM by Dr. Jac Wood M.D on Workstation: [id number]       IR Myelogram Lumbar Spine [[id number]] Collected: 04/25/24 1008     Updated: 04/25/24 1439    Narrative:      LUMBAR SPINE MYELOGRAM 04/25/2024      HISTORY:Low back pain. Left leg pain.        The intrathecal contrast was injected by Dr. Garvey. Spot images for the  lumbar spine were obtained and were reviewed by Dr. Wood. The  patient was sent for the post myelogram CT scan. This will be dictated  separately.     FLUOROSCOPY TIME:  48 seconds ,  12  images. 3628 dose area product.     This report was finalized on 4/25/2024 2:21 PM by Dr. Jac Wood M.D on Workstation: [id number]       XR Spine Lumbar Complete With Flex & Ext [[id number]] Collected: 04/25/24 0828     Updated: 04/25/24 0931    Narrative:      XR SPINE LUMBAR COMPLETE W FLEX EXT-     CLINICAL: Low back pain with radiculopathy.     COMPARISON: Examination 06/11/2020.     NOTE: Examination performed after lumbar myelogram, refer to  myelogram/CT report for full evaluation.     FINDINGS: There is 8 mm of anterior listhesis of L4 on L5. There is  nominal, 3-4 mm, retrolisthesis of L1 on L2, L2 on L3 and L3 on L4. No  interval change in the neutral, flexion and extension views. No  instability with flexion/extension. There is multilevel disc  degeneration involving the lower thoracic as well as the lumbar spine.  No compression deformity seen. Multilevel spur and osteophyte formation.  The appearance of the lumbar spine is quite similar to 2020. The  remainder is unremarkable.     This report was finalized on 4/25/2024 9:28 AM by Dr. Willy Ennis M.D  on Workstation: [id number]       FL Guided Pain Management Spine [[id number]] Resulted: 04/23/24 0917     Updated: 04/23/24 0917    Narrative:      This procedure was auto-finalized with no  dictation required.    MRI Lumbar Spine With & Without Contrast [[id number]] Collected: 04/22/24 1118     Updated: 04/23/24 0605    Narrative:      MRI EXAMINATION OF THE LUMBAR SPINE WITH AND WITHOUT CONTRAST     HISTORY: Back pain, left radiculopathy, breast cancer, recent epidural  steroid injection.     COMPARISON: MRI lumbar spine 04/25/2023.     FINDINGS: There is moderate-to-severe loss of disc height at L5-S1,  grade 1 anterolisthesis of L4 upon L5 and prominence of the posterior  aspect of the disc and endplates at T12-L1, L2-3 and L3-L4.      T12-L1: A mild broad-based disc osteophyte complex is present with no  evidence of herniation.     L1-L2:  A mild broad-based disc osteophyte complex is present resulting  in mild flattening of the ventral surface of the thecal sac.     L2-L3: A broad-based disc osteophyte complex is present which is more  prominent to the left of midline where there is a superimposed disc  protrusion. This results in moderate-to-severe if not severe lateral  recess narrowing on the left. Mild foraminal stenosis is present on the  left secondary to extension of a disc osteophyte complex into the neural  foramen. Disc material approaches the left L2 nerve as it exits the  neural foramen.     L3-L4: There is moderate canal stenosis secondary to mild-to-moderate  facet and ligamentum flavum hypertrophy and a central disc osteophyte  complex which also includes a central disc protrusion. There is mild and  moderate foraminal stenosis on the right and left respectively. Moderate  lateral recess narrowing is present on the right. Disc material  approaches but does not definitively involve the left L3 nerve within  the neural foramen.     L4-L5: There is fusion of the facets bilaterally. Moderate foraminal  stenosis and moderate-to-severe lateral recess narrowing is present  bilaterally secondary to anterolisthesis of L5 upon S1, loss of disc  height and extension of disc material into the  neural foramen.     L5-S1: Moderate-to-severe facet degenerative disease is present  bilaterally. There is moderate and moderate-to-severe foraminal stenosis  on the right and left respectively secondary to loss of disc height and  extension of a disc osteophyte complex into the neural foramen. There is  mild canal stenosis centrally secondary to posterior element  degenerative disease and a broad-based disc osteophyte complex. There is  moderate-to-severe lateral recess narrowing bilaterally.       Impression:      Multilevel degenerative disease involving the lumbar spine  as described in detail above with no evidence of a focal disc  herniation. This includes fusion of the facets bilaterally at L4-L5,  loss of disc height at L5-S1, anterolisthesis of L4 upon L5 and  multilevel disc osteophyte complexes. Spinal stenosis and lateral recess  narrowing is most prominent at L4-L5 where there is moderate central  canal stenosis and moderate-to-severe lateral recess narrowing  bilaterally. There is also moderate-to-severe if not severe lateral  recess narrowing to the left at L2-L3 and moderate canal stenosis and  lateral recess narrowing bilaterally at L3-L4. Multilevel facet  degenerative disease is appreciated as well as multilevel foraminal  stenosis. Foraminal stenosis on the left is most prominent at L3-L4 and  L5-S1 and to the right at L4-L5. Spinal stenosis is accentuated by  congenitally shortened pedicles. See above.           This report was finalized on 4/23/2024 6:02 AM by Dr. Jose Velasquez M.D  on Workstation: [id number]       CT Lumbar Spine Without Contrast [[id number]] Collected: 04/22/24 0614     Updated: 04/22/24 0614    Narrative:        Patient: ELIZABETH MCFARLAND  Time Out: 06:13  Exam(s): CT L SPINE     EXAM:    CT Lumbar Spine Without Intravenous Contrast    CLINICAL HISTORY:     Reason for exam: low back pain, left leg pain.    TECHNIQUE:    Axial computed tomography images of the lumbar spine without    intravenous contrast.  This CT exam was performed according to the   principle of ALARA (As Low As Reasonably Achievable) by using one or more   of the following dose reduction techniques: automated exposure control,   adjustment of the mA and or kV according to patient size, and or use of   iterative reconstruction technique.    COMPARISON:    No relevant prior studies available.    FINDINGS:    Vertebrae:   Severe facet hypertrophy most extreme from L3-S1.  No   acute fracture.  Grade 1 anterolisthesis L4-5.      Discs spinal canal neural foramina:    L2-3 moderate sized posterior disc protrusion and facet hypertrophy   causing moderate severe left neural foraminal stenosis and mild moderate   right neural foraminal stenosis.    Large left eccentric posterior disc protrusion at L3-4 and severe facet   hypertrophy causing severe left neural foramen and lateral recess   stenosis.     Grade 1 anterolisthesis at L4-5, small posterior disc protrusion and   severe facet hypertrophy causing severe central canal and bilateral   neural foraminal stenosis.     Moderate large posterior disc protrusion at L5-S1 as well as severe   facet hypertrophy causing severe central canal and bilateral neural   foraminal stenosis.        Soft tissues: Aortic atherosclerosis.  Cholecystectomy..    IMPRESSION:       1.  No acute fracture.  Severe lumbar spondylosis.  2.  Multilevel severe central canal and neural foraminal stenosis as   detailed above.      Impression:          Electronically signed by Ankit Courtney MD on 04-22-24 at 0613            Results for orders placed during the hospital encounter of 04/22/24    Adult Transthoracic Echo Complete W/ Cont if Necessary Per Protocol    Interpretation Summary    The left ventricular cavity is mildly dilated.    Left ventricular systolic function is normal. Left ventricular ejection fraction appears to be 61 - 65%.    Left ventricular wall thickness is consistent with mild concentric  hypertrophy.    Left ventricular diastolic function is consistent with (grade I) impaired relaxation.    Normal right ventricular cavity size and systolic function noted.    The left atrial cavity is moderately dilated.    Mild mitral valve regurgitation is present.    Insufficient TR velocity profile to estimate the right ventricular systolic pressure.    The ascending aorta is mildly enlarged at 3.7 cm    There is no evidence of pericardial effusion.    Pertinent Labs     Results from last 7 days   Lab Units 05/01/24  0542 04/30/24  0404 04/29/24  0545 04/28/24  0539   WBC 10*3/mm3 10.95* 15.40* 10.10 8.73   HEMOGLOBIN g/dL 15.0 15.8 16.4* 16.1*   PLATELETS 10*3/mm3 186 220 203 238     Results from last 7 days   Lab Units 04/30/24  0404 04/29/24  0853 04/29/24  0545 04/28/24  0539 04/26/24  0558   SODIUM mmol/L 138  --  136  136 137 139   POTASSIUM mmol/L 4.5 4.2 6.4*  6.3* 4.9 4.0   CHLORIDE mmol/L 99  --  98  98 98 100   CO2 mmol/L 30.4*  --  28.0  29.9* 30.0* 28.4   BUN mg/dL 21  --  25*  23 25* 23   CREATININE mg/dL 0.66  --  0.77  0.75 0.74 0.67   GLUCOSE mg/dL 177*  --  181*  181* 182* 166*   EGFR mL/min/1.73 92.2  --  81.1  83.7 85.0 91.8     Results from last 7 days   Lab Units 04/29/24  0545 04/25/24  0937   ALBUMIN g/dL 3.6 3.8   BILIRUBIN mg/dL 0.6 0.4   ALK PHOS U/L 79 68   AST (SGOT) U/L 25 39*   ALT (SGPT) U/L 49* 47*     Results from last 7 days   Lab Units 04/30/24  0404 04/29/24  0545 04/28/24  0539 04/26/24  0558 04/25/24  0937   CALCIUM mg/dL 8.9 9.6  9.4 9.1 8.7 9.2   ALBUMIN g/dL  --  3.6  --   --  3.8           Results from last 7 days   Lab Units 04/28/24  0539   CHOLESTEROL mg/dL 98   TRIGLYCERIDES mg/dL 63   HDL CHOL mg/dL 45   LDL CHOL mg/dL 39             Test Results Pending at Discharge       Discharge Details        Discharge Medications        New Medications        Instructions Start Date   cephalexin 500 MG capsule  Commonly known as: KEFLEX   500 mg, Oral, 4 Times Daily       naloxone 4 MG/0.1ML nasal spray  Commonly known as: NARCAN   Call 911. Don't prime. [place] in 1 nostril for overdose. Repeat in 2-3 minutes in other nostril if no or minimal breathing/responsiveness.      polyethylene glycol 17 g packet  Commonly known as: MIRALAX   17 g, Oral, Daily PRN      sennosides-docusate 8.6-50 MG per tablet  Commonly known as: PERICOLACE   2 tablets, Oral, 2 Times Daily      tiZANidine 4 MG tablet  Commonly known as: ZANAFLEX   4 mg, Oral, Every 6 Hours PRN             Changes to Medications        Instructions Start Date   HYDROcodone-acetaminophen 5-325 MG per tablet  Commonly known as: Norco  What changed:   when to take this  reasons to take this   1 tablet, Oral, Every 4 Hours PRN      oxybutynin 5 MG tablet  Commonly known as: DITROPAN  What changed:   how much to take  how to take this  when to take this   TAKE 1 TABLET BY MOUTH  TWICE DAILY             Continue These Medications        Instructions Start Date   amLODIPine 10 MG tablet  Commonly known as: NORVASC   10 mg, Oral, Daily      atenolol 25 MG tablet  Commonly known as: TENORMIN   25 mg, Oral, Daily      atorvastatin 20 MG tablet  Commonly known as: LIPITOR   20 mg, Oral, Daily      buPROPion  MG 24 hr tablet  Commonly known as: WELLBUTRIN XL   150 mg, Oral, Daily      celecoxib 200 MG capsule  Commonly known as: CeleBREX   200 mg, Oral, Daily      escitalopram 5 MG tablet  Commonly known as: LEXAPRO   5 mg, Oral, Daily      fluticasone 50 MCG/ACT nasal spray  Commonly known as: FLONASE   2 sprays, Nasal, Daily      hydroCHLOROthiazide 25 MG tablet   25 mg, Oral, Daily      losartan 25 MG tablet  Commonly known as: COZAAR   25 mg, Oral, Daily      methylPREDNISolone 4 MG dose pack  Commonly known as: MEDROL   Take as directed on package instructions.      omeprazole 20 MG capsule  Commonly known as: priLOSEC   20 mg, Oral, Daily      pregabalin 25 MG capsule  Commonly known as: LYRICA   TAKE 1 CAPSULE BY MOUTH 3  TIMES  DAILY               Allergies   Allergen Reactions    Morphine Itching       Discharge Disposition:  Home-Health Care Svc      Discharge Diet:  Diet Order   Procedures    Diet: Regular/House; Fluid Consistency: Thin (IDDSI 0)       Discharge Activity:   Activity Instructions       Activity as Tolerated              CODE STATUS:    Code Status and Medical Interventions:   Ordered at: 04/29/24 1326     Code Status (Patient has no pulse and is not breathing):    CPR (Attempt to Resuscitate)     Medical Interventions (Patient has pulse or is breathing):    Full Support       Future Appointments   Date Time Provider Department Center   5/16/2024  2:15 PM Moise Garvey MD [facility]   8/14/2024  8:50 AM [facility]   8/21/2024  8:30 AM Epley, James, APRN [facility]   9/5/2024  8:20 AM LAB CHAIR 1 [facility]   9/5/2024  8:40 AM Peyton Keyes MD PhD [facility]     Additional Instructions for the Follow-ups that You Need to Schedule       Ambulatory Referral to Home Health   As directed      Face to Face Visit Date: 5/1/2024   Follow-up provider for Plan of Care?: I treated the patient in an acute care facility and will not continue treatment after discharge.   Follow-up provider: HARRIS MELARA [[id number]]   Reason/Clinical Findings: S/p laminectomy   Describe mobility limitations that make leaving home difficult: impaired mobility and endurance   Nursing/Therapeutic Services Requested: Skilled Nursing Occupational Therapy Physical Therapy   Skilled nursing orders: Medication education Pain management   PT orders: Gait Training Home safety assessment Strengthening Therapeutic exercise   Weight Bearing Status: As Tolerated   Frequency: Other               Contact information for follow-up providers       Moise Garvey MD. Go in 2 week(s).    Specialty: Neurosurgery  Why: Post op appointment scheduled for May 16, 2024 at 2:15 pm. Call if you  are unable to keep this appointment.  Contact information:  [address]  [address]  [facility] [address]  [phone]               Epley, James, APRN .    Specialties: Nurse Practitioner, Family Medicine  Contact information:  [address]  [address]  [facility] [id number] [phone]                       Contact information for after-discharge care       Home Medical Care       [facility] .    Service: Home Rehabilitation  Contact information:  [email or web address]  [facility] [id number] [phone]                                   Additional Instructions for the Follow-ups that You Need to Schedule       Ambulatory Referral to Home Health   As directed      Face to Face Visit Date: 5/1/2024   Follow-up provider for Plan of Care?: I treated the patient in an acute care facility and will not continue treatment after discharge.   Follow-up provider: HARRIS MELARA [[id number]]   Reason/Clinical Findings: S/p laminectomy   Describe mobility limitations that make leaving home difficult: impaired mobility and endurance   Nursing/Therapeutic Services Requested: Skilled Nursing Occupational Therapy Physical Therapy   Skilled nursing orders: Medication education Pain management   PT orders: Gait Training Home safety assessment Strengthening Therapeutic exercise   Weight Bearing Status: As Tolerated   Frequency: Other            Time Spent on Discharge:  Greater than 45 minutes      KYLIE Contreras  [facility]  05/01/24  10:17 EDT

## 2024-05-01 NOTE — PLAN OF CARE
Goal Outcome Evaluation:           Progress: improving  Outcome Evaluation: Patient discharging home today with HH.

## 2024-05-01 NOTE — CASE MANAGEMENT/SOCIAL WORK
Case Management Discharge Note      Final Note: Home with [facility]. Orders reviewed no further discharge needs.         Selected Continued Care - Discharged on 5/1/2024 Admission date: 4/22/2024 - Discharge disposition: Home-Health Care Svc      Destination    No services have been selected for the patient.                Durable Medical Equipment    No services have been selected for the patient.                Dialysis/Infusion    No services have been selected for the patient.                Home Medical Care Coordination complete.      Service Provider Selected Services Address Phone Fax Patient Preferred    [facility] [address] [place] [address], [address] [address] [phone] --              Therapy    No services have been selected for the patient.                Community Resources    No services have been selected for the patient.                Community & DME    No services have been selected for the patient.                         Final Discharge Disposition Code: 06 - home with home health care

## 2024-05-01 NOTE — SIGNIFICANT NOTE
05/01/24 1057   OTHER   Discipline physical therapist   Rehab Time/Intention   Session Not Performed   (pt ambulated yesterday in [place] w/ PT.  Today pt reports already ambulated, feeling fine, no concerns for DC  home, owns a walker if needed.  DC PT.)

## 2024-05-01 NOTE — OUTREACH NOTE
Prep Survey      Flowsheet Row Responses   [facility] patient discharged from? [place]   Is LACE score < 7 ? No   Eligibility [facility]   Date of Admission 04/22/24   Date of Discharge 05/01/24   Discharge Disposition Home-Health Care [facility]   Discharge diagnosis Low back pain, Left lumbar 2 to lumbar 3 laminectomy and discectomy with metrx   Does the patient have one of the following disease processes/diagnoses(primary or secondary)? General Surgery   Does the patient have Home health ordered? Yes   What is the Home health agency?  [facility]   Is there a DME ordered? No   Prep survey completed? Yes            Mckayla JONES - Registered Nurse

## 2024-05-02 ENCOUNTER — TRANSITIONAL CARE MANAGEMENT TELEPHONE ENCOUNTER (OUTPATIENT)
Dept: CALL CENTER | Facility: HOSPITAL | Age: 74
End: 2024-05-02
Payer: MEDICARE

## 2024-05-02 NOTE — OUTREACH NOTE
Call Center TCM Note      Flowsheet Row Responses   [facility] [place] patient discharged from? [place]   Does the patient have one of the following disease processes/diagnoses(primary or secondary)? General Surgery   TCM attempt successful? Yes   Call start time 1348   Discharge diagnosis Low back pain, Left lumbar 2 to lumbar 3 laminectomy and discectomy with metrx   Meds reviewed with patient/caregiver? Yes   Is the patient having any side effects they believe may be caused by any medication additions or changes? No   Does the patient have all medications related to this admission filled (includes all antibiotics, pain medications, etc.) Yes   Is the patient taking all medications as directed (includes completed medication regime)? Yes   Does the patient have an appointment with their PCP within 7-14 days of discharge? No   Nursing Interventions Patient declined scheduling/rescheduling appointment at this time, Patient desires to follow up with specialty only   What is the Home health agency?  [facility]   Psychosocial issues? No   Did the patient receive a copy of their discharge instructions? Yes   Nursing interventions Reviewed instructions with patient   What is the patient's perception of their health status since discharge? Improving   Is the patient /caregiver able to teach back basic post-op care? Drive as instructed by MD in discharge instructions, Take showers only when approved by MD-sponge bathe until then, No tub bath, swimming, or hot tub until instructed by MD, Keep incision areas clean,dry and protected, Lifting as instructed by MD in discharge instructions   Is the patient/caregiver able to teach back signs and symptoms of incisional infection? Increased redness, swelling or pain at the incisonal site, Increased drainage or bleeding, Incisional warmth, Fever, Pus or odor from incision   Is the patient/caregiver able to teach back steps to recovery at home? Set small, achievable goals for return to  baseline health, Rest and rebuild strength, gradually increase activity   If the patient is a current smoker, are they able to teach back resources for cessation? Not a smoker   Is the patient/caregiver able to teach back the hierarchy of who to call/visit for symptoms/problems? PCP, Specialist, Home health nurse, Urgent Care, ED, 911 Yes   TCM call completed? Yes   Would this patient benefit from a Referral to Amb Social Work? No   Is the patient interested in additional calls from an ambulatory ? No            Karen Carr LPN    5/2/2024, 13:56 EDT

## 2024-05-14 NOTE — PROGRESS NOTES
Subjective   Patient ID: Alesia Resendez is a 74 y.o. female is here today via telephone for 2 week PO follow-up. Patient had a Left lumbar 2 to lumbar 3 laminectomy and discectomy with metrx on 4/29/2024    You have chosen to receive care through a telephone visit. Do you consent to use a telephone visit for your medical care today? Yes     We had a video visit today.  The patient was at home and I was in the office.  We talked for 5 minutes.    History of Present Illness    I talked to this patient today on the phone.  She is doing well.  She still has some pain in her legs but for the most part it feels better.  She is able to get up and walk around some as well.  She has been walking about a mile a day.    The following portions of the patient's history were reviewed and updated as appropriate: allergies, current medications, past family history, past medical history, past social history, past surgical history, and problem list.    Review of Systems    I reviewed the review of systems listed by the patient and discussed by my MA    Objective     Tobacco Use: Low Risk  (5/16/2024)    Patient History     Smoking Tobacco Use: Never     Smokeless Tobacco Use: Never     Passive Exposure: Not on file          Physical Exam  Neurological:      Mental Status: She is alert and oriented to person, place, and time.       Neurologic Exam     Mental Status   Oriented to person, place, and time.           Assessment & Plan   Independent Review of Radiographic Studies:      I personally reviewed the images from the following studies.    There is no new imaging to review    Medical Decision Making:      I told the patient that from my point of view we will start her on physical therapy.  She can start sitting 3-4 times a day for 20 or 30 minutes at a time.  We will see her back in the office in about a month.    Diagnoses and all orders for this visit:    1. Follow-up examination following surgery (Primary)  -     Ambulatory  Referral to Physical Therapy      Return in about 4 weeks (around 6/13/2024).

## 2024-05-16 ENCOUNTER — CLINICAL SUPPORT (OUTPATIENT)
Dept: NEUROSURGERY | Facility: CLINIC | Age: 74
End: 2024-05-16
Payer: MEDICARE

## 2024-05-16 DIAGNOSIS — Z09 FOLLOW-UP EXAMINATION FOLLOWING SURGERY: Primary | ICD-10-CM

## 2024-05-16 PROCEDURE — 99024 POSTOP FOLLOW-UP VISIT: CPT | Performed by: NEUROLOGICAL SURGERY

## 2024-06-24 ENCOUNTER — TELEPHONE (OUTPATIENT)
Dept: NEUROSURGERY | Facility: CLINIC | Age: 74
End: 2024-06-24

## 2024-06-24 NOTE — PROGRESS NOTES
Subjective   Patient ID: Alesia Resendez is a 74 y.o. female is here today for 4 week PO follow-up. Patient had a Left lumbar 2 to lumbar 3 laminectomy and discectomy with metrx done on 4/29/2024    Today patient states she has Numbness and weakness in the L leg     History of Present Illness    This patient returns today.  She is doing quite well.  She has a little numbness in the medial aspect of her left knee but otherwise feels okay.    The following portions of the patient's history were reviewed and updated as appropriate: allergies, current medications, past family history, past medical history, past social history, past surgical history, and problem list.    Review of Systems   Constitutional:  Negative for chills and fever.   HENT:  Negative for congestion.    Genitourinary:  Negative for difficulty urinating.   Musculoskeletal:  Negative for back pain, gait problem and myalgias.   Neurological:  Positive for weakness and numbness.        L leg        I reviewed the review of systems listed by the patient and discussed by my MA    Objective     There were no vitals filed for this visit.  There is no height or weight on file to calculate BMI.    Tobacco Use: Low Risk  (6/25/2024)    Patient History     Smoking Tobacco Use: Never     Smokeless Tobacco Use: Never     Passive Exposure: Not on file          Physical Exam  Neurological:      Mental Status: She is alert and oriented to person, place, and time.       Neurologic Exam     Mental Status   Oriented to person, place, and time.           Assessment & Plan   Independent Review of Radiographic Studies:      I personally reviewed the images from the following studies.    There is no new imaging to review    Medical Decision Making:      I told the patient that from my point of view we do not need to do anything else.  She should avoid strenuously heavy lifting and lose some weight.  I will see her back on an as-needed basis.    Diagnoses and all orders for  this visit:    1. Follow-up examination following surgery (Primary)      Return if symptoms worsen or fail to improve.

## 2024-06-25 ENCOUNTER — OFFICE VISIT (OUTPATIENT)
Dept: NEUROSURGERY | Facility: CLINIC | Age: 74
End: 2024-06-25
Payer: MEDICARE

## 2024-06-25 DIAGNOSIS — Z09 FOLLOW-UP EXAMINATION FOLLOWING SURGERY: Primary | ICD-10-CM

## 2024-06-25 PROCEDURE — 1160F RVW MEDS BY RX/DR IN RCRD: CPT | Performed by: NEUROLOGICAL SURGERY

## 2024-06-25 PROCEDURE — 1159F MED LIST DOCD IN RCRD: CPT | Performed by: NEUROLOGICAL SURGERY

## 2024-06-25 PROCEDURE — 99024 POSTOP FOLLOW-UP VISIT: CPT | Performed by: NEUROLOGICAL SURGERY

## 2024-07-22 DIAGNOSIS — M48.061 SPINAL STENOSIS OF LUMBAR REGION, UNSPECIFIED WHETHER NEUROGENIC CLAUDICATION PRESENT: ICD-10-CM

## 2024-07-22 DIAGNOSIS — M46.1 SACROILIITIS: ICD-10-CM

## 2024-07-22 DIAGNOSIS — M54.16 LUMBAR RADICULOPATHY: ICD-10-CM

## 2024-07-23 RX ORDER — PREGABALIN 25 MG/1
CAPSULE ORAL
Qty: 90 CAPSULE | Refills: 0 | Status: SHIPPED | OUTPATIENT
Start: 2024-07-23

## 2024-08-14 ENCOUNTER — TELEPHONE (OUTPATIENT)
Dept: FAMILY MEDICINE CLINIC | Facility: CLINIC | Age: 74
End: 2024-08-14
Payer: MEDICARE

## 2024-08-14 DIAGNOSIS — E78.2 MIXED HYPERLIPIDEMIA: ICD-10-CM

## 2024-08-14 DIAGNOSIS — R73.9 HYPERGLYCEMIA: ICD-10-CM

## 2024-08-14 DIAGNOSIS — I10 ESSENTIAL HYPERTENSION: Primary | ICD-10-CM

## 2024-08-14 DIAGNOSIS — I10 ESSENTIAL HYPERTENSION: ICD-10-CM

## 2024-08-14 LAB
ALBUMIN SERPL-MCNC: 4.3 G/DL (ref 3.5–5.2)
ALBUMIN/GLOB SERPL: 2 G/DL
ALP SERPL-CCNC: 87 U/L (ref 39–117)
ALT SERPL-CCNC: 32 U/L (ref 1–33)
AST SERPL-CCNC: 33 U/L (ref 1–32)
BILIRUB SERPL-MCNC: 0.5 MG/DL (ref 0–1.2)
BUN SERPL-MCNC: 16 MG/DL (ref 8–23)
BUN/CREAT SERPL: 26.7 (ref 7–25)
CALCIUM SERPL-MCNC: 9.9 MG/DL (ref 8.6–10.5)
CHLORIDE SERPL-SCNC: 101 MMOL/L (ref 98–107)
CHOLEST SERPL-MCNC: 100 MG/DL (ref 0–200)
CO2 SERPL-SCNC: 31.2 MMOL/L (ref 22–29)
CREAT SERPL-MCNC: 0.6 MG/DL (ref 0.57–1)
EGFRCR SERPLBLD CKD-EPI 2021: 94.3 ML/MIN/1.73
GLOBULIN SER CALC-MCNC: 2.2 GM/DL
GLUCOSE SERPL-MCNC: 121 MG/DL (ref 65–99)
HBA1C MFR BLD: 5.7 % (ref 4.8–5.6)
HDLC SERPL-MCNC: 40 MG/DL (ref 40–60)
LDLC SERPL CALC-MCNC: 31 MG/DL (ref 0–100)
POTASSIUM SERPL-SCNC: 4.1 MMOL/L (ref 3.5–5.2)
PROT SERPL-MCNC: 6.5 G/DL (ref 6–8.5)
SODIUM SERPL-SCNC: 141 MMOL/L (ref 136–145)
TRIGL SERPL-MCNC: 181 MG/DL (ref 0–150)
VLDLC SERPL CALC-MCNC: 29 MG/DL (ref 5–40)

## 2024-08-21 ENCOUNTER — OFFICE VISIT (OUTPATIENT)
Dept: FAMILY MEDICINE CLINIC | Facility: CLINIC | Age: 74
End: 2024-08-21
Payer: MEDICARE

## 2024-08-21 VITALS
RESPIRATION RATE: 16 BRPM | HEIGHT: 68 IN | OXYGEN SATURATION: 96 % | SYSTOLIC BLOOD PRESSURE: 138 MMHG | HEART RATE: 70 BPM | WEIGHT: 241.5 LBS | DIASTOLIC BLOOD PRESSURE: 88 MMHG | BODY MASS INDEX: 36.6 KG/M2 | TEMPERATURE: 98.7 F

## 2024-08-21 DIAGNOSIS — Z00.00 MEDICARE ANNUAL WELLNESS VISIT, SUBSEQUENT: Primary | ICD-10-CM

## 2024-08-21 DIAGNOSIS — Z98.890 STATUS POST LAMINECTOMY: ICD-10-CM

## 2024-08-21 DIAGNOSIS — R93.1 ECHOCARDIOGRAM ABNORMAL: ICD-10-CM

## 2024-08-21 DIAGNOSIS — I10 ESSENTIAL HYPERTENSION: ICD-10-CM

## 2024-08-21 DIAGNOSIS — E78.2 MIXED HYPERLIPIDEMIA: ICD-10-CM

## 2024-08-21 DIAGNOSIS — E66.01 CLASS 2 SEVERE OBESITY DUE TO EXCESS CALORIES WITH SERIOUS COMORBIDITY AND BODY MASS INDEX (BMI) OF 36.0 TO 36.9 IN ADULT: ICD-10-CM

## 2024-08-21 PROCEDURE — 1125F AMNT PAIN NOTED PAIN PRSNT: CPT | Performed by: NURSE PRACTITIONER

## 2024-08-21 PROCEDURE — 3079F DIAST BP 80-89 MM HG: CPT | Performed by: NURSE PRACTITIONER

## 2024-08-21 PROCEDURE — 99213 OFFICE O/P EST LOW 20 MIN: CPT | Performed by: NURSE PRACTITIONER

## 2024-08-21 PROCEDURE — 3075F SYST BP GE 130 - 139MM HG: CPT | Performed by: NURSE PRACTITIONER

## 2024-08-21 PROCEDURE — G0439 PPPS, SUBSEQ VISIT: HCPCS | Performed by: NURSE PRACTITIONER

## 2024-08-21 RX ORDER — OFLOXACIN 3 MG/ML
SOLUTION/ DROPS OPHTHALMIC
COMMUNITY
Start: 2024-06-30

## 2024-08-21 RX ORDER — CLOBETASOL PROPIONATE 0.5 MG/ML
SOLUTION TOPICAL
COMMUNITY
Start: 2024-04-26

## 2024-08-21 RX ORDER — SEMAGLUTIDE 0.25 MG/.5ML
0.25 INJECTION, SOLUTION SUBCUTANEOUS WEEKLY
Qty: 2 ML | Refills: 0 | Status: SHIPPED | OUTPATIENT
Start: 2024-08-21

## 2024-08-21 NOTE — ASSESSMENT & PLAN NOTE
Lipid abnormalities are stable    Plan:  Continue same medication/s without change.      Discussed medication dosage, use, side effects, and goals of treatment in detail.    Counseled patient on lifestyle modifications to help control hyperlipidemia.     Patient Treatment Goals:   LDL goal is less than 70    Followup in 6 months.   Called to speak with the patient and she states she was feeling bad and she was running a temp and I

## 2024-08-21 NOTE — PROGRESS NOTES
Subjective   The ABCs of the Annual Wellness Visit  Medicare Wellness Visit      Alesia Resendez is a 74 y.o. patient who presents for a Medicare Wellness Visit.    The following portions of the patient's history were reviewed and   updated as appropriate: allergies, current medications, past family history, past medical history, past social history, past surgical history, and problem list.    Compared to one year ago, the patient's physical   health is the same.  Compared to one year ago, the patient's mental   health is the same.    Recent Hospitalizations:  This patient has had a [facility] admission record on file within the last 365 days.  Current Medical Providers:  Patient Care Team:  Epley, James, APRN as PCP - General (Family Medicine)  Virgil Moeller II, MD as Consulting Physician (Radiology)  Diallo Eisenberg MD as Referring Physician (Breast Surgery)  Peyton Keyes MD PhD as Consulting Physician (Hematology and Oncology)  Rimma Keller APRN as Nurse Practitioner (Nurse Practitioner)    Outpatient Medications Prior to Visit   Medication Sig Dispense Refill    amLODIPine (NORVASC) 10 MG tablet TAKE 1 TABLET BY MOUTH DAILY 90 tablet 3    atenolol (TENORMIN) 25 MG tablet TAKE 1 TABLET BY MOUTH DAILY 90 tablet 3    atorvastatin (LIPITOR) 20 MG tablet TAKE 1 TABLET BY MOUTH ONCE  DAILY 90 tablet 3    buPROPion XL (WELLBUTRIN XL) 150 MG 24 hr tablet TAKE 1 TABLET BY MOUTH DAILY 90 tablet 3    celecoxib (CeleBREX) 200 MG capsule TAKE 1 CAPSULE BY MOUTH DAILY 90 capsule 1    clobetasol (TEMOVATE) 0.05 % external solution       escitalopram (LEXAPRO) 5 MG tablet TAKE 1 TABLET BY MOUTH DAILY 90 tablet 3    fluticasone (FLONASE) 50 MCG/ACT nasal spray 2 sprays into the nostril(s) as directed by provider Daily. 48 mL 3    hydroCHLOROthiazide (HYDRODIURIL) 25 MG tablet TAKE 1 TABLET BY MOUTH DAILY 90 tablet 3    losartan (COZAAR) 25 MG tablet TAKE 1 TABLET BY MOUTH DAILY 90 tablet 3     naloxone (NARCAN) 4 MG/0.1ML nasal spray Call 911. Don't prime. Soquel in 1 nostril for overdose. Repeat in 2-3 minutes in other nostril if no or minimal breathing/responsiveness. 2 each 0    ofloxacin (OCUFLOX) 0.3 % ophthalmic solution       omeprazole (priLOSEC) 20 MG capsule TAKE 1 CAPSULE BY MOUTH DAILY 90 capsule 3    oxybutynin (DITROPAN) 5 MG tablet TAKE 1 TABLET BY MOUTH  TWICE DAILY (Patient taking differently: Daily.) 180 tablet 3    pregabalin (LYRICA) 25 MG capsule TAKE 1 CAPSULE BY MOUTH 3 TIMES  DAILY 90 capsule 0    methylPREDNISolone (MEDROL) 4 MG dose pack Take as directed on package instructions. 21 tablet 0    polyethylene glycol (MIRALAX) 17 g packet Take 17 g by mouth Daily As Needed (Use if senna-docusate is ineffective).      sennosides-docusate (PERICOLACE) 8.6-50 MG per tablet Take 2 tablets by mouth 2 (Two) Times a Day. 20 tablet 0     No facility-administered medications prior to visit.     No opioid medication identified on active medication list. I have reviewed chart for other potential  high risk medication/s and harmful drug interactions in the elderly.      Aspirin is not on active medication list.  Aspirin use is not indicated based on review of current medical condition/s. Risk of harm outweighs potential benefits.  .    Patient Active Problem List   Diagnosis    Arthritis    Atrophic vaginitis    Depression    Gastroesophageal reflux disease without esophagitis    Mixed hyperlipidemia    Essential hypertension    Menopause present    Overactive bladder    History of colonic polyps    Rectal hemorrhage    Proctocele    Encounter for screening for malignant neoplasm of colon    Stress incontinence in female    Recurrent breast cancer    Viral syndrome    Chronic fatigue    Snoring    Tremor    Community acquired pneumonia    Chronic right-sided low back pain with sciatica    Elevated liver function tests    Piriformis syndrome of left side    Myofasciitis    History of left breast  "cancer    Diarrhea    Family history of esophageal cancer    Spondylosis of lumbar region without myelopathy or radiculopathy    Cervical stenosis of spinal canal    Cervical spondylosis without myelopathy    Prediabetes    Sacroiliitis    Spinal stenosis of lumbar region    Lumbar radiculopathy    Degeneration of lumbar or lumbosacral intervertebral disc    Class 1 obesity with body mass index (BMI) of 33.0 to 33.9 in adult    Low back pain    Lumbar back pain with radiculopathy affecting left lower extremity    Left lumbar radiculopathy    Status post laminectomy     Advance Care Planning Advance Directive is on file.  ACP discussion was held with the patient during this visit. Patient has an advance directive in EMR which is still valid.             Objective   Vitals:    08/21/24 0819   BP: 138/88   BP Location: Right arm   Patient Position: Sitting   Cuff Size: Large Adult   Pulse: 70   Resp: 16   Temp: 98.7 °F (37.1 °C)   TempSrc: Oral   SpO2: 96%   Weight: 110 kg (241 lb 8 oz)   Height: 172.7 cm (68\")   PainSc:   5   PainLoc: Back       Estimated body mass index is 36.72 kg/m² as calculated from the following:    Height as of this encounter: 172.7 cm (68\").    Weight as of this encounter: 110 kg (241 lb 8 oz).            Does the patient have evidence of cognitive impairment? No  Lab Results   Component Value Date    CHLPL 100 08/14/2024    TRIG 181 (H) 08/14/2024    HDL 40 08/14/2024    LDL 31 08/14/2024    VLDL 29 08/14/2024    HGBA1C 5.70 (H) 08/14/2024                                                                                                Health  Risk Assessment    Smoking Status:  Social History     Tobacco Use   Smoking Status Never   Smokeless Tobacco Never     Alcohol Consumption:  Social History     Substance and Sexual Activity   Alcohol Use No       Fall Risk Screen  STEADI Fall Risk Assessment was completed, and patient is at HIGH risk for falls. Assessment completed " on:2024    Depression Screenin/21/2024     8:15 AM   PHQ-2/PHQ-9 Depression Screening   Little Interest or Pleasure in Doing Things 0-->not at all   Feeling Down, Depressed or Hopeless 0-->not at all   PHQ-9: Brief Depression Severity Measure Score 0     Health Habits and Functional and Cognitive Screenin/21/2024     8:00 AM   Functional & Cognitive Status   Do you have difficulty preparing food and eating? No   Do you have difficulty bathing yourself, getting dressed or grooming yourself? No   Do you have difficulty using the toilet? No   Do you have difficulty moving around from place to place? No   Do you have trouble with steps or getting out of a bed or a chair? No   Current Diet Well Balanced Diet   Dental Exam Up to date   Eye Exam Up to date   Exercise (times per week) 4 times per week   Current Exercises Include Swim Aerobics Class   Do you need help using the phone?  No   Are you deaf or do you have serious difficulty hearing?  No   Do you need help to go to places out of walking distance? No   Do you need help shopping? No   Do you need help preparing meals?  No   Do you need help with housework?  No   Do you need help with laundry? No   Do you need help taking your medications? No   Do you need help managing money? No   Do you ever drive or ride in a car without wearing a seat belt? No   Have you felt unusual stress, anger or loneliness in the last month? No   Who do you live with? Other   If you need help, do you have trouble finding someone available to you? No   Have you been bothered in the last four weeks by sexual problems? No   Do you have difficulty concentrating, remembering or making decisions? No           Age-appropriate Screening Schedule:  Refer to the list below for future screening recommendations based on patient's age, sex and/or medical conditions. Orders for these recommended tests are listed in the plan section. The patient has been provided with a written  plan.    Health Maintenance List  Health Maintenance   Topic Date Due    ANNUAL WELLNESS VISIT  08/02/2024    COVID-19 Vaccine (7 - 2023-24 season) 09/01/2024    INFLUENZA VACCINE  08/01/2024    DXA SCAN  11/11/2024    LIPID PANEL  08/14/2025    BMI FOLLOWUP  08/21/2025    TDAP/TD VACCINES (2 - Td or Tdap) 09/03/2025    COLORECTAL CANCER SCREENING  10/10/2025    HEPATITIS C SCREENING  Completed    Pneumococcal Vaccine 65+  Completed    ZOSTER VACCINE  Discontinued                                                                                                                                                CMS Preventative Services Quick Reference  Risk Factors Identified During Encounter  Fall Risk-High or Moderate: Discussed Fall Prevention in the home    The above risks/problems have been discussed with the patient.  Pertinent information has been shared with the patient in the After Visit Summary.  An After Visit Summary and PPPS were made available to the patient.    Follow Up:   Next Medicare Wellness visit to be scheduled in 1 year.         Additional E&M Note during same encounter follows:  Patient has additional, significant, and separately identifiable condition(s)/problem(s) that require work above and beyond the Medicare Wellness Visit     Chief Complaint  Medicare Wellness-subsequent, Hyperglycemia, Extremity Weakness (Fell because legs came out from under her. Back in April. Went to hospital for and also has had a back surgery.), and Hyperlipidemia    Subjective   Very pleasant patient here today for Medicare wellness as well as a follow-up, hypertension primary has been controlled, hyperlipidemia takes atorvastatin appropriately depression takes bupropion unfortunately, she had a sudden onset of weakness in her lower extremity  And was found to have a severe disc herniation, had surgery discectomy, lower lumbar region, she was in the hospital for 11 days, she had weakness and paresthesias she is  improved  Still feels a little weak in the left leg but improving substantially,  Has some paresthesias mostly around her medial knee and proximal thigh medially but is improving as well  She was found to have an ascending aortic  LDLs are in the 30s triglycerides mildly elevated recent A1c improved less than 6  Previously 6.4 she got quite a bit of steroids in the hospital she is doing well    Echocardiogram showed a mildly dilated aortic ascending 3.7 cm,  No severe valvular disease,  Mildly dilated atria  No chest pain shortness of breath          Back Pain  This is a chronic problem. The current episode started more than 1 year ago. The problem occurs daily. The problem is unchanged. The pain is present in the gluteal and lumbar spine. The quality of the pain is described as aching. The pain radiates to the right buttock and left thigh. The pain is at a severity of 7/10. The pain is Worse during the day. The symptoms are aggravated by bending, position, stress and twisting. Stiffness is present In the morning. Associated symptoms include bladder incontinence, bowel incontinence, numbness, paresthesias, tingling and weakness. Pertinent negatives include no abdominal pain, perianal numbness or weight loss. Risk factors include history of cancer and obesity.   Additional comments: Spinal surgery 4/24    Alesia is also being seen today for an annual adult preventative physical exam.  and Alesia is also being seen today for additional medical problem/s.    Review of Systems   Constitutional:  Negative for unexpected weight loss.   HENT: Negative.     Respiratory: Negative.     Cardiovascular: Negative.    Gastrointestinal:  Positive for bowel incontinence. Negative for abdominal pain.   Endocrine: Negative.    Genitourinary:  Positive for urinary incontinence.   Musculoskeletal:  Positive for back pain and extremity weakness.   Allergic/Immunologic: Negative.    Neurological:  Positive for tingling, weakness, numbness  "and paresthesias.              Objective   Vital Signs:  /88 (BP Location: Right arm, Patient Position: Sitting, Cuff Size: Large Adult)   Pulse 70   Temp 98.7 °F (37.1 °C) (Oral)   Resp 16   Ht 172.7 cm (68\")   Wt 110 kg (241 lb 8 oz)   SpO2 96%   BMI 36.72 kg/m²   Physical Exam            Assessment and Plan               Medicare annual wellness visit, subsequent    Echocardiogram abnormal    Essential hypertension  Hypertension is stable and controlled  Continue current treatment regimen.  Blood pressure will be reassessed in 6 months.  Mixed hyperlipidemia   Lipid abnormalities are stable    Plan:  Continue same medication/s without change.      Discussed medication dosage, use, side effects, and goals of treatment in detail.    Counseled patient on lifestyle modifications to help control hyperlipidemia.     Patient Treatment Goals:   LDL goal is less than 70    Followup in 6 months.  Status post laminectomy    Class 2 severe obesity due to excess calories with serious comorbidity and body mass index (BMI) of 36.0 to 36.9 in adult  Patient's (Body mass index is 36.72 kg/m².) indicates that they are obese (BMI >30) with health conditions that include hypertension . Weight is unchanged. BMI  is above average; BMI management plan is completed. We discussed portion control and increasing exercise.     Orders Placed This Encounter   Procedures    Ambulatory Referral to Cardiology     Referral Priority:   Routine     Referral Type:   Consultation     Referral Reason:   Specialty Services Required     Requested Specialty:   Cardiology     Number of Visits Requested:   1     New Medications Ordered This Visit   Medications    Semaglutide-Weight Management (Wegovy) 0.25 MG/0.5ML solution auto-injector     Sig: Inject 0.5 mL under the skin into the appropriate area as directed 1 (One) Time Per Week.     Dispense:  2 mL     Refill:  0          Follow Up   Return in about 6 months (around 2/21/2025) for Labs " before next visit.  Patient was given instructions and counseling regarding her condition or for health maintenance advice. Please see specific information pulled into the AVS if appropriate.  Answers submitted by the patient for this visit:  Primary Reason for Visit (Submitted on 8/21/2024)  What is the primary reason for your visit?: Back Pain      Discharge instructions continue present plan, healthy diet, focus on prediabetes eating plan, slow steady changes prior authorization [employer] let me know if it goes through and affordable    Otherwise continue what you are doing now you are doing jennifer,    Follow-up with cardiology as long as you are feeling good and doing well I will see you back in 6, falls precaution, stay on top immunizations,    Will consider metformin if your medication is not approved

## 2024-08-21 NOTE — PATIENT INSTRUCTIONS
Discharge instructions continue present plan, healthy diet, focus on prediabetes eating plan, slow steady changes prior authorization Wegovy let me know if it goes through and affordable    Otherwise continue what you are doing now you are doing jennifer,    Follow-up with cardiology as long as you are feeling good and doing well I will see you back in 6, falls precaution, stay on top immunizations,    Will consider metformin if your medication is not approved

## 2024-08-28 RX ORDER — METFORMIN HCL 500 MG
500 TABLET, EXTENDED RELEASE 24 HR ORAL
Qty: 360 TABLET | Refills: 3 | Status: SHIPPED | OUTPATIENT
Start: 2024-08-28 | End: 2024-08-28 | Stop reason: SDUPTHER

## 2024-08-28 RX ORDER — METFORMIN HCL 500 MG
2000 TABLET, EXTENDED RELEASE 24 HR ORAL
Qty: 360 TABLET | Refills: 3 | Status: SHIPPED | OUTPATIENT
Start: 2024-08-28

## 2024-09-11 DIAGNOSIS — C50.919 RECURRENT MALIGNANT NEOPLASM OF BREAST, UNSPECIFIED LATERALITY: Primary | ICD-10-CM

## 2024-09-26 ENCOUNTER — OFFICE VISIT (OUTPATIENT)
Dept: ONCOLOGY | Facility: CLINIC | Age: 74
End: 2024-09-26
Payer: MEDICARE

## 2024-09-26 ENCOUNTER — LAB (OUTPATIENT)
Dept: OTHER | Facility: HOSPITAL | Age: 74
End: 2024-09-26
Payer: MEDICARE

## 2024-09-26 VITALS
DIASTOLIC BLOOD PRESSURE: 86 MMHG | HEART RATE: 69 BPM | HEIGHT: 68 IN | WEIGHT: 243.1 LBS | BODY MASS INDEX: 36.84 KG/M2 | OXYGEN SATURATION: 97 % | RESPIRATION RATE: 16 BRPM | SYSTOLIC BLOOD PRESSURE: 144 MMHG | TEMPERATURE: 97.9 F

## 2024-09-26 DIAGNOSIS — C50.919 RECURRENT MALIGNANT NEOPLASM OF BREAST, UNSPECIFIED LATERALITY: ICD-10-CM

## 2024-09-26 DIAGNOSIS — C50.919 RECURRENT MALIGNANT NEOPLASM OF BREAST, UNSPECIFIED LATERALITY: Primary | ICD-10-CM

## 2024-09-26 LAB
ALBUMIN SERPL-MCNC: 4.2 G/DL (ref 3.5–5.2)
ALBUMIN/GLOB SERPL: 1.5 G/DL
ALP SERPL-CCNC: 98 U/L (ref 39–117)
ALT SERPL W P-5'-P-CCNC: 36 U/L (ref 1–33)
ANION GAP SERPL CALCULATED.3IONS-SCNC: 10.2 MMOL/L (ref 5–15)
AST SERPL-CCNC: 42 U/L (ref 1–32)
BASOPHILS # BLD AUTO: 0.06 10*3/MM3 (ref 0–0.2)
BASOPHILS NFR BLD AUTO: 0.7 % (ref 0–1.5)
BILIRUB SERPL-MCNC: 0.4 MG/DL (ref 0–1.2)
BUN SERPL-MCNC: 18 MG/DL (ref 8–23)
BUN/CREAT SERPL: 30.5 (ref 7–25)
CALCIUM SPEC-SCNC: 9.9 MG/DL (ref 8.6–10.5)
CHLORIDE SERPL-SCNC: 101 MMOL/L (ref 98–107)
CO2 SERPL-SCNC: 31.8 MMOL/L (ref 22–29)
CREAT SERPL-MCNC: 0.59 MG/DL (ref 0.57–1)
DEPRECATED RDW RBC AUTO: 39.3 FL (ref 37–54)
EGFRCR SERPLBLD CKD-EPI 2021: 94.7 ML/MIN/1.73
EOSINOPHIL # BLD AUTO: 0.2 10*3/MM3 (ref 0–0.4)
EOSINOPHIL NFR BLD AUTO: 2.5 % (ref 0.3–6.2)
ERYTHROCYTE [DISTWIDTH] IN BLOOD BY AUTOMATED COUNT: 12.7 % (ref 12.3–15.4)
GLOBULIN UR ELPH-MCNC: 2.8 GM/DL
GLUCOSE SERPL-MCNC: 110 MG/DL (ref 65–99)
HCT VFR BLD AUTO: 41.4 % (ref 34–46.6)
HGB BLD-MCNC: 14 G/DL (ref 12–15.9)
IMM GRANULOCYTES # BLD AUTO: 0.04 10*3/MM3 (ref 0–0.05)
IMM GRANULOCYTES NFR BLD AUTO: 0.5 % (ref 0–0.5)
LYMPHOCYTES # BLD AUTO: 2.51 10*3/MM3 (ref 0.7–3.1)
LYMPHOCYTES NFR BLD AUTO: 31.1 % (ref 19.6–45.3)
MCH RBC QN AUTO: 28.6 PG (ref 26.6–33)
MCHC RBC AUTO-ENTMCNC: 33.8 G/DL (ref 31.5–35.7)
MCV RBC AUTO: 84.7 FL (ref 79–97)
MONOCYTES # BLD AUTO: 0.6 10*3/MM3 (ref 0.1–0.9)
MONOCYTES NFR BLD AUTO: 7.4 % (ref 5–12)
NEUTROPHILS NFR BLD AUTO: 4.67 10*3/MM3 (ref 1.7–7)
NEUTROPHILS NFR BLD AUTO: 57.8 % (ref 42.7–76)
NRBC BLD AUTO-RTO: 0 /100 WBC (ref 0–0.2)
PLATELET # BLD AUTO: 235 10*3/MM3 (ref 140–450)
PMV BLD AUTO: 9.3 FL (ref 6–12)
POTASSIUM SERPL-SCNC: 3.4 MMOL/L (ref 3.5–5.2)
PROT SERPL-MCNC: 7 G/DL (ref 6–8.5)
RBC # BLD AUTO: 4.89 10*6/MM3 (ref 3.77–5.28)
SODIUM SERPL-SCNC: 143 MMOL/L (ref 136–145)
WBC NRBC COR # BLD AUTO: 8.08 10*3/MM3 (ref 3.4–10.8)

## 2024-09-26 PROCEDURE — 85025 COMPLETE CBC W/AUTO DIFF WBC: CPT | Performed by: INTERNAL MEDICINE

## 2024-09-26 PROCEDURE — 36415 COLL VENOUS BLD VENIPUNCTURE: CPT

## 2024-09-26 PROCEDURE — 80053 COMPREHEN METABOLIC PANEL: CPT | Performed by: INTERNAL MEDICINE

## 2024-09-27 ENCOUNTER — OFFICE VISIT (OUTPATIENT)
Dept: CARDIOLOGY | Facility: CLINIC | Age: 74
End: 2024-09-27
Payer: MEDICARE

## 2024-09-27 VITALS
HEART RATE: 78 BPM | HEIGHT: 67 IN | WEIGHT: 243 LBS | BODY MASS INDEX: 38.14 KG/M2 | DIASTOLIC BLOOD PRESSURE: 76 MMHG | OXYGEN SATURATION: 98 % | SYSTOLIC BLOOD PRESSURE: 140 MMHG | RESPIRATION RATE: 20 BRPM

## 2024-09-27 DIAGNOSIS — R73.03 PREDIABETES: ICD-10-CM

## 2024-09-27 DIAGNOSIS — E78.2 MIXED HYPERLIPIDEMIA: ICD-10-CM

## 2024-09-27 DIAGNOSIS — I71.21 ANEURYSM OF ASCENDING AORTA WITHOUT RUPTURE: ICD-10-CM

## 2024-09-27 DIAGNOSIS — R07.2 PRECORDIAL PAIN: Primary | ICD-10-CM

## 2024-09-27 DIAGNOSIS — I10 ESSENTIAL HYPERTENSION: ICD-10-CM

## 2024-09-27 RX ORDER — CHLORAL HYDRATE 500 MG
1000 CAPSULE ORAL
COMMUNITY

## 2024-10-11 ENCOUNTER — TELEPHONE (OUTPATIENT)
Dept: CARDIOLOGY | Facility: CLINIC | Age: 74
End: 2024-10-11
Payer: MEDICARE

## 2024-10-18 DIAGNOSIS — M46.1 SACROILIITIS: ICD-10-CM

## 2024-10-18 DIAGNOSIS — M54.16 LUMBAR RADICULOPATHY: ICD-10-CM

## 2024-10-18 DIAGNOSIS — M48.061 SPINAL STENOSIS OF LUMBAR REGION, UNSPECIFIED WHETHER NEUROGENIC CLAUDICATION PRESENT: ICD-10-CM

## 2024-10-21 ENCOUNTER — TELEPHONE (OUTPATIENT)
Dept: CARDIOLOGY | Facility: CLINIC | Age: 74
End: 2024-10-21
Payer: MEDICARE

## 2024-10-21 RX ORDER — PREGABALIN 25 MG/1
25 CAPSULE ORAL NIGHTLY
Qty: 30 CAPSULE | Refills: 0 | Status: SHIPPED | OUTPATIENT
Start: 2024-10-21

## 2024-10-21 RX ORDER — HYDROCHLOROTHIAZIDE 25 MG/1
25 TABLET ORAL DAILY
Qty: 90 TABLET | Refills: 3 | Status: SHIPPED | OUTPATIENT
Start: 2024-10-21

## 2024-10-21 NOTE — TELEPHONE ENCOUNTER
Rx Refill Note  Requested Prescriptions     Pending Prescriptions Disp Refills    hydroCHLOROthiazide 25 MG tablet [Pharmacy Med Name: hydroCHLOROthiazide 25 MG Oral Tablet] 90 tablet 3     Sig: TAKE 1 TABLET BY MOUTH DAILY      Last office visit with prescribing clinician: 8/21/2024   Last telemedicine visit with prescribing clinician: Visit date not found   Next office visit with prescribing clinician: 2/21/2025                         Would you like a call back once the refill request has been completed: [] Yes [] No    If the office needs to give you a call back, can they leave a voicemail: [] Yes [] No    Ranjit Amezcua MA  10/21/24, 08:12 EDT

## 2024-10-22 ENCOUNTER — HOSPITAL ENCOUNTER (OUTPATIENT)
Dept: CARDIOLOGY | Facility: HOSPITAL | Age: 74
Discharge: HOME | End: 2024-10-22
Admitting: INTERNAL MEDICINE
Payer: MEDICARE

## 2024-10-22 VITALS — BODY MASS INDEX: 38.06 KG/M2 | WEIGHT: 242.51 LBS | HEIGHT: 67 IN

## 2024-10-22 DIAGNOSIS — R07.2 PRECORDIAL PAIN: ICD-10-CM

## 2024-10-22 LAB
BH CV NUCLEAR PRIOR STUDY: 2
BH CV REST NUCLEAR ISOTOPE DOSE: 25.8 MCI
BH CV STRESS BP STAGE 1: NORMAL
BH CV STRESS COMMENTS STAGE 1: NORMAL
BH CV STRESS DOSE REGADENOSON STAGE 1: 0.4
BH CV STRESS DURATION MIN STAGE 1: 0
BH CV STRESS DURATION SEC STAGE 1: 10
BH CV STRESS HR STAGE 1: 75
BH CV STRESS NUCLEAR ISOTOPE DOSE: 26 MCI
BH CV STRESS PROTOCOL 1: NORMAL
BH CV STRESS RECOVERY BP: NORMAL MMHG
BH CV STRESS RECOVERY HR: 67 BPM
BH CV STRESS STAGE 1: 1
LV EF NUC BP: 63 %
MAXIMAL PREDICTED HEART RATE: 146 BPM
PERCENT MAX PREDICTED HR: 51.37 %
STRESS BASELINE BP: NORMAL MMHG
STRESS BASELINE HR: 65 BPM
STRESS PERCENT HR: 60 %
STRESS POST EXERCISE DUR SEC: 10 SEC
STRESS POST PEAK BP: NORMAL MMHG
STRESS POST PEAK HR: 75 BPM
STRESS TARGET HR: 124 BPM

## 2024-10-22 PROCEDURE — 78492 MYOCRD IMG PET MLT RST&STRS: CPT | Performed by: INTERNAL MEDICINE

## 2024-10-22 PROCEDURE — A9555 RB82 RUBIDIUM: HCPCS | Performed by: INTERNAL MEDICINE

## 2024-10-22 PROCEDURE — 0 RUBIDIUM CHLORIDE: Performed by: INTERNAL MEDICINE

## 2024-10-22 PROCEDURE — 78492 MYOCRD IMG PET MLT RST&STRS: CPT

## 2024-10-22 PROCEDURE — 93017 CV STRESS TEST TRACING ONLY: CPT

## 2024-10-22 PROCEDURE — 25010000002 REGADENOSON 0.4 MG/5ML SOLUTION: Performed by: INTERNAL MEDICINE

## 2024-10-22 PROCEDURE — 93018 CV STRESS TEST I&R ONLY: CPT | Performed by: INTERNAL MEDICINE

## 2024-10-22 PROCEDURE — 93016 CV STRESS TEST SUPVJ ONLY: CPT | Performed by: INTERNAL MEDICINE

## 2024-10-22 RX ORDER — REGADENOSON 0.08 MG/ML
0.4 INJECTION, SOLUTION INTRAVENOUS
Status: COMPLETED | OUTPATIENT
Start: 2024-10-22 | End: 2024-10-22

## 2024-10-22 RX ADMIN — REGADENOSON 0.4 MG: 0.08 INJECTION, SOLUTION INTRAVENOUS at 12:20

## 2024-11-08 DIAGNOSIS — M46.1 SACROILIITIS: ICD-10-CM

## 2024-11-08 DIAGNOSIS — M48.061 SPINAL STENOSIS OF LUMBAR REGION, UNSPECIFIED WHETHER NEUROGENIC CLAUDICATION PRESENT: ICD-10-CM

## 2024-11-08 DIAGNOSIS — M54.16 LUMBAR RADICULOPATHY: ICD-10-CM

## 2024-11-08 RX ORDER — PREGABALIN 25 MG/1
25 CAPSULE ORAL NIGHTLY
Qty: 30 CAPSULE | Refills: 0 | Status: CANCELLED | OUTPATIENT
Start: 2024-11-08

## 2024-11-12 NOTE — TELEPHONE ENCOUNTER
Ok--if she's requesting from our office then she will need an OV--once scheduled I would send in a 30 day rx

## 2024-11-12 NOTE — TELEPHONE ENCOUNTER
Left a message for the patient to return our call. Unsure if this was meant for us or another provider.

## 2024-11-13 DIAGNOSIS — M46.1 SACROILIITIS: ICD-10-CM

## 2024-11-13 DIAGNOSIS — M54.16 LUMBAR RADICULOPATHY: ICD-10-CM

## 2024-11-13 DIAGNOSIS — M48.061 SPINAL STENOSIS OF LUMBAR REGION, UNSPECIFIED WHETHER NEUROGENIC CLAUDICATION PRESENT: ICD-10-CM

## 2024-11-13 RX ORDER — PREGABALIN 25 MG/1
25 CAPSULE ORAL NIGHTLY
Qty: 90 CAPSULE | Refills: 1 | Status: SHIPPED | OUTPATIENT
Start: 2024-11-13

## 2024-11-13 NOTE — TELEPHONE ENCOUNTER
Rx Refill Note  Requested Prescriptions     Pending Prescriptions Disp Refills    pregabalin (LYRICA) 25 MG capsule 90 capsule 1     Sig: Take 1 capsule by mouth Every Night.      Last office visit with prescribing clinician: 8/21/2024   Last telemedicine visit with prescribing clinician: Visit date not found   Next office visit with prescribing clinician: 2/21/2025                         Would you like a call back once the refill request has been completed: [] Yes [] No    If the office needs to give you a call back, can they leave a voicemail: [] Yes [] No    Elysia Ratliff MA  11/13/24, 08:18 EST

## 2024-11-14 ENCOUNTER — HOSPITAL ENCOUNTER (OUTPATIENT)
Dept: CT IMAGING | Facility: HOSPITAL | Age: 74
Discharge: HOME | End: 2024-11-14
Admitting: INTERNAL MEDICINE
Payer: MEDICARE

## 2024-11-14 DIAGNOSIS — I71.21 ANEURYSM OF ASCENDING AORTA WITHOUT RUPTURE: ICD-10-CM

## 2024-11-14 PROCEDURE — 71250 CT THORAX DX C-: CPT

## 2024-11-18 ENCOUNTER — TELEPHONE (OUTPATIENT)
Dept: CARDIOLOGY | Facility: CLINIC | Age: 74
End: 2024-11-18
Payer: MEDICARE

## 2024-11-18 ENCOUNTER — TELEPHONE (OUTPATIENT)
Dept: FAMILY MEDICINE CLINIC | Facility: CLINIC | Age: 74
End: 2024-11-18
Payer: MEDICARE

## 2024-11-18 NOTE — TELEPHONE ENCOUNTER
----- Message from Kory Noland sent at 11/18/2024  1:12 PM EST -----  Waqas Prado,    This lady had an abnormal echocardiogram for which I saw her earlier in the year.  We saw a dilated thoracic aorta and ordered a CT chest.  This showed that her aorta is mildly dilated and something I will follow closely.  The thing that it also showed was significant coronary calcifications.  I think she would benefit from being on aspirin but she is also on Celebrex which would obviously increase her risk for bleeding while on aspirin.  Is there anything else we could use for osteoarthritis pain control?    Kory

## 2024-11-18 NOTE — TELEPHONE ENCOUNTER
Please pass the message to patient she has some coronary calcifications and her cardiologist recommends a baby aspirin daily enteric-coated with food      For this reason she should stop Celebrex  If needed she can take Tylenol 652 tablets twice daily and see how she does,  Should avoid Celebrex with aspirin due to risk of bleeding although Celebrex is less likely than others    Thanks

## 2024-11-18 NOTE — TELEPHONE ENCOUNTER
Spoke to patient about CT chest results.  TAA at 4.3 and will be monitored closely with routine surveillance imaging.  She also has extensive coronary calcifications.  She just had a negative stress study so this is encouraging.  I think she would be best suited for daily aspirin however she has been on Celebrex twice daily for many years.  I have a message out to her primary care to see if we could consider adding something for pain control other than NSAIDs so that we could protect her cardiovascular risk with aspirin use.

## 2024-11-20 ENCOUNTER — OFFICE VISIT (OUTPATIENT)
Dept: FAMILY MEDICINE CLINIC | Facility: CLINIC | Age: 74
End: 2024-11-20
Payer: MEDICARE

## 2024-11-20 VITALS
TEMPERATURE: 97.1 F | HEIGHT: 67 IN | HEART RATE: 78 BPM | SYSTOLIC BLOOD PRESSURE: 140 MMHG | DIASTOLIC BLOOD PRESSURE: 96 MMHG | BODY MASS INDEX: 38.45 KG/M2 | WEIGHT: 245 LBS | OXYGEN SATURATION: 94 %

## 2024-11-20 DIAGNOSIS — I25.83 CORONARY ARTERY DISEASE DUE TO LIPID RICH PLAQUE: ICD-10-CM

## 2024-11-20 DIAGNOSIS — R82.90 ABNORMAL URINE ODOR: ICD-10-CM

## 2024-11-20 DIAGNOSIS — R52 PAIN: ICD-10-CM

## 2024-11-20 DIAGNOSIS — R30.9 PAINFUL URINATION: ICD-10-CM

## 2024-11-20 DIAGNOSIS — I25.10 CORONARY ARTERY DISEASE DUE TO LIPID RICH PLAQUE: ICD-10-CM

## 2024-11-20 DIAGNOSIS — M25.571 ACUTE RIGHT ANKLE PAIN: Primary | ICD-10-CM

## 2024-11-20 DIAGNOSIS — R39.9 URINARY SYMPTOM OR SIGN: ICD-10-CM

## 2024-11-20 LAB
BILIRUB BLD-MCNC: NEGATIVE MG/DL
CLARITY, POC: ABNORMAL
COLOR UR: ABNORMAL
EXPIRATION DATE: ABNORMAL
GLUCOSE UR STRIP-MCNC: NEGATIVE MG/DL
KETONES UR QL: NEGATIVE
LEUKOCYTE EST, POC: NEGATIVE
Lab: ABNORMAL
NITRITE UR-MCNC: POSITIVE MG/ML
PH UR: 5 [PH] (ref 5–8)
PROT UR STRIP-MCNC: NEGATIVE MG/DL
RBC # UR STRIP: NEGATIVE /UL
SP GR UR: 1.03 (ref 1–1.03)
UROBILINOGEN UR QL: ABNORMAL

## 2024-11-20 PROCEDURE — 1126F AMNT PAIN NOTED NONE PRSNT: CPT | Performed by: NURSE PRACTITIONER

## 2024-11-20 PROCEDURE — 3080F DIAST BP >= 90 MM HG: CPT | Performed by: NURSE PRACTITIONER

## 2024-11-20 PROCEDURE — 81003 URINALYSIS AUTO W/O SCOPE: CPT | Performed by: NURSE PRACTITIONER

## 2024-11-20 PROCEDURE — 99214 OFFICE O/P EST MOD 30 MIN: CPT | Performed by: NURSE PRACTITIONER

## 2024-11-20 PROCEDURE — 3077F SYST BP >= 140 MM HG: CPT | Performed by: NURSE PRACTITIONER

## 2024-11-20 RX ORDER — CELECOXIB 200 MG/1
200 CAPSULE ORAL EVERY OTHER DAY
Start: 2024-11-20

## 2024-11-20 RX ORDER — SEMAGLUTIDE 0.25 MG/.5ML
0.25 INJECTION, SOLUTION SUBCUTANEOUS WEEKLY
Qty: 2 ML | Refills: 0 | Status: SHIPPED | OUTPATIENT
Start: 2024-11-20

## 2024-11-20 RX ORDER — NITROFURANTOIN 25; 75 MG/1; MG/1
100 CAPSULE ORAL 2 TIMES DAILY
Qty: 6 CAPSULE | Refills: 0 | Status: SHIPPED | OUTPATIENT
Start: 2024-11-20 | End: 2024-11-23

## 2024-11-20 RX ORDER — OMEPRAZOLE 40 MG/1
40 CAPSULE, DELAYED RELEASE ORAL DAILY
Qty: 90 CAPSULE | Refills: 3 | Status: SHIPPED | OUTPATIENT
Start: 2024-11-20

## 2024-11-20 NOTE — PATIENT INSTRUCTIONS
Discharge instructions    Range of motion exercises stretching at least twice a day the ankle, Ace wrap start with your distal foot wrapped with  Carefully for edema as well as focus over the ankle  Gentle stretching, walk easy Ace wrap to help with the edema elevate legs, update me in 2 weeks if not significantly better we will see orthopedic    Aspirin 81 mg enteric-coated with dinner daily  Will increase your PPI for better GI protection will try to wean you off of Celebrex  200 mg Celebrex every other day then every 3 days then discontinue over the next several weeks if you have rebound pain we can consider lower dose  To mitigate risk such as every other day that we should try to avoid anti-inflammatories certainly if possible  The risk increases been taking Celebrex and aspirin have a GI bleed bleed in the brain or elsewhere    Tylenol can always supplement pain as well do not forget extended release 650 as needed    HEENT eyes Lyrica    If symptoms UTI burning frequency urgency or increased or worsening appearing urine go ahead and start the antibiotic  Your urinalysis suggest you should increase your fluid and  Specifically flush your urine next couple days but if need be start the antibiotic will await the culture but if you not having specific symptoms even if the culture comes back positive we do not necessarily need to treat depends on how you feel and clinically if you have any UTI    Wegovy for history of CAD, that would be excellent,  Let me know if this is approved or if I need to do anything else call your insurance company in a couple weeks and let me know    I will see you in February we will catch up on your labs and things and let me know in a couple weeks how you are doing above everything here please

## 2024-11-20 NOTE — PROGRESS NOTES
"Chief Complaint  Ankle Pain (Right ankle pain )    Subjective        Alesia Resendez presents to [facility]  History of Present Illness  Acute right ankle pain no injury x-rays negative for fracture but still having lateral pain does not give out, some localized tenderness  Urine abnormal color darker, think she may have a UTI she did last time but otherwise no symptoms frequency urgency pain fever chills    Coronary calcifications CAD on recent scan extensive, Dr. Khan reached out cardiology  Would like aspirin, recommended stopping Celebrex, due to potential risk of bleeding  Patient been on Celebrex for years has chronic arthritis, would know which she would do without it,  Tylenol does not work alternatives not helping  Willing to decrease Celebrex and try to wean?  No history of bleeding    Obesity, with CAD, she would like Wegovy?  No contraindications,      Ankle Pain         Objective   Vital Signs:  /96   Pulse 78   Temp 97.1 °F (36.2 °C) (Temporal)   Ht 170.2 cm (67.01\")   Wt 111 kg (245 lb)   SpO2 94%   BMI 38.36 kg/m²   Estimated body mass index is 38.36 kg/m² as calculated from the following:    Height as of this encounter: 170.2 cm (67.01\").    Weight as of this encounter: 111 kg (245 lb).            Physical Exam  Constitutional:       Appearance: Normal appearance. She is not ill-appearing or diaphoretic.   Eyes:      Conjunctiva/sclera: Conjunctivae normal.      Pupils: Pupils are equal, round, and reactive to light.   Cardiovascular:      Rate and Rhythm: Normal rate.   Pulmonary:      Effort: Pulmonary effort is normal. No respiratory distress.   Musculoskeletal:      Comments: Normal range of motion without crepitus right ankle, she has chronic dependent edema,  Edema extends down to her ankle as well but no intra-articular effusion no redness and no red hot joints,  Pain is lateral over the fibula but also with the tendon regions and lower ankle, but " not extending to the anteriorly tarsal bones or metatarsals  Specifically proximal fourth and fifth metatarsal nontender without deformity  Normal gait, able to bear weight.       Skin:     General: Skin is warm and dry.   Neurological:      General: No focal deficit present.      Mental Status: She is alert and oriented to person, place, and time.   Psychiatric:         Mood and Affect: Mood normal.         Thought Content: Thought content normal.         Judgment: Judgment normal.        Result Review :                Assessment and Plan   Diagnoses and all orders for this visit:    1. Acute right ankle pain (Primary)    2. Pain  -     POC Urinalysis Dipstick, Automated    3. Painful urination  -     Urine Culture - Urine, Urine, Clean Catch    4. Abnormal urine odor    5. Urinary symptom or sign    6. Coronary artery disease due to lipid rich plaque  Comments:  Noted on imaging extensive coronary calcifications  Orders:  -     Semaglutide-Weight Management (Wegovy) 0.25 MG/0.5ML solution auto-injector; Inject 0.5 mL under the skin into the appropriate area as directed 1 (One) Time Per Week.  Dispense: 2 mL; Refill: 0    Other orders  -     nitrofurantoin, macrocrystal-monohydrate, (Macrobid) 100 MG capsule; Take 1 capsule by mouth 2 (Two) Times a Day for 3 days.  Dispense: 6 capsule; Refill: 0  -     omeprazole (priLOSEC) 40 MG capsule; Take 1 capsule by mouth Daily.  Dispense: 90 capsule; Refill: 3             Follow Up   No follow-ups on file.  Patient was given instructions and counseling regarding her condition or for health maintenance advice. Please see specific information pulled into the AVS if appropriate.       Patient to go ahead and start baby aspirin 81 mg daily enteric-coated with food risk and benefit discussed including risk of bleeds in the brain stomach elsewhere  Increased risk with anti-inflammatory substantially  Celebrex less likely to cause bleed but still may increase risk of bleeding  To  mitigate risk, increase omeprazole to 40 mg daily aspirin to take with food and enteric-coated 81 mg  Start to wean Celebrex every other day every 3 days several weeks until DC  If unable she will minimize the dose to every other day or every 3 days, and omeprazole increased to mitigate risk of bleeding,  Nonetheless there is a risk of bleeding  Discussed with patient benefit greater than risk as opiates for greater risk other alternative measures have been less or effective ineffective  And quality life is important to her      Patient Instructions   Discharge instructions    Range of motion exercises stretching at least twice a day the ankle, Ace wrap start with your distal foot wrapped with  Carefully for edema as well as focus over the ankle  Gentle stretching, walk easy Ace wrap to help with the edema elevate legs, update me in 2 weeks if not significantly better we will see orthopedic    Aspirin 81 mg enteric-coated with dinner daily  Will increase your PPI for better GI protection will try to wean you off of Celebrex  200 mg Celebrex every other day then every 3 days then discontinue over the next several weeks if you have rebound pain we can consider lower dose  To mitigate risk such as every other day that we should try to avoid anti-inflammatories certainly if possible  The risk increases been taking Celebrex and aspirin have a GI bleed bleed in the brain or elsewhere    Tylenol can always supplement pain as well do not forget extended release 650 as needed    HEENT eyes Lyrica    If symptoms UTI burning frequency urgency or increased or worsening appearing urine go ahead and start the antibiotic  Your urinalysis suggest you should increase your fluid and  Specifically flush your urine next couple days but if need be start the antibiotic will await the culture but if you not having specific symptoms even if the culture comes back positive we do not necessarily need to treat depends on how you feel and  clinically if you have any UTI    Wegovy for history of CAD, that would be excellent,  Let me know if this is approved or if I need to do anything else call your insurance company in a couple weeks and let me know    I will see you in February we will catch up on your labs and things and let me know in a couple weeks how you are doing above everything here please

## 2024-11-23 LAB
BACTERIA UR CULT: ABNORMAL
BACTERIA UR CULT: ABNORMAL
OTHER ANTIBIOTIC SUSC ISLT: ABNORMAL

## 2024-11-27 RX ORDER — AMLODIPINE BESYLATE 10 MG/1
10 TABLET ORAL DAILY
Qty: 90 TABLET | Refills: 3 | Status: SHIPPED | OUTPATIENT
Start: 2024-11-27

## 2024-11-27 RX ORDER — BUPROPION HYDROCHLORIDE 150 MG/1
150 TABLET ORAL DAILY
Qty: 90 TABLET | Refills: 3 | OUTPATIENT
Start: 2024-11-27

## 2024-11-27 RX ORDER — LOSARTAN POTASSIUM 25 MG/1
25 TABLET ORAL DAILY
Qty: 90 TABLET | Refills: 3 | OUTPATIENT
Start: 2024-11-27

## 2024-11-27 NOTE — TELEPHONE ENCOUNTER
Rx Refill Note  Requested Prescriptions     Pending Prescriptions Disp Refills    amLODIPine (NORVASC) 10 MG tablet [Pharmacy Med Name: amLODIPine Besylate 10 MG Oral Tablet] 90 tablet 1     Sig: TAKE 1 TABLET BY MOUTH DAILY     Refused Prescriptions Disp Refills    buPROPion XL (WELLBUTRIN XL) 150 MG 24 hr tablet [Pharmacy Med Name: buPROPion HCl ER (XL) 150 MG Oral Tablet Extended Release 24 Hour] 90 tablet 3     Sig: TAKE 1 TABLET BY MOUTH DAILY     Refused By: SALTY RATLIFF     Reason for Refusal: Patient does not need medication refilled at this time    losartan (COZAAR) 25 MG tablet [Pharmacy Med Name: Losartan Potassium 25 MG Oral Tablet] 90 tablet 3     Sig: TAKE 1 TABLET BY MOUTH DAILY     Refused By: SALTY RATLIFF     Reason for Refusal: Patient does not need medication refilled at this time      Last office visit with prescribing clinician: 11/20/2024   Last telemedicine visit with prescribing clinician: Visit date not found   Next office visit with prescribing clinician: 2/21/2025                         Would you like a call back once the refill request has been completed: [] Yes [] No    If the office needs to give you a call back, can they leave a voicemail: [] Yes [] No    Salty Ratliff MA  11/27/24, 07:44 EST

## 2024-12-03 RX ORDER — CELECOXIB 200 MG/1
200 CAPSULE ORAL DAILY
Qty: 90 CAPSULE | Refills: 1 | Status: SHIPPED | OUTPATIENT
Start: 2024-12-03

## 2024-12-03 NOTE — TELEPHONE ENCOUNTER
Rx Refill Note  Requested Prescriptions     Pending Prescriptions Disp Refills    celecoxib (CeleBREX) 200 MG capsule [Pharmacy Med Name: CELECOXIB 200 MG CAPSULE] 90 capsule      Sig: TAKE 1 CAPSULE BY MOUTH DAILY      Last office visit with prescribing clinician: 11/20/2024   Last telemedicine visit with prescribing clinician: Visit date not found   Next office visit with prescribing clinician: 2/21/2025                         Would you like a call back once the refill request has been completed: [] Yes [] No    If the office needs to give you a call back, can they leave a voicemail: [] Yes [] No    Elysia Ratliff MA  12/03/24, 10:17 EST

## 2024-12-05 DIAGNOSIS — M48.061 SPINAL STENOSIS OF LUMBAR REGION, UNSPECIFIED WHETHER NEUROGENIC CLAUDICATION PRESENT: ICD-10-CM

## 2024-12-05 DIAGNOSIS — M46.1 SACROILIITIS: ICD-10-CM

## 2024-12-05 DIAGNOSIS — M54.16 LUMBAR RADICULOPATHY: ICD-10-CM

## 2024-12-05 RX ORDER — ESCITALOPRAM OXALATE 5 MG/1
5 TABLET ORAL DAILY
Qty: 90 TABLET | Refills: 3 | Status: SHIPPED | OUTPATIENT
Start: 2024-12-05

## 2024-12-05 RX ORDER — PREGABALIN 25 MG/1
25 CAPSULE ORAL NIGHTLY
Qty: 90 CAPSULE | Refills: 1 | Status: SHIPPED | OUTPATIENT
Start: 2024-12-05

## 2024-12-05 RX ORDER — BUPROPION HYDROCHLORIDE 150 MG/1
150 TABLET ORAL DAILY
Qty: 90 TABLET | Refills: 3 | Status: SHIPPED | OUTPATIENT
Start: 2024-12-05

## 2024-12-05 NOTE — TELEPHONE ENCOUNTER
Rx Refill Note  Requested Prescriptions     Pending Prescriptions Disp Refills    buPROPion XL (WELLBUTRIN XL) 150 MG 24 hr tablet 90 tablet 3     Sig: Take 1 tablet by mouth Daily.    escitalopram (LEXAPRO) 5 MG tablet 90 tablet 3     Sig: Take 1 tablet by mouth Daily.    pregabalin (LYRICA) 25 MG capsule 90 capsule 1     Sig: Take 1 capsule by mouth Every Night.      Last office visit with prescribing clinician: 11/20/2024   Last telemedicine visit with prescribing clinician: Visit date not found   Next office visit with prescribing clinician: 2/21/2025                         Would you like a call back once the refill request has been completed: [] Yes [] No    If the office needs to give you a call back, can they leave a voicemail: [] Yes [] No    Maggie Parker MA  12/05/24, 15:22 EST

## 2024-12-09 ENCOUNTER — TELEPHONE (OUTPATIENT)
Dept: FAMILY MEDICINE CLINIC | Facility: CLINIC | Age: 74
End: 2024-12-09
Payer: MEDICARE

## 2024-12-09 NOTE — TELEPHONE ENCOUNTER
Caller: Alesia Resendez    Relationship: Self    Best call back number: [phone]     Who are you requesting to speak with (clinical staff, provider,  specific staff member): CLINICAL STAFF    What was the call regarding: PATIENT STATES THAT SHE HAS A COLD WITH CHEST CONGESTION AND WHEEZING. [facility] SCHEDULED FIRST AVAILABLE APPOINTMENT WITH PCP ON 12/11/24, BUT PATIENT WOULD LIKE TO SEE IF SHE CAN BE SEEN SOONER OR IF THERE WAS OTHER ADVICE IN THE MEAN TIME. PLEASE CALL AND ADVISE

## 2024-12-09 NOTE — TELEPHONE ENCOUNTER
when I (TRENT Martines) called to move appt up i gio for wrong day (12/11) not 12/9 - epley didnt have an appt aval to work her in today - adv she can go to UC or be seen 12/10 - pt declined and went to UC

## 2024-12-18 ENCOUNTER — NURSE TRIAGE (OUTPATIENT)
Dept: CALL CENTER | Facility: HOSPITAL | Age: 74
End: 2024-12-18
Payer: MEDICARE

## 2024-12-18 ENCOUNTER — TELEPHONE (OUTPATIENT)
Dept: FAMILY MEDICINE CLINIC | Facility: CLINIC | Age: 74
End: 2024-12-18
Payer: MEDICARE

## 2024-12-18 NOTE — TELEPHONE ENCOUNTER
Pt was transferred to me through hub. Pt was experiencing right side chest pain, feeling dizzy. Pt thinks she possibly has pneumonia. We had no SAME appt today or tomorrow. I advised pt to go to UC with symptoms.

## 2024-12-18 NOTE — TELEPHONE ENCOUNTER
Reason for Disposition  • [1] Nasal discharge AND [2] present > 10 days    Additional Information  • Negative: SEVERE difficulty breathing (e.g., struggling for each breath, speaks in single words)  • Negative: Bluish (or gray) lips or face now  • Negative: [1] Rapid onset of cough AND [2] has hives  • Negative: Coughing started suddenly after medicine, an allergic food or bee sting  • Negative: [1] Difficulty breathing AND [2] exposure to flames, smoke, or fumes  • Negative: [1] Stridor AND [2] difficulty breathing  • Negative: Sounds like a life-threatening emergency to the triager  • Negative: Choked on object of food that could be caught in the throat  • Negative: Chest pain is main symptom  • Negative: [1] Previous asthma attacks AND [2] this feels like asthma attack  • Negative: Cough lasts > 3 weeks  • Negative: Wet cough (productive; white-yellow, yellow, green, or adeola colored sputum)  • Negative: [1] Dry cough (non-productive;  no sputum or minimal clear sputum) AND [2] within 14 days of COVID-19 Exposure  • Negative: [1] MODERATE difficulty breathing (e.g., speaks in phrases, SOB even at rest, pulse 100-120) AND [2] still present when not coughing  • Negative: Chest pain  (Exception: MILD central chest pain, present only when coughing.)  • Negative: Patient sounds very sick or weak to the triager  • Negative: [1] MILD difficulty breathing (e.g., minimal/no SOB at rest, SOB with walking, pulse <100) AND [2] still present when not coughing  • Negative: [1] Coughed up blood AND [2] > 1 tablespoon (15 ml)   (Exception: Blood-tinged sputum.)  • Negative: Fever > 103 F (39.4 C)  • Negative: [1] Fever > 101 F (38.3 C) AND [2] age > 60 years  • Negative: [1] Fever > 100.0 F (37.8 C) AND [2] bedridden (e.g., CVA, chronic illness, recovering from surgery)  • Negative: [1] Fever > 100.0 F (37.8 C) AND [2] diabetes mellitus or weak immune system (e.g., HIV positive, cancer chemo, splenectomy, organ transplant,  "chronic steroids)  • Negative: Wheezing is present  • Negative: [1] Ankle swelling AND [2] swelling is increasing  • Negative: SEVERE coughing spells (e.g., whooping sound after coughing, vomiting after coughing)  • Negative: [1] Continuous (nonstop) coughing interferes with work or school AND [2] no improvement using cough treatment per Care Advice  • Negative: Fever present > 3 days (72 hours)  • Negative: [1] Fever returns after gone for over 24 hours AND [2] symptoms worse or not improved  • Negative: [1] Using nasal washes and pain medicine > 24 hours AND [2] sinus pain (around cheekbone or eye) persists  • Negative: Earache is present  • Negative: Cough has been present for > 3 weeks    Answer Assessment - Initial Assessment Questions  1. ONSET: \"When did the cough begin?\"       Last week  2. SEVERITY: \"How bad is the cough today?\"       Mild to moderate  3. SPUTUM: \"Describe the color of your sputum\" (none, dry cough; clear, white, yellow, green)      clear  4. HEMOPTYSIS: \"Are you coughing up any blood?\" If so ask: \"How much?\" (flecks, streaks, tablespoons, etc.)      denies  5. DIFFICULTY BREATHING: \"Are you having difficulty breathing?\" If Yes, ask: \"How bad is it?\" (e.g., mild, moderate, severe)     - MILD: No SOB at rest, mild SOB with walking, speaks normally in sentences, can lie down, no retractions, pulse < 100.     - MODERATE: SOB at rest, SOB with minimal exertion and prefers to sit, cannot lie down flat, speaks in phrases, mild retractions, audible wheezing, pulse 100-120.     - SEVERE: Very SOB at rest, speaks in single words, struggling to breathe, sitting hunched forward, retractions, pulse > 120       mild  6. FEVER: \"Do you have a fever?\" If Yes, ask: \"What is your temperature, how was it measured, and when did it start?\"      denies  7. CARDIAC HISTORY: \"Do you have any history of heart disease?\" (e.g., heart attack, congestive heart failure)       na  8. LUNG HISTORY: \"Do you have any " "history of lung disease?\"  (e.g., pulmonary embolus, asthma, emphysema)      na  9. PE RISK FACTORS: \"Do you have a history of blood clots?\" (or: recent major surgery, recent prolonged travel, bedridden)      denies  10. OTHER SYMPTOMS: \"Do you have any other symptoms?\" (e.g., runny nose, wheezing, chest pain)        Congestion and nausea and chest pain on right side,   11. PREGNANCY: \"Is there any chance you are pregnant?\" \"When was your last menstrual period?\"        na  12. TRAVEL: \"Have you traveled out of the country in the last month?\" (e.g., travel history, exposures)        na    Protocols used: Cough - Acute Non-Productive-ADULT-AH    "

## 2025-02-20 RX ORDER — OMEPRAZOLE 40 MG/1
40 CAPSULE, DELAYED RELEASE ORAL DAILY
Qty: 90 CAPSULE | Refills: 1 | Status: SHIPPED | OUTPATIENT
Start: 2025-02-20

## 2025-02-21 ENCOUNTER — OFFICE VISIT (OUTPATIENT)
Dept: FAMILY MEDICINE CLINIC | Facility: CLINIC | Age: 75
End: 2025-02-21
Payer: MEDICARE

## 2025-02-21 VITALS
TEMPERATURE: 97.3 F | RESPIRATION RATE: 14 BRPM | WEIGHT: 283.3 LBS | HEART RATE: 91 BPM | BODY MASS INDEX: 42.93 KG/M2 | OXYGEN SATURATION: 93 % | SYSTOLIC BLOOD PRESSURE: 142 MMHG | HEIGHT: 68 IN | DIASTOLIC BLOOD PRESSURE: 88 MMHG

## 2025-02-21 DIAGNOSIS — F41.8 SITUATIONAL ANXIETY: ICD-10-CM

## 2025-02-21 DIAGNOSIS — R42 DIZZINESS: ICD-10-CM

## 2025-02-21 DIAGNOSIS — I10 ESSENTIAL HYPERTENSION: ICD-10-CM

## 2025-02-21 DIAGNOSIS — R73.03 PREDIABETES: ICD-10-CM

## 2025-02-21 DIAGNOSIS — R42 VERTIGO: Primary | ICD-10-CM

## 2025-02-21 DIAGNOSIS — H81.10 BENIGN PAROXYSMAL POSITIONAL VERTIGO, UNSPECIFIED LATERALITY: ICD-10-CM

## 2025-02-21 PROCEDURE — 3077F SYST BP >= 140 MM HG: CPT | Performed by: NURSE PRACTITIONER

## 2025-02-21 PROCEDURE — 3079F DIAST BP 80-89 MM HG: CPT | Performed by: NURSE PRACTITIONER

## 2025-02-21 PROCEDURE — 99214 OFFICE O/P EST MOD 30 MIN: CPT | Performed by: NURSE PRACTITIONER

## 2025-02-21 PROCEDURE — 1125F AMNT PAIN NOTED PAIN PRSNT: CPT | Performed by: NURSE PRACTITIONER

## 2025-02-21 RX ORDER — ESCITALOPRAM OXALATE 10 MG/1
10 TABLET ORAL DAILY
Qty: 90 TABLET | Refills: 1 | Status: SHIPPED | OUTPATIENT
Start: 2025-02-21

## 2025-02-21 NOTE — PATIENT INSTRUCTIONS
Discharge instructions, deep breathing meditation relaxation  Good support family friends  Continue, weekly Jainism,  105.9    Continue meclizine as needed,  If dizzy do not drive operate heavy machinery or climb swim alone excetra    Caution with body position change or walking,  If severe dizziness weakness slurred speech confusion 911    Recheck here in 3 weeks, sooner for problems  Increase Lexapro 7/2 mg daily for 1 or 2 weeks  then 10 mg daily  Increase

## 2025-03-06 ENCOUNTER — TELEPHONE (OUTPATIENT)
Dept: FAMILY MEDICINE CLINIC | Facility: CLINIC | Age: 75
End: 2025-03-06
Payer: MEDICARE

## 2025-03-06 DIAGNOSIS — H81.10 BENIGN PAROXYSMAL POSITIONAL VERTIGO, UNSPECIFIED LATERALITY: ICD-10-CM

## 2025-03-06 DIAGNOSIS — R42 VERTIGO: ICD-10-CM

## 2025-03-06 DIAGNOSIS — C50.919 RECURRENT MALIGNANT NEOPLASM OF BREAST, UNSPECIFIED LATERALITY: ICD-10-CM

## 2025-03-06 DIAGNOSIS — R42 DIZZINESS: ICD-10-CM

## 2025-03-06 DIAGNOSIS — R73.03 PREDIABETES: ICD-10-CM

## 2025-03-06 DIAGNOSIS — I10 ESSENTIAL HYPERTENSION: ICD-10-CM

## 2025-03-13 ENCOUNTER — TELEPHONE (OUTPATIENT)
Dept: FAMILY MEDICINE CLINIC | Facility: CLINIC | Age: 75
End: 2025-03-13

## 2025-03-13 NOTE — TELEPHONE ENCOUNTER
Caller: Alesia Resendez    Relationship to patient: Self    Best call back number: [phone]     Type of visit: LABS    Requested date: AROUND 3.19    Additional notes: PLEASE CALL TO SCHEDULE

## 2025-03-19 ENCOUNTER — HOSPITAL ENCOUNTER (OUTPATIENT)
Dept: MRI IMAGING | Facility: HOSPITAL | Age: 75
Discharge: HOME | End: 2025-03-19
Admitting: NURSE PRACTITIONER
Payer: MEDICARE

## 2025-03-19 PROCEDURE — 70551 MRI BRAIN STEM W/O DYE: CPT

## 2025-03-20 ENCOUNTER — RESULTS FOLLOW-UP (OUTPATIENT)
Dept: FAMILY MEDICINE CLINIC | Facility: CLINIC | Age: 75
End: 2025-03-20
Payer: MEDICARE

## 2025-03-20 DIAGNOSIS — R82.81 PYURIA: Primary | ICD-10-CM

## 2025-03-20 LAB
ALBUMIN SERPL-MCNC: 4.1 G/DL (ref 3.5–5.2)
ALBUMIN/GLOB SERPL: 1.6 G/DL
ALP SERPL-CCNC: 96 U/L (ref 39–117)
ALT SERPL-CCNC: 31 U/L (ref 1–33)
APPEARANCE UR: ABNORMAL
AST SERPL-CCNC: 40 U/L (ref 1–32)
BACTERIA #/AREA URNS HPF: ABNORMAL /HPF
BASOPHILS # BLD AUTO: 0.04 10*3/MM3 (ref 0–0.2)
BASOPHILS NFR BLD AUTO: 0.6 % (ref 0–1.5)
BILIRUB SERPL-MCNC: 0.4 MG/DL (ref 0–1.2)
BILIRUB UR QL STRIP: NEGATIVE
BUN SERPL-MCNC: 12 MG/DL (ref 8–23)
BUN/CREAT SERPL: 20.3 (ref 7–25)
CALCIUM SERPL-MCNC: 9.6 MG/DL (ref 8.6–10.5)
CASTS URNS MICRO: ABNORMAL
CHLORIDE SERPL-SCNC: 102 MMOL/L (ref 98–107)
CHOLEST SERPL-MCNC: 107 MG/DL (ref 0–200)
CO2 SERPL-SCNC: 30.3 MMOL/L (ref 22–29)
COLOR UR: YELLOW
CREAT SERPL-MCNC: 0.59 MG/DL (ref 0.57–1)
CRYSTALS URNS MICRO: ABNORMAL
EGFRCR SERPLBLD CKD-EPI 2021: 94.1 ML/MIN/1.73
EOSINOPHIL # BLD AUTO: 0.18 10*3/MM3 (ref 0–0.4)
EOSINOPHIL NFR BLD AUTO: 2.7 % (ref 0.3–6.2)
EPI CELLS #/AREA URNS HPF: ABNORMAL /HPF
ERYTHROCYTE [DISTWIDTH] IN BLOOD BY AUTOMATED COUNT: 13.7 % (ref 12.3–15.4)
GLOBULIN SER CALC-MCNC: 2.6 GM/DL
GLUCOSE SERPL-MCNC: 136 MG/DL (ref 65–99)
GLUCOSE UR QL STRIP: NEGATIVE
HBA1C MFR BLD: 6.2 % (ref 4.8–5.6)
HCT VFR BLD AUTO: 44.8 % (ref 34–46.6)
HDLC SERPL-MCNC: 38 MG/DL (ref 40–60)
HGB BLD-MCNC: 14.9 G/DL (ref 12–15.9)
HGB UR QL STRIP: NEGATIVE
IMM GRANULOCYTES # BLD AUTO: 0.01 10*3/MM3 (ref 0–0.05)
IMM GRANULOCYTES NFR BLD AUTO: 0.2 % (ref 0–0.5)
KETONES UR QL STRIP: NEGATIVE
LDLC SERPL CALC-MCNC: 38 MG/DL (ref 0–100)
LDLC/HDLC SERPL: 0.81 {RATIO}
LEUKOCYTE ESTERASE UR QL STRIP: ABNORMAL
LYMPHOCYTES # BLD AUTO: 2.22 10*3/MM3 (ref 0.7–3.1)
LYMPHOCYTES NFR BLD AUTO: 33.8 % (ref 19.6–45.3)
MCH RBC QN AUTO: 28.6 PG (ref 26.6–33)
MCHC RBC AUTO-ENTMCNC: 33.3 G/DL (ref 31.5–35.7)
MCV RBC AUTO: 86 FL (ref 79–97)
MONOCYTES # BLD AUTO: 0.44 10*3/MM3 (ref 0.1–0.9)
MONOCYTES NFR BLD AUTO: 6.7 % (ref 5–12)
NEUTROPHILS # BLD AUTO: 3.68 10*3/MM3 (ref 1.7–7)
NEUTROPHILS NFR BLD AUTO: 56 % (ref 42.7–76)
NITRITE UR QL STRIP: POSITIVE
NRBC BLD AUTO-RTO: 0 /100 WBC (ref 0–0.2)
PH UR STRIP: 5.5 [PH] (ref 5–8)
PLATELET # BLD AUTO: 263 10*3/MM3 (ref 140–450)
POTASSIUM SERPL-SCNC: 3.2 MMOL/L (ref 3.5–5.2)
PROT SERPL-MCNC: 6.7 G/DL (ref 6–8.5)
PROT UR QL STRIP: NEGATIVE
RBC # BLD AUTO: 5.21 10*6/MM3 (ref 3.77–5.28)
RBC #/AREA URNS HPF: ABNORMAL /HPF
SODIUM SERPL-SCNC: 145 MMOL/L (ref 136–145)
SP GR UR STRIP: 1.02 (ref 1–1.03)
TRIGL SERPL-MCNC: 192 MG/DL (ref 0–150)
TSH SERPL DL<=0.005 MIU/L-ACNC: 1.41 UIU/ML (ref 0.27–4.2)
UROBILINOGEN UR STRIP-MCNC: ABNORMAL MG/DL
VLDLC SERPL CALC-MCNC: 31 MG/DL (ref 5–40)
WBC # BLD AUTO: 6.57 10*3/MM3 (ref 3.4–10.8)
WBC #/AREA URNS HPF: ABNORMAL /HPF

## 2025-03-24 ENCOUNTER — OFFICE VISIT (OUTPATIENT)
Dept: FAMILY MEDICINE CLINIC | Facility: CLINIC | Age: 75
End: 2025-03-24
Payer: MEDICARE

## 2025-03-24 VITALS
WEIGHT: 239 LBS | BODY MASS INDEX: 36.22 KG/M2 | TEMPERATURE: 97.3 F | DIASTOLIC BLOOD PRESSURE: 91 MMHG | HEART RATE: 75 BPM | HEIGHT: 68 IN | SYSTOLIC BLOOD PRESSURE: 152 MMHG | OXYGEN SATURATION: 95 %

## 2025-03-24 DIAGNOSIS — N39.3 STRESS INCONTINENCE IN FEMALE: Primary | ICD-10-CM

## 2025-03-24 DIAGNOSIS — D32.9 MENINGIOMA: ICD-10-CM

## 2025-03-24 DIAGNOSIS — N30.00 ACUTE CYSTITIS WITHOUT HEMATURIA: ICD-10-CM

## 2025-03-24 DIAGNOSIS — Z78.0 MENOPAUSE PRESENT: ICD-10-CM

## 2025-03-24 DIAGNOSIS — R42 DIZZINESS: ICD-10-CM

## 2025-03-24 PROCEDURE — 3077F SYST BP >= 140 MM HG: CPT | Performed by: NURSE PRACTITIONER

## 2025-03-24 PROCEDURE — 99214 OFFICE O/P EST MOD 30 MIN: CPT | Performed by: NURSE PRACTITIONER

## 2025-03-24 PROCEDURE — 1125F AMNT PAIN NOTED PAIN PRSNT: CPT | Performed by: NURSE PRACTITIONER

## 2025-03-24 PROCEDURE — 3080F DIAST BP >= 90 MM HG: CPT | Performed by: NURSE PRACTITIONER

## 2025-03-24 RX ORDER — SPIRONOLACTONE 25 MG/1
25 TABLET ORAL DAILY
Qty: 90 TABLET | Refills: 3 | Status: SHIPPED | OUTPATIENT
Start: 2025-03-24

## 2025-03-24 RX ORDER — NITROFURANTOIN 25; 75 MG/1; MG/1
100 CAPSULE ORAL 2 TIMES DAILY
Qty: 10 CAPSULE | Refills: 0 | Status: SHIPPED | OUTPATIENT
Start: 2025-03-24 | End: 2025-03-29

## 2025-03-24 NOTE — PROGRESS NOTES
"Chief Complaint  Dizziness and Urinary Tract Infection (Urgency )    Subjective        Alesiakarlnee Resendez presents to [facility]  History of Present Illness  Pleasant patient here today follow-up vertigo, recently had MRI of the brain, blood pressure essential hypertension runs around 131 or so at home controlled checks it regularly  She thinks she may have a UTI has chronic urinary incontinence stress incontinence is worse over the last year or so  Urinalysis frequently have nitrates, will need a urine culture  No burning dysuria fever chills flank pain she is doing exercises for her inner ears and balance and water aerobics she prefers not physical therapy or ENT at this time,    Dizziness  Urinary Tract Infection       Objective   Vital Signs:  /91   Pulse 75   Temp 97.3 °F (36.3 °C) (Temporal)   Ht 172.7 cm (68\")   Wt 108 kg (239 lb)   SpO2 95%   BMI 36.34 kg/m²   Estimated body mass index is 36.34 kg/m² as calculated from the following:    Height as of this encounter: 172.7 cm (68\").    Weight as of this encounter: 108 kg (239 lb).            Physical Exam  Vitals reviewed.   Constitutional:       General: She is not in acute distress.     Appearance: Normal appearance. She is well-developed. She is not ill-appearing, toxic-appearing or diaphoretic.   HENT:      Head: Normocephalic.      Nose: Nose normal.   Eyes:      General: No scleral icterus.     Conjunctiva/sclera: Conjunctivae normal.      Pupils: Pupils are equal, round, and reactive to light.   Neck:      Thyroid: No thyromegaly.      Vascular: No JVD.   Cardiovascular:      Rate and Rhythm: Normal rate and regular rhythm.      Heart sounds: Normal heart sounds. No murmur heard.     No friction rub. No gallop.   Pulmonary:      Effort: Pulmonary effort is normal. No respiratory distress.      Breath sounds: Normal breath sounds. No stridor. No wheezing or rales.   Abdominal:      General: Bowel sounds are normal. " There is no distension.      Palpations: Abdomen is soft.      Tenderness: There is no abdominal tenderness.      Comments: No hepatosplenomegaly, no ascites,   Musculoskeletal:         General: No tenderness.      Cervical back: Neck supple.   Lymphadenopathy:      Cervical: No cervical adenopathy.   Skin:     General: Skin is warm and dry.      Findings: No erythema or rash.   Neurological:      General: No focal deficit present.      Mental Status: She is alert and oriented to person, place, and time. Mental status is at baseline.      Deep Tendon Reflexes: Reflexes are normal and symmetric.   Psychiatric:         Mood and Affect: Mood normal.         Behavior: Behavior normal.         Thought Content: Thought content normal.         Judgment: Judgment normal.      Result Review :                Assessment and Plan   Diagnoses and all orders for this visit:    1. Stress incontinence in female (Primary)  -     Ambulatory Referral to Urology    2. Menopause present    3. Acute cystitis without hematuria  -     Urine Culture - Urine, Urine, Clean Catch    Other orders  -     nitrofurantoin, macrocrystal-monohydrate, (Macrobid) 100 MG capsule; Take 1 capsule by mouth 2 (Two) Times a Day for 5 days.  Dispense: 10 capsule; Refill: 0             Follow Up   No follow-ups on file.  Patient was given instructions and counseling regarding her condition or for health maintenance advice. Please see specific information pulled into the AVS if appropriate.     There are no Patient Instructions on file for this visit.

## 2025-03-24 NOTE — PATIENT INSTRUCTIONS
Discharge instructions push fluids  Discontinue HCTZ  Start spironolactone will be easier on the potassium  Should control blood pressure well  Let me know if you have difficulty with this  Follow-up 1 month we will check your electrolytes and kidney function at that time nonfasting okay  Check blood pressure weekly let me know if there is issues until then    Possible urinary tract infection push fluids nitrofurantoin  Urology evaluation    Continue therapies,  Neurology to discuss likely benign meningioma further up scans if needed or when  Likely yearly  Discussed dizziness as well    Chest pain shortness of breath syncope severe dizziness emergency room

## 2025-03-27 LAB
BACTERIA UR CULT: ABNORMAL
BACTERIA UR CULT: ABNORMAL
OTHER ANTIBIOTIC SUSC ISLT: ABNORMAL

## 2025-03-28 ENCOUNTER — OFFICE VISIT (OUTPATIENT)
Dept: CARDIOLOGY | Facility: CLINIC | Age: 75
End: 2025-03-28
Payer: MEDICARE

## 2025-03-28 ENCOUNTER — LAB (OUTPATIENT)
Dept: LAB | Facility: HOSPITAL | Age: 75
End: 2025-03-28
Payer: MEDICARE

## 2025-03-28 VITALS
WEIGHT: 237 LBS | RESPIRATION RATE: 16 BRPM | HEART RATE: 64 BPM | BODY MASS INDEX: 35.92 KG/M2 | DIASTOLIC BLOOD PRESSURE: 84 MMHG | HEIGHT: 68 IN | SYSTOLIC BLOOD PRESSURE: 140 MMHG

## 2025-03-28 DIAGNOSIS — E78.2 MIXED HYPERLIPIDEMIA: ICD-10-CM

## 2025-03-28 DIAGNOSIS — I71.21 ANEURYSM OF ASCENDING AORTA WITHOUT RUPTURE: ICD-10-CM

## 2025-03-28 DIAGNOSIS — I20.0 UNSTABLE ANGINA: Primary | ICD-10-CM

## 2025-03-28 DIAGNOSIS — I20.0 UNSTABLE ANGINA: ICD-10-CM

## 2025-03-28 DIAGNOSIS — I10 ESSENTIAL HYPERTENSION: ICD-10-CM

## 2025-03-28 DIAGNOSIS — I25.10 CORONARY ARTERY CALCIFICATION: ICD-10-CM

## 2025-03-28 LAB
ANION GAP SERPL CALCULATED.3IONS-SCNC: 12 MMOL/L (ref 5–15)
BASOPHILS # BLD AUTO: 0.05 10*3/MM3 (ref 0–0.2)
BASOPHILS NFR BLD AUTO: 0.6 % (ref 0–1.5)
BUN SERPL-MCNC: 17 MG/DL (ref 8–23)
BUN/CREAT SERPL: 31.5 (ref 7–25)
CALCIUM SPEC-SCNC: 9.9 MG/DL (ref 8.6–10.5)
CHLORIDE SERPL-SCNC: 100 MMOL/L (ref 98–107)
CO2 SERPL-SCNC: 30 MMOL/L (ref 22–29)
CREAT SERPL-MCNC: 0.54 MG/DL (ref 0.57–1)
DEPRECATED RDW RBC AUTO: 40.4 FL (ref 37–54)
EGFRCR SERPLBLD CKD-EPI 2021: 96.1 ML/MIN/1.73
EOSINOPHIL # BLD AUTO: 0.34 10*3/MM3 (ref 0–0.4)
EOSINOPHIL NFR BLD AUTO: 4.1 % (ref 0.3–6.2)
ERYTHROCYTE [DISTWIDTH] IN BLOOD BY AUTOMATED COUNT: 13.1 % (ref 12.3–15.4)
GLUCOSE SERPL-MCNC: 126 MG/DL (ref 65–99)
HCT VFR BLD AUTO: 44 % (ref 34–46.6)
HGB BLD-MCNC: 14.8 G/DL (ref 12–15.9)
IMM GRANULOCYTES # BLD AUTO: 0.02 10*3/MM3 (ref 0–0.05)
IMM GRANULOCYTES NFR BLD AUTO: 0.2 % (ref 0–0.5)
LYMPHOCYTES # BLD AUTO: 3.42 10*3/MM3 (ref 0.7–3.1)
LYMPHOCYTES NFR BLD AUTO: 41.2 % (ref 19.6–45.3)
MCH RBC QN AUTO: 28.4 PG (ref 26.6–33)
MCHC RBC AUTO-ENTMCNC: 33.6 G/DL (ref 31.5–35.7)
MCV RBC AUTO: 84.5 FL (ref 79–97)
MONOCYTES # BLD AUTO: 0.62 10*3/MM3 (ref 0.1–0.9)
MONOCYTES NFR BLD AUTO: 7.5 % (ref 5–12)
NEUTROPHILS NFR BLD AUTO: 3.86 10*3/MM3 (ref 1.7–7)
NEUTROPHILS NFR BLD AUTO: 46.4 % (ref 42.7–76)
NRBC BLD AUTO-RTO: 0 /100 WBC (ref 0–0.2)
PLATELET # BLD AUTO: 250 10*3/MM3 (ref 140–450)
PMV BLD AUTO: 9 FL (ref 6–12)
POTASSIUM SERPL-SCNC: 3.3 MMOL/L (ref 3.5–5.2)
RBC # BLD AUTO: 5.21 10*6/MM3 (ref 3.77–5.28)
SODIUM SERPL-SCNC: 142 MMOL/L (ref 136–145)
WBC NRBC COR # BLD AUTO: 8.31 10*3/MM3 (ref 3.4–10.8)

## 2025-03-28 PROCEDURE — 80048 BASIC METABOLIC PNL TOTAL CA: CPT

## 2025-03-28 PROCEDURE — 36415 COLL VENOUS BLD VENIPUNCTURE: CPT

## 2025-03-28 PROCEDURE — 85025 COMPLETE CBC W/AUTO DIFF WBC: CPT

## 2025-03-28 RX ORDER — ASPIRIN 81 MG/1
81 TABLET ORAL DAILY
Qty: 90 TABLET | Refills: 3 | Status: SHIPPED | OUTPATIENT
Start: 2025-03-28

## 2025-03-28 NOTE — PROGRESS NOTES
[facility]      Patient Name: Alesia Resendez  :1950  Age: 75 y.o.  Encounter Provider:  Kory Noland Jr, MD      Chief Complaint: Follow-up evaluation of TAA and chest pain      HPI  Alesia Resendez is a 75 y.o. female past medical history of diabetes, hypertension, dyslipidemia and severe degenerative disc disease of the spine who presents for follow-up evaluation of precordial pain and abnormal echocardiogram.      Last clinic visit note: She had emergent spine surgery in April at which time an echocardiogram was performed showing normal EF with grade 1 diastolic dysfunction and ascending aorta measuring 3.7 cm.  Since then she has been increasing physical activity and currently does water aerobics.  Over the last few weeks she has had a couple of episodes of left-sided chest pressure radiating to the neck at rest.  She has no limiting symptoms with activity.  She denies orthopnea, PND.  She has mild amount of lower extremity edema since starting the amlodipine.  She denies palpitations, dizziness or syncope.  EKG today shows sinus rhythm with a normal repolarization but a prolonged QT interval at 510 ms.  She has a strong family history of both parents requiring surgical bypass at an advanced age and her brother dying of fatal MI per patient report.  She is a lifelong non-smoker denies alcohol or illicit drug use.  No cardiac complaints at time of interview.    Stress study performed with no evidence of ischemia.  CT chest performed showing ascending aorta measures 4.3 cm.  Patient continues to have chest pain 1-2 times weekly.  Now she describes this is a substernal pain that radiates towards the neck.  She is involved with water aerobics and does not notice a strict relationship with physical activity.  She has a lot of social stressors that she is currently raising a grandchild in her teens who she states has substance abuse problems.  No orthopnea, PND or edema.  No  "palpitations, dizziness or syncope.    The following portions of the patient's history were reviewed and updated as appropriate: allergies, current medications, past family history, past medical history, past social history, past surgical history and problem list.      Review of Systems   Constitutional: Negative for chills and fever.   HENT:  Negative for hoarse voice and sore throat.    Eyes:  Negative for double vision and photophobia.   Cardiovascular:  Positive for chest pain and leg swelling. Negative for near-syncope, orthopnea, palpitations, paroxysmal nocturnal dyspnea and syncope.   Respiratory:  Negative for cough and wheezing.    Skin:  Negative for poor wound healing and rash.   Musculoskeletal:  Negative for arthritis and joint swelling.   Gastrointestinal:  Negative for bloating, abdominal pain, hematemesis and hematochezia.   Neurological:  Negative for dizziness and focal weakness.   Psychiatric/Behavioral:  Negative for depression and suicidal ideas.        OBJECTIVE:   Vital Signs  Vitals:    03/28/25 1123   BP: 140/84   Pulse: 64   Resp: 16     Estimated body mass index is 36.04 kg/m² as calculated from the following:    Height as of this encounter: 172.7 cm (68\").    Weight as of this encounter: 108 kg (237 lb).    Vitals reviewed.   Constitutional:       Appearance: Healthy appearance. Not in distress.   Neck:      Vascular: No JVR. JVD normal.   Pulmonary:      Effort: Pulmonary effort is normal.      Breath sounds: Normal breath sounds. No wheezing. No rhonchi. No rales.   Chest:      Chest wall: Not tender to palpatation.   Cardiovascular:      PMI at left midclavicular line. Normal rate. Regular rhythm. Normal S1. Normal S2.       Murmurs: There is no murmur.      No gallop.  No click. No rub.   Pulses:     Intact distal pulses.   Edema:     Thigh: bilateral trace edema of the thigh.     Pretibial: bilateral trace edema of the pretibial area.     Ankle: bilateral trace edema of the ankle.   "   Feet: bilateral trace edema of the feet.  Abdominal:      General: Bowel sounds are normal.      Palpations: Abdomen is soft.      Tenderness: There is no abdominal tenderness.   Musculoskeletal: Normal range of motion.         General: No tenderness. Skin:     General: Skin is warm and dry.   Neurological:      General: No focal deficit present.      Mental Status: Alert and oriented to person, place and time.         Procedures    Lipid Panel          4/28/2024    05:39 8/14/2024    08:37 3/19/2025    09:02   Lipid Panel   Total Cholesterol 98      Total Cholesterol  100  107    Triglycerides 63  181  192    HDL Cholesterol 45  40  38    VLDL Cholesterol 14  29  31    LDL Cholesterol  39  31  38    LDL/HDL Ratio 0.90   0.81         BUN   Date Value Ref Range Status   03/19/2025 12 8 - 23 mg/dL Final   09/26/2024 18 8 - 23 mg/dL Final   03/08/2022 11 10 - 20 mg/dL Final     Creatinine   Date Value Ref Range Status   03/19/2025 0.59 0.57 - 1.00 mg/dL Final   09/26/2024 0.59 0.57 - 1.00 mg/dL Final   03/08/2022 0.57 0.55 - 1.02 mg/dL Final     Potassium   Date Value Ref Range Status   03/19/2025 3.2 (L) 3.5 - 5.2 mmol/L Final   09/26/2024 3.4 (L) 3.5 - 5.2 mmol/L Final   03/08/2022 3.3 (L) 3.5 - 5.1 mmol/L Final     ALT (SGPT)   Date Value Ref Range Status   03/19/2025 31 1 - 33 U/L Final   09/26/2024 36 (H) 1 - 33 U/L Final   03/07/2022 21 0 - 55 U/L Final     AST (SGOT)   Date Value Ref Range Status   03/19/2025 40 (H) 1 - 32 U/L Final   09/26/2024 42 (H) 1 - 32 U/L Final   03/07/2022 22 5 - 34 U/L Final           ASSESSMENT:     75-year-old female with family history of coronary artery disease, diabetes, hypertension, dyslipidemia who presents for incidental finding of thoracic aortic aneurysm and recent episodes of precordial pain      PLAN OF CARE:     Precordial pain -much more typical characteristics at this point in time with increased frequency and intensity concerning for unstable angina.  CT chest showed  extensive coronary calcifications.  Add aspirin.  Last LDL 38.  Plan for cardiac catheterization.  TAA -ascending aorta measures 4.3 cm.  Extensive coronary calcifications noted on CT chest.  She will need follow-up for TAA in 1 year.  Leg edema -likely secondary to amlodipine.  It is minimal on exam and I have spoken to the patient that I feel this is cosmetic and we will monitor.  Diabetes  Hypertension  Dyslipidemia    Return to clinic 4 weeks             Discharge Medications            Accurate as of March 28, 2025 11:54 AM. If you have any questions, ask your nurse or doctor.                New Medications        Instructions Start Date   aspirin 81 MG EC tablet  Started by: Kory Noland   81 mg, Oral, Daily             Continue These Medications        Instructions Start Date   amLODIPine 10 MG tablet  Commonly known as: NORVASC   10 mg, Oral, Daily      atorvastatin 20 MG tablet  Commonly known as: LIPITOR   20 mg, Oral, Daily      buPROPion  MG 24 hr tablet  Commonly known as: WELLBUTRIN XL   150 mg, Oral, Daily      celecoxib 200 MG capsule  Commonly known as: CeleBREX   200 mg, Oral, Daily      escitalopram 10 MG tablet  Commonly known as: LEXAPRO   10 mg, Oral, Daily      fish oil 1000 MG capsule capsule   1,000 mg, Daily With Breakfast      multivitamin with minerals tablet tablet   1 tablet, Daily      nitrofurantoin (macrocrystal-monohydrate) 100 MG capsule  Commonly known as: Macrobid   100 mg, Oral, 2 Times Daily      omeprazole 40 MG capsule  Commonly known as: priLOSEC   40 mg, Oral, Daily      ondansetron ODT 4 MG disintegrating tablet  Commonly known as: ZOFRAN-ODT   4 mg, Translingual, Every 8 Hours PRN      pregabalin 25 MG capsule  Commonly known as: LYRICA   25 mg, Oral, Nightly      spironolactone 25 MG tablet  Commonly known as: Aldactone   25 mg, Oral, Daily               Thank you for allowing me to participate in the care of your patient,      Sincerely,   Kory Noland Jr,  MD [facility]  03/28/25  11:54 EDT

## 2025-03-28 NOTE — H&P (VIEW-ONLY)
[facility]      Patient Name: Alesia Resendez  :1950  Age: 75 y.o.  Encounter Provider:  Kory Noland Jr, MD      Chief Complaint: Follow-up evaluation of TAA and chest pain      HPI  Alesia Resendez is a 75 y.o. female past medical history of diabetes, hypertension, dyslipidemia and severe degenerative disc disease of the spine who presents for follow-up evaluation of precordial pain and abnormal echocardiogram.      Last clinic visit note: She had emergent spine surgery in April at which time an echocardiogram was performed showing normal EF with grade 1 diastolic dysfunction and ascending aorta measuring 3.7 cm.  Since then she has been increasing physical activity and currently does water aerobics.  Over the last few weeks she has had a couple of episodes of left-sided chest pressure radiating to the neck at rest.  She has no limiting symptoms with activity.  She denies orthopnea, PND.  She has mild amount of lower extremity edema since starting the amlodipine.  She denies palpitations, dizziness or syncope.  EKG today shows sinus rhythm with a normal repolarization but a prolonged QT interval at 510 ms.  She has a strong family history of both parents requiring surgical bypass at an advanced age and her brother dying of fatal MI per patient report.  She is a lifelong non-smoker denies alcohol or illicit drug use.  No cardiac complaints at time of interview.    Stress study performed with no evidence of ischemia.  CT chest performed showing ascending aorta measures 4.3 cm.  Patient continues to have chest pain 1-2 times weekly.  Now she describes this is a substernal pain that radiates towards the neck.  She is involved with water aerobics and does not notice a strict relationship with physical activity.  She has a lot of social stressors that she is currently raising a grandchild in her teens who she states has substance abuse problems.  No orthopnea, PND or edema.  No  "palpitations, dizziness or syncope.    The following portions of the patient's history were reviewed and updated as appropriate: allergies, current medications, past family history, past medical history, past social history, past surgical history and problem list.      Review of Systems   Constitutional: Negative for chills and fever.   HENT:  Negative for hoarse voice and sore throat.    Eyes:  Negative for double vision and photophobia.   Cardiovascular:  Positive for chest pain and leg swelling. Negative for near-syncope, orthopnea, palpitations, paroxysmal nocturnal dyspnea and syncope.   Respiratory:  Negative for cough and wheezing.    Skin:  Negative for poor wound healing and rash.   Musculoskeletal:  Negative for arthritis and joint swelling.   Gastrointestinal:  Negative for bloating, abdominal pain, hematemesis and hematochezia.   Neurological:  Negative for dizziness and focal weakness.   Psychiatric/Behavioral:  Negative for depression and suicidal ideas.        OBJECTIVE:   Vital Signs  Vitals:    03/28/25 1123   BP: 140/84   Pulse: 64   Resp: 16     Estimated body mass index is 36.04 kg/m² as calculated from the following:    Height as of this encounter: 172.7 cm (68\").    Weight as of this encounter: 108 kg (237 lb).    Vitals reviewed.   Constitutional:       Appearance: Healthy appearance. Not in distress.   Neck:      Vascular: No JVR. JVD normal.   Pulmonary:      Effort: Pulmonary effort is normal.      Breath sounds: Normal breath sounds. No wheezing. No rhonchi. No rales.   Chest:      Chest wall: Not tender to palpatation.   Cardiovascular:      PMI at left midclavicular line. Normal rate. Regular rhythm. Normal S1. Normal S2.       Murmurs: There is no murmur.      No gallop.  No click. No rub.   Pulses:     Intact distal pulses.   Edema:     Thigh: bilateral trace edema of the thigh.     Pretibial: bilateral trace edema of the pretibial area.     Ankle: bilateral trace edema of the ankle.   "   Feet: bilateral trace edema of the feet.  Abdominal:      General: Bowel sounds are normal.      Palpations: Abdomen is soft.      Tenderness: There is no abdominal tenderness.   Musculoskeletal: Normal range of motion.         General: No tenderness. Skin:     General: Skin is warm and dry.   Neurological:      General: No focal deficit present.      Mental Status: Alert and oriented to person, place and time.         Procedures    Lipid Panel          4/28/2024    05:39 8/14/2024    08:37 3/19/2025    09:02   Lipid Panel   Total Cholesterol 98      Total Cholesterol  100  107    Triglycerides 63  181  192    HDL Cholesterol 45  40  38    VLDL Cholesterol 14  29  31    LDL Cholesterol  39  31  38    LDL/HDL Ratio 0.90   0.81         BUN   Date Value Ref Range Status   03/19/2025 12 8 - 23 mg/dL Final   09/26/2024 18 8 - 23 mg/dL Final   03/08/2022 11 10 - 20 mg/dL Final     Creatinine   Date Value Ref Range Status   03/19/2025 0.59 0.57 - 1.00 mg/dL Final   09/26/2024 0.59 0.57 - 1.00 mg/dL Final   03/08/2022 0.57 0.55 - 1.02 mg/dL Final     Potassium   Date Value Ref Range Status   03/19/2025 3.2 (L) 3.5 - 5.2 mmol/L Final   09/26/2024 3.4 (L) 3.5 - 5.2 mmol/L Final   03/08/2022 3.3 (L) 3.5 - 5.1 mmol/L Final     ALT (SGPT)   Date Value Ref Range Status   03/19/2025 31 1 - 33 U/L Final   09/26/2024 36 (H) 1 - 33 U/L Final   03/07/2022 21 0 - 55 U/L Final     AST (SGOT)   Date Value Ref Range Status   03/19/2025 40 (H) 1 - 32 U/L Final   09/26/2024 42 (H) 1 - 32 U/L Final   03/07/2022 22 5 - 34 U/L Final           ASSESSMENT:     75-year-old female with family history of coronary artery disease, diabetes, hypertension, dyslipidemia who presents for incidental finding of thoracic aortic aneurysm and recent episodes of precordial pain      PLAN OF CARE:     Precordial pain -much more typical characteristics at this point in time with increased frequency and intensity concerning for unstable angina.  CT chest showed  extensive coronary calcifications.  Add aspirin.  Last LDL 38.  Plan for cardiac catheterization.  TAA -ascending aorta measures 4.3 cm.  Extensive coronary calcifications noted on CT chest.  She will need follow-up for TAA in 1 year.  Leg edema -likely secondary to amlodipine.  It is minimal on exam and I have spoken to the patient that I feel this is cosmetic and we will monitor.  Diabetes  Hypertension  Dyslipidemia    Return to clinic 4 weeks             Discharge Medications            Accurate as of March 28, 2025 11:54 AM. If you have any questions, ask your nurse or doctor.                New Medications        Instructions Start Date   aspirin 81 MG EC tablet  Started by: Kory Noland   81 mg, Oral, Daily             Continue These Medications        Instructions Start Date   amLODIPine 10 MG tablet  Commonly known as: NORVASC   10 mg, Oral, Daily      atorvastatin 20 MG tablet  Commonly known as: LIPITOR   20 mg, Oral, Daily      buPROPion  MG 24 hr tablet  Commonly known as: WELLBUTRIN XL   150 mg, Oral, Daily      celecoxib 200 MG capsule  Commonly known as: CeleBREX   200 mg, Oral, Daily      escitalopram 10 MG tablet  Commonly known as: LEXAPRO   10 mg, Oral, Daily      fish oil 1000 MG capsule capsule   1,000 mg, Daily With Breakfast      multivitamin with minerals tablet tablet   1 tablet, Daily      nitrofurantoin (macrocrystal-monohydrate) 100 MG capsule  Commonly known as: Macrobid   100 mg, Oral, 2 Times Daily      omeprazole 40 MG capsule  Commonly known as: priLOSEC   40 mg, Oral, Daily      ondansetron ODT 4 MG disintegrating tablet  Commonly known as: ZOFRAN-ODT   4 mg, Translingual, Every 8 Hours PRN      pregabalin 25 MG capsule  Commonly known as: LYRICA   25 mg, Oral, Nightly      spironolactone 25 MG tablet  Commonly known as: Aldactone   25 mg, Oral, Daily               Thank you for allowing me to participate in the care of your patient,      Sincerely,   Kory Noland Jr,  MD [facility]  03/28/25  11:54 EDT

## 2025-03-31 ENCOUNTER — HOSPITAL ENCOUNTER (OUTPATIENT)
Facility: HOSPITAL | Age: 75
Setting detail: HOSPITAL OUTPATIENT SURGERY
Discharge: HOME | End: 2025-03-31
Attending: STUDENT IN AN ORGANIZED HEALTH CARE EDUCATION/TRAINING PROGRAM | Admitting: STUDENT IN AN ORGANIZED HEALTH CARE EDUCATION/TRAINING PROGRAM
Payer: MEDICARE

## 2025-03-31 ENCOUNTER — TELEPHONE (OUTPATIENT)
Dept: CARDIOLOGY | Facility: CLINIC | Age: 75
End: 2025-03-31
Payer: MEDICARE

## 2025-03-31 VITALS
TEMPERATURE: 96.4 F | HEART RATE: 67 BPM | RESPIRATION RATE: 16 BRPM | DIASTOLIC BLOOD PRESSURE: 73 MMHG | OXYGEN SATURATION: 93 % | HEIGHT: 68 IN | BODY MASS INDEX: 33.19 KG/M2 | WEIGHT: 219 LBS | SYSTOLIC BLOOD PRESSURE: 136 MMHG

## 2025-03-31 DIAGNOSIS — I20.0 UNSTABLE ANGINA: ICD-10-CM

## 2025-03-31 PROCEDURE — 25510000001 IOPAMIDOL PER 1 ML: Performed by: STUDENT IN AN ORGANIZED HEALTH CARE EDUCATION/TRAINING PROGRAM

## 2025-03-31 PROCEDURE — 25810000003 SODIUM CHLORIDE 0.9 % SOLUTION: Performed by: STUDENT IN AN ORGANIZED HEALTH CARE EDUCATION/TRAINING PROGRAM

## 2025-03-31 PROCEDURE — 25010000002 LIDOCAINE 2% SOLUTION: Performed by: STUDENT IN AN ORGANIZED HEALTH CARE EDUCATION/TRAINING PROGRAM

## 2025-03-31 PROCEDURE — C1769 GUIDE WIRE: HCPCS | Performed by: STUDENT IN AN ORGANIZED HEALTH CARE EDUCATION/TRAINING PROGRAM

## 2025-03-31 PROCEDURE — C1894 INTRO/SHEATH, NON-LASER: HCPCS | Performed by: STUDENT IN AN ORGANIZED HEALTH CARE EDUCATION/TRAINING PROGRAM

## 2025-03-31 PROCEDURE — 99152 MOD SED SAME PHYS/QHP 5/>YRS: CPT | Performed by: STUDENT IN AN ORGANIZED HEALTH CARE EDUCATION/TRAINING PROGRAM

## 2025-03-31 PROCEDURE — 93458 L HRT ARTERY/VENTRICLE ANGIO: CPT | Performed by: STUDENT IN AN ORGANIZED HEALTH CARE EDUCATION/TRAINING PROGRAM

## 2025-03-31 PROCEDURE — 25010000002 FENTANYL CITRATE (PF) 50 MCG/ML SOLUTION: Performed by: STUDENT IN AN ORGANIZED HEALTH CARE EDUCATION/TRAINING PROGRAM

## 2025-03-31 PROCEDURE — 25010000002 MIDAZOLAM PER 1 MG: Performed by: STUDENT IN AN ORGANIZED HEALTH CARE EDUCATION/TRAINING PROGRAM

## 2025-03-31 PROCEDURE — 25010000002 HEPARIN (PORCINE) PER 1000 UNITS: Performed by: STUDENT IN AN ORGANIZED HEALTH CARE EDUCATION/TRAINING PROGRAM

## 2025-03-31 RX ORDER — SODIUM CHLORIDE 0.9 % (FLUSH) 0.9 %
10 SYRINGE (ML) INJECTION AS NEEDED
Status: DISCONTINUED | OUTPATIENT
Start: 2025-03-31 | End: 2025-03-31 | Stop reason: HOSPADM

## 2025-03-31 RX ORDER — ACETAMINOPHEN 325 MG/1
650 TABLET ORAL EVERY 4 HOURS PRN
Status: DISCONTINUED | OUTPATIENT
Start: 2025-03-31 | End: 2025-03-31 | Stop reason: HOSPADM

## 2025-03-31 RX ORDER — SODIUM CHLORIDE 9 MG/ML
100 INJECTION, SOLUTION INTRAVENOUS CONTINUOUS
Status: DISCONTINUED | OUTPATIENT
Start: 2025-03-31 | End: 2025-03-31 | Stop reason: HOSPADM

## 2025-03-31 RX ORDER — LIDOCAINE HYDROCHLORIDE 20 MG/ML
INJECTION, SOLUTION INFILTRATION; PERINEURAL
Status: DISCONTINUED | OUTPATIENT
Start: 2025-03-31 | End: 2025-03-31 | Stop reason: HOSPADM

## 2025-03-31 RX ORDER — FENTANYL CITRATE 50 UG/ML
INJECTION, SOLUTION INTRAMUSCULAR; INTRAVENOUS
Status: DISCONTINUED | OUTPATIENT
Start: 2025-03-31 | End: 2025-03-31 | Stop reason: HOSPADM

## 2025-03-31 RX ORDER — VERAPAMIL HYDROCHLORIDE 2.5 MG/ML
INJECTION, SOLUTION INTRAVENOUS
Status: DISCONTINUED | OUTPATIENT
Start: 2025-03-31 | End: 2025-03-31 | Stop reason: HOSPADM

## 2025-03-31 RX ORDER — IOPAMIDOL 755 MG/ML
INJECTION, SOLUTION INTRAVASCULAR
Status: DISCONTINUED | OUTPATIENT
Start: 2025-03-31 | End: 2025-03-31 | Stop reason: HOSPADM

## 2025-03-31 RX ORDER — MIDAZOLAM HYDROCHLORIDE 1 MG/ML
INJECTION, SOLUTION INTRAMUSCULAR; INTRAVENOUS
Status: DISCONTINUED | OUTPATIENT
Start: 2025-03-31 | End: 2025-03-31 | Stop reason: HOSPADM

## 2025-03-31 RX ORDER — HEPARIN SODIUM 1000 [USP'U]/ML
INJECTION, SOLUTION INTRAVENOUS; SUBCUTANEOUS
Status: DISCONTINUED | OUTPATIENT
Start: 2025-03-31 | End: 2025-03-31 | Stop reason: HOSPADM

## 2025-03-31 RX ADMIN — SODIUM CHLORIDE 100 ML/HR: 9 INJECTION, SOLUTION INTRAVENOUS at 10:33

## 2025-03-31 NOTE — TELEPHONE ENCOUNTER
Cardiac catheterization shows mild nonobstructive coronary disease.  Left a message that we will continue aspirin and statin.

## 2025-03-31 NOTE — Clinical Note
Hemostasis started on the right radial artery. R-Band was used in achieving hemostasis. Radial compression device applied to vessel. Hemostasis achieved successfully. Closure device additional comment: 13 marivel of air inserted

## 2025-03-31 NOTE — DISCHARGE INSTRUCTIONS
[facility]  [address] [place] [address]    Coronary Angiogram (Radial/Ulnar Approach) After Care    Refer to this sheet in the next few weeks. These instructions provide you with information on caring for yourself after your procedure. Your caregiver may also give you more specific instructions. Your treatment has been planned according to current medical practices, but problems sometimes occur. Call your caregiver if you have any problems or questions after your procedure.    Home Care Instructions:  You may shower the day after the procedure. Remove the bandage (dressing) and gently wash the site with plain soap and water. Gently pat the site dry. You may apply a band aid daily for 2 days if desired.    Do not apply powder or lotion to the site.  Do not submerge the affected site in water for 3 to 5 days or until the site is completely healed.   Do not lift, push or pull anything over 5 pounds for 5 days after your procedure or as directed by your physician.  As a reference, a gallon of milk weighs 8 pounds.   Inspect the site at least twice daily. You may notice some bruising at the site and it may be tender for 1 to 2 weeks.     Increase your fluid intake for the next 2 days.    Keep arm elevated for 24 hours. For the remainder of the day, keep your arm in “Pledge of Allegiance” position when up and about.     You may drive 24 hours after the procedure unless otherwise instructed by your caregiver.  Do not operate machinery or power tools for 24 hours.  A responsible adult should be with you for the first 24 hours after you arrive home. Do not make any important legal decisions or sign legal papers for 24 hours.  Do not drink alcohol for 24 hours.    Metformin or any medications containing Metformin should not be taken for 48 hours after your procedure.      Call Your Doctor if:   You have unusual pain at the radial/ulnar (wrist) site.  You have redness, warmth, swelling, or pain at the  radial/ulnar (wrist) site.  You have drainage (other than a small amount of blood on the dressing).  `You have chills or a fever > 101.  Your arm becomes pale or dark, cool, tingly, or numb.  You develop chest pain, shortness of breath, feel faint or pass out.    You have heavy bleeding from the site, hold pressure on the site for 20 minutes.  If the bleeding stops, apply a fresh bandage and call your cardiologist.  However, if you        continue to have bleeding, call 911 and continue to apply pressure to the site.   You have any symptoms of a stroke.  Remember BE FAST  B-balance. Sudden trouble walking or loss of balance.  E-eyes.  Sudden changes in how you see or a sudden onset of a very bad headache.   F-face. Sudden weakness or loss of feeling of the face or facial droop on one side.   A-arms Sudden weakness or numbness in one arm.  One arm drifts down if they are both held out in front of you. This happens suddenly and usually on one side of the body.   S-speech.  Sudden trouble speaking, slurred speech or trouble understanding what are saying.   T-time  Time to call emergency services.  Write down the symptoms and the time they started.

## 2025-04-28 ENCOUNTER — PATIENT ROUNDING (BHMG ONLY) (OUTPATIENT)
Dept: NEUROLOGY | Facility: CLINIC | Age: 75
End: 2025-04-28
Payer: MEDICARE

## 2025-04-28 ENCOUNTER — OFFICE VISIT (OUTPATIENT)
Dept: NEUROLOGY | Facility: CLINIC | Age: 75
End: 2025-04-28
Payer: MEDICARE

## 2025-04-28 VITALS
SYSTOLIC BLOOD PRESSURE: 126 MMHG | BODY MASS INDEX: 35.92 KG/M2 | HEIGHT: 68 IN | DIASTOLIC BLOOD PRESSURE: 82 MMHG | WEIGHT: 237 LBS | HEART RATE: 70 BPM | OXYGEN SATURATION: 96 %

## 2025-04-28 DIAGNOSIS — R20.2 NUMBNESS AND TINGLING OF FOOT: ICD-10-CM

## 2025-04-28 DIAGNOSIS — R20.0 NUMBNESS AND TINGLING OF FOOT: ICD-10-CM

## 2025-04-28 DIAGNOSIS — M54.16 LUMBAR BACK PAIN WITH RADICULOPATHY AFFECTING LEFT LOWER EXTREMITY: Primary | ICD-10-CM

## 2025-04-28 PROBLEM — Z96.651 PRESENCE OF RIGHT ARTIFICIAL KNEE JOINT: Status: ACTIVE | Noted: 2018-01-18

## 2025-04-28 PROBLEM — I16.0 HYPERTENSIVE URGENCY: Status: ACTIVE | Noted: 2022-03-07

## 2025-04-28 PROBLEM — M17.9 OSTEOARTHRITIS OF KNEE: Status: ACTIVE | Noted: 2025-04-28

## 2025-04-28 PROBLEM — F41.9 ANXIETY: Status: ACTIVE | Noted: 2025-04-28

## 2025-04-28 PROBLEM — C44.42 SQUAMOUS CELL CANCER OF SCALP AND SKIN OF NECK: Status: ACTIVE | Noted: 2025-04-28

## 2025-04-28 PROBLEM — Z87.410 HISTORY OF CERVICAL DYSPLASIA: Status: ACTIVE | Noted: 2019-06-11

## 2025-04-28 NOTE — PROGRESS NOTES
[facility] NEUROLOGY         Date of Visit: 2025    Name: Alesia Resendez    :  1950    PCP: Epley, James, APRN    Visit Type: an initial evaluation  Chief Complaint   Patient presents with    Dizziness             Subjective     Patient ID: Alesia is a 75 y.o. female.         History of Present Illness  I have had the pleasure of seeing your patient for the first time today. As you  may know she is a 75 year old female here today for intital evaluation for dizziness and menigoma. She was referred by her primary care nurse practitioner.     History:    Patient does have history of hypertension, hyperlipidemia, prediabetes, GERD, lumbar spinal stenosis with previous surgical intervention, cervical spinal stenosis.    Patient states that she has been having some symptoms of balance difficulty.  She was describing it as feeling that she is unsteady on her feet.  She described it almost as a dizziness but does not report room spinning or the sensation being in her head more as a general difficulty walking and feeling like she veers when trying to walk next to people.  She did mention symptoms to her primary care provider who ended up ordering an MRI of the brain.  MRI of the brain revealed evidence for an 8 x 6 x 6 mm meningioma in the left superior falx with no other significant abnormalities.    Patient does have history of previous lumbar spinal surgery done by Dr. Holt with [facility] neurology.  She did not notice any significant balance or gait difficulties post surgical intervention.    Current:    Patient states that currently symptoms feel like others symptom of unsteadiness described as feeling like she cannot walk in a straight line which she especially notices when she is walking next to people.  She also notices significant balance difficulty with her eyes closed such as in her showers where she has to use a grab bar.  She denies any room spinning or lightheadedness.  She has not  had any recent falls.  She denies any presyncopal sensation or syncope.  She does report some bowel and bladder incontinence however has a history of previous bladder surgery.  She does report some mild numbness in both of her feet.  She does report still some mild low back pain for which she completes water aerobics.  She denies any other new or worsening neurologic complaints at today's visit.  Dizziness  Symptoms include neck pain and numbness.    Pertinent negative symptoms include no fatigue, no headaches, no nausea, no vomiting and no weakness.       The following portions of the patient's history were reviewed and updated as appropriate: allergies, current medications, past family history, past medical history, past social history, past surgical history, and problem list.                 Review of Systems   Constitutional:  Negative for activity change, appetite change, fatigue and unexpected weight change.   HENT:  Negative for tinnitus and trouble swallowing.    Eyes:  Negative for visual disturbance.   Respiratory:  Negative for chest tightness and shortness of breath.    Gastrointestinal:  Negative for constipation, diarrhea, nausea and vomiting.   Genitourinary:  Positive for urgency. Negative for difficulty urinating and frequency.   Musculoskeletal:  Positive for back pain, gait problem and neck pain.   Neurological:  Positive for numbness. Negative for dizziness, tremors, syncope, facial asymmetry, speech difficulty, weakness, light-headedness and headaches.   Psychiatric/Behavioral:  Negative for confusion and sleep disturbance.             Current Medications:    Current Outpatient Medications   Medication Instructions    amLODIPine (NORVASC) 10 mg, Oral, Daily    atorvastatin (LIPITOR) 20 mg, Oral, Daily    buPROPion XL (WELLBUTRIN XL) 150 mg, Oral, Daily    celecoxib (CELEBREX) 200 mg, Oral, Daily    escitalopram (LEXAPRO) 10 mg, Oral, Daily    fish oil 1,000 mg, Daily With Breakfast     "multivitamin with minerals (CENTRUM SILVER 50+WOMEN PO) 1 tablet, Daily    omeprazole (PRILOSEC) 40 mg, Oral, Daily    ondansetron ODT (ZOFRAN-ODT) 4 mg, Translingual, Every 8 Hours PRN    pregabalin (LYRICA) 25 mg, Oral, Nightly    spironolactone (ALDACTONE) 25 mg, Oral, Daily          /82   Pulse 70   Ht 172.7 cm (67.99\")   Wt 108 kg (237 lb)   SpO2 96%   BMI 36.04 kg/m²                Objective     Neurological Exam  Mental Status  Awake, alert and oriented to person, place and time. Speech is normal. Language is fluent with no aphasia.    Cranial Nerves  CN II: Visual fields full to confrontation.  CN III, IV, VI: Extraocular movements intact bilaterally. Normal lids and orbits bilaterally. Pupils equal round and reactive to light bilaterally.  CN V: Facial sensation is normal.  CN VII: Full and symmetric facial movement.  CN IX, X: Palate elevates symmetrically  CN XI: Shoulder shrug strength is normal.  CN XII: Tongue midline without atrophy or fasciculations.    Motor  Normal muscle bulk throughout. Normal muscle tone. No abnormal involuntary movements. Strength is 5/5 throughout all four extremities.    Sensory  Light touch is normal in upper and lower extremities. Pinprick abnormality: Diminished toes bilaterally. Temperature is normal in upper and lower extremities. Vibration abnormality: Diminished bilateral feet to ankles. Proprioception abnormality: Diminished in the left foot at the toes..     Reflexes  Deep tendon reflexes are 2+ and symmetric except as noted.                                            Right                      Left  Achilles                                1+                         1+    Coordination    Finger-to-nose, rapid alternating movements and heel-to-shin normal bilaterally without dysmetria.    Gait  Normal casual, toe, heel and tandem gait.      Physical Exam  Constitutional:       Appearance: Normal appearance. She is normal weight.   Eyes:      General: Lids " are normal.      Extraocular Movements: Extraocular movements intact.      Pupils: Pupils are equal, round, and reactive to light.   Pulmonary:      Effort: Pulmonary effort is normal.   Skin:     General: Skin is warm.   Neurological:      Motor: Motor strength is normal.     Coordination: Coordination is intact.      Deep Tendon Reflexes:      Reflex Scores:       Achilles reflexes are 1+ on the right side and 1+ on the left side.  Psychiatric:         Mood and Affect: Mood normal.         Speech: Speech normal.         Behavior: Behavior normal.                     Assessment & Plan     Diagnoses and all orders for this visit:    1. Lumbar back pain with radiculopathy affecting left lower extremity (Primary)  -     EMG & Nerve Conduction Test; Future    2. Numbness and tingling of foot  -     EMG & Nerve Conduction Test; Future       At this time I would like to order an EMG study of the bilateral lower extremities to look for possible peripheral neuropathy that could be contributing to patient's sense of balance and gait disturbance.    In addition this will rule out any involvement of the lumbar spine and the cause of her symptoms.    I did offer to send patient for physical therapy however she would like to try to complete some exercises at home as she is already slightly doing water aerobics and has a very busy schedule with taking care of a 16-year-old granddaughter.    She is not complaining of any significant pain in the feet so we will hold off on any kind of medication management at this time.    Follow-up after EMG is complete            Meri ESPINAL    Neurology    [facility] Neurology [place]    Phone: [phone]    4/28/2025 , 17:18 EDT

## 2025-05-24 NOTE — PROGRESS NOTES
"Chief Complaint   Patient presents with   • Diarrhea         History of Present Illness  Patient is a 72-year-old female who presents today for follow-up. She was referred for upper abdominal pain.  She is a new patient to me.  She had a colonoscopy May 2021 with multiple polyps including a 12 mm polyp that was removed piecemeal.  She been recommended to have her next colonoscopy in 1 year.    Patient presents today with concerns about chronic diarrhea.  She reports this has been present for years.  She will have multiple loose stools per day, generally 3-4 times.  Reports urgency at times fecal incontinence.  She had been diagnosed with irritable bowel syndrome in her 40s.    She also has a history of GERD.  Has been on omeprazole for this for years.  Reports symptoms are controlled.    She has a family history of esophageal cancer in her mother and her sister and both parents have had H. pylori infection.  She questions if this could be contributing to her symptoms.    She denies any nausea, vomiting, or dysphagia.     Result Review :       CBC & Differential (08/18/2022 08:15)   Comprehensive Metabolic Panel (08/18/2022 08:15)   Office Visit with Epley, James, APRN (07/28/2022)   Tissue Pathology Exam (05/27/2021 08:19)   COLONOSCOPY (05/27/2021 08:09)   Referral to Gastroenterology for Pain of upper abdomen (07/28/2022)         Vital Signs:   /80   Pulse 64   Temp 97.5 °F (36.4 °C)   Ht 172.7 cm (68\")   Wt 103 kg (226 lb 11.2 oz)   SpO2 96%   BMI 34.47 kg/m²     Body mass index is 34.47 kg/m².     Physical Exam  Vitals reviewed.   Constitutional:       General: She is not in acute distress.     Appearance: She is well-developed.   HENT:      Head: Normocephalic and atraumatic.   Pulmonary:      Effort: Pulmonary effort is normal. No respiratory distress.   Abdominal:      General: Abdomen is flat. Bowel sounds are normal. There is no distension.      Palpations: Abdomen is soft.      Tenderness: " - AFVSS on RA.  - no leukocytosis or significant electrolyte abnormalities.   - UDS positive for THC  - UA noninfectious  - s/p 1 L LR bolus  - continuous IVF while intolerant to PO  - CLD as tolerated  - IV zofran q6h   - daily BMP, mag     There is no abdominal tenderness.   Skin:     General: Skin is dry.      Coloration: Skin is not pale.   Neurological:      Mental Status: She is alert and oriented to person, place, and time.   Psychiatric:         Thought Content: Thought content normal.           Assessment and Plan    Diagnoses and all orders for this visit:    1. History of colon polyps (Primary)    2. Diarrhea, unspecified type    3. Gastroesophageal reflux disease, unspecified whether esophagitis present    4. Family history of esophageal cancer         Discussion  Patient presents today for follow-up.  She is due for colonoscopy to follow-up on piecemeal polypectomy performed last year.  We will schedule this and plan on random biopsies at time of colonoscopy to assess for colitis in light of her chronic diarrhea.  We will plan on EGD at time of colonoscopy to evaluate history of chronic reflux, assess for H. pylori infection, and assess for celiac disease.  Offered treatment for IBS-D at today's office visit, patient reports she does not wish to add any medications currently and declined.          Follow Up   Return for Follow up to review results after testing complete.    Patient Instructions   Schedule EGD and colonoscopy for further evaluation.

## 2025-06-03 ENCOUNTER — HOSPITAL ENCOUNTER (OUTPATIENT)
Dept: NEUROLOGY | Facility: HOSPITAL | Age: 75
Discharge: HOME | End: 2025-06-03
Admitting: NURSE PRACTITIONER
Payer: MEDICARE

## 2025-06-03 DIAGNOSIS — R20.2 NUMBNESS AND TINGLING OF FOOT: ICD-10-CM

## 2025-06-03 DIAGNOSIS — R20.0 NUMBNESS AND TINGLING OF FOOT: ICD-10-CM

## 2025-06-03 DIAGNOSIS — M54.16 LUMBAR BACK PAIN WITH RADICULOPATHY AFFECTING LEFT LOWER EXTREMITY: ICD-10-CM

## 2025-06-03 PROCEDURE — 95910 NRV CNDJ TEST 7-8 STUDIES: CPT | Performed by: PSYCHIATRY & NEUROLOGY

## 2025-06-03 PROCEDURE — 95886 MUSC TEST DONE W/N TEST COMP: CPT

## 2025-06-03 PROCEDURE — 95910 NRV CNDJ TEST 7-8 STUDIES: CPT

## 2025-06-03 PROCEDURE — 95886 MUSC TEST DONE W/N TEST COMP: CPT | Performed by: PSYCHIATRY & NEUROLOGY

## 2025-06-03 NOTE — PROCEDURES
"EMG and Nerve Conduction Studies    I.      Instrument used: [employer] [place]  II.     Please see data sheets for tabular summary of NCS and details on methods, temperatures and lab standards.   III.    EMG muscles tested for upper extremity studies include the deltoid, biceps, triceps, pronator teres, extensor digitorum communis, first dorsal interosseous and abductor pollicis brevis.    IV.   EMG muscles tested for lower extremity studies include the vastus lateralis, tibialis anterior, peroneus longus, medial gastrocnemius and extensor digitorum brevis.    V.    Additional muscles tested as needed.  Paraspinal muscles tested as needed.   VI.   Please see data sheets for tabular summary of EMG findings.   VII. The complete report includes the data sheets.      Indication: neuropathy, back pain  History: She has been having issues with balance while walking. She has had tingling in her feet over the last 8 months. There was concern for reduced vibratory sensation in both feet. She has numbness of her left leg with tingling on the inside of her thigh down to her calf. This has been persistent since a discectomy in April 2024.      Ht: 68\"  Wt: 237 lbs  HbA1C:   Lab Results   Component Value Date    HGBA1C 6.20 (H) 03/19/2025     TSH:   Lab Results   Component Value Date    TSH 1.410 03/19/2025       Technical summary: Nerve conduction studies were obtained of the bilateral LE. Skin temperatures were normal and temperature correction was used where indicated. Needle examination was obtained on selected muscles in the BLE.    Results:  Absent versus low amplitude left superficial fibular, right superficial fibular and right sural sensory response  Abnormal left fibular motor response with low amplitude of 1.9 mV measured at the ankle and normal conduction velocity  Normal right fibular motor response with borderline amplitudes  Normal left tibial motor response  Normal right tibial motor response  Needle " examination of the bilateral lower extremities is noted for increased insertional activity with fibrillation potentials and positive sharp waves in the bilateral tibialis anterior as well as the left gastrocnemius medial head.  Large motor unit action potentials with increased firing rate, polyspikes, and reduced interference pattern were seen in the bilateral tibialis anterior, left gastrocnemius medial head, and left vastus lateralis.  Bilateral extensor digitorum brevis were difficult to fully localize on needle examination, but no increased insertional activity noted.  Lumbar paraspinals deferred due to prior lumbar surgery    Impression: There are abnormalities in the bilateral sensory responses as well as left fibular motor response that meet criteria for a peripheral polyneuropathy.   There is evidence of acute on chronic denervation changes of the bilateral lower extremities that may be consistent with diagnosis of peripheral neuropathy; however, concomitant chronic lumbar radiculopathies are difficult to fully exclude particularly of the left lower extremity. Clinical correlation is advised.    Kelechi Wyman M.D.          Dictated utilizing Dragon dictation.

## 2025-06-11 RX ORDER — CELECOXIB 200 MG/1
200 CAPSULE ORAL DAILY
Qty: 90 CAPSULE | Refills: 0 | Status: SHIPPED | OUTPATIENT
Start: 2025-06-11

## 2025-06-11 NOTE — TELEPHONE ENCOUNTER
Rx Refill Note  Requested Prescriptions     Pending Prescriptions Disp Refills    celecoxib (CeleBREX) 200 MG capsule [Pharmacy Med Name: CELECOXIB 200 MG CAPSULE] 90 capsule 1     Sig: TAKE 1 CAPSULE BY MOUTH DAILY      Last office visit with prescribing clinician: 3/24/2025   Last telemedicine visit with prescribing clinician: Visit date not found   Next office visit with prescribing clinician: Visit date not found                         Would you like a call back once the refill request has been completed: [] Yes [] No    If the office needs to give you a call back, can they leave a voicemail: [] Yes [] No    Krysta Martines MA  06/11/25, 08:05 EDT

## 2025-06-16 DIAGNOSIS — M48.061 SPINAL STENOSIS OF LUMBAR REGION, UNSPECIFIED WHETHER NEUROGENIC CLAUDICATION PRESENT: ICD-10-CM

## 2025-06-16 DIAGNOSIS — M54.16 LUMBAR RADICULOPATHY: ICD-10-CM

## 2025-06-16 DIAGNOSIS — M46.1 SACROILIITIS: ICD-10-CM

## 2025-06-16 RX ORDER — PREGABALIN 25 MG/1
25 CAPSULE ORAL NIGHTLY
Qty: 90 CAPSULE | Refills: 1 | Status: SHIPPED | OUTPATIENT
Start: 2025-06-16

## 2025-06-16 NOTE — TELEPHONE ENCOUNTER
Rx Refill Note  Requested Prescriptions     Pending Prescriptions Disp Refills    pregabalin (LYRICA) 25 MG capsule [Pharmacy Med Name: Pregabalin 25 MG Oral Capsule] 90 capsule 1     Sig: TAKE 1 CAPSULE BY MOUTH AT NIGHT      Last office visit with prescribing clinician: 3/24/2025   Last telemedicine visit with prescribing clinician: Visit date not found   Next office visit with prescribing clinician: Visit date not found                         Would you like a call back once the refill request has been completed: [] Yes [] No    If the office needs to give you a call back, can they leave a voicemail: [] Yes [] No    Krysta Martines MA  06/16/25, 13:19 EDT

## 2025-06-27 RX ORDER — ATORVASTATIN CALCIUM 20 MG/1
20 TABLET, FILM COATED ORAL DAILY
Qty: 90 TABLET | Refills: 3 | Status: SHIPPED | OUTPATIENT
Start: 2025-06-27

## 2025-07-08 ENCOUNTER — OFFICE VISIT (OUTPATIENT)
Dept: NEUROLOGY | Facility: CLINIC | Age: 75
End: 2025-07-08
Payer: MEDICARE

## 2025-07-08 ENCOUNTER — LAB (OUTPATIENT)
Dept: LAB | Facility: HOSPITAL | Age: 75
End: 2025-07-08
Payer: MEDICARE

## 2025-07-08 VITALS
BODY MASS INDEX: 35.77 KG/M2 | SYSTOLIC BLOOD PRESSURE: 124 MMHG | WEIGHT: 236 LBS | HEIGHT: 68 IN | DIASTOLIC BLOOD PRESSURE: 78 MMHG | OXYGEN SATURATION: 94 % | HEART RATE: 73 BPM

## 2025-07-08 DIAGNOSIS — R79.9 ABNORMAL FINDING OF BLOOD CHEMISTRY, UNSPECIFIED: ICD-10-CM

## 2025-07-08 DIAGNOSIS — M54.16 LUMBAR BACK PAIN WITH RADICULOPATHY AFFECTING LEFT LOWER EXTREMITY: Primary | ICD-10-CM

## 2025-07-08 DIAGNOSIS — G62.9 PERIPHERAL POLYNEUROPATHY: ICD-10-CM

## 2025-07-08 DIAGNOSIS — D32.0 CEREBRAL MENINGIOMA: ICD-10-CM

## 2025-07-08 LAB
CRP SERPL-MCNC: <0.3 MG/DL (ref 0–0.5)
ERYTHROCYTE [SEDIMENTATION RATE] IN BLOOD: 11 MM/HR (ref 0–30)
FERRITIN SERPL-MCNC: 78.2 NG/ML (ref 13–150)
FOLATE SERPL-MCNC: 18.9 NG/ML (ref 4.78–24.2)
HBA1C MFR BLD: 6.5 % (ref 4.8–5.6)
VIT B12 BLD-MCNC: 615 PG/ML (ref 211–946)

## 2025-07-08 PROCEDURE — 1159F MED LIST DOCD IN RCRD: CPT | Performed by: NURSE PRACTITIONER

## 2025-07-08 PROCEDURE — 83036 HEMOGLOBIN GLYCOSYLATED A1C: CPT

## 2025-07-08 PROCEDURE — 82784 ASSAY IGA/IGD/IGG/IGM EACH: CPT | Performed by: NURSE PRACTITIONER

## 2025-07-08 PROCEDURE — 83655 ASSAY OF LEAD: CPT | Performed by: NURSE PRACTITIONER

## 2025-07-08 PROCEDURE — 82607 VITAMIN B-12: CPT | Performed by: NURSE PRACTITIONER

## 2025-07-08 PROCEDURE — 84446 ASSAY OF VITAMIN E: CPT | Performed by: NURSE PRACTITIONER

## 2025-07-08 PROCEDURE — 86140 C-REACTIVE PROTEIN: CPT | Performed by: NURSE PRACTITIONER

## 2025-07-08 PROCEDURE — 3078F DIAST BP <80 MM HG: CPT | Performed by: NURSE PRACTITIONER

## 2025-07-08 PROCEDURE — 82175 ASSAY OF ARSENIC: CPT | Performed by: NURSE PRACTITIONER

## 2025-07-08 PROCEDURE — 99214 OFFICE O/P EST MOD 30 MIN: CPT | Performed by: NURSE PRACTITIONER

## 2025-07-08 PROCEDURE — 36415 COLL VENOUS BLD VENIPUNCTURE: CPT | Performed by: NURSE PRACTITIONER

## 2025-07-08 PROCEDURE — 84165 PROTEIN E-PHORESIS SERUM: CPT | Performed by: NURSE PRACTITIONER

## 2025-07-08 PROCEDURE — 82746 ASSAY OF FOLIC ACID SERUM: CPT | Performed by: NURSE PRACTITIONER

## 2025-07-08 PROCEDURE — 86592 SYPHILIS TEST NON-TREP QUAL: CPT | Performed by: NURSE PRACTITIONER

## 2025-07-08 PROCEDURE — 85652 RBC SED RATE AUTOMATED: CPT | Performed by: NURSE PRACTITIONER

## 2025-07-08 PROCEDURE — 82728 ASSAY OF FERRITIN: CPT | Performed by: NURSE PRACTITIONER

## 2025-07-08 PROCEDURE — 1160F RVW MEDS BY RX/DR IN RCRD: CPT | Performed by: NURSE PRACTITIONER

## 2025-07-08 PROCEDURE — 82525 ASSAY OF COPPER: CPT | Performed by: NURSE PRACTITIONER

## 2025-07-08 PROCEDURE — 86235 NUCLEAR ANTIGEN ANTIBODY: CPT | Performed by: NURSE PRACTITIONER

## 2025-07-08 PROCEDURE — 84425 ASSAY OF VITAMIN B-1: CPT | Performed by: NURSE PRACTITIONER

## 2025-07-08 PROCEDURE — 3074F SYST BP LT 130 MM HG: CPT | Performed by: NURSE PRACTITIONER

## 2025-07-08 PROCEDURE — 83921 ORGANIC ACID SINGLE QUANT: CPT | Performed by: NURSE PRACTITIONER

## 2025-07-08 PROCEDURE — 84155 ASSAY OF PROTEIN SERUM: CPT | Performed by: NURSE PRACTITIONER

## 2025-07-08 PROCEDURE — 86258 DGP ANTIBODY EACH IG CLASS: CPT | Performed by: NURSE PRACTITIONER

## 2025-07-08 PROCEDURE — 84207 ASSAY OF VITAMIN B-6: CPT | Performed by: NURSE PRACTITIONER

## 2025-07-08 PROCEDURE — 83825 ASSAY OF MERCURY: CPT | Performed by: NURSE PRACTITIONER

## 2025-07-08 PROCEDURE — 86364 TISS TRNSGLTMNASE EA IG CLAS: CPT | Performed by: NURSE PRACTITIONER

## 2025-07-08 PROCEDURE — 83521 IG LIGHT CHAINS FREE EACH: CPT | Performed by: NURSE PRACTITIONER

## 2025-07-08 RX ORDER — ALPHA LIPOIC ACID 300 MG
300 CAPSULE ORAL 2 TIMES DAILY
Qty: 60 EACH | Refills: 5 | Status: SHIPPED | OUTPATIENT
Start: 2025-07-08 | End: 2026-01-04

## 2025-07-08 NOTE — PROGRESS NOTES
[facility] NEUROLOGY         Date of Visit: 2025    Name: Alesia Resendez    :  1950    PCP: Epley, James, APRN    Visit Type: follow-up  Chief Complaint   Patient presents with    Dizziness             Subjective     Patient ID: Alesia is a 75 y.o. female.         History of Present Illness  I have had the pleasure of seeing your patient today. As you  may know she is a 75 year old female here today for follow up evaluation for dizziness and menigoma. She was referred by her primary care nurse practitioner.     History:    Patient does have history of hypertension, hyperlipidemia, prediabetes, GERD, lumbar spinal stenosis with previous surgical intervention, cervical spinal stenosis.    Patient states that she has been having some symptoms of balance difficulty.  She was describing it as feeling that she is unsteady on her feet.  She described it almost as a dizziness but does not report room spinning or the sensation being in her head more as a general difficulty walking and feeling like she veers when trying to walk next to people.  She did mention symptoms to her primary care provider who ended up ordering an MRI of the brain.  MRI of the brain in 2025 revealed evidence for an 8 x 6 x 6 mm meningioma in the left superior falx with no other significant abnormalities.    Patient does have history of previous lumbar spinal surgery done by Dr. Holt with [facility] neurology.  She did not notice any significant balance or gait difficulties post surgical intervention.    Current:    Patient did end up undergoing EMG study with Dr. Wyman on 6/3/2025.  The EMG revealed evidence for peripheral neuropathy with possible superimposed lumbar radiculopathy.  No other abnormalities were noted.  Patient states that she has been doing about the same since her last visit.  She is not having any worsening pain symptoms.  She does continue to have some balance and gait difficulty.  She continues to  have numbness in the feet with pain radiating down the left lower extremity from her low back.  She states that otherwise she is not in significant amounts of pain.  She has been told by Dr. Holt that she needs a more extensive lumbar spine surgery however is holding off on this as she has a granddaughter she is caring for currently.    Patient has had recent laboratory work with an A1c in March of this 0.3.  She is not currently being treated for any kind of diabetic condition.  Her most recent thyroid level was within normal limits.  She has not had any other workup for neuropathy in the past.    Patient states that she is not having any other new or worsening cognitive complaints.  No other new neurologic complaints at today's visit.      The following portions of the patient's history were reviewed and updated as appropriate: allergies, current medications, past family history, past medical history, past social history, past surgical history, and problem list.                 Review of Systems   HENT:  Negative for tinnitus and trouble swallowing.    Eyes:  Negative for visual disturbance.   Respiratory:  Negative for chest tightness and shortness of breath.    Gastrointestinal:  Negative for constipation and diarrhea.   Genitourinary:  Positive for urgency. Negative for difficulty urinating and frequency.   Musculoskeletal:  Positive for back pain.   Psychiatric/Behavioral:  Negative for sleep disturbance.             Current Medications:    Current Outpatient Medications   Medication Instructions    Alpha-Lipoic Acid 300 mg, Oral, 2 Times Daily    amLODIPine (NORVASC) 10 mg, Oral, Daily    atorvastatin (LIPITOR) 20 mg, Oral, Daily    buPROPion XL (WELLBUTRIN XL) 150 mg, Oral, Daily    celecoxib (CELEBREX) 200 mg, Oral, Daily    escitalopram (LEXAPRO) 10 mg, Oral, Daily    fish oil 1,000 mg, Daily With Breakfast    multivitamin with minerals (CENTRUM SILVER 50+WOMEN PO) 1 tablet, Daily    omeprazole (PRILOSEC)  "40 mg, Oral, Daily    ondansetron ODT (ZOFRAN-ODT) 4 mg, Translingual, Every 8 Hours PRN    pregabalin (LYRICA) 25 mg, Oral, Nightly    spironolactone (ALDACTONE) 25 mg, Oral, Daily          /78   Pulse 73   Ht 172.7 cm (67.99\")   Wt 107 kg (236 lb)   SpO2 94%   BMI 35.89 kg/m²                Objective     Neurological Exam  Mental Status  Awake, alert and oriented to person, place and time. Speech is normal. Language is fluent with no aphasia.    Cranial Nerves  CN II: Visual fields full to confrontation.  CN III, IV, VI: Extraocular movements intact bilaterally. Normal lids and orbits bilaterally. Pupils equal round and reactive to light bilaterally.  CN V: Facial sensation is normal.  CN VII: Full and symmetric facial movement.  CN IX, X: Palate elevates symmetrically  CN XI: Shoulder shrug strength is normal.  CN XII: Tongue midline without atrophy or fasciculations.    Motor  Normal muscle bulk throughout. Normal muscle tone. No abnormal involuntary movements. Strength is 5/5 throughout all four extremities.    Sensory  Light touch is normal in upper and lower extremities. Pinprick abnormality: Diminished toes bilaterally. Temperature is normal in upper and lower extremities. Vibration abnormality: Diminished bilateral feet to ankles. Proprioception abnormality: Diminished in the left foot at the toes..     Reflexes  Deep tendon reflexes are 2+ and symmetric except as noted.                                            Right                      Left  Achilles                                1+                         1+    Coordination    Finger-to-nose, rapid alternating movements and heel-to-shin normal bilaterally without dysmetria.    Gait  Normal casual, toe, heel and tandem gait.      Physical Exam  Constitutional:       Appearance: Normal appearance. She is normal weight.   Eyes:      General: Lids are normal.      Extraocular Movements: Extraocular movements intact.      Pupils: Pupils are " equal, round, and reactive to light.   Pulmonary:      Effort: Pulmonary effort is normal.   Skin:     General: Skin is warm.   Neurological:      Motor: Motor strength is normal.     Coordination: Coordination is intact.      Deep Tendon Reflexes:      Reflex Scores:       Achilles reflexes are 1+ on the right side and 1+ on the left side.  Psychiatric:         Mood and Affect: Mood normal.         Speech: Speech normal.         Behavior: Behavior normal.                     Assessment & Plan     Diagnoses and all orders for this visit:    1. Lumbar back pain with radiculopathy affecting left lower extremity (Primary)  -     Alpha-Lipoic Acid 300 MG capsule; Take 300 mg by mouth 2 (Two) Times a Day for 180 days.  Dispense: 60 each; Refill: 5    2. Peripheral polyneuropathy  -     Alpha-Lipoic Acid 300 MG capsule; Take 300 mg by mouth 2 (Two) Times a Day for 180 days.  Dispense: 60 each; Refill: 5  -     Celiac Disease Antibody Screen  -     Copper, Serum  -     C-reactive Protein  -     Ferritin  -     Folate  -     Heavy Metals, Blood  -     Methylmalonic Acid, Serum  -     Immunoglobulin Free LT Chains Blood  -     Protein Elec + Interp, Serum  -     Protein Electrophoresis, 24 Hr Urine - Urine, Clean Catch  -     RPR Qualitative with Reflex to Quant  -     Sedimentation Rate  -     Sjogren's Antibody, Anti-SS-A / -SS-B  -     Vitamin B1, Whole Blood  -     Vitamin B12  -     Vitamin B6  -     Vitamin E  -     Hemoglobin A1c; Future    3. Abnormal finding of blood chemistry, unspecified  -     Ferritin  -     Hemoglobin A1c; Future    4. Cerebral meningioma       At this time we did discuss the results of the EMG study.    I would like to start patient on alpha lipoic acid for peripheral neuropathy.    We will complete full neuropathic workup to look for causes of peripheral neuropathy.    We did discuss the role of blood sugars even in the prediabetic range as potential cause for symptoms and encouraged  lifestyle modification and follow-up with primary care.    In addition I would like to also plan for 1 year follow-up on the cerebral meningioma with MRI brain with and without contrast.    Follow-up in 1 month or sooner if needed to discuss laboratory results.            Meri ESPINAL    Neurology    [facility] Neurology [place]    Phone: [phone]    7/8/2025 , 15:04 EDT

## 2025-07-09 ENCOUNTER — RESULTS FOLLOW-UP (OUTPATIENT)
Dept: NEUROLOGY | Facility: CLINIC | Age: 75
End: 2025-07-09
Payer: MEDICARE

## 2025-07-09 LAB
ALBUMIN SERPL ELPH-MCNC: 3.5 G/DL (ref 2.9–4.4)
ALBUMIN/GLOB SERPL: 1.3 {RATIO} (ref 0.7–1.7)
ALPHA1 GLOB SERPL ELPH-MCNC: 0.2 G/DL (ref 0–0.4)
ALPHA2 GLOB SERPL ELPH-MCNC: 0.9 G/DL (ref 0.4–1)
B-GLOBULIN SERPL ELPH-MCNC: 1 G/DL (ref 0.7–1.3)
ENA SS-A AB SER-ACNC: <0.2 AI (ref 0–0.9)
ENA SS-B AB SER-ACNC: <0.2 AI (ref 0–0.9)
GAMMA GLOB SERPL ELPH-MCNC: 0.7 G/DL (ref 0.4–1.8)
GLIADIN PEPTIDE IGA SER-ACNC: 11 UNITS (ref 0–19)
GLOBULIN SER CALC-MCNC: 2.8 G/DL (ref 2.2–3.9)
IGA SERPL-MCNC: 168 MG/DL (ref 64–422)
KAPPA LC FREE SER-MCNC: 15.6 MG/L (ref 3.3–19.4)
KAPPA LC FREE/LAMBDA FREE SER: 1.38 {RATIO} (ref 0.26–1.65)
LABORATORY COMMENT REPORT: NORMAL
LAMBDA LC FREE SERPL-MCNC: 11.3 MG/L (ref 5.7–26.3)
M PROTEIN SERPL ELPH-MCNC: NORMAL G/DL
PROT PATTERN SERPL ELPH-IMP: NORMAL
PROT SERPL-MCNC: 6.3 G/DL (ref 6–8.5)
RPR SER QL: NORMAL
TTG IGA SER-ACNC: <2 U/ML (ref 0–3)

## 2025-07-09 NOTE — PROGRESS NOTES
Wanted to share with you. Patient tested positive for peripheral neuropathy. It would probably be helpful to be aggressive in managing her blood sugars.

## 2025-07-10 NOTE — PROGRESS NOTES
We are starting to see neuropathy emerging even with the prediabetic patients especially as they are transitioning into a diabetic phase.  Her testing is consistent with that of a diabetic neuropathy.  I do think that something like her metformin would be very helpful.  Let me know if you have any other questions.

## 2025-07-11 LAB
COPPER SERPL-MCNC: 104 UG/DL (ref 80–158)
VIT B1 BLD-SCNC: 154.9 NMOL/L (ref 66.5–200)

## 2025-07-12 LAB
ARSENIC BLD-MCNC: 2 UG/L (ref 0–9)
LEAD BLDV-MCNC: 1.2 UG/DL (ref 0–3.4)
MERCURY BLD-MCNC: <1 UG/L (ref 0–14.9)
METHYLMALONATE SERPL-SCNC: 138 NMOL/L (ref 0–378)

## 2025-07-13 LAB
A-TOCOPHEROL VIT E SERPL-MCNC: 11.6 MG/L (ref 9–29)
GAMMA TOCOPHEROL SERPL-MCNC: 0.9 MG/L (ref 0.5–4.9)

## 2025-07-14 LAB — PYRIDOXAL PHOS SERPL-MCNC: 16.5 UG/L (ref 3.4–65.2)

## 2025-07-28 RX ORDER — OMEPRAZOLE 40 MG/1
40 CAPSULE, DELAYED RELEASE ORAL DAILY
Qty: 90 CAPSULE | Refills: 3 | Status: SHIPPED | OUTPATIENT
Start: 2025-07-28

## (undated) DEVICE — SYRINGE,CONTROL,LL,FINGER,GRIP: Brand: MEDLINE INDUSTRIES, INC.

## (undated) DEVICE — NDL HYPO PRECISIONGLIDE REG 25G 1 1/2

## (undated) DEVICE — ANTIBACTERIAL UNDYED BRAIDED (POLYGLACTIN 910), SYNTHETIC ABSORBABLE SUTURE: Brand: COATED VICRYL

## (undated) DEVICE — GOWN ISOL W/THUMB UNIV BLU BX/15

## (undated) DEVICE — UNDYED BRAIDED (POLYGLACTIN 910), SYNTHETIC ABSORBABLE SUTURE: Brand: COATED VICRYL

## (undated) DEVICE — INTENDED FOR TISSUE SEPARATION, AND OTHER PROCEDURES THAT REQUIRE A SHARP SURGICAL BLADE TO PUNCTURE OR CUT.: Brand: BARD-PARKER ® CARBON RIB-BACK BLADES

## (undated) DEVICE — VIAL FORMLN CAP 10PCT 20ML

## (undated) DEVICE — TUBING, SUCTION, 1/4" X 20', STRAIGHT: Brand: MEDLINE INDUSTRIES, INC.

## (undated) DEVICE — Device

## (undated) DEVICE — NEEDLE, QUINCKE, 20GX3.5": Brand: MEDLINE

## (undated) DEVICE — 3M™ STERI-STRIP™ REINFORCED ADHESIVE SKIN CLOSURES, R1547, 1/2 IN X 4 IN (12 MM X 100 MM), 6 STRIPS/ENVELOPE: Brand: 3M™ STERI-STRIP™

## (undated) DEVICE — PATIENT RETURN ELECTRODE, SINGLE-USE, CONTACT QUALITY MONITORING, ADULT, WITH 9FT CORD, FOR PATIENTS WEIGING OVER 33LBS. (15KG): Brand: MEGADYNE

## (undated) DEVICE — GLV SURG TRIUMPH PF LTX 7.5 STRL

## (undated) DEVICE — BITEBLOCK OMNI BLOC

## (undated) DEVICE — SUT VIC 2/0 TIES 18IN J111T

## (undated) DEVICE — TRAP FLD MINIVAC MEGADYNE 100ML

## (undated) DEVICE — EPIDURAL TRAY: Brand: MEDLINE INDUSTRIES, INC.

## (undated) DEVICE — WIPE INST MEROCEL

## (undated) DEVICE — DGW .035 FC J3MM 260CM TEF: Brand: EMERALD

## (undated) DEVICE — MARKR SKIN W/RULR AND LBL

## (undated) DEVICE — APPL CHLORAPREP HI/LITE 26ML ORNG

## (undated) DEVICE — CONN TBG Y 5 IN 1 LF STRL

## (undated) DEVICE — THE VASC BAND HEMOSTAT IS A COMPRESSION DEVICE TO ASSIST HEMOSTASIS OF ARTERIAL, VENOUS AND HEMODIALYSIS PERCUTANEOUS ACCESS SITES.: Brand: VASC BAND™ HEMOSTAT

## (undated) DEVICE — JACKSON-PRATT 100CC BULB RESERVOIR: Brand: CARDINAL HEALTH

## (undated) DEVICE — CROUCH CORNEAL PROTECTOR: Brand: BAUSCH + LOMB

## (undated) DEVICE — NDL SPINE 22G 5IN BLK

## (undated) DEVICE — KT MANIFLD CARDIAC

## (undated) DEVICE — ENCORE® LATEX ORTHO SIZE 8, STERILE LATEX POWDER-FREE SURGICAL GLOVE: Brand: ENCORE

## (undated) DEVICE — SPNG GZ WOVN 4X4IN 12PLY 10/BX STRL

## (undated) DEVICE — SUT VIC 3/0 TIES 18IN J110T

## (undated) DEVICE — STPLR SKIN VISISTAT WD 35CT

## (undated) DEVICE — APPL CHLORAPREP W/TINT 26ML ORNG

## (undated) DEVICE — SNAR POLYP CAPTIVATOR CRSNT 27MM 240CM

## (undated) DEVICE — SUT GUT PLN FAST ABS 5/0 PC1 18IN 1915G

## (undated) DEVICE — LOU PACE DEFIB: Brand: MEDLINE INDUSTRIES, INC.

## (undated) DEVICE — Device: Brand: PORTEX

## (undated) DEVICE — COMB 7IN BLK STRL

## (undated) DEVICE — CANN NASL CO2 TRULINK W/O2 A/

## (undated) DEVICE — GLV SURG BIOGEL LTX PF 7

## (undated) DEVICE — PK NEURO SPINE 40

## (undated) DEVICE — FEMORAL ENTRY ANGIOGRAPHY SHIELD-YELLOW: Brand: RADPAD

## (undated) DEVICE — ELECTRD NDL EZ CLN MOD 2.75IN

## (undated) DEVICE — LABEL SHEET CUSTOM 2X2 YELLOW: Brand: MEDLINE INDUSTRIES, INC.

## (undated) DEVICE — FLEX ADVANTAGE 1500CC: Brand: FLEX ADVANTAGE

## (undated) DEVICE — GLV SURG TRIUMPH PF LTX 7 STRL

## (undated) DEVICE — COVER,LIGHT HANDLE,FLX,2/PK: Brand: MEDLINE INDUSTRIES, INC.

## (undated) DEVICE — TBG PENCL TELESCP MEGADYNE SMOKE EVAC 10FT

## (undated) DEVICE — ADHS LIQ MASTISOL 2/3ML

## (undated) DEVICE — PIN SFTY LG LF

## (undated) DEVICE — SUREFIT, DUAL DISPERSIVE ELECTRODE, CONTACT QUALITY MONITOR: Brand: SUREFIT

## (undated) DEVICE — SINGLE-USE POLYPECTOMY SNARE: Brand: SENSATION SHORT THROW

## (undated) DEVICE — BNDG ELAS ELITE V/CLOSE 6IN 5YD LF STRL

## (undated) DEVICE — CVR TRANSD CIV FLX TPR 11.9 TO 3.8X61CM

## (undated) DEVICE — CATH DIAG IMPULSE FL5 5F 100CM

## (undated) DEVICE — GLV SURG SENSICARE W/ALOE PF LF 7.5 STRL

## (undated) DEVICE — PENCL E/S HNDSWTCH SMOKEEVAC HOLSTR 10FT

## (undated) DEVICE — GOWN ,SIRUS,NONREINFORCED 3XL: Brand: MEDLINE

## (undated) DEVICE — COVER,C-ARM,41X74: Brand: MEDLINE

## (undated) DEVICE — PK ENT 40

## (undated) DEVICE — SINGLE-USE BIOPSY FORCEPS: Brand: RADIAL JAW 4

## (undated) DEVICE — CONTAINER,SPECIMEN,OR STERILE,4OZ: Brand: MEDLINE

## (undated) DEVICE — ELECTRD BLD EZ CLN MOD 2.5IN

## (undated) DEVICE — SPONGE,NEURO,.75"X.75",XR,STRL,LF,10/PK: Brand: MEDLINE

## (undated) DEVICE — KT ORCA ORCAPOD DISP STRL

## (undated) DEVICE — 3M™ STERI-STRIP™ COMPOUND BENZOIN TINCTURE 40 BAGS/CARTON 4 CARTONS/CASE C1544: Brand: 3M™ STERI-STRIP™

## (undated) DEVICE — GLV SURG BIOGEL SENSR LTX PF SZ7.5

## (undated) DEVICE — SPNG LAP 18X18IN LF STRL PK/5

## (undated) DEVICE — CATH DIAG IMPULSE FL3.5 5F 100CM

## (undated) DEVICE — 3M™ STERI-STRIP™ ANTIMICROBIAL SKIN CLOSURES 1/2 INCH X 4 INCHES 50/CARTON 4 CARTONS/CASE A1847: Brand: 3M™ STERI-STRIP™

## (undated) DEVICE — NDL SPINE 22G 31/2IN BLK

## (undated) DEVICE — TOOL MR8-15MH30 MR8 15CM MATCH 3MM: Brand: MIDAS REX MR8

## (undated) DEVICE — PK UNIV COMPL 40

## (undated) DEVICE — 1010 S-DRAPE TOWEL DRAPE 10/BX: Brand: STERI-DRAPE™

## (undated) DEVICE — STERILE COTTON TIP 6IN 10PK: Brand: MEDLINE

## (undated) DEVICE — PK CATH CARD 40

## (undated) DEVICE — IRRIGATOR BULB ASEPTO 60CC STRL

## (undated) DEVICE — ULTRACLEAN ACCESSORY ELECTRODE 1" (2.54 CM) COATED BLADE: Brand: ULTRACLEAN

## (undated) DEVICE — 100% SILICONE URINE METER FOLEY TRAY,16 FR/CH (5.3 MM), 5 ML CATHETER PRE-CONNECTED TO 2000 ML DRAINAGE BAG WITH LUER-LOCK SAMPLING: Brand: DOVER

## (undated) DEVICE — TOOL MR8-15BA50T MR8 15CM BAL SYMTRI 5MM: Brand: MIDAS REX MR8

## (undated) DEVICE — COVER,MAYO STAND,STERILE: Brand: MEDLINE

## (undated) DEVICE — 6 ML SYRINGE LUER-LOCK TIP: Brand: MONOJECT

## (undated) DEVICE — TRAP POLYP 4CHAMBER

## (undated) DEVICE — BLUE PROCEDURE GOWN; X-LARGE; BLUE: Brand: CONVERTORS

## (undated) DEVICE — SWABSTK SKINPREP PVPI LF PK/3

## (undated) DEVICE — SOL NACL 0.9PCT 100ML SGL

## (undated) DEVICE — GLIDESHEATH SLENDER STAINLESS STEEL KIT: Brand: GLIDESHEATH SLENDER

## (undated) DEVICE — SUT SILK 2/0 FS BLK 18IN 685G

## (undated) DEVICE — STERILE TOOTHPICK SET: Brand: CENTURION

## (undated) DEVICE — CATH DIAG DXTERITY ULTRA TRANSRADIAL 4.0 5F 100CM 0/SH

## (undated) DEVICE — MEDI-VAC YANKAUER SUCTION HANDLE W/BULBOUS TIP: Brand: CARDINAL HEALTH

## (undated) DEVICE — GOWN,SIRUS,NON REINFRCD,LARGE,SET IN SL: Brand: MEDLINE

## (undated) DEVICE — DRP MICROSCP LEICA 137X381CM

## (undated) DEVICE — SUT VIC 5/0 P3 18IN J493G

## (undated) DEVICE — SUT PROLN 5/0 P3 18IN 8698G